# Patient Record
Sex: MALE | Race: BLACK OR AFRICAN AMERICAN | NOT HISPANIC OR LATINO | Employment: OTHER | URBAN - METROPOLITAN AREA
[De-identification: names, ages, dates, MRNs, and addresses within clinical notes are randomized per-mention and may not be internally consistent; named-entity substitution may affect disease eponyms.]

---

## 2017-01-09 RX ORDER — RISPERIDONE 1 MG/1
TABLET ORAL
Qty: 90 TABLET | Refills: 2 | Status: SHIPPED | OUTPATIENT
Start: 2017-01-09 | End: 2017-09-08

## 2017-01-25 RX ORDER — ALBUTEROL SULFATE 108 UG/1
AEROSOL, METERED RESPIRATORY (INHALATION)
Qty: 6.7 G | Refills: 0 | Status: SHIPPED | OUTPATIENT
Start: 2017-01-25 | End: 2017-03-06 | Stop reason: SDUPTHER

## 2017-01-31 RX ORDER — RISPERIDONE 1 MG/1
TABLET ORAL
Qty: 30 TABLET | Refills: 5 | Status: SHIPPED | OUTPATIENT
Start: 2017-01-31 | End: 2017-09-08 | Stop reason: SDUPTHER

## 2017-03-07 RX ORDER — ALBUTEROL SULFATE 108 UG/1
AEROSOL, METERED RESPIRATORY (INHALATION)
Qty: 6.7 G | Refills: 0 | Status: SHIPPED | OUTPATIENT
Start: 2017-03-07 | End: 2017-03-31 | Stop reason: SDUPTHER

## 2017-03-31 RX ORDER — ALBUTEROL SULFATE 108 UG/1
AEROSOL, METERED RESPIRATORY (INHALATION)
Qty: 6.7 G | Refills: 0 | Status: SHIPPED | OUTPATIENT
Start: 2017-03-31 | End: 2017-05-15 | Stop reason: SDUPTHER

## 2017-05-15 RX ORDER — ALBUTEROL SULFATE 90 UG/1
AEROSOL, METERED RESPIRATORY (INHALATION)
Qty: 6.7 G | Refills: 0 | Status: SHIPPED | OUTPATIENT
Start: 2017-05-15 | End: 2017-06-14 | Stop reason: SDUPTHER

## 2017-06-15 RX ORDER — ALBUTEROL SULFATE 90 UG/1
AEROSOL, METERED RESPIRATORY (INHALATION)
Qty: 6.7 G | Refills: 0 | Status: SHIPPED | OUTPATIENT
Start: 2017-06-15 | End: 2017-09-02 | Stop reason: SDUPTHER

## 2017-07-10 DIAGNOSIS — Z12.11 COLON CANCER SCREENING: ICD-10-CM

## 2017-08-15 RX ORDER — LISINOPRIL AND HYDROCHLOROTHIAZIDE 12.5; 2 MG/1; MG/1
2 TABLET ORAL DAILY
Qty: 60 TABLET | Refills: 2 | Status: SHIPPED | OUTPATIENT
Start: 2017-08-15 | End: 2018-03-14 | Stop reason: SDUPTHER

## 2017-09-05 RX ORDER — ALBUTEROL SULFATE 90 UG/1
AEROSOL, METERED RESPIRATORY (INHALATION)
Qty: 18 G | Refills: 0 | Status: SHIPPED | OUTPATIENT
Start: 2017-09-05 | End: 2017-10-30 | Stop reason: SDUPTHER

## 2017-09-08 DIAGNOSIS — Z12.11 COLON CANCER SCREENING: ICD-10-CM

## 2017-09-08 RX ORDER — RISPERIDONE 1 MG/1
TABLET ORAL
Qty: 90 TABLET | Refills: 0 | Status: SHIPPED | OUTPATIENT
Start: 2017-09-08 | End: 2018-03-14 | Stop reason: SDUPTHER

## 2017-10-09 RX ORDER — ALBUTEROL SULFATE 90 UG/1
AEROSOL, METERED RESPIRATORY (INHALATION)
Qty: 18 G | Refills: 0 | OUTPATIENT
Start: 2017-10-09

## 2017-10-09 RX ORDER — RISPERIDONE 1 MG/1
TABLET ORAL
Qty: 90 TABLET | Refills: 5 | Status: SHIPPED | OUTPATIENT
Start: 2017-10-09 | End: 2018-03-14

## 2017-10-30 RX ORDER — ALBUTEROL SULFATE 90 UG/1
AEROSOL, METERED RESPIRATORY (INHALATION)
Qty: 18 G | Refills: 1 | Status: SHIPPED | OUTPATIENT
Start: 2017-10-30 | End: 2018-03-20

## 2017-10-30 NOTE — TELEPHONE ENCOUNTER
----- Message from Jonathan Lu sent at 10/27/2017  4:14 PM CDT -----  Contact: Pt       _x  1st Request  _  2nd Request  _  3rd Request    Please refill the medication(s) listed below. Please call the patient when the prescription(s) is ready for  at this phone number     [phone]       Medication #1: VENTOLIN HFA 90 mcg/actuation inhaler       Preferred Pharmacy:[facility] [address] [place] [address] [place]NAL  AT SEC of [address]

## 2017-11-06 RX ORDER — FUROSEMIDE 40 MG/1
TABLET ORAL
Qty: 90 TABLET | Refills: 10 | Status: SHIPPED | OUTPATIENT
Start: 2017-11-06 | End: 2018-03-14 | Stop reason: SDUPTHER

## 2017-11-07 ENCOUNTER — PATIENT OUTREACH (OUTPATIENT)
Dept: ADMINISTRATIVE | Facility: HOSPITAL | Age: 52
End: 2017-11-07

## 2017-11-07 NOTE — PROGRESS NOTES
Pre chart for visit with Dr. Ramos on 11/10/17. Health maintenance letter mailed to pt. [facility] is committed to your overall health.  To help you get the most out of each of your visits, we will review your information to make sure you are up to date on all of your recommended tests and/or procedures.      Dr. Ramos has found that your chart shows you may be due for:    Health Maintenance Due   Topic Date Due    Colonoscopy  01/20/2015    Influenza Vaccine  08/01/2017          If you have had any of the above done at another facility, please bring the records or information with you so that your record at [facility] will be complete.  If you would like to schedule any of these, please contact me.    If you are currently taking medication, please bring it with you to your appointment for review.    Also, if you have any type of Advanced Directives, please bring them with you to your office visit so we may scan them into your chart.    Mary Rosales LPN  Clinic Care Coordinator  Internal Medicine  [facility]  [address]  [place] [address]  [phone]

## 2018-02-12 RX ORDER — FUROSEMIDE 40 MG/1
TABLET ORAL
Qty: 30 TABLET | Refills: 0 | Status: SHIPPED | OUTPATIENT
Start: 2018-02-12 | End: 2018-03-14

## 2018-03-14 ENCOUNTER — LAB VISIT (OUTPATIENT)
Dept: LAB | Facility: OTHER | Age: 53
End: 2018-03-14
Attending: FAMILY MEDICINE
Payer: MEDICARE

## 2018-03-14 ENCOUNTER — OFFICE VISIT (OUTPATIENT)
Dept: INTERNAL MEDICINE | Facility: CLINIC | Age: 53
End: 2018-03-14
Attending: FAMILY MEDICINE
Payer: MEDICARE

## 2018-03-14 VITALS
WEIGHT: 248.25 LBS | BODY MASS INDEX: 39.9 KG/M2 | HEART RATE: 101 BPM | SYSTOLIC BLOOD PRESSURE: 136 MMHG | OXYGEN SATURATION: 98 % | HEIGHT: 66 IN | DIASTOLIC BLOOD PRESSURE: 74 MMHG

## 2018-03-14 DIAGNOSIS — I10 HYPERTENSION, ESSENTIAL: ICD-10-CM

## 2018-03-14 DIAGNOSIS — H54.7 BLIND: ICD-10-CM

## 2018-03-14 DIAGNOSIS — Z00.00 PREVENTATIVE HEALTH CARE: ICD-10-CM

## 2018-03-14 DIAGNOSIS — Z00.00 PREVENTATIVE HEALTH CARE: Primary | ICD-10-CM

## 2018-03-14 DIAGNOSIS — H91.93 DEAF, BILATERAL: ICD-10-CM

## 2018-03-14 LAB
ALBUMIN SERPL BCP-MCNC: 3.2 G/DL
ALP SERPL-CCNC: 97 U/L
ALT SERPL W/O P-5'-P-CCNC: 16 U/L
ANION GAP SERPL CALC-SCNC: 12 MMOL/L
AST SERPL-CCNC: 14 U/L
BASOPHILS # BLD AUTO: 0.01 K/UL
BASOPHILS NFR BLD: 0.1 %
BILIRUB SERPL-MCNC: 0.4 MG/DL
BUN SERPL-MCNC: 25 MG/DL
CALCIUM SERPL-MCNC: 9.1 MG/DL
CHLORIDE SERPL-SCNC: 96 MMOL/L
CHOLEST SERPL-MCNC: 168 MG/DL
CHOLEST/HDLC SERPL: 3.4 {RATIO}
CO2 SERPL-SCNC: 31 MMOL/L
CREAT SERPL-MCNC: 2.3 MG/DL
DIFFERENTIAL METHOD: ABNORMAL
EOSINOPHIL # BLD AUTO: 0.3 K/UL
EOSINOPHIL NFR BLD: 2.2 %
ERYTHROCYTE [DISTWIDTH] IN BLOOD BY AUTOMATED COUNT: 19.1 %
EST. GFR  (AFRICAN AMERICAN): 36 ML/MIN/1.73 M^2
EST. GFR  (NON AFRICAN AMERICAN): 31 ML/MIN/1.73 M^2
ESTIMATED AVG GLUCOSE: 131 MG/DL
GLUCOSE SERPL-MCNC: 124 MG/DL
HBA1C MFR BLD HPLC: 6.2 %
HCT VFR BLD AUTO: 35.4 %
HDLC SERPL-MCNC: 50 MG/DL
HDLC SERPL: 29.8 %
HGB BLD-MCNC: 10.7 G/DL
LDLC SERPL CALC-MCNC: 90.8 MG/DL
LYMPHOCYTES # BLD AUTO: 1.1 K/UL
LYMPHOCYTES NFR BLD: 8 %
MCH RBC QN AUTO: 24.5 PG
MCHC RBC AUTO-ENTMCNC: 30.2 G/DL
MCV RBC AUTO: 81 FL
MONOCYTES # BLD AUTO: 0.5 K/UL
MONOCYTES NFR BLD: 3.5 %
NEUTROPHILS # BLD AUTO: 12 K/UL
NEUTROPHILS NFR BLD: 86 %
NONHDLC SERPL-MCNC: 118 MG/DL
PLATELET # BLD AUTO: 447 K/UL
PMV BLD AUTO: 9.5 FL
POTASSIUM SERPL-SCNC: 3.5 MMOL/L
PROT SERPL-MCNC: 9.2 G/DL
RBC # BLD AUTO: 4.36 M/UL
SODIUM SERPL-SCNC: 139 MMOL/L
TRIGL SERPL-MCNC: 136 MG/DL
TSH SERPL DL<=0.005 MIU/L-ACNC: 1.93 UIU/ML
WBC # BLD AUTO: 13.92 K/UL

## 2018-03-14 PROCEDURE — 99396 PREV VISIT EST AGE 40-64: CPT | Mod: S$GLB,,, | Performed by: FAMILY MEDICINE

## 2018-03-14 PROCEDURE — 85025 COMPLETE CBC W/AUTO DIFF WBC: CPT

## 2018-03-14 PROCEDURE — 80061 LIPID PANEL: CPT

## 2018-03-14 PROCEDURE — 80053 COMPREHEN METABOLIC PANEL: CPT

## 2018-03-14 PROCEDURE — 36415 COLL VENOUS BLD VENIPUNCTURE: CPT

## 2018-03-14 PROCEDURE — 83036 HEMOGLOBIN GLYCOSYLATED A1C: CPT

## 2018-03-14 PROCEDURE — 99499 UNLISTED E&M SERVICE: CPT | Mod: S$GLB,,, | Performed by: FAMILY MEDICINE

## 2018-03-14 PROCEDURE — 99999 PR PBB SHADOW E&M-EST. PATIENT-LVL III: CPT | Mod: PBBFAC,,, | Performed by: FAMILY MEDICINE

## 2018-03-14 PROCEDURE — 84443 ASSAY THYROID STIM HORMONE: CPT

## 2018-03-14 RX ORDER — LISINOPRIL AND HYDROCHLOROTHIAZIDE 12.5; 2 MG/1; MG/1
2 TABLET ORAL DAILY
Qty: 60 TABLET | Refills: 2 | Status: SHIPPED | OUTPATIENT
Start: 2018-03-14 | End: 2018-07-19 | Stop reason: SDUPTHER

## 2018-03-14 RX ORDER — ALBUTEROL SULFATE 0.63 MG/3ML
0.63 SOLUTION RESPIRATORY (INHALATION) EVERY 6 HOURS PRN
Qty: 1 BOX | Refills: 2 | Status: SHIPPED | OUTPATIENT
Start: 2018-03-14 | End: 2021-01-14

## 2018-03-14 RX ORDER — RISPERIDONE 1 MG/1
TABLET ORAL
Qty: 90 TABLET | Refills: 0 | Status: SHIPPED | OUTPATIENT
Start: 2018-03-14 | End: 2019-05-24 | Stop reason: SDUPTHER

## 2018-03-14 RX ORDER — FUROSEMIDE 40 MG/1
TABLET ORAL
Qty: 90 TABLET | Refills: 10 | Status: SHIPPED | OUTPATIENT
Start: 2018-03-14 | End: 2019-03-25 | Stop reason: SDUPTHER

## 2018-03-14 NOTE — PROGRESS NOTES
"CHIEF COMPLAINT:  Annual    HISTORY OF PRESENT ILLNESS: The patient is a fairly healthy 53 year-old male.  He is blind and deaf.  She was seen about 18 months ago.  He is seen with his family today.  The patient has been doing well overall.  He is quite active normally.      Since the last time I've seen him he has been doing very well.  His episode of bilateral lower extremity edema most consistent with congestive heart failure has responded to diuresis.  No edema is present currently.    REVIEW OF SYSTEMS:  GENERAL: No fever, chills, fatigability or weight loss.  SKIN: No rashes, itching or changes in color or texture of skin.  HEAD: No headaches or recent head trauma.  EYES: He is blind bilaterally.  EARS: Denies ear pain, discharge or vertigo.  He is deaf bilaterally  NOSE: No loss of smell, no epistaxis or postnasal drip.  MOUTH & THROAT: No hoarseness or change in voice. No excessive gum bleeding.  NODES: Denies swollen glands.  CHEST: Denies LI, cyanosis, cough and sputum production.  CARDIOVASCULAR: Denies chest pain, PND, orthopnea or reduced exercise tolerance.  ABDOMEN: Appetite fine. No weight loss. Denies diarrhea, abdominal pain, hematemesis.  URINARY: No flank pain, dysuria or hematuria.  PERIPHERAL VASCULAR: No claudication or cyanosis.  MUSCULOSKELETAL: No joint stiffness. Denies back pain.  NEUROLOGIC: No history of seizures, paralysis, alteration of gait or coordination.    SOCIAL HISTORY: The patient does not smoke.  The patient consumes alcohol socially.  The patient is disabled and lives with his family.    PHYSICAL EXAMINATION:     Blood pressure 136/74, pulse 101, height 5' 6" (1.676 m), weight 112.6 kg (248 lb 3.8 oz), SpO2 98 %.    APPEARANCE: Well nourished, well developed, in no acute distress.    HEAD: Normocephalic, atraumatic.  EYES: Changes consistent with bilateral corneal transplants and a pacer is noted  EARS: TM's intact. Light reflex normal. No retraction or perforation.  "   NOSE: Mucosa pink. Airway clear.  MOUTH & THROAT: No tonsillar enlargement. No pharyngeal erythema or exudate. No stridor.  NECK: Supple.   NODES: No cervical, axillary or inguinal lymph node enlargement.  CHEST: Lungs clear to auscultation.  No retractions are noted.  No rales or rhonchi are present.  CARDIOVASCULAR: Normal S1, S2. No rubs, murmurs or gallops.  ABDOMEN: Bowel sounds normal. Not distended. Soft. No tenderness or masses.  No ascites is noted.  MUSCULOSKELETAL:  There is no clubbing, cyanosis of the extremities x4.  There is 3+ bilateral lower extremity pitting edema to the knees bilaterally.  There is full range of motion of the lumbar spine.  There is full range of motion of the extremities x4.  There is no deformity noted.    NEUROLOGIC:       Normal gait.      Motor and sensory exams grossly normal.      DTR's normal.  PSYCHIATRIC: Patient is alert.  Thought processes are all normal.  There is no homicidality.  There is no suicidality.  There is no evidence of psychosis.    LABORATORY/RADIOLOGY:   Chart reviewed.  We will update blood work today.    ASSESSMENT:  Well compensated CHF  HTN, condition is well controlled on current medication regimen  Extensive psychiatric history, schizophrenia  Blindness  Deaf  Asthma, condition is well controlled on current medication regimen    PLAN:  Lisinopril/HCTZ 40/25 and Lasix 40 mg daily    Return to clinic in one year sooner if problems develop.  He has previously had an elevated creatinine but not sufficient for CK D3.  We will review that w/ a CBC CMP etc.

## 2018-03-15 ENCOUNTER — TELEPHONE (OUTPATIENT)
Dept: INTERNAL MEDICINE | Facility: CLINIC | Age: 53
End: 2018-03-15

## 2018-03-15 DIAGNOSIS — N18.30 CKD (CHRONIC KIDNEY DISEASE), STAGE III: Primary | ICD-10-CM

## 2018-03-15 NOTE — TELEPHONE ENCOUNTER
Left message for pt to call the office in regards to relating results and advice.    Referral pending

## 2018-03-15 NOTE — TELEPHONE ENCOUNTER
----- Message from Jonathan Lu sent at 3/15/2018  2:27 PM CDT -----  Contact: Pt  _x  1st Request  _  2nd Request  _  3rd Request        Who: Pt sister     Why: Returning a call back regarding test results.   Please return the call at earliest convenience. Thanks!    What Number to Call Back:[phone] (H)      When to Expect a call back: (Within 24 hours)

## 2018-03-15 NOTE — TELEPHONE ENCOUNTER
Pt sister informed of results and advice.  States verbal understanding.  Pt scheduled accordingly. There are no further questions or concerns.

## 2018-03-15 NOTE — TELEPHONE ENCOUNTER
----- Message from Zachary Ramos MD sent at 3/15/2018 11:44 AM CDT -----  Blood work looks good overall.  Kidney function has declined a little bit from last blood work.  He would benefit from seeing nephrology.  Please pend a referral for chronic kidney disease stage III.  No changes in medications.  Followup as scheduled with us in about a year.

## 2018-03-20 DIAGNOSIS — J45.909 ASTHMA, UNSPECIFIED ASTHMA SEVERITY, UNSPECIFIED WHETHER COMPLICATED, UNSPECIFIED WHETHER PERSISTENT: Primary | ICD-10-CM

## 2018-03-20 RX ORDER — ALBUTEROL SULFATE 90 UG/1
AEROSOL, METERED RESPIRATORY (INHALATION)
Qty: 18 G | Refills: 1 | Status: SHIPPED | OUTPATIENT
Start: 2018-03-20 | End: 2019-03-25 | Stop reason: SDUPTHER

## 2018-03-20 NOTE — TELEPHONE ENCOUNTER
Pt sister states pt received albuterol nebulizer. Pt generally uses inhaler. Sis would like PCP to authorize inhaler. Please advise/authorize?

## 2018-07-10 DIAGNOSIS — R89.9 ABNORMAL LABORATORY TEST: Primary | ICD-10-CM

## 2018-07-19 DIAGNOSIS — I10 ESSENTIAL HYPERTENSION: Primary | ICD-10-CM

## 2018-07-19 NOTE — TELEPHONE ENCOUNTER
----- Message from Lea Rosa LPN sent at 7/19/2018  2:33 PM CDT -----  Good afternoon. Brady Ledemsa from [facility] just sent an email requesting that the above patient get a refill on his Lisinopril/HCTZ . His last visit was on 3/14/118.    Lea MEYER LPN  Clinical Care Coordinator  Internal Medicine  [facility]

## 2018-07-20 RX ORDER — LISINOPRIL AND HYDROCHLOROTHIAZIDE 12.5; 2 MG/1; MG/1
2 TABLET ORAL DAILY
Qty: 60 TABLET | Refills: 2 | Status: SHIPPED | OUTPATIENT
Start: 2018-07-20 | End: 2018-11-19 | Stop reason: SDUPTHER

## 2018-08-24 ENCOUNTER — TELEPHONE (OUTPATIENT)
Dept: NEPHROLOGY | Facility: CLINIC | Age: 53
End: 2018-08-24

## 2018-08-28 ENCOUNTER — TELEPHONE (OUTPATIENT)
Dept: NEPHROLOGY | Facility: CLINIC | Age: 53
End: 2018-08-28

## 2018-09-04 RX ORDER — LISINOPRIL AND HYDROCHLOROTHIAZIDE 12.5; 2 MG/1; MG/1
TABLET ORAL
Qty: 180 TABLET | Refills: 2 | Status: SHIPPED | OUTPATIENT
Start: 2018-09-04 | End: 2020-08-10 | Stop reason: SDUPTHER

## 2018-09-21 DIAGNOSIS — Z12.11 COLON CANCER SCREENING: ICD-10-CM

## 2018-11-19 DIAGNOSIS — I10 ESSENTIAL HYPERTENSION: ICD-10-CM

## 2018-11-19 RX ORDER — LISINOPRIL AND HYDROCHLOROTHIAZIDE 12.5; 2 MG/1; MG/1
TABLET ORAL
Qty: 60 TABLET | Refills: 0 | Status: SHIPPED | OUTPATIENT
Start: 2018-11-19 | End: 2019-03-27 | Stop reason: SDUPTHER

## 2018-11-19 RX ORDER — LISINOPRIL AND HYDROCHLOROTHIAZIDE 12.5; 2 MG/1; MG/1
TABLET ORAL
Qty: 180 TABLET | Refills: 2 | Status: SHIPPED | OUTPATIENT
Start: 2018-11-19 | End: 2019-12-24

## 2018-12-11 PROBLEM — Z91.89 CANDIDATE FOR STATIN THERAPY DUE TO RISK OF FUTURE CARDIOVASCULAR EVENT: Status: ACTIVE | Noted: 2018-12-11

## 2018-12-11 PROBLEM — M79.606 LEG PAIN: Status: ACTIVE | Noted: 2018-12-11

## 2018-12-11 PROBLEM — N17.9 AKI (ACUTE KIDNEY INJURY): Status: ACTIVE | Noted: 2018-12-11

## 2019-03-13 DIAGNOSIS — I10 ESSENTIAL HYPERTENSION: ICD-10-CM

## 2019-03-13 RX ORDER — LISINOPRIL AND HYDROCHLOROTHIAZIDE 12.5; 2 MG/1; MG/1
2 TABLET ORAL DAILY
Qty: 180 TABLET | Refills: 2 | OUTPATIENT
Start: 2019-03-13

## 2019-03-13 RX ORDER — LISINOPRIL AND HYDROCHLOROTHIAZIDE 12.5; 2 MG/1; MG/1
2 TABLET ORAL DAILY
Qty: 180 TABLET | Refills: 2 | Status: CANCELLED | OUTPATIENT
Start: 2019-03-13

## 2019-03-25 DIAGNOSIS — J45.909 ASTHMA, UNSPECIFIED ASTHMA SEVERITY, UNSPECIFIED WHETHER COMPLICATED, UNSPECIFIED WHETHER PERSISTENT: ICD-10-CM

## 2019-03-25 RX ORDER — ALBUTEROL SULFATE 90 UG/1
AEROSOL, METERED RESPIRATORY (INHALATION)
Qty: 18 G | Refills: 0 | Status: SHIPPED | OUTPATIENT
Start: 2019-03-25 | End: 2019-12-24

## 2019-03-26 RX ORDER — FUROSEMIDE 40 MG/1
TABLET ORAL
Qty: 90 TABLET | Refills: 0 | Status: SHIPPED | OUTPATIENT
Start: 2019-03-26 | End: 2019-06-30 | Stop reason: SDUPTHER

## 2019-03-27 DIAGNOSIS — I10 ESSENTIAL HYPERTENSION: ICD-10-CM

## 2019-03-27 RX ORDER — LISINOPRIL AND HYDROCHLOROTHIAZIDE 12.5; 2 MG/1; MG/1
2 TABLET ORAL DAILY
Qty: 60 TABLET | Refills: 0 | Status: SHIPPED | OUTPATIENT
Start: 2019-03-27 | End: 2019-11-20 | Stop reason: SDUPTHER

## 2019-03-27 NOTE — TELEPHONE ENCOUNTER
----- Message from Mony Lara sent at 3/27/2019  4:43 PM CDT -----  Contact: CALVIN TORRES JR. [[id number]]  Can the clinic reply in [facility]:n    Please refill the medication(s) listed below.     Please call the patient when the prescription(s) is ready for  at the phone number   [phone]    Medication #1:lisinopril-hydrochlorothiazide (PRINZIDE,ZESTORETIC) 20-12.5 mg per tablet      Preferred Pharmacy:[facility] [address] [place] [address] AT [facility] OF [address] [phone]

## 2019-04-10 ENCOUNTER — PES CALL (OUTPATIENT)
Dept: ADMINISTRATIVE | Facility: CLINIC | Age: 54
End: 2019-04-10

## 2019-05-06 ENCOUNTER — TELEPHONE (OUTPATIENT)
Dept: INTERNAL MEDICINE | Facility: CLINIC | Age: 54
End: 2019-05-06

## 2019-05-06 NOTE — TELEPHONE ENCOUNTER
----- Message from Jun Sweeney sent at 5/6/2019 10:28 AM CDT -----  Contact: Wendy (sister)  Name of Who is Calling: Wendy (sister)      What is the request in detail: Would like to speak with staff in regards to needing a letter for [facility], fax [phone] Attn: Vanessa Valencia; with patient's intellectual disabilities, diagnosis, include limitation ie shopping, ADL's, make appt. Please call to discuss.       Can the clinic reply by [facility]: no      What Number to Call Back if not in [facility]: [phone]

## 2019-05-09 ENCOUNTER — LAB VISIT (OUTPATIENT)
Dept: LAB | Facility: OTHER | Age: 54
End: 2019-05-09
Attending: FAMILY MEDICINE
Payer: MEDICARE

## 2019-05-09 ENCOUNTER — OFFICE VISIT (OUTPATIENT)
Dept: INTERNAL MEDICINE | Facility: CLINIC | Age: 54
End: 2019-05-09
Attending: FAMILY MEDICINE
Payer: MEDICARE

## 2019-05-09 VITALS
WEIGHT: 202.38 LBS | BODY MASS INDEX: 33.72 KG/M2 | SYSTOLIC BLOOD PRESSURE: 132 MMHG | HEART RATE: 114 BPM | OXYGEN SATURATION: 95 % | HEIGHT: 65 IN | DIASTOLIC BLOOD PRESSURE: 88 MMHG

## 2019-05-09 DIAGNOSIS — H54.7 BLIND: ICD-10-CM

## 2019-05-09 DIAGNOSIS — H91.93 DEAF, BILATERAL: ICD-10-CM

## 2019-05-09 DIAGNOSIS — I10 ESSENTIAL HYPERTENSION: ICD-10-CM

## 2019-05-09 DIAGNOSIS — N18.30 CKD (CHRONIC KIDNEY DISEASE), STAGE III: ICD-10-CM

## 2019-05-09 DIAGNOSIS — Z00.00 PREVENTATIVE HEALTH CARE: Primary | ICD-10-CM

## 2019-05-09 DIAGNOSIS — R79.9 ABNORMAL FINDING OF BLOOD CHEMISTRY: ICD-10-CM

## 2019-05-09 LAB
ALBUMIN SERPL BCP-MCNC: 3.6 G/DL (ref 3.5–5.2)
ALP SERPL-CCNC: 102 U/L (ref 55–135)
ALT SERPL W/O P-5'-P-CCNC: 15 U/L (ref 10–44)
ANION GAP SERPL CALC-SCNC: 11 MMOL/L (ref 8–16)
AST SERPL-CCNC: 11 U/L (ref 10–40)
BASOPHILS # BLD AUTO: 0.02 K/UL (ref 0–0.2)
BASOPHILS NFR BLD: 0.2 % (ref 0–1.9)
BILIRUB SERPL-MCNC: 0.3 MG/DL (ref 0.1–1)
BUN SERPL-MCNC: 43 MG/DL (ref 6–20)
CALCIUM SERPL-MCNC: 10.1 MG/DL (ref 8.7–10.5)
CHLORIDE SERPL-SCNC: 103 MMOL/L (ref 95–110)
CHOLEST SERPL-MCNC: 137 MG/DL (ref 120–199)
CHOLEST/HDLC SERPL: 2.7 {RATIO} (ref 2–5)
CO2 SERPL-SCNC: 30 MMOL/L (ref 23–29)
CREAT SERPL-MCNC: 2.3 MG/DL (ref 0.5–1.4)
DIFFERENTIAL METHOD: ABNORMAL
EOSINOPHIL # BLD AUTO: 0.3 K/UL (ref 0–0.5)
EOSINOPHIL NFR BLD: 2.8 % (ref 0–8)
ERYTHROCYTE [DISTWIDTH] IN BLOOD BY AUTOMATED COUNT: 17.8 % (ref 11.5–14.5)
EST. GFR  (AFRICAN AMERICAN): 36 ML/MIN/1.73 M^2
EST. GFR  (NON AFRICAN AMERICAN): 31 ML/MIN/1.73 M^2
ESTIMATED AVG GLUCOSE: 117 MG/DL (ref 68–131)
GLUCOSE SERPL-MCNC: 81 MG/DL (ref 70–110)
HBA1C MFR BLD HPLC: 5.7 % (ref 4–5.6)
HCT VFR BLD AUTO: 33.8 % (ref 40–54)
HDLC SERPL-MCNC: 50 MG/DL (ref 40–75)
HDLC SERPL: 36.5 % (ref 20–50)
HGB BLD-MCNC: 10.4 G/DL (ref 14–18)
LDLC SERPL CALC-MCNC: 67.8 MG/DL (ref 63–159)
LYMPHOCYTES # BLD AUTO: 1.6 K/UL (ref 1–4.8)
LYMPHOCYTES NFR BLD: 14.6 % (ref 18–48)
MCH RBC QN AUTO: 25.4 PG (ref 27–31)
MCHC RBC AUTO-ENTMCNC: 30.8 G/DL (ref 32–36)
MCV RBC AUTO: 83 FL (ref 82–98)
MONOCYTES # BLD AUTO: 0.6 K/UL (ref 0.3–1)
MONOCYTES NFR BLD: 5.3 % (ref 4–15)
NEUTROPHILS # BLD AUTO: 8.2 K/UL (ref 1.8–7.7)
NEUTROPHILS NFR BLD: 76.9 % (ref 38–73)
NONHDLC SERPL-MCNC: 87 MG/DL
PLATELET # BLD AUTO: 489 K/UL (ref 150–350)
PMV BLD AUTO: 9.6 FL (ref 9.2–12.9)
POTASSIUM SERPL-SCNC: 4 MMOL/L (ref 3.5–5.1)
PROT SERPL-MCNC: 9.1 G/DL (ref 6–8.4)
RBC # BLD AUTO: 4.09 M/UL (ref 4.6–6.2)
SODIUM SERPL-SCNC: 144 MMOL/L (ref 136–145)
TRIGL SERPL-MCNC: 96 MG/DL (ref 30–150)
TSH SERPL DL<=0.005 MIU/L-ACNC: 1.91 UIU/ML (ref 0.4–4)
WBC # BLD AUTO: 10.64 K/UL (ref 3.9–12.7)

## 2019-05-09 PROCEDURE — 85025 COMPLETE CBC W/AUTO DIFF WBC: CPT

## 2019-05-09 PROCEDURE — 80053 COMPREHEN METABOLIC PANEL: CPT

## 2019-05-09 PROCEDURE — 3075F PR MOST RECENT SYSTOLIC BLOOD PRESS GE 130-139MM HG: ICD-10-PCS | Mod: CPTII,S$GLB,, | Performed by: FAMILY MEDICINE

## 2019-05-09 PROCEDURE — 99499 UNLISTED E&M SERVICE: CPT | Mod: S$GLB,,, | Performed by: FAMILY MEDICINE

## 2019-05-09 PROCEDURE — 99499 RISK ADDL DX/OHS AUDIT: ICD-10-PCS | Mod: S$GLB,,, | Performed by: FAMILY MEDICINE

## 2019-05-09 PROCEDURE — 99999 PR PBB SHADOW E&M-EST. PATIENT-LVL III: ICD-10-PCS | Mod: PBBFAC,,, | Performed by: FAMILY MEDICINE

## 2019-05-09 PROCEDURE — 3075F SYST BP GE 130 - 139MM HG: CPT | Mod: CPTII,S$GLB,, | Performed by: FAMILY MEDICINE

## 2019-05-09 PROCEDURE — 84443 ASSAY THYROID STIM HORMONE: CPT

## 2019-05-09 PROCEDURE — 3079F PR MOST RECENT DIASTOLIC BLOOD PRESSURE 80-89 MM HG: ICD-10-PCS | Mod: CPTII,S$GLB,, | Performed by: FAMILY MEDICINE

## 2019-05-09 PROCEDURE — 36415 COLL VENOUS BLD VENIPUNCTURE: CPT

## 2019-05-09 PROCEDURE — 99214 PR OFFICE/OUTPT VISIT, EST, LEVL IV, 30-39 MIN: ICD-10-PCS | Mod: S$GLB,,, | Performed by: FAMILY MEDICINE

## 2019-05-09 PROCEDURE — 99999 PR PBB SHADOW E&M-EST. PATIENT-LVL III: CPT | Mod: PBBFAC,,, | Performed by: FAMILY MEDICINE

## 2019-05-09 PROCEDURE — 80061 LIPID PANEL: CPT

## 2019-05-09 PROCEDURE — 99214 OFFICE O/P EST MOD 30 MIN: CPT | Mod: S$GLB,,, | Performed by: FAMILY MEDICINE

## 2019-05-09 PROCEDURE — 3079F DIAST BP 80-89 MM HG: CPT | Mod: CPTII,S$GLB,, | Performed by: FAMILY MEDICINE

## 2019-05-09 PROCEDURE — 83036 HEMOGLOBIN GLYCOSYLATED A1C: CPT

## 2019-05-09 NOTE — LETTER
May 9, 2019    Frantz Sigala Jr.  [address] [facility] [place] [address]             [facility] [address] [facility] [address]  Phone: [phone]  Fax: [phone] Dear Mr. Sigala:    This letter is to confirm that you are blind and deaf.  You also are treated for schizophrenia.  You have developmental delay and only mild to moderate cognitive function.  You need assistance with self care, ADLs and language.  You will never be able to live independently.    If you have any questions or concerns, please don't hesitate to call.    Sincerely,        Zachary Ramos MD

## 2019-05-09 NOTE — PROGRESS NOTES
"CHIEF COMPLAINT:  Annual    HISTORY OF PRESENT ILLNESS: The patient is a fairly healthy 53 year-old male.  He is blind and deaf.  She was seen about 18 months ago.  He is seen with his family today.  The patient has been doing well overall.  He is quite active normally.      Since the last time I've seen him he has been doing very well.  His episode of bilateral lower extremity edema most consistent with congestive heart failure has responded to diuresis.  No edema is present currently.    REVIEW OF SYSTEMS:  GENERAL: No fever, chills, fatigability or weight loss.  SKIN: No rashes, itching or changes in color or texture of skin.  HEAD: No headaches or recent head trauma.  EYES: He is blind bilaterally.  EARS: Denies ear pain, discharge or vertigo.  He is deaf bilaterally  NOSE: No loss of smell, no epistaxis or postnasal drip.  MOUTH & THROAT: No hoarseness or change in voice. No excessive gum bleeding.  NODES: Denies swollen glands.  CHEST: Denies LI, cyanosis, cough and sputum production.  CARDIOVASCULAR: Denies chest pain, PND, orthopnea or reduced exercise tolerance.  ABDOMEN: Appetite fine. No weight loss. Denies diarrhea, abdominal pain, hematemesis.  URINARY: No flank pain, dysuria or hematuria.  PERIPHERAL VASCULAR: No claudication or cyanosis.  MUSCULOSKELETAL: No joint stiffness. Denies back pain.  NEUROLOGIC: No history of seizures, paralysis, alteration of gait or coordination.    SOCIAL HISTORY: The patient does not smoke.  The patient consumes alcohol socially.  The patient is disabled and lives with his family.    PHYSICAL EXAMINATION:     Blood pressure 132/88, pulse (!) 114, height 5' 5" (1.651 m), weight 91.8 kg (202 lb 6.1 oz), SpO2 95 %.    APPEARANCE: Well nourished, well developed, in no acute distress.    HEAD: Normocephalic, atraumatic.  EYES: Changes consistent with bilateral corneal transplants and a pacer is noted  EARS: TM's intact. Light reflex normal. No retraction or perforation.  "   NOSE: Mucosa pink. Airway clear.  MOUTH & THROAT: No tonsillar enlargement. No pharyngeal erythema or exudate. No stridor.  NECK: Supple.   NODES: No cervical, axillary or inguinal lymph node enlargement.  CHEST: Lungs clear to auscultation.  No retractions are noted.  No rales or rhonchi are present.  CARDIOVASCULAR: Normal S1, S2. No rubs, murmurs or gallops.  ABDOMEN: Bowel sounds normal. Not distended. Soft. No tenderness or masses.  No ascites is noted.  MUSCULOSKELETAL:  There is no clubbing, cyanosis of the extremities x4.  There is 3+ bilateral lower extremity pitting edema to the knees bilaterally.  There is full range of motion of the lumbar spine.  There is full range of motion of the extremities x4.  There is no deformity noted.    NEUROLOGIC:       Normal gait.      Motor and sensory exams grossly normal.  PSYCHIATRIC: Patient is alert.  There is no homicidality.  There is no suicidality.  There is no evidence of psychosis.    LABORATORY/RADIOLOGY:   Chart reviewed.  We will update blood work today.    ASSESSMENT:  Annual  He has stable CK D3.   Well compensated CHF  HTN, condition is well controlled on current medication regimen  Extensive psychiatric history, schizophrenia  Blindness  Deaf  Asthma, condition is well controlled on current medication regimen    PLAN:  We will follow-up blood work which we expect to be normal.    Lisinopril/HCTZ 40/25 and Lasix 40 mg daily  Return to clinic in one year sooner if problems develop.

## 2019-05-10 ENCOUNTER — TELEPHONE (OUTPATIENT)
Dept: INTERNAL MEDICINE | Facility: CLINIC | Age: 54
End: 2019-05-10

## 2019-05-10 NOTE — TELEPHONE ENCOUNTER
Spoke with pts sister, she had a verbal understanding.  ----- Message from Zachary Ramos MD sent at 5/10/2019  8:01 AM CDT -----  Blood work is better than ever.  Kidney function is stable.  No changes in medications.  Followup as scheduled.

## 2019-05-24 RX ORDER — RISPERIDONE 1 MG/1
TABLET ORAL
Qty: 90 TABLET | Refills: 0 | Status: SHIPPED | OUTPATIENT
Start: 2019-05-24 | End: 2019-10-14 | Stop reason: SDUPTHER

## 2019-05-31 DIAGNOSIS — N18.9 CHRONIC KIDNEY DISEASE, UNSPECIFIED CKD STAGE: ICD-10-CM

## 2019-07-01 RX ORDER — FUROSEMIDE 40 MG/1
TABLET ORAL
Qty: 90 TABLET | Refills: 3 | Status: SHIPPED | OUTPATIENT
Start: 2019-07-01 | End: 2020-07-08 | Stop reason: SDUPTHER

## 2019-07-03 ENCOUNTER — TELEPHONE (OUTPATIENT)
Dept: INTERNAL MEDICINE | Facility: CLINIC | Age: 54
End: 2019-07-03

## 2019-07-03 NOTE — TELEPHONE ENCOUNTER
----- Message from Jun Sweeney sent at 7/3/2019  1:57 PM CDT -----  Contact: Wendy (sister)      Can the clinic reply in [facility]: no      Please refill the medication(s) listed below. Please call the patient when the prescription(s) is ready for  at this phone number   [phone]       Medication #1 Flomax    Medication #2 Fluid pill      Preferred Pharmacy:  [facility] [address] [place] [address] [address]  AT [address]

## 2019-09-13 DIAGNOSIS — N18.9 CHRONIC KIDNEY DISEASE, UNSPECIFIED CKD STAGE: ICD-10-CM

## 2019-09-27 DIAGNOSIS — Z12.11 COLON CANCER SCREENING: ICD-10-CM

## 2019-10-14 RX ORDER — RISPERIDONE 1 MG/1
TABLET ORAL
Qty: 90 TABLET | Refills: 0 | Status: SHIPPED | OUTPATIENT
Start: 2019-10-14 | End: 2020-01-13

## 2019-11-20 DIAGNOSIS — I10 ESSENTIAL HYPERTENSION: ICD-10-CM

## 2019-11-20 RX ORDER — LISINOPRIL AND HYDROCHLOROTHIAZIDE 12.5; 2 MG/1; MG/1
TABLET ORAL
Qty: 60 TABLET | Refills: 0 | Status: SHIPPED | OUTPATIENT
Start: 2019-11-20 | End: 2019-12-24

## 2019-11-20 NOTE — TELEPHONE ENCOUNTER
----- Message from Shawna Carolina sent at 11/20/2019  9:15 AM CST -----  Contact: Wendy (sister)  Can the clinic reply in [facility]: no    Please refill the medication(s) listed below. The patient can be reached at this phone number once it is called into the pharmacy [phone]    Medication #1: lisinopril-hydrochlorothiazide (PRINZIDE,ZESTORETIC) 20-12.5 mg per tablet    Preferred Pharmacy: [id number] - [place] [id number] [facility] AT [address]

## 2019-12-10 ENCOUNTER — PATIENT OUTREACH (OUTPATIENT)
Dept: ADMINISTRATIVE | Facility: HOSPITAL | Age: 54
End: 2019-12-10

## 2019-12-10 NOTE — PROGRESS NOTES
[employer] was given to patient on 12/10/2019 11:55 AM       Lea MEYER LPN  Clinical Care Coordinator  Internal Medicine  [facility]  [phone]

## 2019-12-18 ENCOUNTER — PATIENT OUTREACH (OUTPATIENT)
Dept: ADMINISTRATIVE | Facility: OTHER | Age: 54
End: 2019-12-18

## 2019-12-18 NOTE — PROGRESS NOTES
Spoke with patient's sister/caregiver. Patient is blind and deaf. Sister stated that she wanted to hold off until she spoke with patient's mother about scheduling a colonoscopy.

## 2019-12-23 DIAGNOSIS — J45.909 ASTHMA, UNSPECIFIED ASTHMA SEVERITY, UNSPECIFIED WHETHER COMPLICATED, UNSPECIFIED WHETHER PERSISTENT: ICD-10-CM

## 2019-12-23 DIAGNOSIS — I10 ESSENTIAL HYPERTENSION: ICD-10-CM

## 2019-12-24 DIAGNOSIS — I10 ESSENTIAL HYPERTENSION: ICD-10-CM

## 2019-12-24 RX ORDER — LISINOPRIL AND HYDROCHLOROTHIAZIDE 12.5; 2 MG/1; MG/1
TABLET ORAL
Qty: 180 TABLET | Refills: 2 | Status: SHIPPED | OUTPATIENT
Start: 2019-12-24 | End: 2020-08-10 | Stop reason: SDUPTHER

## 2019-12-24 RX ORDER — ALBUTEROL SULFATE 90 UG/1
AEROSOL, METERED RESPIRATORY (INHALATION)
Qty: 18 G | Refills: 0 | Status: SHIPPED | OUTPATIENT
Start: 2019-12-24 | End: 2021-01-14 | Stop reason: SDUPTHER

## 2019-12-24 RX ORDER — LISINOPRIL AND HYDROCHLOROTHIAZIDE 12.5; 2 MG/1; MG/1
TABLET ORAL
Qty: 60 TABLET | Refills: 0 | Status: SHIPPED | OUTPATIENT
Start: 2019-12-24 | End: 2020-08-10 | Stop reason: SDUPTHER

## 2019-12-26 ENCOUNTER — TELEPHONE (OUTPATIENT)
Dept: INTERNAL MEDICINE | Facility: CLINIC | Age: 54
End: 2019-12-26

## 2019-12-26 RX ORDER — LISINOPRIL AND HYDROCHLOROTHIAZIDE 12.5; 2 MG/1; MG/1
TABLET ORAL
Qty: 60 TABLET | Refills: 2 | Status: SHIPPED | OUTPATIENT
Start: 2019-12-26 | End: 2020-08-10 | Stop reason: SDUPTHER

## 2019-12-27 NOTE — TELEPHONE ENCOUNTER
Staff spoke with patient's daughter at [phone]. She stated she wasn't around the patient or his wife at the moment, but will discuss this matter with her mother. She then provided staff with the phone anupama serna to contact the wife at [phone], no answer and mailbox was full.

## 2020-01-13 RX ORDER — RISPERIDONE 1 MG/1
TABLET ORAL
Qty: 90 TABLET | Refills: 0 | Status: SHIPPED | OUTPATIENT
Start: 2020-01-13 | End: 2020-06-11 | Stop reason: SDUPTHER

## 2020-06-11 RX ORDER — RISPERIDONE 1 MG/1
1 TABLET ORAL DAILY
Qty: 90 TABLET | Refills: 0 | Status: SHIPPED | OUTPATIENT
Start: 2020-06-11 | End: 2020-08-14 | Stop reason: SDUPTHER

## 2020-06-23 ENCOUNTER — TELEPHONE (OUTPATIENT)
Dept: PRIMARY CARE CLINIC | Facility: CLINIC | Age: 55
End: 2020-06-23

## 2020-07-08 RX ORDER — FUROSEMIDE 40 MG/1
40 TABLET ORAL DAILY
Qty: 90 TABLET | Refills: 3 | Status: SHIPPED | OUTPATIENT
Start: 2020-07-08 | End: 2021-07-27 | Stop reason: SDUPTHER

## 2020-07-08 NOTE — TELEPHONE ENCOUNTER
----- Message from Jun Sweeney sent at 7/8/2020  8:09 AM CDT -----  Regarding: refill/Orders  Contact: Wendy (sister)      Can the clinic reply in [facility]: no    1) Would like to have annual blood work orders place for 8/10 around 9:30.     2) Please refill the medication(s) listed below. Please call the patient when the prescription(s) is ready for  at this phone number   [phone]      Medication #1 all medication (patient is out of Lasix)    Preferred Pharmacy: [facility] #[id number] - [place], [place] - [address] [place] [address] AT [address]

## 2020-07-27 ENCOUNTER — PATIENT OUTREACH (OUTPATIENT)
Dept: ADMINISTRATIVE | Facility: HOSPITAL | Age: 55
End: 2020-07-27

## 2020-08-07 ENCOUNTER — TELEPHONE (OUTPATIENT)
Dept: INTERNAL MEDICINE | Facility: CLINIC | Age: 55
End: 2020-08-07

## 2020-08-07 NOTE — TELEPHONE ENCOUNTER
Patient sister called stating that patient won't be able to come to previous time scheduled do to an emergency meeting she have. Scheduled patient for a later time.

## 2020-08-07 NOTE — TELEPHONE ENCOUNTER
----- Message from Williams Nixon sent at 8/7/2020  4:06 PM CDT -----      Name of Who is Calling: CALVIN TORRES JR. [[id number]] Sister      What is the request in detail: Sister called said an emergency meeting came up same time as appt and would like to know if pt could be seen later in the day or the following day.Please contact to further discuss and advise.          Can the clinic reply by [facility]: N      What Number [facility] Back [facility]: [phone]

## 2020-08-10 ENCOUNTER — OFFICE VISIT (OUTPATIENT)
Dept: INTERNAL MEDICINE | Facility: CLINIC | Age: 55
End: 2020-08-10
Attending: FAMILY MEDICINE
Payer: MEDICARE

## 2020-08-10 VITALS
HEART RATE: 91 BPM | SYSTOLIC BLOOD PRESSURE: 160 MMHG | OXYGEN SATURATION: 99 % | HEIGHT: 65 IN | BODY MASS INDEX: 33.66 KG/M2 | WEIGHT: 202 LBS | DIASTOLIC BLOOD PRESSURE: 88 MMHG

## 2020-08-10 DIAGNOSIS — H54.3 BLINDNESS OF BOTH EYES: ICD-10-CM

## 2020-08-10 DIAGNOSIS — I10 ESSENTIAL HYPERTENSION: Primary | ICD-10-CM

## 2020-08-10 DIAGNOSIS — N18.30 CKD (CHRONIC KIDNEY DISEASE), STAGE III: ICD-10-CM

## 2020-08-10 DIAGNOSIS — H91.93 DEAF, BILATERAL: ICD-10-CM

## 2020-08-10 PROCEDURE — 3077F SYST BP >= 140 MM HG: CPT | Mod: CPTII,S$GLB,, | Performed by: FAMILY MEDICINE

## 2020-08-10 PROCEDURE — 3008F PR BODY MASS INDEX (BMI) DOCUMENTED: ICD-10-PCS | Mod: CPTII,S$GLB,, | Performed by: FAMILY MEDICINE

## 2020-08-10 PROCEDURE — 99999 PR PBB SHADOW E&M-EST. PATIENT-LVL III: CPT | Mod: PBBFAC,,, | Performed by: FAMILY MEDICINE

## 2020-08-10 PROCEDURE — 3077F PR MOST RECENT SYSTOLIC BLOOD PRESSURE >= 140 MM HG: ICD-10-PCS | Mod: CPTII,S$GLB,, | Performed by: FAMILY MEDICINE

## 2020-08-10 PROCEDURE — 99499 UNLISTED E&M SERVICE: CPT | Mod: S$GLB,,, | Performed by: FAMILY MEDICINE

## 2020-08-10 PROCEDURE — 3008F BODY MASS INDEX DOCD: CPT | Mod: CPTII,S$GLB,, | Performed by: FAMILY MEDICINE

## 2020-08-10 PROCEDURE — 99214 PR OFFICE/OUTPT VISIT, EST, LEVL IV, 30-39 MIN: ICD-10-PCS | Mod: S$GLB,,, | Performed by: FAMILY MEDICINE

## 2020-08-10 PROCEDURE — 99214 OFFICE O/P EST MOD 30 MIN: CPT | Mod: S$GLB,,, | Performed by: FAMILY MEDICINE

## 2020-08-10 PROCEDURE — 3079F PR MOST RECENT DIASTOLIC BLOOD PRESSURE 80-89 MM HG: ICD-10-PCS | Mod: CPTII,S$GLB,, | Performed by: FAMILY MEDICINE

## 2020-08-10 PROCEDURE — 99999 PR PBB SHADOW E&M-EST. PATIENT-LVL III: ICD-10-PCS | Mod: PBBFAC,,, | Performed by: FAMILY MEDICINE

## 2020-08-10 PROCEDURE — 3079F DIAST BP 80-89 MM HG: CPT | Mod: CPTII,S$GLB,, | Performed by: FAMILY MEDICINE

## 2020-08-10 PROCEDURE — 99499 RISK ADDL DX/OHS AUDIT: ICD-10-PCS | Mod: S$GLB,,, | Performed by: FAMILY MEDICINE

## 2020-08-10 RX ORDER — LISINOPRIL AND HYDROCHLOROTHIAZIDE 12.5; 2 MG/1; MG/1
2 TABLET ORAL DAILY
Qty: 180 TABLET | Refills: 2 | Status: SHIPPED | OUTPATIENT
Start: 2020-08-10 | End: 2021-07-26 | Stop reason: SDUPTHER

## 2020-08-14 NOTE — TELEPHONE ENCOUNTER
----- Message from Coretta Beltran sent at 8/14/2020  3:39 PM CDT -----  Regarding: Refill Request  Please refill the medication(s) listed below :Wendy (Sister) states patient is completely out of his medication and would like prescription today for he can have some over the weekend    Medication # 1 :risperiDONE (RISPERDAL) 1 MG tablet    Medication # 2 :furosemide (LASIX) 40 MG tablet    Please call the patient when the prescription is sent to pharmacy :[phone]    Can the clinic reply in [facility] :No    Preferred Pharmacy : [facility] #[id number] [address] AT [address] [phone] (Phone)  [phone] (Fax)

## 2020-08-14 NOTE — TELEPHONE ENCOUNTER
History of Present Illness





- History of Present Illness


History of Present Illness: 





Surgery: Dr. Valerio, covering for Dr. Prescott 





CC: worsening right heal pain, chronic wound 





HPI: Patient is a 58 y/o male who presents from [facility] with worsening 

severe right heal and foot pain. Patient states the pain has been present since 

last admission but over the past week has increased in intensity. He reports 

extension of the necrosis on the heal and states that the past couple of days 

he has noticed a bright green discharge on the dressing. He denies fever or 

chills. He reports being non-weight bearing to the heal and only ambulates when 

necessary with a walker. He states that the wound vac was discontinued and they 

have been doing betadine and gauze dressing daily to the wounds. He reports 

completing full 2 weeks of IV abx at [facility]. He states since last 

hospitalization he has lost 40lbs due to decreased appetite. Patient denies any 

other complaints at this time.





PMHx: HTN, DM, PVD, bilateral chronic foot wounds, CKD, CAD w/ AICD, COPD 


PSH: left TMA, TMA revision and debridement x2, right heal debridement w/ skin 

graft placement, angios w/ stent placement, renal transplant 


Social: + tobacco use, denies ETOH or drug use 


Fam hx: noncontributory  





Present on Admission





- Present on Admission


Any Indicators Present on Admission: No





Review of Systems





- Review of Systems


All systems: reviewed and no additional remarkable complaints except


Review of Systems: 





unless stated in HPI





- Constitutional


Constitutional: Anorexia.  absent: Chills, Fever





- EENT


Eyes: absent: Blurred Vision, Change in Vision


Ears: absent: Disequilibrium, Dizziness


Nose/Mouth/Throat: absent: Nasal Congestion, Dysphagia





- Cardiovascular


Cardiovascular: absent: Chest Pain, Edema, Syncope





- Respiratory


Respiratory: absent: Cough, Wheezing, Chest Congestion





- Gastrointestinal


Gastrointestinal: absent: Abdominal Pain, Bloating, Constipation





- Genitourinary


Genitourinary: absent: Hematuria, Pyuria





- Musculoskeletal


Musculoskeletal: absent: Back Pain, Muscle Cramps





- Integumentary


Integumentary: Wounds





- Neurological


Neurological: absent: Disequilibrium, Dizziness





- Psychiatric


Psychiatric: absent: Behavioral Changes, Depression





- Endocrine


Endocrine: absent: Polydipsia, Polyphagia





- Hematologic/Lymphatic


Hematologic: absent: Easy Bleeding, Easy Bruising





Past Patient History





- Infectious Disease


Hx of Infectious Diseases: None





- Tetanus Immunizations


Tetanus Immunization: Unknown





- Past Social History


Smoking Status: Former Smoker





- CARDIAC


Hx Cardiac Disorders: Yes (cad)


Hx Congestive Heart Failure: Yes


Hx Hypercholesterolemia: Yes


Hx Hypertension: Yes





- PULMONARY


Hx Chronic Obstructive Pulmonary Disease (COPD): Yes





- NEUROLOGICAL


Hx Neurological Disorder: No





- HEENT


Hx HEENT Problems: Yes


Hx Macular Degeneration: Yes


Other/Comment: laser sx for macular degeneration 5 yrs ago both eyes





- RENAL


Hx Renal Failure: Yes





- ENDOCRINE/METABOLIC


Hx Diabetes Mellitus Type 2: Yes





- HEMATOLOGICAL/ONCOLOGICAL


Hx Blood Transfusions: No


Hx Blood Transfusion Reaction: No





- INTEGUMENTARY


Hx Dermatological Problems: Yes


Other/Comment: LEFT BIG TOE POST AMPUTATION-GANGRENE TO AREA AMPUTATED.7-24-17 

second sx all toes amputated left ft,





- MUSCULOSKELETAL/RHEUMATOLOGICAL


Hx Falls: No





- GASTROINTESTINAL


Hx Gastrointestinal Disorders: Yes (CONSTIPATION,INCISIONAL HERNIA)


Hx Gastroesophageal Reflux: Yes





- GENITOURINARY/GYNECOLOGICAL


Hx Genitourinary Disorders: Yes (KIDNEY TRANSPLANT)


Other/Comment: pt voids well





- PSYCHIATRIC


Hx Emotional Abuse: No


Hx Physical Abuse: No


Hx Substance Use: No





- SURGICAL HISTORY


Hx Surgeries: Yes





- ANESTHESIA


Hx Anesthesia Reactions: No


Hx Malignant Hyperthermia: No





Meds


Allergies/Adverse Reactions: 


 Allergies











Allergy/AdvReac Type Severity Reaction Status Date / Time


 


No Known Allergies Allergy   Verified 02/08/18 12:52














Physical Exam





- Constitutional


Appears: Non-toxic, No Acute Distress, Chronically Ill





- Head Exam


Head Exam: ATRAUMATIC, NORMOCEPHALIC





- Eye Exam


Eye Exam: EOMI, Normal appearance





- ENT Exam


ENT Exam: Mucous Membranes Moist





- Respiratory Exam


Respiratory Exam: NORMAL BREATHING PATTERN.  absent: Respiratory Distress





- Cardiovascular Exam


Cardiovascular Exam: REGULAR RHYTHM.  absent: Tachycardia





- GI/Abdominal Exam


GI & Abdominal Exam: Soft.  absent: Distended, Tenderness





- Extremities Exam


Extremities exam: Negative for: calf tenderness, pedal edema


Additional comments: 





Left prior TMA revision, inferior lateral portion of wound appears dusky, other 

skin edges appear healthy. So green tinged purulent drainage on dressing. No 

active drainage from wound. Base appears healthy. 





Right heal with black necrotic, dry gangrene. Green exudate from right heal. 





Results





- Vital Signs


Recent Vital Signs: 





 Last Vital Signs











Temp  97.9 F   04/04/18 14:22


 


Pulse  99 H  04/04/18 14:22


 


Resp  18   04/04/18 14:22


 


BP  171/96 H  04/04/18 14:22


 


Pulse Ox  100   04/04/18 14:22














- Labs


Result Diagrams: 


 04/04/18 15:00





 04/04/18 15:00


Labs: 





 Laboratory Results - last 24 hr











  04/04/18 04/04/18 04/04/18





  15:00 15:00 15:00


 


WBC  14.3 H D  


 


RBC  4.10  


 


Hgb  11.4 L  


 


Hct  34.1 L  


 


MCV  83.2  D  


 


MCH  27.8  


 


MCHC  33.4  


 


RDW  15.6 H  


 


Plt Count  319  


 


MPV  9.0  


 


Gran %  77.5 H  


 


Lymph % (Auto)  10.5 L  


 


Mono % (Auto)  9.5 H  


 


Eos % (Auto)  2.4  


 


Baso % (Auto)  0.1  


 


Gran #  11.04 H  


 


Lymph # (Auto)  1.5  


 


Mono # (Auto)  1.4 H  


 


Eos # (Auto)  0.3  


 


Baso # (Auto)  0.02  


 


PT   13.6 H 


 


INR   1.19 H 


 


APTT   29.9 


 


Sodium    130 L


 


Potassium    4.0


 


Chloride    92 L


 


Carbon Dioxide    28


 


Anion Gap    14


 


BUN    14


 


Creatinine    0.9


 


Est GFR ( Amer)    > 60


 


Est GFR (Non-Af Amer)    > 60


 


Random Glucose    140 H


 


Calcium    10.1


 


Total Bilirubin    0.6


 


AST    20


 


ALT    30


 


Alkaline Phosphatase    161 H D


 


Total Protein    7.4


 


Albumin    3.7


 


Globulin    3.7


 


Albumin/Globulin Ratio    1.0 L














Assessment & Plan





- Assessment and Plan (Free Text)


Assessment: 





58 y/o male w/ dry gangrene to right heal


Plan: 





-green discharge: concern for pseudomonas infection: will need ID consult and 

will initiate abx for coverage, culture taken, f/u results


-no crepitus on exam but will obtain right foot Xray to rule out gas gangrene 


-non weight bearing to RLE 


-ok for diet, will make NPO PMN for possible right BKA on 4/5 


-medical/cardiology/renal consult for clearance prior to OR 


-further recs per Dr. Valerio covering for Dr. Kenyon Goel PGY3





- Date & Time


Date: 04/04/18


Time: 16:22 lov 8/10/2020. Rx refill pend and routed. Pt daughter stated pt is out of risperidone.

## 2020-08-15 RX ORDER — RISPERIDONE 1 MG/1
1 TABLET ORAL DAILY
Qty: 90 TABLET | Refills: 0 | Status: SHIPPED | OUTPATIENT
Start: 2020-08-15 | End: 2020-12-18 | Stop reason: SDUPTHER

## 2020-10-23 DIAGNOSIS — Z12.11 COLON CANCER SCREENING: ICD-10-CM

## 2020-11-03 ENCOUNTER — PATIENT OUTREACH (OUTPATIENT)
Dept: FAMILY MEDICINE | Facility: CLINIC | Age: 55
End: 2020-11-03

## 2020-11-12 ENCOUNTER — PATIENT OUTREACH (OUTPATIENT)
Dept: FAMILY MEDICINE | Facility: CLINIC | Age: 55
End: 2020-11-12

## 2020-11-25 ENCOUNTER — PATIENT OUTREACH (OUTPATIENT)
Dept: ADMINISTRATIVE | Facility: OTHER | Age: 55
End: 2020-11-25

## 2020-11-26 NOTE — PROGRESS NOTES
Health Maintenance Due   Topic Date Due    Colorectal Cancer Screening  1965    HIV Screening  01/20/1980    Shingles Vaccine (1 of 2) 01/20/2015    Influenza Vaccine (1) 08/01/2020     Updates were requested from care everywhere.  Chart was reviewed for overdue Proactive [facility] Encounters (EDUARDO) topics (CRS, Breast Cancer Screening, Eye exam)  Health Maintenance has been updated.  LINKS immunization registry triggered.  Immunizations were reconciled.

## 2020-12-01 ENCOUNTER — TELEPHONE (OUTPATIENT)
Dept: OPTOMETRY | Facility: CLINIC | Age: 55
End: 2020-12-01

## 2020-12-01 NOTE — TELEPHONE ENCOUNTER
Spoke with Wendy pts sister. They would like an appointment to be seen . Will send message to [facility] for further assistance with [facility] per Dr. Marks

## 2020-12-18 RX ORDER — RISPERIDONE 1 MG/1
1 TABLET ORAL DAILY
Qty: 90 TABLET | Refills: 0 | Status: SHIPPED | OUTPATIENT
Start: 2020-12-18 | End: 2021-03-19

## 2020-12-18 NOTE — TELEPHONE ENCOUNTER
----- Message from Coretta Beltran sent at 12/18/2020  1:21 PM CST -----  Regarding: Refill Request  Please refill the medication(s) listed below :Patient is completely out of medication.    Medication # 1 :risperiDONE (RISPERDAL) 1 MG tablet    Please call the patient when the prescription is sent to pharmacy :[phone]    Can the clinic reply in [facility] :No     Preferred Pharmacy : [id number] [address] AT [address] [phone] (Phone)  [phone] (Fax)

## 2021-01-04 ENCOUNTER — TELEPHONE (OUTPATIENT)
Dept: INTERNAL MEDICINE | Facility: CLINIC | Age: 56
End: 2021-01-04

## 2021-01-12 ENCOUNTER — PATIENT OUTREACH (OUTPATIENT)
Dept: ADMINISTRATIVE | Facility: OTHER | Age: 56
End: 2021-01-12

## 2021-01-13 ENCOUNTER — TELEPHONE (OUTPATIENT)
Dept: OPTOMETRY | Facility: CLINIC | Age: 56
End: 2021-01-13

## 2021-01-14 DIAGNOSIS — J45.909 ASTHMA, UNSPECIFIED ASTHMA SEVERITY, UNSPECIFIED WHETHER COMPLICATED, UNSPECIFIED WHETHER PERSISTENT: ICD-10-CM

## 2021-01-14 RX ORDER — ALBUTEROL SULFATE 90 UG/1
2 AEROSOL, METERED RESPIRATORY (INHALATION) EVERY 6 HOURS PRN
Qty: 18 G | Refills: 2 | Status: SHIPPED | OUTPATIENT
Start: 2021-01-14 | End: 2021-11-23 | Stop reason: SDUPTHER

## 2021-01-28 NOTE — PROGRESS NOTES
"CHIEF COMPLAINT:  Medication management    HISTORY OF PRESENT ILLNESS: The patient is a fairly healthy 55 year-old male.  He is blind and deaf.  She was seen about 15 months ago.  He is seen with his family today.  The patient has been doing well overall.  He is quite active normally.      Since the last time I've seen him he has been doing very well.  His episode of bilateral lower extremity edema most consistent with congestive heart failure has responded to diuresis.  No edema is present currently.    REVIEW OF SYSTEMS:  GENERAL: No fever, chills, fatigability or weight loss.  SKIN: No rashes, itching or changes in color or texture of skin.  HEAD: No headaches or recent head trauma.  EYES: He is blind bilaterally.  EARS: Denies ear pain, discharge or vertigo.  He is deaf bilaterally  NOSE: No loss of smell, no epistaxis or postnasal drip.  MOUTH & THROAT: No hoarseness or change in voice. No excessive gum bleeding.  NODES: Denies swollen glands.  CHEST: Denies LI, cyanosis, cough and sputum production.  CARDIOVASCULAR: Denies chest pain, PND, orthopnea or reduced exercise tolerance.  ABDOMEN: Appetite fine. No weight loss. Denies diarrhea, abdominal pain, hematemesis.  URINARY: No flank pain, dysuria or hematuria.  PERIPHERAL VASCULAR: No claudication or cyanosis.  MUSCULOSKELETAL: No joint stiffness. Denies back pain.  NEUROLOGIC: No history of seizures, paralysis, alteration of gait or coordination.    SOCIAL HISTORY: The patient does not smoke.  The patient consumes alcohol socially.  The patient is disabled and lives with his family.    PHYSICAL EXAMINATION:     Blood pressure (!) 160/88, pulse 91, height 5' 5" (1.651 m), weight 91.6 kg (202 lb), SpO2 99 %.    APPEARANCE: Well nourished, well developed, in no acute distress.    HEAD: Normocephalic, atraumatic.  EYES: Changes consistent with bilateral corneal transplants and a pacer is noted  EARS: TM's intact. Light reflex normal. No retraction or " perforation.    NOSE: Mucosa pink. Airway clear.  MOUTH & THROAT: No tonsillar enlargement. No pharyngeal erythema or exudate. No stridor.  NECK: Supple.   NODES: No cervical, axillary or inguinal lymph node enlargement.  CHEST: Lungs clear to auscultation.  No retractions are noted.  No rales or rhonchi are present.  CARDIOVASCULAR: Normal S1, S2. No rubs, murmurs or gallops.  ABDOMEN: Bowel sounds normal. Not distended. Soft. No tenderness or masses.  No ascites is noted.  MUSCULOSKELETAL:  There is no clubbing, cyanosis of the extremities x4.  There is 3+ bilateral lower extremity pitting edema to the knees bilaterally.  There is full range of motion of the lumbar spine.  There is full range of motion of the extremities x4.  There is no deformity noted.    NEUROLOGIC:       Normal gait.      Motor and sensory exams grossly normal.  PSYCHIATRIC: Patient is alert.  There is no homicidality.  There is no suicidality.  There is no evidence of psychosis.    LABORATORY/RADIOLOGY:   Chart reviewed.  We will update blood work today.    ASSESSMENT:  He has stable CK D3.   Well compensated CHF  HTN, condition is well controlled on current medication regimen  Extensive psychiatric history, schizophrenia  Blindness  Deaf  Asthma, condition is well controlled on current medication regimen    PLAN:  Lisinopril/HCTZ 40/25 and Lasix 40 mg daily  Return to clinic in one year sooner if problems develop.            4

## 2021-03-10 ENCOUNTER — TELEPHONE (OUTPATIENT)
Dept: INTERNAL MEDICINE | Facility: CLINIC | Age: 56
End: 2021-03-10

## 2021-06-16 ENCOUNTER — TELEPHONE (OUTPATIENT)
Dept: OPHTHALMOLOGY | Facility: CLINIC | Age: 56
End: 2021-06-16

## 2021-06-18 ENCOUNTER — TELEPHONE (OUTPATIENT)
Dept: INTERNAL MEDICINE | Facility: CLINIC | Age: 56
End: 2021-06-18

## 2021-06-21 ENCOUNTER — TELEPHONE (OUTPATIENT)
Dept: OPHTHALMOLOGY | Facility: CLINIC | Age: 56
End: 2021-06-21

## 2021-07-01 ENCOUNTER — TELEPHONE (OUTPATIENT)
Dept: INTERNAL MEDICINE | Facility: CLINIC | Age: 56
End: 2021-07-01

## 2021-07-21 RX ORDER — RISPERIDONE 1 MG/1
TABLET ORAL
Qty: 90 TABLET | Refills: 0 | Status: SHIPPED | OUTPATIENT
Start: 2021-07-21 | End: 2021-10-29 | Stop reason: SDUPTHER

## 2021-07-26 DIAGNOSIS — I10 ESSENTIAL HYPERTENSION: Primary | ICD-10-CM

## 2021-07-26 RX ORDER — LISINOPRIL AND HYDROCHLOROTHIAZIDE 12.5; 2 MG/1; MG/1
2 TABLET ORAL DAILY
Qty: 180 TABLET | Refills: 0 | Status: SHIPPED | OUTPATIENT
Start: 2021-07-26 | End: 2021-10-29 | Stop reason: SDUPTHER

## 2021-07-28 RX ORDER — FUROSEMIDE 40 MG/1
40 TABLET ORAL DAILY
Qty: 90 TABLET | Refills: 3 | Status: SHIPPED | OUTPATIENT
Start: 2021-07-28 | End: 2021-11-23 | Stop reason: SDUPTHER

## 2021-08-17 ENCOUNTER — TELEPHONE (OUTPATIENT)
Dept: OPHTHALMOLOGY | Facility: CLINIC | Age: 56
End: 2021-08-17

## 2021-10-15 ENCOUNTER — OFFICE VISIT (OUTPATIENT)
Dept: OPHTHALMOLOGY | Facility: CLINIC | Age: 56
End: 2021-10-15
Payer: MEDICAID

## 2021-10-15 DIAGNOSIS — H54.3 LOW VISION, BOTH EYES: ICD-10-CM

## 2021-10-15 DIAGNOSIS — H44.522 PHTHISIS BULBI OF LEFT EYE: ICD-10-CM

## 2021-10-15 DIAGNOSIS — T86.8413 FAILURE OF CORNEA TRANSPLANT OF BOTH EYES: Primary | ICD-10-CM

## 2021-10-15 DIAGNOSIS — H91.93 BILATERAL DEAFNESS: ICD-10-CM

## 2021-10-15 PROCEDURE — 99212 OFFICE O/P EST SF 10 MIN: CPT | Mod: PBBFAC | Performed by: OPHTHALMOLOGY

## 2021-10-15 PROCEDURE — 99999 PR PBB SHADOW E&M-EST. PATIENT-LVL II: ICD-10-PCS | Mod: PBBFAC,,, | Performed by: OPHTHALMOLOGY

## 2021-10-15 PROCEDURE — 99999 PR PBB SHADOW E&M-EST. PATIENT-LVL II: CPT | Mod: PBBFAC,,, | Performed by: OPHTHALMOLOGY

## 2021-10-15 PROCEDURE — 99204 PR OFFICE/OUTPT VISIT, NEW, LEVL IV, 45-59 MIN: ICD-10-PCS | Mod: S$PBB,,, | Performed by: OPHTHALMOLOGY

## 2021-10-15 PROCEDURE — 99204 OFFICE O/P NEW MOD 45 MIN: CPT | Mod: S$PBB,,, | Performed by: OPHTHALMOLOGY

## 2021-10-25 ENCOUNTER — TELEPHONE (OUTPATIENT)
Dept: OPHTHALMOLOGY | Facility: CLINIC | Age: 56
End: 2021-10-25
Payer: MEDICAID

## 2021-10-29 DIAGNOSIS — F20.5 RESIDUAL SCHIZOPHRENIA: Primary | ICD-10-CM

## 2021-10-29 DIAGNOSIS — I10 ESSENTIAL HYPERTENSION: ICD-10-CM

## 2021-10-29 RX ORDER — LISINOPRIL AND HYDROCHLOROTHIAZIDE 12.5; 2 MG/1; MG/1
TABLET ORAL
Qty: 180 TABLET | Refills: 0 | Status: SHIPPED | OUTPATIENT
Start: 2021-10-29 | End: 2021-11-23 | Stop reason: SDUPTHER

## 2021-10-29 RX ORDER — LISINOPRIL AND HYDROCHLOROTHIAZIDE 12.5; 2 MG/1; MG/1
2 TABLET ORAL DAILY
Qty: 60 TABLET | Refills: 0 | Status: SHIPPED | OUTPATIENT
Start: 2021-10-29 | End: 2021-10-29

## 2021-10-29 RX ORDER — RISPERIDONE 1 MG/1
TABLET ORAL
Qty: 30 TABLET | Refills: 0 | Status: SHIPPED | OUTPATIENT
Start: 2021-10-29 | End: 2021-11-23 | Stop reason: SDUPTHER

## 2021-11-05 ENCOUNTER — PES CALL (OUTPATIENT)
Dept: ADMINISTRATIVE | Facility: CLINIC | Age: 56
End: 2021-11-05
Payer: MEDICAID

## 2021-11-08 ENCOUNTER — TELEPHONE (OUTPATIENT)
Dept: INTERNAL MEDICINE | Facility: CLINIC | Age: 56
End: 2021-11-08
Payer: MEDICAID

## 2021-11-23 ENCOUNTER — LAB VISIT (OUTPATIENT)
Dept: LAB | Facility: OTHER | Age: 56
End: 2021-11-23
Attending: FAMILY MEDICINE
Payer: MEDICAID

## 2021-11-23 ENCOUNTER — OFFICE VISIT (OUTPATIENT)
Dept: INTERNAL MEDICINE | Facility: CLINIC | Age: 56
End: 2021-11-23
Attending: FAMILY MEDICINE
Payer: MEDICAID

## 2021-11-23 VITALS
OXYGEN SATURATION: 96 % | DIASTOLIC BLOOD PRESSURE: 74 MMHG | BODY MASS INDEX: 36.65 KG/M2 | SYSTOLIC BLOOD PRESSURE: 136 MMHG | WEIGHT: 220 LBS | HEART RATE: 105 BPM | HEIGHT: 65 IN

## 2021-11-23 DIAGNOSIS — I10 ESSENTIAL HYPERTENSION: Primary | ICD-10-CM

## 2021-11-23 DIAGNOSIS — F20.5 RESIDUAL SCHIZOPHRENIA: ICD-10-CM

## 2021-11-23 DIAGNOSIS — I10 ESSENTIAL HYPERTENSION: ICD-10-CM

## 2021-11-23 DIAGNOSIS — Z12.11 SCREENING FOR COLON CANCER: ICD-10-CM

## 2021-11-23 DIAGNOSIS — N18.31 STAGE 3A CHRONIC KIDNEY DISEASE: ICD-10-CM

## 2021-11-23 DIAGNOSIS — J45.909 ASTHMA, UNSPECIFIED ASTHMA SEVERITY, UNSPECIFIED WHETHER COMPLICATED, UNSPECIFIED WHETHER PERSISTENT: ICD-10-CM

## 2021-11-23 LAB
ALBUMIN SERPL BCP-MCNC: 3.2 G/DL (ref 3.5–5.2)
ALP SERPL-CCNC: 89 U/L (ref 55–135)
ALT SERPL W/O P-5'-P-CCNC: 13 U/L (ref 10–44)
ANION GAP SERPL CALC-SCNC: 16 MMOL/L (ref 8–16)
AST SERPL-CCNC: 10 U/L (ref 10–40)
BILIRUB SERPL-MCNC: 0.2 MG/DL (ref 0.1–1)
BUN SERPL-MCNC: 45 MG/DL (ref 6–20)
CALCIUM SERPL-MCNC: 9.6 MG/DL (ref 8.7–10.5)
CHLORIDE SERPL-SCNC: 95 MMOL/L (ref 95–110)
CHOLEST SERPL-MCNC: 142 MG/DL (ref 120–199)
CHOLEST/HDLC SERPL: 3.3 {RATIO} (ref 2–5)
CO2 SERPL-SCNC: 28 MMOL/L (ref 23–29)
CREAT SERPL-MCNC: 2.3 MG/DL (ref 0.5–1.4)
EST. GFR  (AFRICAN AMERICAN): 35 ML/MIN/1.73 M^2
EST. GFR  (NON AFRICAN AMERICAN): 31 ML/MIN/1.73 M^2
ESTIMATED AVG GLUCOSE: 117 MG/DL (ref 68–131)
GLUCOSE SERPL-MCNC: 109 MG/DL (ref 70–110)
HBA1C MFR BLD: 5.7 % (ref 4–5.6)
HDLC SERPL-MCNC: 43 MG/DL (ref 40–75)
HDLC SERPL: 30.3 % (ref 20–50)
LDLC SERPL CALC-MCNC: 72.2 MG/DL (ref 63–159)
NONHDLC SERPL-MCNC: 99 MG/DL
POTASSIUM SERPL-SCNC: 4.4 MMOL/L (ref 3.5–5.1)
PROT SERPL-MCNC: 8.5 G/DL (ref 6–8.4)
SODIUM SERPL-SCNC: 139 MMOL/L (ref 136–145)
TRIGL SERPL-MCNC: 134 MG/DL (ref 30–150)
TSH SERPL DL<=0.005 MIU/L-ACNC: 1.96 UIU/ML (ref 0.4–4)

## 2021-11-23 PROCEDURE — 99999 PR PBB SHADOW E&M-EST. PATIENT-LVL III: CPT | Mod: PBBFAC,,, | Performed by: FAMILY MEDICINE

## 2021-11-23 PROCEDURE — 99214 PR OFFICE/OUTPT VISIT, EST, LEVL IV, 30-39 MIN: ICD-10-PCS | Mod: S$PBB,,, | Performed by: FAMILY MEDICINE

## 2021-11-23 PROCEDURE — 99214 OFFICE O/P EST MOD 30 MIN: CPT | Mod: S$PBB,,, | Performed by: FAMILY MEDICINE

## 2021-11-23 PROCEDURE — 84443 ASSAY THYROID STIM HORMONE: CPT | Performed by: FAMILY MEDICINE

## 2021-11-23 PROCEDURE — 83036 HEMOGLOBIN GLYCOSYLATED A1C: CPT | Performed by: FAMILY MEDICINE

## 2021-11-23 PROCEDURE — 99999 PR PBB SHADOW E&M-EST. PATIENT-LVL III: ICD-10-PCS | Mod: PBBFAC,,, | Performed by: FAMILY MEDICINE

## 2021-11-23 PROCEDURE — 36415 COLL VENOUS BLD VENIPUNCTURE: CPT | Performed by: FAMILY MEDICINE

## 2021-11-23 PROCEDURE — 80061 LIPID PANEL: CPT | Performed by: FAMILY MEDICINE

## 2021-11-23 PROCEDURE — 80053 COMPREHEN METABOLIC PANEL: CPT | Performed by: FAMILY MEDICINE

## 2021-11-23 PROCEDURE — 99213 OFFICE O/P EST LOW 20 MIN: CPT | Mod: PBBFAC | Performed by: FAMILY MEDICINE

## 2021-11-23 RX ORDER — RISPERIDONE 1 MG/1
TABLET ORAL
Qty: 30 TABLET | Refills: 5 | Status: SHIPPED | OUTPATIENT
Start: 2021-11-23 | End: 2022-06-20

## 2021-11-23 RX ORDER — ALBUTEROL SULFATE 90 UG/1
2 AEROSOL, METERED RESPIRATORY (INHALATION) EVERY 6 HOURS PRN
Qty: 18 G | Refills: 2 | Status: SHIPPED | OUTPATIENT
Start: 2021-11-23 | End: 2022-02-04 | Stop reason: SDUPTHER

## 2021-11-23 RX ORDER — LISINOPRIL AND HYDROCHLOROTHIAZIDE 12.5; 2 MG/1; MG/1
2 TABLET ORAL DAILY
Qty: 180 TABLET | Refills: 3 | Status: SHIPPED | OUTPATIENT
Start: 2021-11-23 | End: 2022-04-14

## 2021-11-23 RX ORDER — FUROSEMIDE 40 MG/1
40 TABLET ORAL DAILY
Qty: 90 TABLET | Refills: 3 | Status: SHIPPED | OUTPATIENT
Start: 2021-11-23 | End: 2022-12-14 | Stop reason: SDUPTHER

## 2021-11-23 RX ORDER — MELOXICAM 15 MG/1
15 TABLET ORAL DAILY
Qty: 30 TABLET | Refills: 5 | Status: SHIPPED | OUTPATIENT
Start: 2021-11-23

## 2021-12-03 ENCOUNTER — TELEPHONE (OUTPATIENT)
Dept: INTERNAL MEDICINE | Facility: CLINIC | Age: 56
End: 2021-12-03
Payer: MEDICAID

## 2021-12-15 ENCOUNTER — PES CALL (OUTPATIENT)
Dept: ADMINISTRATIVE | Facility: CLINIC | Age: 56
End: 2021-12-15
Payer: MEDICAID

## 2022-02-04 DIAGNOSIS — J45.909 ASTHMA, UNSPECIFIED ASTHMA SEVERITY, UNSPECIFIED WHETHER COMPLICATED, UNSPECIFIED WHETHER PERSISTENT: ICD-10-CM

## 2022-02-04 RX ORDER — ALBUTEROL SULFATE 90 UG/1
2 AEROSOL, METERED RESPIRATORY (INHALATION) EVERY 6 HOURS PRN
Qty: 18 G | Refills: 0 | Status: SHIPPED | OUTPATIENT
Start: 2022-02-04 | End: 2023-10-11 | Stop reason: SDUPTHER

## 2022-02-04 NOTE — TELEPHONE ENCOUNTER
No new care gaps identified.  Powered by [employer]. Reference number: [id number].   2/04/2022 10:43:00 AM CST

## 2022-02-23 DIAGNOSIS — N18.9 CHRONIC KIDNEY DISEASE, UNSPECIFIED CKD STAGE: ICD-10-CM

## 2022-06-14 ENCOUNTER — OFFICE VISIT (OUTPATIENT)
Dept: URGENT CARE | Facility: CLINIC | Age: 57
End: 2022-06-14
Payer: MEDICAID

## 2022-06-14 VITALS
SYSTOLIC BLOOD PRESSURE: 137 MMHG | RESPIRATION RATE: 16 BRPM | OXYGEN SATURATION: 95 % | WEIGHT: 220 LBS | DIASTOLIC BLOOD PRESSURE: 84 MMHG | HEIGHT: 65 IN | HEART RATE: 90 BPM | BODY MASS INDEX: 36.65 KG/M2 | TEMPERATURE: 98 F

## 2022-06-14 DIAGNOSIS — U07.1 COVID-19 VIRUS DETECTED: ICD-10-CM

## 2022-06-14 DIAGNOSIS — Z11.59 SCREENING FOR VIRAL DISEASE: ICD-10-CM

## 2022-06-14 DIAGNOSIS — U07.1 COVID-19: Primary | ICD-10-CM

## 2022-06-14 LAB
CTP QC/QA: YES
SARS-COV-2 RDRP RESP QL NAA+PROBE: POSITIVE

## 2022-06-14 PROCEDURE — 99203 OFFICE O/P NEW LOW 30 MIN: CPT | Mod: S$GLB,,,

## 2022-06-14 PROCEDURE — 3075F PR MOST RECENT SYSTOLIC BLOOD PRESS GE 130-139MM HG: ICD-10-PCS | Mod: CPTII,S$GLB,,

## 2022-06-14 PROCEDURE — 3079F PR MOST RECENT DIASTOLIC BLOOD PRESSURE 80-89 MM HG: ICD-10-PCS | Mod: CPTII,S$GLB,,

## 2022-06-14 PROCEDURE — 1160F PR REVIEW ALL MEDS BY PRESCRIBER/CLIN PHARMACIST DOCUMENTED: ICD-10-PCS | Mod: CPTII,S$GLB,,

## 2022-06-14 PROCEDURE — 3079F DIAST BP 80-89 MM HG: CPT | Mod: CPTII,S$GLB,,

## 2022-06-14 PROCEDURE — 4010F ACE/ARB THERAPY RXD/TAKEN: CPT | Mod: CPTII,S$GLB,,

## 2022-06-14 PROCEDURE — 1159F MED LIST DOCD IN RCRD: CPT | Mod: CPTII,S$GLB,,

## 2022-06-14 PROCEDURE — 1159F PR MEDICATION LIST DOCUMENTED IN MEDICAL RECORD: ICD-10-PCS | Mod: CPTII,S$GLB,,

## 2022-06-14 PROCEDURE — 4010F PR ACE/ARB THEARPY RXD/TAKEN: ICD-10-PCS | Mod: CPTII,S$GLB,,

## 2022-06-14 PROCEDURE — U0002: ICD-10-PCS | Mod: QW,S$GLB,,

## 2022-06-14 PROCEDURE — 3075F SYST BP GE 130 - 139MM HG: CPT | Mod: CPTII,S$GLB,,

## 2022-06-14 PROCEDURE — 1160F RVW MEDS BY RX/DR IN RCRD: CPT | Mod: CPTII,S$GLB,,

## 2022-06-14 PROCEDURE — U0002 COVID-19 LAB TEST NON-CDC: HCPCS | Mod: QW,S$GLB,,

## 2022-06-14 PROCEDURE — 99203 PR OFFICE/OUTPT VISIT, NEW, LEVL III, 30-44 MIN: ICD-10-PCS | Mod: S$GLB,,,

## 2022-06-14 PROCEDURE — 3008F PR BODY MASS INDEX (BMI) DOCUMENTED: ICD-10-PCS | Mod: CPTII,S$GLB,,

## 2022-06-14 PROCEDURE — 3008F BODY MASS INDEX DOCD: CPT | Mod: CPTII,S$GLB,,

## 2022-06-14 RX ORDER — BENZONATATE 100 MG/1
100 CAPSULE ORAL 3 TIMES DAILY PRN
Qty: 30 CAPSULE | Refills: 0 | Status: SHIPPED | OUTPATIENT
Start: 2022-06-14 | End: 2022-06-24

## 2022-06-14 RX ORDER — FLUTICASONE PROPIONATE 50 MCG
1 SPRAY, SUSPENSION (ML) NASAL DAILY
Qty: 9.9 ML | Refills: 0 | Status: SHIPPED | OUTPATIENT
Start: 2022-06-14

## 2022-06-14 RX ORDER — CETIRIZINE HYDROCHLORIDE 10 MG/1
10 TABLET ORAL DAILY
Qty: 30 TABLET | Refills: 0 | Status: SHIPPED | OUTPATIENT
Start: 2022-06-14 | End: 2022-07-14

## 2022-06-14 NOTE — PROGRESS NOTES
"Subjective:       Patient ID: Frantz Sigala Jr. is a 57 y.o. male.    Vitals:  height is 5' 5" (1.651 m) and weight is 99.8 kg (220 lb). His temperature is 97.9 °F (36.6 °C). His blood pressure is 137/84 and his pulse is 90. His respiration is 16 and oxygen saturation is 95%.     Chief Complaint: Cough (/)    57-year-old male with past medical history of deafness and blindness presents with sister and mother with complaints of a cough and sneezing that started 2 days ago.  They have not tried anything for symptoms.  Cough is improved today.    Cough  This is a new problem. The current episode started in the past 7 days. The problem has been unchanged. The problem occurs constantly. The cough is non-productive. Pertinent negatives include no chest pain, chills, ear congestion, ear pain, fever, headaches, heartburn, hemoptysis, myalgias, nasal congestion, postnasal drip, rash, rhinorrhea, sore throat, shortness of breath, sweats, weight loss or wheezing. Nothing aggravates the symptoms. He has tried nothing for the symptoms. The treatment provided no relief.       Constitution: Negative for chills and fever.   HENT: Negative for ear pain, postnasal drip and sore throat.    Cardiovascular: Negative for chest pain.   Respiratory: Positive for cough. Negative for bloody sputum, shortness of breath and wheezing.    Gastrointestinal: Negative for heartburn.   Musculoskeletal: Negative for muscle ache.   Skin: Negative for rash.   Allergic/Immunologic: Positive for sneezing.   Neurological: Negative for headaches.       Objective:      Physical Exam   Constitutional: He is oriented to person, place, and time. He appears well-developed. He is cooperative.  Non-toxic appearance. He does not appear ill. No distress.   HENT:   Head: Normocephalic and atraumatic.   Ears:   Right Ear: Hearing, tympanic membrane, external ear and ear canal normal.   Left Ear: Hearing, tympanic membrane, external ear and ear canal normal. "   Nose: Nose normal. No mucosal edema, rhinorrhea or nasal deformity. No epistaxis. Right sinus exhibits no maxillary sinus tenderness and no frontal sinus tenderness. Left sinus exhibits no maxillary sinus tenderness and no frontal sinus tenderness.   Mouth/Throat: Uvula is midline, oropharynx is clear and moist and mucous membranes are normal. No trismus in the jaw. Normal dentition. No uvula swelling. No posterior oropharyngeal erythema.   Eyes: Conjunctivae and lids are normal. Right eye exhibits no discharge. Left eye exhibits no discharge. No scleral icterus.   Neck: Trachea normal and phonation normal. Neck supple.   Cardiovascular: Normal rate, regular rhythm, normal heart sounds and normal pulses.   Pulmonary/Chest: Effort normal and breath sounds normal. No respiratory distress. He has no wheezes. He has no rhonchi. He has no rales.   Abdominal: Normal appearance and bowel sounds are normal. He exhibits no distension and no mass. Soft. There is no abdominal tenderness.   Musculoskeletal: Normal range of motion.         General: No deformity. Normal range of motion.   Neurological: He is alert and oriented to person, place, and time. He exhibits normal muscle tone. Coordination normal.   Skin: Skin is warm, dry, intact, not diaphoretic and not pale.   Psychiatric: His speech is normal and behavior is normal. Judgment and thought content normal.   Nursing note and vitals reviewed.        Results for orders placed or performed in visit on 06/14/22   POCT COVID-19 Rapid Screening   Result Value Ref Range    POC Rapid COVID Positive (A) Negative     Acceptable Yes        Assessment:       1. COVID-19    2. Screening for viral disease          Plan:       Discussed results with patient and proper quarantine based on CDC guidelines.   Discussed use of OTC medications for symptom control as this is a viral disease.   All ER precautions covered including but not limited to shortness of breath,  intractable fever, or chest pain.  Discussed RTC if symptoms worsen, change, or persist.   Patient verbalized understanding and agreed with the plan.       COVID risk of complications score is a 3. Prescribed Paxlovid.  Discussed side effects and rebound COVID symptoms with Paxlovid medication.     COVID-19  -     benzonatate (TESSALON) 100 MG capsule; Take 1 capsule (100 mg total) by mouth 3 (three) times daily as needed for Cough.  Dispense: 30 capsule; Refill: 0  -     cetirizine (ZYRTEC) 10 MG tablet; Take 1 tablet (10 mg total) by mouth once daily.  Dispense: 30 tablet; Refill: 0  -     fluticasone propionate (FLONASE) 50 mcg/actuation nasal spray; 1 spray (50 mcg total) by Each Nostril route once daily.  Dispense: 9.9 mL; Refill: 0  -     nirmatrelvir-ritonavir 150 mg x 2- 100 mg copackaged tablets (EUA); Take 3 tablets by mouth 2 (two) times daily for 5 days. Each dose contains 2 nirmatrelvir (pink tablets) and 1 ritonavir (white tablet). Take all 3 tablets together  Dispense: 30 tablet; Refill: 0    Screening for viral disease  -     POCT COVID-19 Rapid Screening

## 2022-06-15 NOTE — PATIENT INSTRUCTIONS
You have tested positive for COVID-19 today.      ISOLATION  If you tested positive and do not have symptoms, you must isolate for 5 days starting on the day of the positive test. I    If you tested positive and have symptoms, you must isolate for 5 days starting on the day of the first symptoms,  not the day of the positive test.     This is the most important part, both the CDC and the [facility] emphasize that you do not test out of isolation.     After 5 days, if your symptoms have improved and you have not had fever on day 5, you can return to the community on day 6- NO TESTING REQUIRED!      In fact, we do not retest if you were positive in the last 90 days.    After your 5 days of isolation are completed, the CDC recommends strict mask use for the first 5 days that you come out of isolation.    PLEASE READ YOUR DISCHARGE INSTRUCTIONS ENTIRELY AS IT CONTAINS IMPORTANT INFORMATION.      Please drink plenty of fluids.    Please get plenty of rest.    Please return here or go to the Emergency Department for any concerns or worsening of condition.    Please take an over the counter antihistamine medication (allegra/Claritin/Zyrtec) of your choice as directed for allergy symptoms and/or runny nose and postnasal drip.    Try an over the counter decongestant for sinus pressure/ear pressure, congestion symptoms like Mucinex D or Sudafed or Phenylephrine. You buy this behind the pharmacy counter    If you do have Hypertension or palpitations, it is safe to take Coricidin HBP for relief of sinus symptoms.    Tylenol or ibuprofen can also be used as directed for pain and fever unless you have an allergy to them or medical condition such as stomach ulcers, kidney or liver disease or blood thinners etc for which you should not be taking these type of medications.     Sore throat recommendations: Warm fluids, warm salt water gargles, throat lozenges, tea, honey, soup, rest, hydration.    Use over the counter flonase or nasocort:  one spray each nostril twice daily OR two sprays each nostril once daily until nares dry out, unless you have Glaucoma.   Can also supplement with nasal saline rinse.    Sinus rinses DO NOT USE TAP WATER, if you must, water must be a rolling boil for 1 minute, let it cool, then use.  May use distilled water, or over the counter nasal saline rinses.  Vics vapor rub in shower to help open nasal passages.  May use nasal gel to keep passages moisturized.  May use Nasal saline sprays during the day for added relief of congestion.   For those who go to the gym, please do not use the sauna or steam room now to clear sinuses.      Cough     Rest and fluids are important  Can use honey with anibal to soothe your throat    Robitussin or Delsyum for cough suppressant for dry cough.    Mucinex DM or products containing Guaifenesin or Dextromethorphan for expectorant (wet cough).    Take prescription cough meds (pills) as prescribed; take prescription cough syrup at night as needed for cough.  Do not take both the prescribed cough pills and syrup at the same time or within 6 hours of each other.  Do not take the cough syrup with any other sedative medication as it can can cause drowsiness. Do not operate any heavy machinery, drink or drive while taking the cough syrup.     Please follow up with your primary care doctor or specialist in the next 48-72hrs as needed and if no improvement    If you  smoke, please stop smoking.      Please return or see your primary care doctor if you develop new or worsening symptoms.     Please arrange follow up with your primary medical clinic as soon as possible. You must understand that you've received an Urgent Care treatment only and that you may be released before all of your medical problems are known or treated. You, the patient, will arrange for follow up as instructed. If your symptoms worsen or fail to improve you should go to the Emergency Room.      You have been prescribed Paxlovid,  which is an antiviral medication.     Oral antiviral treatment may help your body fight COVID-19 by stopping the SARS-CoV-2 virus (the virus that causes COVID-19) from multiplying in your body, lowering the amount of the virus within your body, or helping your immune system. By getting treatment, you could have less serious symptoms and may lower the chances of your illness getting worse and needing care in the hospital.    To be eligible for Paxlovid, you must be at least 12 years of age and weigh at least 88 pounds.    What is PAXLOVID?    PAXLOVID is an investigational medicine used to treat mild-to-moderate COVID-19 in  adults and children [12 years of age and older weighing at least 88 pounds (40 kg)] with  positive results of direct SARS-CoV-2 viral testing, and who are at high risk for  progression to severe COVID-19, including hospitalization or death. PAXLOVID is  investigational because it is still being studied. There is limited information about the  safety and effectiveness of using PAXLOVID to treat people with mild-to-moderate  COVID-19.  The FDA has authorized the emergency use of PAXLOVID for the treatment of mild-tomoderate COVID-19 in adults and children [12 years of age and older weighing at least  88 pounds (40 kg)] with a positive test for the virus that causes COVID-19, and who are  at high risk for progression to severe COVID-19, including hospitalization or death,  under an EUA.    Tell your healthcare provider if you:  ? Have any allergies  ? Have liver or kidney disease  ? Are pregnant or plan to become pregnant  ? Are breastfeeding a child  ? Have any serious illnesses  Tell your healthcare provider about all the medicines you take, including  prescription and over-the-counter medicines, vitamins, and herbal supplements.  Some medicines may interact with PAXLOVID and may cause serious side effects.  Keep a list of your medicines to show your healthcare provider and pharmacist when  you  get a new medicine.  You can ask your healthcare provider or pharmacist for a list of medicines that interact  with PAXLOVID. Do not start taking a new medicine without telling your  healthcare provider. Your healthcare provider can tell you if it is safe to take  PAXLOVID with other medicines    How do I take PAXLOVID?  ? PAXLOVID consists of 2 medicines: nirmatrelvir and ritonavir.  Take 2 pink tablets of nirmatrelvir with 1 white tablet of ritonavir by mouth  2 times each day (in the morning and in the evening) for 5 days. For each  dose, take all 3 tablets at the same time.  If you have kidney disease, talk to your healthcare provider. You  may need a different dose.  Swallow the tablets whole. Do not chew, break, or crush the tablets.  Take PAXLOVID with or without food.  Do not stop taking PAXLOVID without talking to your healthcare provider, even if  you feel better.  If you miss a dose of PAXLOVID within 8 hours of the time it is usually taken,  take it as soon as you remember. If you miss a dose by more than 8 hours, skip  the missed dose and take the next dose at your regular time. Do not take  2 doses of PAXLOVID at the same time.  If you take too much PAXLOVID, call your healthcare provider or go to the  nearest hospital emergency room right away.  If you are taking a ritonavir- or cobicistat-containing medicine to treat hepatitis C  or Human Immunodeficiency Virus (HIV), you should continue to take your  medicine as prescribed by your healthcare provider    Possible side effects of PAXLOVID are:  ? Allergic Reactions. Allergic reactions can happen in people taking  PAXLOVID, even after only 1 dose. Stop taking PAXLOVID and call your  healthcare provider right away if you get any of the following symptoms of an  allergic reaction:  hives  trouble swallowing or breathing  swelling of the mouth, lips, or face  throat tightness  hoarseness  skin rash  ? Liver Problems. Tell your healthcare provider right  away if you have any of  these signs and symptoms of liver problems: loss of appetite, yellowing of your  skin and the whites of eyes (jaundice), dark-colored urine, pale colored stools  and itchy skin, stomach area (abdominal) pain.  ? Resistance to HIV Medicines: If you have untreated HIV infection, PAXLOVID  may lead to some HIV medicines not working as well in the future.  ? Other possible side effects include:  altered sense of taste  diarrhea  high blood pressure  muscle aches  These are not all the possible side effects of PAXLOVID. Not many people have taken  PAXLOVID. Serious and unexpected side effects may happen. PAXLOVID is still being  studied, so it is possible that all of the risks are not known at this time.

## 2022-06-16 ENCOUNTER — TELEPHONE (OUTPATIENT)
Dept: URGENT CARE | Facility: CLINIC | Age: 57
End: 2022-06-16
Payer: MEDICAID

## 2022-10-20 ENCOUNTER — PATIENT OUTREACH (OUTPATIENT)
Dept: INTERNAL MEDICINE | Facility: CLINIC | Age: 57
End: 2022-10-20
Payer: MEDICARE

## 2022-10-20 DIAGNOSIS — Z12.11 ENCOUNTER FOR COLORECTAL CANCER SCREENING: Primary | ICD-10-CM

## 2022-10-20 DIAGNOSIS — Z12.12 ENCOUNTER FOR COLORECTAL CANCER SCREENING: Primary | ICD-10-CM

## 2022-11-01 ENCOUNTER — TELEPHONE (OUTPATIENT)
Dept: INTERNAL MEDICINE | Facility: CLINIC | Age: 57
End: 2022-11-01
Payer: MEDICARE

## 2022-11-01 NOTE — TELEPHONE ENCOUNTER
----- Message from Sabina Brock sent at 11/1/2022 10:48 AM CDT -----  Regarding: Patient call back  Who called:Wendy (sister)    What is the request in detail: Patient is requesting a call back. She states her brother's caretakers found a rash on both sides of his legs and would like to know if he can be seen today or tomorrow. Soonest appt in clinic is 11/7. She would like to further discuss.   Please advise.    Can the clinic reply by [facility]? No    Best call back number: [phone]    Additional Information: N/A

## 2022-11-01 NOTE — TELEPHONE ENCOUNTER
Spoke to patient daughter and offered an same day appointment for today 11/1/2022 for rash on patients bilateral legs. Patient daughter declined. Patient daughter was scheduled on 11/4/2022 with Laura Villa.

## 2022-12-02 ENCOUNTER — TELEPHONE (OUTPATIENT)
Dept: INTERNAL MEDICINE | Facility: CLINIC | Age: 57
End: 2022-12-02
Payer: MEDICARE

## 2022-12-02 NOTE — TELEPHONE ENCOUNTER
----- Message from Evelyn Woody sent at 12/2/2022  8:30 AM CST -----  Regarding: Advice  Name of Who is Calling: Wendy - Sister           What is the request in detail:  She states her brother is having trouble sleeping and has been up all night.  She is requesting a call back in regards to any advice in regards   Please advise         Can the clinic reply by [facility]: No           What [facility] to [facility] Back [facility]:[phone]

## 2022-12-06 ENCOUNTER — LAB VISIT (OUTPATIENT)
Dept: LAB | Facility: OTHER | Age: 57
End: 2022-12-06
Attending: FAMILY MEDICINE
Payer: MEDICARE

## 2022-12-06 ENCOUNTER — OFFICE VISIT (OUTPATIENT)
Dept: INTERNAL MEDICINE | Facility: CLINIC | Age: 57
End: 2022-12-06
Attending: FAMILY MEDICINE
Payer: MEDICARE

## 2022-12-06 VITALS
SYSTOLIC BLOOD PRESSURE: 128 MMHG | WEIGHT: 220 LBS | HEART RATE: 120 BPM | OXYGEN SATURATION: 98 % | HEIGHT: 65 IN | DIASTOLIC BLOOD PRESSURE: 80 MMHG | BODY MASS INDEX: 36.65 KG/M2

## 2022-12-06 DIAGNOSIS — Z00.00 ANNUAL PHYSICAL EXAM: ICD-10-CM

## 2022-12-06 DIAGNOSIS — I10 ESSENTIAL HYPERTENSION: Primary | ICD-10-CM

## 2022-12-06 DIAGNOSIS — I10 ESSENTIAL HYPERTENSION: ICD-10-CM

## 2022-12-06 DIAGNOSIS — F20.5 RESIDUAL SCHIZOPHRENIA: ICD-10-CM

## 2022-12-06 LAB
ALBUMIN SERPL BCP-MCNC: 3.5 G/DL (ref 3.5–5.2)
ALP SERPL-CCNC: 95 U/L (ref 55–135)
ALT SERPL W/O P-5'-P-CCNC: 17 U/L (ref 10–44)
ANION GAP SERPL CALC-SCNC: 11 MMOL/L (ref 8–16)
AST SERPL-CCNC: 15 U/L (ref 10–40)
BILIRUB SERPL-MCNC: 0.2 MG/DL (ref 0.1–1)
BUN SERPL-MCNC: 46 MG/DL (ref 6–20)
CALCIUM SERPL-MCNC: 9.9 MG/DL (ref 8.7–10.5)
CHLORIDE SERPL-SCNC: 95 MMOL/L (ref 95–110)
CO2 SERPL-SCNC: 30 MMOL/L (ref 23–29)
CREAT SERPL-MCNC: 2.5 MG/DL (ref 0.5–1.4)
EST. GFR  (NO RACE VARIABLE): 29 ML/MIN/1.73 M^2
ESTIMATED AVG GLUCOSE: 105 MG/DL (ref 68–131)
GLUCOSE SERPL-MCNC: 99 MG/DL (ref 70–110)
HBA1C MFR BLD: 5.3 % (ref 4–5.6)
POTASSIUM SERPL-SCNC: 3.5 MMOL/L (ref 3.5–5.1)
PROT SERPL-MCNC: 8.9 G/DL (ref 6–8.4)
SODIUM SERPL-SCNC: 136 MMOL/L (ref 136–145)
TSH SERPL DL<=0.005 MIU/L-ACNC: 1.92 UIU/ML (ref 0.4–4)

## 2022-12-06 PROCEDURE — 80053 COMPREHEN METABOLIC PANEL: CPT | Performed by: FAMILY MEDICINE

## 2022-12-06 PROCEDURE — 99214 PR OFFICE/OUTPT VISIT, EST, LEVL IV, 30-39 MIN: ICD-10-PCS | Mod: S$PBB,,, | Performed by: FAMILY MEDICINE

## 2022-12-06 PROCEDURE — 99214 OFFICE O/P EST MOD 30 MIN: CPT | Mod: S$PBB,,, | Performed by: FAMILY MEDICINE

## 2022-12-06 PROCEDURE — 84443 ASSAY THYROID STIM HORMONE: CPT | Performed by: FAMILY MEDICINE

## 2022-12-06 PROCEDURE — 99999 PR PBB SHADOW E&M-EST. PATIENT-LVL III: ICD-10-PCS | Mod: PBBFAC,,, | Performed by: FAMILY MEDICINE

## 2022-12-06 PROCEDURE — 83036 HEMOGLOBIN GLYCOSYLATED A1C: CPT | Performed by: FAMILY MEDICINE

## 2022-12-06 PROCEDURE — 99213 OFFICE O/P EST LOW 20 MIN: CPT | Mod: PBBFAC | Performed by: FAMILY MEDICINE

## 2022-12-06 PROCEDURE — 99999 PR PBB SHADOW E&M-EST. PATIENT-LVL III: CPT | Mod: PBBFAC,,, | Performed by: FAMILY MEDICINE

## 2022-12-06 PROCEDURE — 36415 COLL VENOUS BLD VENIPUNCTURE: CPT | Performed by: FAMILY MEDICINE

## 2022-12-06 NOTE — PROGRESS NOTES
"CHIEF COMPLAINT:  Medication management    HISTORY OF PRESENT ILLNESS: The patient is a fairly healthy 57 year-old male.  He is blind and deaf.  She was seen about 12 months ago.  He is seen with his family today.  The patient has been doing well overall.  He is quite active normally.      Since the last time I've seen him he has been doing very well.  His episode of bilateral lower extremity edema most consistent with congestive heart failure has responded to diuresis.  No edema is present currently.    REVIEW OF SYSTEMS:  GENERAL: No fever, chills, fatigability or weight loss.  SKIN: No rashes, itching or changes in color or texture of skin.  HEAD: No headaches or recent head trauma.  EYES: He is blind bilaterally.  EARS: Denies ear pain, discharge or vertigo.  He is deaf bilaterally  NOSE: No loss of smell, no epistaxis or postnasal drip.  MOUTH & THROAT: No hoarseness or change in voice. No excessive gum bleeding.  NODES: Denies swollen glands.  CHEST: Denies LI, cyanosis, cough and sputum production.  CARDIOVASCULAR: Denies chest pain, PND, orthopnea or reduced exercise tolerance.  ABDOMEN: Appetite fine. No weight loss. Denies diarrhea, abdominal pain, hematemesis.  URINARY: No flank pain, dysuria or hematuria.  PERIPHERAL VASCULAR: No claudication or cyanosis.  MUSCULOSKELETAL: No joint stiffness. Denies back pain.  NEUROLOGIC: No history of seizures, paralysis, alteration of gait or coordination.    SOCIAL HISTORY: The patient does not smoke.  The patient consumes alcohol socially.  The patient is disabled and lives with his family.    PHYSICAL EXAMINATION:     Blood pressure 128/80, pulse (!) 120, height 5' 5" (1.651 m), weight 99.8 kg (220 lb 0.3 oz), SpO2 98 %.    APPEARANCE: Well nourished, in no acute distress.    HEAD:  atraumatic.  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 Airway clear.  MOUTH & THROAT: No tonsillar enlargement. No pharyngeal " erythema or exudate. No stridor.  NECK: Supple.   NODES: No cervical, axillary or inguinal lymph node enlargement.  CHEST: Lungs clear to auscultation.  No retractions are noted.  No rales or rhonchi are present.  CARDIOVASCULAR: Normal S1, S2. No rubs, murmurs or gallops.  ABDOMEN: Bowel sounds normal. Not distended. Soft. No tenderness or masses.  No ascites is noted.  MUSCULOSKELETAL:  There is no clubbing, cyanosis of the extremities x4.  There is 3+ bilateral lower extremity pitting edema to the knees bilaterally.  There is full range of motion of the lumbar spine.  There is full range of motion of the extremities x4.  There is no deformity noted.    NEUROLOGIC:       Normal gait.      Motor and sensory exams grossly normal.  PSYCHIATRIC: Patient is alert.  There is no homicidality.  There is no suicidality.  There is no evidence of psychosis.    LABORATORY/RADIOLOGY:   Chart reviewed.  We will update blood work today.    ASSESSMENT:  He has stable CK D3.   Well compensated CHF  HTN, condition is well controlled on current medication regimen  Extensive psychiatric history, schizophrenia  Blindness  Deaf  Asthma, condition is well controlled on current medication regimen    PLAN:  We will follow-up blood work which we expect to be normal.    Lisinopril/HCTZ 40/25 and Lasix 40 mg daily  Return to clinic in one year sooner if problems develop.

## 2022-12-08 ENCOUNTER — TELEPHONE (OUTPATIENT)
Dept: INTERNAL MEDICINE | Facility: CLINIC | Age: 57
End: 2022-12-08
Payer: MEDICARE

## 2022-12-08 NOTE — TELEPHONE ENCOUNTER
----- Message from Zachary Ramos MD sent at 12/8/2022  1:17 PM CST -----  Blood work looks good as we expected.  No changes in medications.  Followup as scheduled.

## 2022-12-08 NOTE — TELEPHONE ENCOUNTER
Called pt to let him know   Blood work looks good as we expected.  No changes in medications.  Followup as scheduled.     Spoke to sister and let her know  She verbalized understanding and had no further concerns or questions.

## 2022-12-14 DIAGNOSIS — F20.5 RESIDUAL SCHIZOPHRENIA: ICD-10-CM

## 2022-12-14 DIAGNOSIS — I10 ESSENTIAL HYPERTENSION: ICD-10-CM

## 2022-12-14 RX ORDER — RISPERIDONE 1 MG/1
1 TABLET ORAL DAILY
Qty: 30 TABLET | Refills: 3 | Status: SHIPPED | OUTPATIENT
Start: 2022-12-14 | End: 2023-06-15 | Stop reason: SDUPTHER

## 2022-12-14 RX ORDER — FUROSEMIDE 40 MG/1
40 TABLET ORAL DAILY
Qty: 90 TABLET | Refills: 3 | Status: SHIPPED | OUTPATIENT
Start: 2022-12-14 | End: 2023-11-02 | Stop reason: SDUPTHER

## 2022-12-14 NOTE — TELEPHONE ENCOUNTER
Requested medication has been pended and routed to Dr. Ramos for approval. LOV 12/6/22 allergies have been verified. Thanks.

## 2022-12-14 NOTE — TELEPHONE ENCOUNTER
----- Message from Williams Nixon sent at 12/14/2022  2:05 PM CST -----      Can the clinic reply in [facility]:N        Please refill the medication(s) listed below. Please call the patient when the prescription(s) is ready for  at this phone number         Medication #1 furosemide (LASIX) 40 MG tablet    Medication #2 risperiDONE (RISPERDAL) 1 MG tablet      Preferred Pharmacy: [facility] #[id number] - [address] AT [address]

## 2022-12-19 DIAGNOSIS — I10 ESSENTIAL HYPERTENSION: ICD-10-CM

## 2022-12-19 RX ORDER — LISINOPRIL AND HYDROCHLOROTHIAZIDE 12.5; 2 MG/1; MG/1
2 TABLET ORAL DAILY
Qty: 180 TABLET | Refills: 0 | Status: SHIPPED | OUTPATIENT
Start: 2022-12-19 | End: 2023-07-21 | Stop reason: SDUPTHER

## 2022-12-19 NOTE — TELEPHONE ENCOUNTER
Faxed Prescription Refill Request received from  [facility] for Lisinopril-HCTZ 20/12.5 mg                           LOV    12/06/2022                            Thank You

## 2022-12-19 NOTE — TELEPHONE ENCOUNTER
No new care gaps identified.  Health [facility] Embedded Care Gaps. Reference number: [id number]. 12/19/2022   9:46:26 AM CST

## 2023-02-27 ENCOUNTER — TELEPHONE (OUTPATIENT)
Dept: INTERNAL MEDICINE | Facility: CLINIC | Age: 58
End: 2023-02-27
Payer: MEDICARE

## 2023-02-27 NOTE — TELEPHONE ENCOUNTER
Returned call to Mr. Sigala.[facility]  states he is loosing weight and his prostate could be swollen. Given 03/01/2023 0930 with Dr. Ramos

## 2023-02-27 NOTE — TELEPHONE ENCOUNTER
----- Message from Ree Dexter sent at 2/27/2023 10:59 AM CST -----  Regarding: appointment  Name of Who is Calling: Wendy, sister           What is the request in detail: Wendy is requesting a call back for patient to be seen soon. He is loosing weight and prostate area is swollen.            Can the clinic reply by [facility]: No           What Number to Call Back if not in [facility]: [phone]

## 2023-03-01 ENCOUNTER — OFFICE VISIT (OUTPATIENT)
Dept: INTERNAL MEDICINE | Facility: CLINIC | Age: 58
End: 2023-03-01
Attending: FAMILY MEDICINE
Payer: MEDICARE

## 2023-03-01 VITALS
OXYGEN SATURATION: 98 % | WEIGHT: 180.13 LBS | DIASTOLIC BLOOD PRESSURE: 78 MMHG | HEIGHT: 65 IN | BODY MASS INDEX: 30.01 KG/M2 | SYSTOLIC BLOOD PRESSURE: 156 MMHG | HEART RATE: 109 BPM

## 2023-03-01 DIAGNOSIS — F20.5 RESIDUAL SCHIZOPHRENIA: ICD-10-CM

## 2023-03-01 DIAGNOSIS — I50.9 HEART FAILURE, UNSPECIFIED HF CHRONICITY, UNSPECIFIED HEART FAILURE TYPE: ICD-10-CM

## 2023-03-01 DIAGNOSIS — I10 ESSENTIAL HYPERTENSION: ICD-10-CM

## 2023-03-01 DIAGNOSIS — N18.31 STAGE 3A CHRONIC KIDNEY DISEASE: ICD-10-CM

## 2023-03-01 DIAGNOSIS — N18.4 CHRONIC KIDNEY DISEASE (CKD), STAGE IV (SEVERE): ICD-10-CM

## 2023-03-01 DIAGNOSIS — R63.4 WEIGHT LOSS, UNINTENTIONAL: Primary | ICD-10-CM

## 2023-03-01 PROCEDURE — 99214 PR OFFICE/OUTPT VISIT, EST, LEVL IV, 30-39 MIN: ICD-10-PCS | Mod: S$PBB,,, | Performed by: FAMILY MEDICINE

## 2023-03-01 PROCEDURE — 99999 PR PBB SHADOW E&M-EST. PATIENT-LVL IV: CPT | Mod: PBBFAC,,, | Performed by: FAMILY MEDICINE

## 2023-03-01 PROCEDURE — 99999 PR PBB SHADOW E&M-EST. PATIENT-LVL IV: ICD-10-PCS | Mod: PBBFAC,,, | Performed by: FAMILY MEDICINE

## 2023-03-01 PROCEDURE — 99214 OFFICE O/P EST MOD 30 MIN: CPT | Mod: PBBFAC | Performed by: FAMILY MEDICINE

## 2023-03-01 PROCEDURE — 99214 OFFICE O/P EST MOD 30 MIN: CPT | Mod: S$PBB,,, | Performed by: FAMILY MEDICINE

## 2023-03-01 NOTE — PROGRESS NOTES
"CHIEF COMPLAINT:  wt loss and HTN    HISTORY OF PRESENT ILLNESS: The patient is a fairly healthy 58 year-old male.  He is blind and deaf.  She was seen about 2 months ago.  He is seen with his family today.  The patient has been doing well overall.  He is quite active normally.      C/O wt loss but unclear as it is difficult to weigh pt and he has been fluid overloaded in past visits but euvolemic today.    REVIEW OF SYSTEMS:  GENERAL: No fever, chills, fatigability.  SKIN: No rashes, itching or changes in color or texture of skin.  HEAD: No headaches or recent head trauma.  EYES: He is blind bilaterally.  EARS: Denies ear pain, discharge or vertigo.  He is deaf bilaterally  NOSE: No loss of smell, no epistaxis or postnasal drip.  MOUTH & THROAT: No hoarseness or change in voice. No excessive gum bleeding.  NODES: Denies swollen glands.  CHEST: Denies LI, cyanosis, cough and sputum production.  CARDIOVASCULAR: Denies chest pain, PND, orthopnea or reduced exercise tolerance.  ABDOMEN: Appetite fine. Denies diarrhea, abdominal pain, hematemesis.  URINARY: No flank pain, dysuria or hematuria.  PERIPHERAL VASCULAR: No claudication or cyanosis.  MUSCULOSKELETAL: No joint stiffness. Denies back pain.  NEUROLOGIC: No history of seizures, paralysis, alteration of gait or coordination.    SOCIAL HISTORY: The patient does not smoke.  The patient consumes alcohol socially.  The patient is disabled and lives with his family.    PHYSICAL EXAMINATION:   Blood pressure (!) 156/78, pulse 109, height 5' 5" (1.651 m), weight 81.7 kg (180 lb 1.9 oz), SpO2 98 %.    APPEARANCE: Well nourished, in no acute distress.    HEAD:  atraumatic.  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 Airway clear.  MOUTH & THROAT: No tonsillar enlargement. No pharyngeal erythema or exudate. No stridor.  NECK: Supple.   NODES: No cervical, axillary or inguinal lymph node enlargement.  CHEST: Lungs " clear to auscultation.  No retractions are noted.  No rales or rhonchi are present.  CARDIOVASCULAR: Normal S1, S2. No rubs, murmurs or gallops.  ABDOMEN: Bowel sounds normal. Not distended. Soft. No tenderness or masses.  No ascites is noted.  MUSCULOSKELETAL:  There is no clubbing, cyanosis of the extremities x4.  There is 3+ bilateral lower extremity pitting edema to the knees bilaterally.  There is full range of motion of the lumbar spine.  There is full range of motion of the extremities x4.  There is no deformity noted.    NEUROLOGIC:       Normal gait.      Motor and sensory exams grossly normal.  PSYCHIATRIC: Patient is alert.  There is no homicidality.  There is no suicidality.  There is no evidence of psychosis.    LABORATORY/RADIOLOGY:   Chart reviewed.  We reviewed blood work today.    ASSESSMENT:  Possible weight loss  He has stable CK D3.   Well compensated CHF  HTN, condition is not well controlled on current medication regimen  Extensive psychiatric history, schizophrenia  Blindness  Deaf  Asthma, condition is well controlled on current medication regimen    PLAN:  Recent blood work was normal.  Needs GI for weight loss  Lisinopril/HCTZ 40/25 and Lasix 40 mg daily  Nurse remote visit for BP in 2 weeks  Return to clinic in one year sooner if problems develop.

## 2023-06-15 DIAGNOSIS — F20.5 RESIDUAL SCHIZOPHRENIA: ICD-10-CM

## 2023-06-15 RX ORDER — RISPERIDONE 1 MG/1
1 TABLET ORAL DAILY
Qty: 30 TABLET | Refills: 3 | Status: SHIPPED | OUTPATIENT
Start: 2023-06-15 | End: 2023-11-02 | Stop reason: SDUPTHER

## 2023-06-15 NOTE — TELEPHONE ENCOUNTER
No care due was identified.  [facility] Embedded Care Due Messages. Reference number: [id number].   6/15/2023 10:18:29 AM CDT

## 2023-06-15 NOTE — TELEPHONE ENCOUNTER
Refill Encounter    PCP Visits: Recent Visits  Date Type Provider Dept   03/01/23 Office Visit Zachary Ramos MD [facility]   12/06/22 Office Visit Zachary Ramos MD [facility]   Showing recent visits within past 360 days and meeting all other requirements  Future Appointments  No visits were found meeting these conditions.  Showing future appointments within next 720 days and meeting all other requirements     Last 3 Blood Pressure:   BP Readings from Last 3 Encounters:   03/01/23 (!) 156/78   12/06/22 128/80   06/14/22 137/84     Preferred Pharmacy:   [facility][id number] [address] [facility]  [address] [facility] [address]  Phone: [phone] Fax: [phone]    Requested RX:  Requested Prescriptions      No prescriptions requested or ordered in this encounter      RX Route: Normal

## 2023-07-17 ENCOUNTER — PATIENT OUTREACH (OUTPATIENT)
Dept: ADMINISTRATIVE | Facility: HOSPITAL | Age: 58
End: 2023-07-17
Payer: MEDICARE

## 2023-07-21 DIAGNOSIS — I10 ESSENTIAL HYPERTENSION: ICD-10-CM

## 2023-07-21 NOTE — TELEPHONE ENCOUNTER
Refill Encounter    PCP Visits: Recent Visits  Date Type Provider Dept   03/01/23 Office Visit Zachary Ramos MD [facility]   12/06/22 Office Visit Zachary Ramos MD [facility]   Showing recent visits within past 360 days and meeting all other requirements  Future Appointments  No visits were found meeting these conditions.  Showing future appointments within next 720 days and meeting all other requirements     Last 3 Blood Pressure:   BP Readings from Last 3 Encounters:   03/01/23 (!) 156/78   12/06/22 128/80   06/14/22 137/84     Preferred Pharmacy:   [facility] #[id number] [address] [facility]  [address] [facility] [address]  Phone: [phone] Fax: [phone]    Requested RX:  Requested Prescriptions      No prescriptions requested or ordered in this encounter      RX Route: Normal

## 2023-07-21 NOTE — TELEPHONE ENCOUNTER
Refill Routing Note   Medication(s) are not appropriate for processing by [facility] for the following reason(s):      Required vitals abnormal    [facility] action(s):  Defer Care Due:  None identified     Medication Therapy Plan: BP elevated      Appointments  past 12m or future 3m with PCP    Date Provider   Last Visit   3/1/2023 Zachary Ramos MD   Next Visit   Visit date not found Zachary Ramos MD   ED visits in past 90 days: 0        Note composed:3:41 PM 07/21/2023

## 2023-07-24 RX ORDER — LISINOPRIL AND HYDROCHLOROTHIAZIDE 12.5; 2 MG/1; MG/1
2 TABLET ORAL DAILY
Qty: 180 TABLET | Refills: 0 | Status: SHIPPED | OUTPATIENT
Start: 2023-07-24 | End: 2023-10-13 | Stop reason: SDUPTHER

## 2023-10-11 DIAGNOSIS — J45.909 ASTHMA, UNSPECIFIED ASTHMA SEVERITY, UNSPECIFIED WHETHER COMPLICATED, UNSPECIFIED WHETHER PERSISTENT: ICD-10-CM

## 2023-10-11 RX ORDER — ALBUTEROL SULFATE 90 UG/1
2 AEROSOL, METERED RESPIRATORY (INHALATION) EVERY 6 HOURS PRN
Qty: 54 G | Refills: 0 | Status: SHIPPED | OUTPATIENT
Start: 2023-10-11 | End: 2024-01-09 | Stop reason: SDUPTHER

## 2023-10-11 NOTE — TELEPHONE ENCOUNTER
Refill Routing Note   Medication(s) are not appropriate for processing by [facility] for the following reason(s):      Due for refill >6 months ago    [facility] action(s):  Defer Care Due:  Labs due     Medication Therapy Plan: Per Epic data, No dispenses within 180 days of the adherence period (since 9/1/2022)        Appointments  past 12m or future 3m with PCP    Date Provider   Last Visit   3/1/2023 Zachary Ramos MD   Next Visit   Visit date not found Zachary Ramos MD   ED visits in past 90 days: 0        Note composed:11:57 AM 10/11/2023

## 2023-10-11 NOTE — TELEPHONE ENCOUNTER
----- Message from Alonzo Bermudez sent at 10/11/2023  9:01 AM CDT -----  Who Called:        Refill or New Rx: refill         RX Name and Strength:  albuterol (PROVENTIL/VENTOLIN HFA) 90 mcg/actuation inhaler          Is this a 30 day or 90 day RX        Preferred Pharmacy with phone number:   [facility] #[id number] [facility] [address] [facility] AT SEC OF [address]        Local or Mail Order: local           Would the patient rather a call back or a response via [facility]? Call back         Best Call Back Number:        Additional Information:

## 2023-10-11 NOTE — TELEPHONE ENCOUNTER
Care Due:                  Date            Visit Type   Department     Provider  --------------------------------------------------------------------------------                                EP -                              PRIMARY      [facility] INTERNAL  Zachary Solis  Last Visit: 03-      [facility] ([facility])   [facility]  Next Visit: None Scheduled  None         None Found                                                            Last  Test          Frequency    Reason                     Performed    Due Date  --------------------------------------------------------------------------------    CBC.........  12 months..  meloxicam................  Not Found    Overdue    CMP.........  12 months..  furosemide,                12- 12-                             lisinopriL-hydrochlorothi                             azide, meloxicam.........    [employer] Embedded Care Due Messages. Reference number: [id number].   10/11/2023 9:21:55 AM CDT

## 2023-10-13 DIAGNOSIS — I10 ESSENTIAL HYPERTENSION: ICD-10-CM

## 2023-10-13 RX ORDER — LISINOPRIL AND HYDROCHLOROTHIAZIDE 12.5; 2 MG/1; MG/1
2 TABLET ORAL DAILY
Qty: 180 TABLET | Refills: 1 | Status: SHIPPED | OUTPATIENT
Start: 2023-10-13 | End: 2023-11-02 | Stop reason: SDUPTHER

## 2023-10-13 NOTE — TELEPHONE ENCOUNTER
Refill Routing Note   Medication(s) are not appropriate for processing by [facility] for the following reason(s):      Required labs abnormal  Required vitals abnormal    [facility] action(s):  Defer Care Due:  None identified            Appointments  past 12m or future 3m with PCP    Date Provider   Last Visit   3/1/2023 Zachary Ramos MD   Next Visit   Visit date not found Zachary Ramos MD   ED visits in past 90 days: 0        Note composed:10:15 AM 10/13/2023

## 2023-10-13 NOTE — TELEPHONE ENCOUNTER
Refill Encounter    PCP Visits: Recent Visits  Date Type Provider Dept   03/01/23 Office Visit Zachary Ramos MD [facility]   12/06/22 Office Visit Zachary Ramos MD [facility]   Showing recent visits within past 360 days and meeting all other requirements  Future Appointments  No visits were found meeting these conditions.  Showing future appointments within next 720 days and meeting all other requirements     Last 3 Blood Pressure:   BP Readings from Last 3 Encounters:   03/01/23 (!) 156/78   12/06/22 128/80   06/14/22 137/84     Preferred Pharmacy:   [id number] [address] [facility]  [address] [facility] [address]  Phone: [phone] Fax: [phone]    Requested RX:  Requested Prescriptions      No prescriptions requested or ordered in this encounter      RX Route: Normal

## 2023-10-13 NOTE — TELEPHONE ENCOUNTER
No care due was identified.  Health [facility] Embedded Care Due Messages. Reference number: [id number].   10/13/2023 9:43:02 AM CDT

## 2023-11-02 ENCOUNTER — OFFICE VISIT (OUTPATIENT)
Dept: INTERNAL MEDICINE | Facility: CLINIC | Age: 58
End: 2023-11-02
Attending: FAMILY MEDICINE
Payer: MEDICARE

## 2023-11-02 VITALS
BODY MASS INDEX: 28.89 KG/M2 | OXYGEN SATURATION: 100 % | DIASTOLIC BLOOD PRESSURE: 78 MMHG | SYSTOLIC BLOOD PRESSURE: 136 MMHG | WEIGHT: 173.63 LBS | HEART RATE: 88 BPM

## 2023-11-02 DIAGNOSIS — I10 ESSENTIAL HYPERTENSION: ICD-10-CM

## 2023-11-02 DIAGNOSIS — M25.50 POLYARTHRALGIA: Primary | ICD-10-CM

## 2023-11-02 DIAGNOSIS — N18.31 STAGE 3A CHRONIC KIDNEY DISEASE: ICD-10-CM

## 2023-11-02 DIAGNOSIS — F20.5 RESIDUAL SCHIZOPHRENIA: ICD-10-CM

## 2023-11-02 PROCEDURE — 99999 PR PBB SHADOW E&M-EST. PATIENT-LVL III: ICD-10-PCS | Mod: PBBFAC,,, | Performed by: FAMILY MEDICINE

## 2023-11-02 PROCEDURE — 99214 PR OFFICE/OUTPT VISIT, EST, LEVL IV, 30-39 MIN: ICD-10-PCS | Mod: S$PBB,,, | Performed by: FAMILY MEDICINE

## 2023-11-02 PROCEDURE — 99214 OFFICE O/P EST MOD 30 MIN: CPT | Mod: S$PBB,,, | Performed by: FAMILY MEDICINE

## 2023-11-02 PROCEDURE — 99213 OFFICE O/P EST LOW 20 MIN: CPT | Mod: PBBFAC | Performed by: FAMILY MEDICINE

## 2023-11-02 PROCEDURE — 99999 PR PBB SHADOW E&M-EST. PATIENT-LVL III: CPT | Mod: PBBFAC,,, | Performed by: FAMILY MEDICINE

## 2023-11-02 RX ORDER — LISINOPRIL AND HYDROCHLOROTHIAZIDE 12.5; 2 MG/1; MG/1
2 TABLET ORAL DAILY
Qty: 180 TABLET | Refills: 3 | Status: SHIPPED | OUTPATIENT
Start: 2023-11-02

## 2023-11-02 RX ORDER — FUROSEMIDE 40 MG/1
40 TABLET ORAL DAILY
Qty: 90 TABLET | Refills: 3 | Status: SHIPPED | OUTPATIENT
Start: 2023-11-02

## 2023-11-02 RX ORDER — NABUMETONE 500 MG/1
500 TABLET, FILM COATED ORAL 2 TIMES DAILY
Qty: 60 TABLET | Refills: 3 | Status: SHIPPED | OUTPATIENT
Start: 2023-11-02 | End: 2023-12-02

## 2023-11-02 RX ORDER — RISPERIDONE 1 MG/1
1 TABLET ORAL DAILY
Qty: 90 TABLET | Refills: 3 | Status: SHIPPED | OUTPATIENT
Start: 2023-11-02

## 2023-11-02 NOTE — PROGRESS NOTES
CHIEF COMPLAINT:  polyarthralgia and HTN    HISTORY OF PRESENT ILLNESS: The patient is a fairly healthy 58 year-old male.  He is blind and deaf.  He is seen with his family today.  The patient has been doing well overall except for polyarthralgia.  He is quite active normally.      Following with ophtho.    REVIEW OF SYSTEMS:  GENERAL: No fever, chills, fatigability.  SKIN: No rashes, itching or changes in color or texture of skin.  HEAD: No headaches or recent head trauma.  EYES: He is blind bilaterally.  EARS: Denies ear pain, discharge or vertigo.  He is deaf bilaterally  NOSE: No loss of smell, no epistaxis or postnasal drip.  MOUTH & THROAT: No hoarseness or change in voice. No excessive gum bleeding.  NODES: Denies swollen glands.  CHEST: Denies LI, cyanosis, cough and sputum production.  CARDIOVASCULAR: Denies chest pain, PND, orthopnea or reduced exercise tolerance.  ABDOMEN: Appetite fine. Denies diarrhea, abdominal pain, hematemesis.  URINARY: No flank pain, dysuria or hematuria.  PERIPHERAL VASCULAR: No claudication or cyanosis.  MUSCULOSKELETAL: No joint stiffness. Denies back pain.  NEUROLOGIC: No history of seizures, paralysis, alteration of gait or coordination.    SOCIAL HISTORY: The patient does not smoke.  The patient consumes alcohol socially.  The patient is disabled and lives with his family.    PHYSICAL EXAMINATION:   Blood pressure 136/78, pulse 88, weight 78.8 kg (173 lb 9.8 oz), SpO2 100 %.    APPEARANCE: Well nourished, in no acute distress.    HEAD:  atraumatic.  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 Airway clear.  MOUTH & THROAT: No tonsillar enlargement. No pharyngeal erythema or exudate. No stridor.  NECK: Supple.   NODES: No cervical, axillary or inguinal lymph node enlargement.  CHEST: Lungs clear to auscultation.  No retractions are noted.  No rales or rhonchi are present.  CARDIOVASCULAR: Normal S1, S2. No rubs, murmurs  or gallops.  ABDOMEN: Bowel sounds normal. Not distended. Soft. No tenderness or masses.  No ascites is noted.  MUSCULOSKELETAL:  There is no clubbing, cyanosis of the extremities x4.  There is 3+ bilateral lower extremity pitting edema to the knees bilaterally.  There is full range of motion of the lumbar spine.  There is full range of motion of the extremities x4.  There is no deformity noted.    NEUROLOGIC:       Normal gait.      Motor and sensory exams grossly normal.  PSYCHIATRIC: Patient is alert.  There is no homicidality.  There is no suicidality.  There is no evidence of psychosis.    LABORATORY/RADIOLOGY:   Chart reviewed.  We reviewed blood work today.    ASSESSMENT:  Polyarthralgia  He has stable CK D3.   Well compensated CHF  HTN, condition is not well controlled on current medication regimen  Extensive psychiatric history, schizophrenia  Blindness  Deaf  Asthma, condition is well controlled on current medication regimen    PLAN:  Relafen    Lisinopril/HCTZ 40/25 and Lasix 40 mg daily  Nurse remote visit for BP in 2 weeks  Return to clinic in one year sooner if problems develop.

## 2023-11-16 ENCOUNTER — OFFICE VISIT (OUTPATIENT)
Dept: OPHTHALMOLOGY | Facility: CLINIC | Age: 58
End: 2023-11-16
Payer: MEDICARE

## 2023-11-16 DIAGNOSIS — H91.93 DEAF, BILATERAL: Primary | ICD-10-CM

## 2023-11-16 DIAGNOSIS — H54.0X55 CATEGORY 5 BLINDNESS OF BOTH EYES: ICD-10-CM

## 2023-11-16 DIAGNOSIS — H44.522 PHTHISIS BULBI OF LEFT EYE: ICD-10-CM

## 2023-11-16 DIAGNOSIS — T86.8411 FAILURE OF CORNEA TRANSPLANT OF RIGHT EYE: ICD-10-CM

## 2023-11-16 PROCEDURE — 99214 OFFICE O/P EST MOD 30 MIN: CPT | Mod: S$PBB,,, | Performed by: OPHTHALMOLOGY

## 2023-11-16 PROCEDURE — 99214 PR OFFICE/OUTPT VISIT, EST, LEVL IV, 30-39 MIN: ICD-10-PCS | Mod: S$PBB,,, | Performed by: OPHTHALMOLOGY

## 2023-11-16 PROCEDURE — 99999 PR PBB SHADOW E&M-EST. PATIENT-LVL II: ICD-10-PCS | Mod: PBBFAC,,, | Performed by: OPHTHALMOLOGY

## 2023-11-16 PROCEDURE — 99212 OFFICE O/P EST SF 10 MIN: CPT | Mod: PBBFAC | Performed by: OPHTHALMOLOGY

## 2023-11-16 PROCEDURE — 99999 PR PBB SHADOW E&M-EST. PATIENT-LVL II: CPT | Mod: PBBFAC,,, | Performed by: OPHTHALMOLOGY

## 2024-01-09 DIAGNOSIS — J45.909 ASTHMA, UNSPECIFIED ASTHMA SEVERITY, UNSPECIFIED WHETHER COMPLICATED, UNSPECIFIED WHETHER PERSISTENT: ICD-10-CM

## 2024-01-18 RX ORDER — ALBUTEROL SULFATE 90 UG/1
2 AEROSOL, METERED RESPIRATORY (INHALATION) EVERY 6 HOURS PRN
Qty: 18 G | Refills: 0 | Status: SHIPPED | OUTPATIENT
Start: 2024-01-18

## 2024-01-18 NOTE — TELEPHONE ENCOUNTER
Provider Staff:  Action required for this patient    Requires labs  [CMP outdated]     Please see care gap opportunities below in Care Due Message.    Thanks!  [facility]     Appointments      Date Provider   Last Visit   11/2/2023 Zachary Ramos MD   Next Visit   Visit date not found Zachary Ramos MD     Refill Decision Note   Frantz Sigala  is requesting a refill authorization.  Brief Assessment and Rationale for Refill:  Approve     Medication Therapy Plan:  CMP outdated      Comments:     Note composed:2:16 PM 01/18/2024

## 2024-01-18 NOTE — TELEPHONE ENCOUNTER
Care Due:                  Date            Visit Type   Department     Provider  --------------------------------------------------------------------------------                                EP -                              PRIMARY      [facility] INTERNAL  Eamonjaijennyfer Will  Last Visit: 11-      [facility]   [facility]  Next Visit: None Scheduled  None         None Found                                                            Last  Test          Frequency    Reason                     Performed    Due Date  --------------------------------------------------------------------------------    CMP.........  12 months..  furosemide,                12- 12-                             lisinopriL-hydrochlorothi                             azide....................    [employer] Embedded Care Due Messages. Reference number: [id number].   1/18/2024 2:05:18 PM CST

## 2024-02-06 DIAGNOSIS — Z12.11 COLON CANCER SCREENING: ICD-10-CM

## 2024-04-24 ENCOUNTER — TELEPHONE (OUTPATIENT)
Dept: INTERNAL MEDICINE | Facility: CLINIC | Age: 59
End: 2024-04-24
Payer: MEDICARE

## 2024-04-24 NOTE — TELEPHONE ENCOUNTER
Returned call to Mr. Sigala. Given suggestion from the MD. States that he needs to see Dr. Ramos as he is having dark stool. Given May 1 at 0945          From Dr. Ramos    He would benefit from a Gastro referral

## 2024-04-24 NOTE — TELEPHONE ENCOUNTER
----- Message from Zachary Ramos MD sent at 4/23/2024  2:55 PM CDT -----  He would benefit from a Gastro referral  ----- Message -----  From: Linette Mendoza, JUAN C  Sent: 4/23/2024   1:17 PM CDT  To: Zachary Ramos MD    For Your Review abd Advice  ----- Message -----  From: Myla Hagan MA  Sent: 4/23/2024  11:04 AM CDT  To: Richard VILLASEÑOR Staff    Name of Who is Calling: Wendy sister of MELISSA, CALVIN TATI TRAN. [[id number]]    What is the request in detail: The sister of the pt is requesting a call back to see if he can get the colonoscopy done another way due to him being blind and deaf. Is there any other option to check his colon.                 Can the clinic reply by [facility]: No                What Number to Call Back if not in [facility]:[phone]

## 2024-05-01 ENCOUNTER — OFFICE VISIT (OUTPATIENT)
Dept: INTERNAL MEDICINE | Facility: CLINIC | Age: 59
End: 2024-05-01
Attending: FAMILY MEDICINE
Payer: MEDICARE

## 2024-05-01 VITALS
HEIGHT: 65 IN | BODY MASS INDEX: 28.6 KG/M2 | DIASTOLIC BLOOD PRESSURE: 72 MMHG | WEIGHT: 171.63 LBS | OXYGEN SATURATION: 94 % | SYSTOLIC BLOOD PRESSURE: 130 MMHG | HEART RATE: 104 BPM

## 2024-05-01 DIAGNOSIS — I50.9 HEART FAILURE, UNSPECIFIED HF CHRONICITY, UNSPECIFIED HEART FAILURE TYPE: ICD-10-CM

## 2024-05-01 DIAGNOSIS — N18.4 CHRONIC KIDNEY DISEASE (CKD), STAGE IV (SEVERE): ICD-10-CM

## 2024-05-01 DIAGNOSIS — R19.8 CHANGE IN BOWEL MOVEMENT: Primary | ICD-10-CM

## 2024-05-01 DIAGNOSIS — F20.5 RESIDUAL SCHIZOPHRENIA: ICD-10-CM

## 2024-05-01 PROCEDURE — 99214 OFFICE O/P EST MOD 30 MIN: CPT | Mod: S$PBB,,, | Performed by: FAMILY MEDICINE

## 2024-05-01 PROCEDURE — 99213 OFFICE O/P EST LOW 20 MIN: CPT | Mod: PBBFAC | Performed by: FAMILY MEDICINE

## 2024-05-01 PROCEDURE — 99999 PR PBB SHADOW E&M-EST. PATIENT-LVL III: CPT | Mod: PBBFAC,,, | Performed by: FAMILY MEDICINE

## 2024-05-01 NOTE — PROGRESS NOTES
"CHIEF COMPLAINT:  dark stool and HTN    HISTORY OF PRESENT ILLNESS: The patient is a fairly healthy 59 year-old male.  He is blind and deaf.  He is seen with his family today.  Has been having dark stool for 2 weeks    The patient has been doing well overall except for polyarthralgia.  He is quite active normally.      Following with ophtho.    REVIEW OF SYSTEMS:  GENERAL: No fever, chills, fatigability.  SKIN: No rashes, itching or changes in color or texture of skin.  HEAD: No headaches or recent head trauma.  EYES: He is blind bilaterally.  EARS: Denies ear pain, discharge or vertigo.  He is deaf bilaterally  NOSE: No loss of smell, no epistaxis or postnasal drip.  MOUTH & THROAT: No hoarseness or change in voice. No excessive gum bleeding.  NODES: Denies swollen glands.  CHEST: Denies LI, cyanosis, cough and sputum production.  CARDIOVASCULAR: Denies chest pain, PND, orthopnea or reduced exercise tolerance.  ABDOMEN: Appetite fine. Denies diarrhea, abdominal pain, hematemesis.  URINARY: No flank pain, dysuria or hematuria.  PERIPHERAL VASCULAR: No claudication or cyanosis.  MUSCULOSKELETAL: No joint stiffness. Denies back pain.  NEUROLOGIC: No history of seizures, paralysis, alteration of gait or coordination.    SOCIAL HISTORY: The patient does not smoke.  The patient consumes alcohol socially.  The patient is disabled and lives with his family.    PHYSICAL EXAMINATION:   Blood pressure 130/72, pulse 104, height 5' 5" (1.651 m), weight 77.9 kg (171 lb 10.1 oz), SpO2 (!) 94%.    APPEARANCE: Well nourished, in no acute distress.    HEAD:  atraumatic.  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 Airway clear.  MOUTH & THROAT: No tonsillar enlargement. No pharyngeal erythema or exudate. No stridor.  NECK: Supple.   NODES: No cervical, axillary or inguinal lymph node enlargement.  CHEST: Lungs clear to auscultation.  No retractions are noted.  No rales or " rhonchi are present.  CARDIOVASCULAR: Normal S1, S2. No rubs, murmurs or gallops.  ABDOMEN: Bowel sounds normal. Not distended. Soft. No tenderness or masses.  No ascites is noted.  MUSCULOSKELETAL:  There is no clubbing, cyanosis of the extremities x4.  There is 3+ bilateral lower extremity pitting edema to the knees bilaterally.  There is full range of motion of the lumbar spine.  There is full range of motion of the extremities x4.  There is no deformity noted.    NEUROLOGIC:       Normal gait.      Motor and sensory exams grossly normal.  PSYCHIATRIC: Patient is alert.  There is no homicidality.  There is no suicidality.  There is no evidence of psychosis.    LABORATORY/RADIOLOGY:   Chart reviewed.  We reviewed blood work today.    ASSESSMENT:  2 weeks of dark stool    Polyarthralgia  He has stable CK D3.   Well compensated CHF  HTN, condition is not well controlled on current medication regimen  Extensive psychiatric history, schizophrenia  Blindness  Deaf  Asthma, condition is well controlled on current medication regimen    PLAN:  Gastro  Lisinopril/HCTZ 40/25 and Lasix 40 mg daily  Nurse remote visit for BP in 2 weeks  Return to clinic in one year sooner if problems develop.

## 2024-05-09 ENCOUNTER — LAB VISIT (OUTPATIENT)
Dept: LAB | Facility: OTHER | Age: 59
End: 2024-05-09
Attending: INTERNAL MEDICINE
Payer: MEDICARE

## 2024-05-09 DIAGNOSIS — Z12.11 COLON CANCER SCREENING: Primary | ICD-10-CM

## 2024-05-09 DIAGNOSIS — R19.5 DARK STOOLS: ICD-10-CM

## 2024-05-09 LAB
BASOPHILS # BLD AUTO: 0.05 K/UL (ref 0–0.2)
BASOPHILS NFR BLD: 0.5 % (ref 0–1.9)
DIFFERENTIAL METHOD BLD: ABNORMAL
EOSINOPHIL # BLD AUTO: 0.3 K/UL (ref 0–0.5)
EOSINOPHIL NFR BLD: 2.7 % (ref 0–8)
ERYTHROCYTE [DISTWIDTH] IN BLOOD BY AUTOMATED COUNT: 18.2 % (ref 11.5–14.5)
HCT VFR BLD AUTO: 27.1 % (ref 40–54)
HGB BLD-MCNC: 8.3 G/DL (ref 14–18)
IMM GRANULOCYTES # BLD AUTO: 0.05 K/UL (ref 0–0.04)
IMM GRANULOCYTES NFR BLD AUTO: 0.5 % (ref 0–0.5)
LYMPHOCYTES # BLD AUTO: 1.4 K/UL (ref 1–4.8)
LYMPHOCYTES NFR BLD: 15.3 % (ref 18–48)
MCH RBC QN AUTO: 23.4 PG (ref 27–31)
MCHC RBC AUTO-ENTMCNC: 30.6 G/DL (ref 32–36)
MCV RBC AUTO: 76 FL (ref 82–98)
MONOCYTES # BLD AUTO: 0.8 K/UL (ref 0.3–1)
MONOCYTES NFR BLD: 8.3 % (ref 4–15)
NEUTROPHILS # BLD AUTO: 6.8 K/UL (ref 1.8–7.7)
NEUTROPHILS NFR BLD: 72.7 % (ref 38–73)
NRBC BLD-RTO: 0 /100 WBC
PLATELET # BLD AUTO: 414 K/UL (ref 150–450)
PMV BLD AUTO: 8.8 FL (ref 9.2–12.9)
RBC # BLD AUTO: 3.55 M/UL (ref 4.6–6.2)
WBC # BLD AUTO: 9.4 K/UL (ref 3.9–12.7)

## 2024-05-09 PROCEDURE — 85025 COMPLETE CBC W/AUTO DIFF WBC: CPT | Performed by: INTERNAL MEDICINE

## 2024-05-09 PROCEDURE — 36415 COLL VENOUS BLD VENIPUNCTURE: CPT | Performed by: INTERNAL MEDICINE

## 2024-06-19 ENCOUNTER — PATIENT OUTREACH (OUTPATIENT)
Dept: ADMINISTRATIVE | Facility: HOSPITAL | Age: 59
End: 2024-06-19
Payer: MEDICARE

## 2024-06-19 NOTE — PROGRESS NOTES
Health Maintenance Due   Topic Date Due    Annual UACr  Never done    Colorectal Cancer Screening  Never done    Shingles Vaccine (1 of 2) Never done    COVID-19 Vaccine (1 - 2023-24 season) Never done   Health Maintenance reviewed, updated and links triggered. Colorectal due LVM for Scheduling. ([place]) 6/19/24

## 2024-10-24 DIAGNOSIS — J45.909 ASTHMA, UNSPECIFIED ASTHMA SEVERITY, UNSPECIFIED WHETHER COMPLICATED, UNSPECIFIED WHETHER PERSISTENT: ICD-10-CM

## 2024-10-24 RX ORDER — ALBUTEROL SULFATE 90 UG/1
2 INHALANT RESPIRATORY (INHALATION) EVERY 6 HOURS PRN
Qty: 18 G | Refills: 0 | Status: SHIPPED | OUTPATIENT
Start: 2024-10-24

## 2024-10-24 NOTE — TELEPHONE ENCOUNTER
Care Due:                  Date            Visit Type   Department     Provider  --------------------------------------------------------------------------------                                EP -                              PRIMARY      [facility] INTERNAL  Eamonjaijennyfer Will  Last Visit: 05-      [facility] ([facility]   [facility]  Next Visit: None Scheduled  None         None Found                                                            Last  Test          Frequency    Reason                     Performed    Due Date  --------------------------------------------------------------------------------    CMP.........  12 months..  furosemide,                12- 12-                             lisinopriL-hydrochlorothi                             azide....................    [employer] Embedded Care Due Messages. Reference number: [id number].   10/24/2024 10:01:13 AM CDT

## 2024-11-05 DIAGNOSIS — F20.5 RESIDUAL SCHIZOPHRENIA: ICD-10-CM

## 2024-11-05 RX ORDER — RISPERIDONE 1 MG/1
1 TABLET ORAL DAILY
Qty: 90 TABLET | Refills: 3 | Status: SHIPPED | OUTPATIENT
Start: 2024-11-05

## 2024-11-05 NOTE — TELEPHONE ENCOUNTER
No care due was identified.  Health [facility] Embedded Care Due Messages. Reference number: [id number].   11/05/2024 7:53:25 AM CST

## 2024-11-26 DIAGNOSIS — I10 ESSENTIAL HYPERTENSION: ICD-10-CM

## 2024-11-26 RX ORDER — LISINOPRIL AND HYDROCHLOROTHIAZIDE 12.5; 2 MG/1; MG/1
2 TABLET ORAL DAILY
Qty: 180 TABLET | Refills: 0 | Status: SHIPPED | OUTPATIENT
Start: 2024-11-26

## 2024-11-26 NOTE — TELEPHONE ENCOUNTER
No care due was identified.  Health [facility] Embedded Care Due Messages. Reference number: [id number].   11/26/2024 8:10:29 AM CST

## 2024-11-26 NOTE — TELEPHONE ENCOUNTER
Refill Routing Note   Medication(s) are not appropriate for processing by [facility] for the following reason(s):        Required labs outdated    [facility] action(s):  Defer     Requires labs : Yes             Appointments  past 12m or future 3m with PCP    Date Provider   Last Visit   5/1/2024 Zachary Ramos MD   Next Visit   Visit date not found Zachary Ramos MD   ED visits in past 90 days: 0        Note composed:12:52 PM 11/26/2024

## 2024-12-26 DIAGNOSIS — I10 ESSENTIAL HYPERTENSION: ICD-10-CM

## 2024-12-26 NOTE — TELEPHONE ENCOUNTER
----- Message from Summer sent at 12/26/2024  3:03 PM CST -----  Regarding: furosemide (LASIX) 40 MG tablet  Type:  RX Refill Request     Who Called: markus/sister  Refill or New Rx:refill  RX Name and Strength:furosemide (LASIX) 40 MG tablet  How is the patient currently taking it? (ex. 1XDay):1  Is this a 30 day or 90 day RX:90  Preferred Pharmacy with phone number:[id number] [address] AT [address]   Phone: [phone]  Fax: [phone]        Local or Mail Order:local  Ordering Provider:  Would the patient rather a call back or a response via [facility]? Call back   Best Call Back Number:[phone]  Additional Information:

## 2024-12-26 NOTE — TELEPHONE ENCOUNTER
Care Due:                  Date            Visit Type   Department     Provider  --------------------------------------------------------------------------------                                EP -                              PRIMARY      [facility] INTERNAL  Zachary Solis  Last Visit: 05-      [facility] ([facility]   [facility]  Next Visit: None Scheduled  None         None Found                                                            Last  Test          Frequency    Reason                     Performed    Due Date  --------------------------------------------------------------------------------    CMP.........  12 months..  furosemide,                Not Found    Overdue                             lisinopriL-hydrochlorothi                             azide....................    [employer] Embedded Care Due Messages. Reference number: [id number].   12/26/2024 3:08:39 PM CST

## 2024-12-27 RX ORDER — FUROSEMIDE 40 MG/1
40 TABLET ORAL DAILY
Qty: 90 TABLET | Refills: 0 | Status: SHIPPED | OUTPATIENT
Start: 2024-12-27

## 2024-12-27 NOTE — TELEPHONE ENCOUNTER
Refill Routing Note   Medication(s) are not appropriate for processing by [facility] for the following reason(s):        Required labs outdated    [facility] action(s):  Defer   Requires labs : Yes             Appointments  past 12m or future 3m with PCP    Date Provider   Last Visit   5/1/2024 Zachary Ramos MD   Next Visit   Visit date not found Zachary Ramos MD   ED visits in past 90 days: 0        Note composed:7:37 PM 12/26/2024

## 2025-02-13 ENCOUNTER — PATIENT OUTREACH (OUTPATIENT)
Dept: ADMINISTRATIVE | Facility: HOSPITAL | Age: 60
End: 2025-02-13
Payer: MEDICARE

## 2025-02-13 NOTE — PROGRESS NOTES
Health Maintenance reviewed, updated and links triggered. Colorectal due LVM for Scheduling. ([facility]) 2/13/25   Annual appt on 5/5/25 @ 0840am and a request to postpone Colorectal screening for now will call when ready patient is Deaf and Blind and it would be very hard at this time.     Care Coordination Encounter Details:       MyChart Portal Status:         [x]  Reviewed MyChart Portal Status offered / enrolled if applicable        Additional Notes:     MyChart Outcomes: Pt is enrolled & active          Updates Requested / Reviewed:        Care Everywhere         Health Maintenance Screening(s) Due:      Health Maintenance Topics Overdue:      VBHM Score: 1     Colon Cancer Screening    Influenza Vaccine  Pneumonia Vaccine  Shingles/Zoster Vaccine  RSV Vaccine                  Health Maintenance Topic(s) Outreach Outcomes & Actions Taken:    Colorectal Cancer Screening - Outreach Outcomes & Actions Taken  : Colonoscopy Case Request / Referral / Home Test Order Placed  postpone Colorectal screening for now will call when ready patient is Deaf and Blind and it would be very hard at this time    Primary Care Appt - Outreach Outcomes & Actions Taken  : Primary Care Appt Scheduled  Annual appt on 5/5/25 @ 0840am                  Additional Notes:  Health Maintenance reviewed, updated and links triggered. Colorectal due LVM for Scheduling. ([facility]) 2/13/25 Annual appt on 5/5/25 @ 0840am and a request to postpone Colorectal screening for now will call when ready patient is Deaf and Blind and it would be very hard at this time.            Chronic Disease Management:     Diabetes Measures        Lab Results   Component Value Date    HGBA1C 5.3 12/06/2022           [x]  Reviewed chart for active Diabetes diagnosis     [x]  Scheduled necessary follow up appointments if needed         Additional Notes:             Hypertension Measures        BP Readings from Last 1 Encounters:   05/01/24 130/72           [x]  Reviewed chart  for active Hypertension diagnosis     [x]  Reviewed & documented Home BP Cuff     [x]  Documented a Remote BP if needed & applicable     [x]  Scheduled necessary follow up appointments with Primary Care if needed         Additional Notes:  5/5/2025 appt            Provider Team Continuity:     Last PCP Visit Date: 5/1/2024          [x]  Reviewed Primary Care Provider Visits, Annual Wellness Visit, and Future          Appointments to ensure appointments have been scheduled and/or           completed        Additional Notes:             Social Determinants of Health          [x]  Reviewed, completed, and/or updated the following sections:                  Food Insecurity, Transportation Needs, Financial Resource Strain,                 Tobacco Use        Additional Notes:             Care Management, Digital Medicine, and/or Education Referrals    OPCM Risk Score: 34.6                 Additional Notes:

## 2025-02-21 DIAGNOSIS — Z00.00 ENCOUNTER FOR MEDICARE ANNUAL WELLNESS EXAM: ICD-10-CM

## 2025-03-05 DIAGNOSIS — N18.4 CHRONIC KIDNEY DISEASE (CKD), STAGE IV (SEVERE): ICD-10-CM

## 2025-03-28 DIAGNOSIS — I10 ESSENTIAL HYPERTENSION: ICD-10-CM

## 2025-03-28 RX ORDER — LISINOPRIL AND HYDROCHLOROTHIAZIDE 12.5; 2 MG/1; MG/1
2 TABLET ORAL DAILY
Qty: 180 TABLET | Refills: 0 | Status: SHIPPED | OUTPATIENT
Start: 2025-03-28

## 2025-03-28 NOTE — TELEPHONE ENCOUNTER
Care Due:                  Date            Visit Type   Department     Provider  --------------------------------------------------------------------------------                                EP -                              [facility] INTERNAL  Zachary Solis  Last Visit: 05-      CARE ([facility])   [facility]                              [facility]  Next Visit: 05-      [facility] ([facility]                                                            Last  Test          Frequency    Reason                     Performed    Due Date  --------------------------------------------------------------------------------    CMP.........  12 months..  furosemide,                Not Found    Overdue                             lisinopriL-hydrochlorothi                             azide....................    [employer] Embedded Care Due Messages. Reference number: [id number].   3/28/2025 4:18:21 PM CDT

## 2025-03-28 NOTE — TELEPHONE ENCOUNTER
Refill Encounter    PCP Visits: Recent Visits  Date Type Provider Dept   05/01/24 Office Visit Zachary Ramos MD [facility] Internal Medicine   Showing recent visits within past 360 days and meeting all other requirements  Future Appointments  Date Type Provider Dept   05/05/25 Appointment Zachary Ramos MD [facility] Internal Medicine   Showing future appointments within next 720 days and meeting all other requirements      Last 3 Blood Pressure:   BP Readings from Last 3 Encounters:   05/01/24 130/72   11/02/23 136/78   03/01/23 (!) 156/78     Preferred Pharmacy:   [id number] [address] AT [facility] & [address]  [address] [facility] [address]  Phone: [phone] Fax: [phone]    Requested RX:  Requested Prescriptions     Pending Prescriptions Disp Refills    lisinopriL-hydrochlorothiazide (PRINZIDE,ZESTORETIC) 20-12.5 mg per tablet 180 tablet 0     Sig: Take 2 tablets by mouth once daily.      RX Route: Normal

## 2025-04-01 DIAGNOSIS — I10 ESSENTIAL HYPERTENSION: ICD-10-CM

## 2025-04-01 RX ORDER — FUROSEMIDE 40 MG/1
40 TABLET ORAL DAILY
Qty: 90 TABLET | Refills: 0 | Status: SHIPPED | OUTPATIENT
Start: 2025-04-01

## 2025-04-01 NOTE — TELEPHONE ENCOUNTER
Refill Encounter    PCP Visits: Recent Visits  Date Type Provider Dept   05/01/24 Office Visit Zachary Ramos MD [facility] Internal Medicine   Showing recent visits within past 360 days and meeting all other requirements  Future Appointments  Date Type Provider Dept   05/05/25 Appointment Zachary Ramos MD [facility] Internal Medicine   Showing future appointments within next 720 days and meeting all other requirements      Last 3 Blood Pressure:   BP Readings from Last 3 Encounters:   05/01/24 130/72   11/02/23 136/78   03/01/23 (!) 156/78     Preferred Pharmacy:   [id number] [address] AT [facility] [address] & [address]  [address] [facility] [address]  Phone: [phone] Fax: [phone]    Requested RX:  Requested Prescriptions     Pending Prescriptions Disp Refills    furosemide (LASIX) 40 MG tablet 90 tablet 0     Sig: Take 1 tablet (40 mg total) by mouth once daily.      RX Route: Normal

## 2025-04-01 NOTE — TELEPHONE ENCOUNTER
No care due was identified.  [facility] Embedded Care Due Messages. Reference number: [id number].   4/01/2025 9:34:58 AM CDT

## 2025-04-08 ENCOUNTER — HOSPITAL ENCOUNTER (INPATIENT)
Facility: OTHER | Age: 60
LOS: 15 days | Discharge: HOME HEALTH | DRG: 391 | End: 2025-04-23
Attending: EMERGENCY MEDICINE | Admitting: INTERNAL MEDICINE
Payer: MEDICARE

## 2025-04-08 DIAGNOSIS — R55 SYNCOPE: ICD-10-CM

## 2025-04-08 DIAGNOSIS — F20.9 SCHIZOPHRENIA, UNSPECIFIED TYPE: Chronic | ICD-10-CM

## 2025-04-08 DIAGNOSIS — I50.9 HEART FAILURE, UNSPECIFIED HF CHRONICITY, UNSPECIFIED HEART FAILURE TYPE: Primary | ICD-10-CM

## 2025-04-08 DIAGNOSIS — J45.909 ASTHMA, UNSPECIFIED ASTHMA SEVERITY, UNSPECIFIED WHETHER COMPLICATED, UNSPECIFIED WHETHER PERSISTENT: ICD-10-CM

## 2025-04-08 DIAGNOSIS — R00.0 TACHYCARDIA: ICD-10-CM

## 2025-04-08 DIAGNOSIS — N18.4 CHRONIC KIDNEY DISEASE (CKD), STAGE IV (SEVERE): ICD-10-CM

## 2025-04-08 DIAGNOSIS — J96.01 ACUTE RESPIRATORY FAILURE WITH HYPOXIA: ICD-10-CM

## 2025-04-08 DIAGNOSIS — I16.1 HYPERTENSIVE EMERGENCY WITHOUT CONGESTIVE HEART FAILURE: ICD-10-CM

## 2025-04-08 DIAGNOSIS — K52.9 COLITIS: ICD-10-CM

## 2025-04-08 LAB
ABSOLUTE EOSINOPHIL (OHS): 0 K/UL
ABSOLUTE EOSINOPHIL (OHS): 0.09 K/UL
ABSOLUTE MONOCYTE (OHS): 0.9 K/UL (ref 0.3–1)
ABSOLUTE MONOCYTE (OHS): 0.91 K/UL (ref 0.3–1)
ABSOLUTE NEUTROPHIL COUNT (OHS): 17.06 K/UL (ref 1.8–7.7)
ABSOLUTE NEUTROPHIL COUNT (OHS): 19.23 K/UL (ref 1.8–7.7)
ALBUMIN SERPL BCP-MCNC: 2.9 G/DL (ref 3.5–5.2)
ALP SERPL-CCNC: 105 UNIT/L (ref 40–150)
ALT SERPL W/O P-5'-P-CCNC: 10 UNIT/L (ref 10–44)
ANION GAP (OHS): 14 MMOL/L (ref 8–16)
AST SERPL-CCNC: 19 UNIT/L (ref 11–45)
BACTERIA #/AREA URNS AUTO: NORMAL /HPF
BASOPHILS # BLD AUTO: 0.03 K/UL
BASOPHILS # BLD AUTO: 0.04 K/UL
BASOPHILS NFR BLD AUTO: 0.1 %
BASOPHILS NFR BLD AUTO: 0.2 %
BILIRUB SERPL-MCNC: 0.2 MG/DL (ref 0.1–1)
BILIRUB UR QL STRIP.AUTO: NEGATIVE
BNP SERPL-MCNC: 35 PG/ML (ref 0–99)
BUN SERPL-MCNC: 49 MG/DL (ref 6–20)
CALCIUM SERPL-MCNC: 9 MG/DL (ref 8.7–10.5)
CHLORIDE SERPL-SCNC: 100 MMOL/L (ref 95–110)
CLARITY UR: CLEAR
CO2 SERPL-SCNC: 22 MMOL/L (ref 23–29)
COLOR UR AUTO: COLORLESS
CREAT SERPL-MCNC: 2.2 MG/DL (ref 0.5–1.4)
CTP QC/QA: YES
CTP QC/QA: YES
ERYTHROCYTE [DISTWIDTH] IN BLOOD BY AUTOMATED COUNT: 18.3 % (ref 11.5–14.5)
ERYTHROCYTE [DISTWIDTH] IN BLOOD BY AUTOMATED COUNT: 18.6 % (ref 11.5–14.5)
GFR SERPLBLD CREATININE-BSD FMLA CKD-EPI: 33 ML/MIN/1.73/M2
GLUCOSE SERPL-MCNC: 125 MG/DL (ref 70–110)
GLUCOSE UR QL STRIP: NEGATIVE
HCT VFR BLD AUTO: 27.4 % (ref 40–54)
HCT VFR BLD AUTO: 29.4 % (ref 40–54)
HCV AB SERPL QL IA: NEGATIVE
HGB BLD-MCNC: 8.7 GM/DL (ref 14–18)
HGB BLD-MCNC: 9.4 GM/DL (ref 14–18)
HGB UR QL STRIP: NEGATIVE
HIV 1+2 AB+HIV1 P24 AG SERPL QL IA: NEGATIVE
HYALINE CASTS UR QL AUTO: 0 /LPF (ref 0–1)
IMM GRANULOCYTES # BLD AUTO: 0.11 K/UL (ref 0–0.04)
IMM GRANULOCYTES # BLD AUTO: 0.15 K/UL (ref 0–0.04)
IMM GRANULOCYTES NFR BLD AUTO: 0.6 % (ref 0–0.5)
IMM GRANULOCYTES NFR BLD AUTO: 0.7 % (ref 0–0.5)
KETONES UR QL STRIP: NEGATIVE
LACTATE SERPL-SCNC: 2.2 MMOL/L (ref 0.5–2.2)
LEUKOCYTE ESTERASE UR QL STRIP: NEGATIVE
LIPASE SERPL-CCNC: 23 U/L (ref 4–60)
LYMPHOCYTES # BLD AUTO: 0.5 K/UL (ref 1–4.8)
LYMPHOCYTES # BLD AUTO: 0.81 K/UL (ref 1–4.8)
MCH RBC QN AUTO: 25.5 PG (ref 27–31)
MCH RBC QN AUTO: 25.8 PG (ref 27–31)
MCHC RBC AUTO-ENTMCNC: 31.8 G/DL (ref 32–36)
MCHC RBC AUTO-ENTMCNC: 32 G/DL (ref 32–36)
MCV RBC AUTO: 80 FL (ref 82–98)
MCV RBC AUTO: 81 FL (ref 82–98)
MICROSCOPIC COMMENT: NORMAL
NITRITE UR QL STRIP: NEGATIVE
NUCLEATED RBC (/100WBC) (OHS): 0 /100 WBC
NUCLEATED RBC (/100WBC) (OHS): 0 /100 WBC
OHS QRS DURATION: 74 MS
OHS QTC CALCULATION: 471 MS
PH UR STRIP: 6 [PH]
PLATELET # BLD AUTO: 388 K/UL (ref 150–450)
PLATELET # BLD AUTO: 497 K/UL (ref 150–450)
PMV BLD AUTO: 8.2 FL (ref 9.2–12.9)
PMV BLD AUTO: 9.2 FL (ref 9.2–12.9)
POC MOLECULAR INFLUENZA A AGN: NEGATIVE
POC MOLECULAR INFLUENZA B AGN: NEGATIVE
POTASSIUM SERPL-SCNC: 4.4 MMOL/L (ref 3.5–5.1)
PROT SERPL-MCNC: 8.9 GM/DL (ref 6–8.4)
PROT UR QL STRIP: ABNORMAL
RBC # BLD AUTO: 3.41 M/UL (ref 4.6–6.2)
RBC # BLD AUTO: 3.65 M/UL (ref 4.6–6.2)
RBC #/AREA URNS AUTO: 3 /HPF (ref 0–4)
RELATIVE EOSINOPHIL (OHS): 0 %
RELATIVE EOSINOPHIL (OHS): 0.4 %
RELATIVE LYMPHOCYTE (OHS): 2.7 % (ref 18–48)
RELATIVE LYMPHOCYTE (OHS): 3.8 % (ref 18–48)
RELATIVE MONOCYTE (OHS): 4.3 % (ref 4–15)
RELATIVE MONOCYTE (OHS): 4.8 % (ref 4–15)
RELATIVE NEUTROPHIL (OHS): 90.7 % (ref 38–73)
RELATIVE NEUTROPHIL (OHS): 91.7 % (ref 38–73)
SARS-COV-2 RDRP RESP QL NAA+PROBE: NEGATIVE
SODIUM SERPL-SCNC: 136 MMOL/L (ref 136–145)
SP GR UR STRIP: 1.01
SQUAMOUS #/AREA URNS AUTO: 0 /HPF
TROPONIN I SERPL DL<=0.01 NG/ML-MCNC: 0.04 NG/ML
TROPONIN I SERPL DL<=0.01 NG/ML-MCNC: 0.06 NG/ML
UROBILINOGEN UR STRIP-ACNC: NEGATIVE EU/DL
WBC # BLD AUTO: 18.61 K/UL (ref 3.9–12.7)
WBC # BLD AUTO: 21.22 K/UL (ref 3.9–12.7)
WBC #/AREA URNS AUTO: <1 /HPF (ref 0–5)
YEAST UR QL AUTO: NORMAL /HPF

## 2025-04-08 PROCEDURE — 83605 ASSAY OF LACTIC ACID: CPT | Performed by: EMERGENCY MEDICINE

## 2025-04-08 PROCEDURE — 63600175 PHARM REV CODE 636 W HCPCS: Performed by: EMERGENCY MEDICINE

## 2025-04-08 PROCEDURE — 93005 ELECTROCARDIOGRAM TRACING: CPT

## 2025-04-08 PROCEDURE — 84450 TRANSFERASE (AST) (SGOT): CPT | Performed by: EMERGENCY MEDICINE

## 2025-04-08 PROCEDURE — 99900035 HC TECH TIME PER 15 MIN (STAT)

## 2025-04-08 PROCEDURE — 81001 URINALYSIS AUTO W/SCOPE: CPT | Performed by: EMERGENCY MEDICINE

## 2025-04-08 PROCEDURE — 25000003 PHARM REV CODE 250: Performed by: NURSE PRACTITIONER

## 2025-04-08 PROCEDURE — 25000242 PHARM REV CODE 250 ALT 637 W/ HCPCS: Performed by: EMERGENCY MEDICINE

## 2025-04-08 PROCEDURE — 96361 HYDRATE IV INFUSION ADD-ON: CPT

## 2025-04-08 PROCEDURE — 87502 INFLUENZA DNA AMP PROBE: CPT

## 2025-04-08 PROCEDURE — 25500020 PHARM REV CODE 255: Performed by: EMERGENCY MEDICINE

## 2025-04-08 PROCEDURE — 83690 ASSAY OF LIPASE: CPT | Performed by: EMERGENCY MEDICINE

## 2025-04-08 PROCEDURE — 21400001 HC TELEMETRY ROOM

## 2025-04-08 PROCEDURE — 11000001 HC ACUTE MED/SURG PRIVATE ROOM

## 2025-04-08 PROCEDURE — 84484 ASSAY OF TROPONIN QUANT: CPT | Performed by: EMERGENCY MEDICINE

## 2025-04-08 PROCEDURE — 83880 ASSAY OF NATRIURETIC PEPTIDE: CPT | Performed by: EMERGENCY MEDICINE

## 2025-04-08 PROCEDURE — 25000003 PHARM REV CODE 250: Performed by: EMERGENCY MEDICINE

## 2025-04-08 PROCEDURE — 94640 AIRWAY INHALATION TREATMENT: CPT

## 2025-04-08 PROCEDURE — 86803 HEPATITIS C AB TEST: CPT | Performed by: EMERGENCY MEDICINE

## 2025-04-08 PROCEDURE — 87389 HIV-1 AG W/HIV-1&-2 AB AG IA: CPT | Performed by: EMERGENCY MEDICINE

## 2025-04-08 PROCEDURE — 94761 N-INVAS EAR/PLS OXIMETRY MLT: CPT

## 2025-04-08 PROCEDURE — 85025 COMPLETE CBC W/AUTO DIFF WBC: CPT | Performed by: EMERGENCY MEDICINE

## 2025-04-08 PROCEDURE — 96360 HYDRATION IV INFUSION INIT: CPT

## 2025-04-08 PROCEDURE — 87635 SARS-COV-2 COVID-19 AMP PRB: CPT | Performed by: EMERGENCY MEDICINE

## 2025-04-08 PROCEDURE — 27000221 HC OXYGEN, UP TO 24 HOURS

## 2025-04-08 PROCEDURE — 93010 ELECTROCARDIOGRAM REPORT: CPT | Mod: ,,, | Performed by: INTERNAL MEDICINE

## 2025-04-08 PROCEDURE — 99285 EMERGENCY DEPT VISIT HI MDM: CPT | Mod: 25

## 2025-04-08 RX ORDER — RISPERIDONE 1 MG/1
1 TABLET ORAL DAILY
Status: DISCONTINUED | OUTPATIENT
Start: 2025-04-09 | End: 2025-04-10

## 2025-04-08 RX ORDER — HYDRALAZINE HYDROCHLORIDE 25 MG/1
25 TABLET, FILM COATED ORAL EVERY 8 HOURS PRN
Status: DISCONTINUED | OUTPATIENT
Start: 2025-04-08 | End: 2025-04-11

## 2025-04-08 RX ORDER — SODIUM CHLORIDE 0.9 % (FLUSH) 0.9 %
10 SYRINGE (ML) INJECTION
Status: DISCONTINUED | OUTPATIENT
Start: 2025-04-08 | End: 2025-04-23 | Stop reason: HOSPADM

## 2025-04-08 RX ORDER — ONDANSETRON HYDROCHLORIDE 2 MG/ML
8 INJECTION, SOLUTION INTRAVENOUS EVERY 6 HOURS PRN
Status: DISCONTINUED | OUTPATIENT
Start: 2025-04-08 | End: 2025-04-23 | Stop reason: HOSPADM

## 2025-04-08 RX ORDER — ACETAMINOPHEN 500 MG
1000 TABLET ORAL
Status: COMPLETED | OUTPATIENT
Start: 2025-04-08 | End: 2025-04-08

## 2025-04-08 RX ORDER — IPRATROPIUM BROMIDE AND ALBUTEROL SULFATE 2.5; .5 MG/3ML; MG/3ML
3 SOLUTION RESPIRATORY (INHALATION)
Status: COMPLETED | OUTPATIENT
Start: 2025-04-08 | End: 2025-04-08

## 2025-04-08 RX ORDER — SODIUM CHLORIDE 9 MG/ML
INJECTION, SOLUTION INTRAVENOUS CONTINUOUS
Status: DISCONTINUED | OUTPATIENT
Start: 2025-04-08 | End: 2025-04-09

## 2025-04-08 RX ORDER — CIPROFLOXACIN 2 MG/ML
400 INJECTION, SOLUTION INTRAVENOUS
Status: DISCONTINUED | OUTPATIENT
Start: 2025-04-08 | End: 2025-04-08

## 2025-04-08 RX ORDER — CIPROFLOXACIN 2 MG/ML
400 INJECTION, SOLUTION INTRAVENOUS
Status: DISCONTINUED | OUTPATIENT
Start: 2025-04-09 | End: 2025-04-10

## 2025-04-08 RX ORDER — IPRATROPIUM BROMIDE AND ALBUTEROL SULFATE 2.5; .5 MG/3ML; MG/3ML
3 SOLUTION RESPIRATORY (INHALATION) EVERY 4 HOURS PRN
Status: DISCONTINUED | OUTPATIENT
Start: 2025-04-08 | End: 2025-04-23 | Stop reason: HOSPADM

## 2025-04-08 RX ORDER — METRONIDAZOLE 500 MG/100ML
500 INJECTION, SOLUTION INTRAVENOUS
Status: DISCONTINUED | OUTPATIENT
Start: 2025-04-09 | End: 2025-04-10

## 2025-04-08 RX ORDER — TALC
6 POWDER (GRAM) TOPICAL NIGHTLY PRN
Status: DISCONTINUED | OUTPATIENT
Start: 2025-04-08 | End: 2025-04-23 | Stop reason: HOSPADM

## 2025-04-08 RX ORDER — HYDROCODONE BITARTRATE AND ACETAMINOPHEN 5; 325 MG/1; MG/1
1 TABLET ORAL EVERY 4 HOURS PRN
Refills: 0 | Status: DISCONTINUED | OUTPATIENT
Start: 2025-04-08 | End: 2025-04-16

## 2025-04-08 RX ORDER — ACETAMINOPHEN 325 MG/1
650 TABLET ORAL EVERY 4 HOURS PRN
Status: DISCONTINUED | OUTPATIENT
Start: 2025-04-08 | End: 2025-04-19

## 2025-04-08 RX ORDER — ONDANSETRON 8 MG/1
8 TABLET, ORALLY DISINTEGRATING ORAL EVERY 8 HOURS PRN
Status: DISCONTINUED | OUTPATIENT
Start: 2025-04-08 | End: 2025-04-23 | Stop reason: HOSPADM

## 2025-04-08 RX ORDER — METRONIDAZOLE 500 MG/100ML
500 INJECTION, SOLUTION INTRAVENOUS
Status: DISCONTINUED | OUTPATIENT
Start: 2025-04-08 | End: 2025-04-08

## 2025-04-08 RX ADMIN — SODIUM CHLORIDE: 9 INJECTION, SOLUTION INTRAVENOUS at 10:04

## 2025-04-08 RX ADMIN — ACETAMINOPHEN 1000 MG: 500 TABLET ORAL at 03:04

## 2025-04-08 RX ADMIN — IOHEXOL 75 ML: 350 INJECTION, SOLUTION INTRAVENOUS at 04:04

## 2025-04-08 RX ADMIN — CIPROFLOXACIN 400 MG: 2 INJECTION, SOLUTION INTRAVENOUS at 06:04

## 2025-04-08 RX ADMIN — SODIUM CHLORIDE, POTASSIUM CHLORIDE, SODIUM LACTATE AND CALCIUM CHLORIDE 1000 ML: 600; 310; 30; 20 INJECTION, SOLUTION INTRAVENOUS at 03:04

## 2025-04-08 RX ADMIN — IPRATROPIUM BROMIDE AND ALBUTEROL SULFATE 3 ML: .5; 3 SOLUTION RESPIRATORY (INHALATION) at 07:04

## 2025-04-08 NOTE — ED PROVIDER NOTES
Encounter Date: 4/8/2025       History     Chief Complaint   Patient presents with    Loss of Consciousness     Per EMS pt reports LOC with urine and feces on him. 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      HPI    60-year-old male with past medical history anxiety, asthma, schizophrenia, who is deaf and blind presents with syncopal event, vomiting, diarrhea.  He is able to communicate some with his hands as at 1 point time he was able to see, reports living with family at home.  They reports it is some other additional family members have had a little nausea and vomiting and abdominal upset however patient was able to eat a hot sausage sandwich earlier today and some other snacks without difficulty.  Reportedly was in the home and his mother heard a loud thud and patient was found on the ground without significant injury but did vomit and was concerned that he may have to go to the bathroom.  He has had multiple bouts of diarrhea at home, this resolved with the EMS arrival.  He was given IV fluids and some Zofran and still reports some abdominal pain but is feeling slightly better after further communication with his sisters at bedside.  He has had no recent illness, no cough, and no further complaints or concerns for noted.    Review of patient's allergies indicates:   Allergen Reactions    Penicillins Other (See Comments)     Past Medical History:   Diagnosis Date    Anxiety     Asthma     Schizophrenia      Past Surgical History:   Procedure Laterality Date    CORNEAL TRANSPLANT       No family history on file.  Social History[1]  Review of Systems   Constitutional: Negative.    HENT: Negative.     Eyes: Negative.    Respiratory: Negative.     Cardiovascular: Negative.    Gastrointestinal:  Positive for abdominal pain, diarrhea, nausea and vomiting.   Genitourinary: Negative.    Musculoskeletal: Negative.    Skin: Negative.    Neurological:  Positive for syncope.       Physical Exam     Initial Vitals [04/08/25 1458]    BP Pulse Resp Temp SpO2   (!) 155/68 99 20 98.1 °F (36.7 °C) (!) 90 %      MAP       --         Physical Exam    Nursing note and vitals reviewed.  Constitutional: He is not diaphoretic. He appears distressed (moderately, some tremulousness noted on the left side).   HENT:   Head: Normocephalic and atraumatic.   Nose: Nose normal.   Eyes:   Opacified right cornea, proptotic, limited extraocular movements, no discharge drainage noted.   Neck: Neck supple. No tracheal deviation present. No JVD present.   Normal range of motion.  Cardiovascular:  Regular rhythm, normal heart sounds and intact distal pulses.           Tachycardic   Pulmonary/Chest: Breath sounds normal. No respiratory distress. He has no wheezes. He has no rhonchi. He has no rales.   Abdominal: Abdomen is soft. Bowel sounds are normal. He exhibits no distension. There is abdominal tenderness (mild tenderness epigastrically). There is no rebound and no guarding.   Musculoskeletal:         General: No tenderness or edema. Normal range of motion.      Cervical back: Normal range of motion and neck supple.     Neurological: He is alert.   Unable to evaluate orientation with limited communication, able to move all extremities reflexes appear intact   Skin: Skin is warm and dry. Capillary refill takes less than 2 seconds. No rash noted. No erythema.         ED Course   Procedures  Labs Reviewed   COMPREHENSIVE METABOLIC PANEL - Abnormal       Result Value    Sodium 136      Potassium 4.4      Chloride 100      CO2 22 (*)     Glucose 125 (*)     BUN 49 (*)     Creatinine 2.2 (*)     Calcium 9.0      Protein Total 8.9 (*)     Albumin 2.9 (*)     Bilirubin Total 0.2            AST 19      ALT 10      Anion Gap 14      eGFR 33 (*)    TROPONIN I - Abnormal    Troponin-I 0.035 (*)    URINALYSIS, REFLEX TO URINE CULTURE - Abnormal    Color, UA Colorless (*)     Appearance, UA Clear      pH, UA 6.0      Spec Grav UA 1.010      Protein, UA 1+ (*)     Glucose,  UA Negative      Ketones, UA Negative      Bilirubin, UA Negative      Blood, UA Negative      Nitrites, UA Negative      Urobilinogen, UA Negative      Leukocyte Esterase, UA Negative     CBC WITH DIFFERENTIAL - Abnormal    WBC 21.22 (*)     RBC 3.65 (*)     HGB 9.4 (*)     HCT 29.4 (*)     MCV 81 (*)     MCH 25.8 (*)     MCHC 32.0      RDW 18.6 (*)     Platelet Count 497 (*)     MPV 9.2      Nucleated RBC 0      Neut % 90.7 (*)     Lymph % 3.8 (*)     Mono % 4.3      Eos % 0.4      Basophil % 0.1      Imm Grans % 0.7 (*)     Neut # 19.23 (*)     Lymph # 0.81 (*)     Mono # 0.91      Eos # 0.09      Baso # 0.03      Imm Grans # 0.15 (*)    TROPONIN I - Abnormal    Troponin-I 0.059 (*)    CBC WITH DIFFERENTIAL - Abnormal    WBC 18.61 (*)     RBC 3.41 (*)     HGB 8.7 (*)     HCT 27.4 (*)     MCV 80 (*)     MCH 25.5 (*)     MCHC 31.8 (*)     RDW 18.3 (*)     Platelet Count 388      MPV 8.2 (*)     Nucleated RBC 0      Neut % 91.7 (*)     Lymph % 2.7 (*)     Mono % 4.8      Eos % 0.0      Basophil % 0.2      Imm Grans % 0.6 (*)     Neut # 17.06 (*)     Lymph # 0.50 (*)     Mono # 0.90      Eos # 0.00      Baso # 0.04      Imm Grans # 0.11 (*)    HEPATITIS C ANTIBODY - Normal    Hep C Ab Interp Negative     HIV 1 / 2 ANTIBODY - Normal    HIV 1/2 Ag/Ab Negative     B-TYPE NATRIURETIC PEPTIDE - Normal    BNP 35     LIPASE - Normal    Lipase Level 23     LACTIC ACID, PLASMA - Normal    Lactic Acid Level 2.2      Narrative:     Falsely low lactic acid results can be found in samples containing >=13.0 mg/dL total bilirubin and/or >=3.5 mg/dL direct bilirubin.    C. DIFFICILE BY RAPID PCR   CBC W/ AUTO DIFFERENTIAL    Narrative:     The following orders were created for panel order CBC auto differential.  Procedure                               Abnormality         Status                     ---------                               -----------         ------                     CBC with Differential[[id number]]       Abnormal             Final result                 Please view results for these tests on the individual orders.   URINALYSIS MICROSCOPIC    RBC, UA 3      WBC, UA <1      Bacteria, UA None      Yeast, UA None      Squamous Epithelial Cells, UA 0      Hyaline Casts, UA 0      Microscopic Comment       CBC W/ AUTO DIFFERENTIAL    Narrative:     The following orders were created for panel order CBC auto differential.  Procedure                               Abnormality         Status                     ---------                               -----------         ------                     CBC with Differential[[id number]]       Abnormal            Final result                 Please view results for these tests on the individual orders.   POCT INFLUENZA A/B MOLECULAR    POC Molecular Influenza A Ag Negative      POC Molecular Influenza B Ag Negative       Acceptable Yes     SARS-COV-2 RDRP GENE    POC Rapid COVID Negative       Acceptable Yes          ECG Results              EKG 12-lead (Final result)        Collection Time Result Time QRS Duration OHS QTC Calculation    04/08/25 15:12:04 04/08/25 21:48:26 74 471                     Final result by Interface, [facility] (04/08/25 21:48:31)                   Narrative:    Test Reason : R55,    Vent. Rate : 101 BPM     Atrial Rate : 101 BPM     P-R Int : 164 ms          QRS Dur :  74 ms      QT Int : 364 ms       P-R-T Axes :  77  56  66 degrees    QTcB Int : 471 ms    Sinus tachycardia  Cannot rule out Anterior infarct ,age undetermined  Abnormal ECG    Confirmed by Hamzah Go ([id number]) on 4/8/2025 9:48:23 PM    Referred By: AAAREFERRAL SELF           Confirmed By: Hamzah Go                                  Imaging Results              US Carotid Bilateral (Final result)  Result time 04/08/25 22:35:39      Final result by Kuldip Griffin MD (04/08/25 22:35:39)                   Impression:      No evidence of a hemodynamically  significant carotid bifurcation stenosis.      Electronically signed by: Kuldip Griffin MD  Date:    04/08/2025  Time:    22:35               Narrative:    EXAMINATION:  US CAROTID BILATERAL    CLINICAL HISTORY:  Syncope;    TECHNIQUE:  Grayscale and color Doppler ultrasound examination of the carotid and vertebral artery systems bilaterally.  Stenosis estimates are per the NASCET measurement criteria.    COMPARISON:  None.    FINDINGS:  Right:    Internal Carotid Artery (ICA) peak systolic velocity 115 cm/sec    ICA/CCA peak systolic velocity ratio: 1.4    Plaque formation: No significant    Vertebral artery: Antegrade flow and normal waveform.    Left:    Internal Carotid Artery (ICA)  peak systolic velocity 102 cm/sec    ICA/CCA peak systolic velocity ratio: 1.3    Plaque formation: No significant    Vertebral artery: Antegrade flow and normal waveform.                                       US Retroperitoneal Complete (Final result)  Result time 04/08/25 22:40:57      Final result by Gloria Rosa MD (04/08/25 22:40:57)                   Impression:      Elevated bilateral renal indices, which may suggest medical renal disease.    Findings may suggest 9 x 9 x 9 mm bilateral subcentimeter renal cysts.  Consider interval follow-up, given their subcentimeter size.      Electronically signed by: Gloria Rosa  Date:    04/08/2025  Time:    22:40               Narrative:    EXAMINATION:  ULTRASOUND RETROPERITONEAL COMPLETE    CLINICAL HISTORY:  hypodensity in the upper pole of the right kidney;    TECHNIQUE:  Real-time ultrasound of the retroperitoneum was performed, including the urinary bladder and kidneys.    COMPARISON:  CT abdomen pelvis 04/08/2025 reportedly hypodensity in the right kidney    FINDINGS:  Right kidney measures 8.0 cm.  The resistive index is 8.0 cm.  9 x 9 x 9 mm anechoic exophytic lesion arising from the upper to mid pole kidney.  No hydronephrosis is seen.    Left kidney measures 9.2.   The resistive index is 9.2 cm.  9 x 9 x 9 mm anechoic exophytic lesion arising from the lower pole of the left kidney.  No hydronephrosis is seen.    The bladder is within normal limits.                                        CT Abdomen Pelvis With IV Contrast NO Oral Contrast (Final result)  Result time 04/08/25 17:50:11      Final result by Kuldip Griffin MD (04/08/25 17:50:11)                   Impression:      Wall thickening of the transverse colon, suggestive of nonspecific colitis.    Wall thickening of the urinary bladder.  The findings may be seen with cystitis.    Advanced calcifications of the abdominal aorta and the mesenteric arteries.  Correlation with possible vascular ultrasound of the mesenteric vessels may be obtained for further assessment.    1.2 cm hypodensity in the upper pole of the right kidney with Hounsfield of 23.  This can be assessed with renal ultrasound.    Additional findings as above.    This report was flagged in Epic as abnormal.      Electronically signed by: Kuldip Griffin MD  Date:    04/08/2025  Time:    17:50               Narrative:    EXAMINATION:  CT ABDOMEN PELVIS WITH IV CONTRAST    CLINICAL HISTORY:  Nausea/vomiting;    TECHNIQUE:  Low dose axial images, sagittal and coronal reformations were obtained from the lung bases to the pubic symphysis following the IV administration of 75 mL of Omnipaque 350 .  Oral contrast was not given.    COMPARISON:  X-ray dated 11/27/2011.    FINDINGS:  There are no pleural effusions.  There is no evidence of a pneumothorax.  No airspace opacity is present.  No pulmonary nodules identified.    The heart is unremarkable.  There is normal tapering of the abdominal aorta.  There are extensive calcifications along the course of the abdominal aorta and its branch vessels.  The signal calcifications at the ostia of the SMA.  There also calcifications identified along the course of the SMA.  No occlusion is identified.  The portal veins and  mesenteric veins are within normal limits.  IVC and the remainder of the venous structures are within normal limits    There is no evidence of lymphadenopathy in the abdomen or pelvis.    The esophagus is unremarkable.  The stomach is within normal limits.  The duodenum is unremarkable.  The small bowel loops are unremarkable.  The appendix is within normal limits.  There is wall thickening of the transverse colon..  There is colonic diverticula without evidence of acute diverticulitis.  There is no CT evidence of bowel obstruction.    The liver is unremarkable.  The gallbladder is within normal limits.  The biliary tree is unremarkable.  The spleen is within normal limits.  The pancreas is unremarkable.  The adrenal glands are within normal limits.    There is a 1.2 cm hypodensity in the upper pole of the right kidney with Hounsfield of 23.  There are additional hypodensities in both kidneys.  The ureters are unremarkable.  There is circumferential wall thickening of the urinary bladder.  There is a small diverticulum along the dome of the urinary bladder.  The prostate gland is within normal limits.    There is no evidence of free fluid in the abdomen or pelvis.  There is no evidence of free air.  There is no evidence of pneumatosis.  No portal venous air is identified.    The psoas margins are unremarkable.  There is an umbilical hernia containing omental fat.  The remainder the abdominal wall is unremarkable.    There are degenerative changes in the osseous structures. There is no evidence of a fracture.                                       CT Head Without Contrast (Final result)  Result time 04/08/25 17:44:57      Final result by Gloria Rosa MD (04/08/25 17:44:57)                   Impression:      No acute intracranial abnormality detected.    Bilateral partial opacification of the mastoid air cells.    Sphenoid sinus disease.      Electronically signed by: Gloria  Moe  Date:    04/08/2025  Time:    17:44               Narrative:    EXAMINATION:  CT OF THE HEAD WITHOUT    CLINICAL HISTORY:  Head trauma, repeat vomiting (Age 19-64y);Mental status change, unknown cause;    TECHNIQUE:  5 mm unenhanced axial images were obtained from the skull base to the vertex.    COMPARISON:  11/04/2010    FINDINGS:  The ventricles, basal cisterns, and cortical sulci are within normal limits for patient's stated age.  Moderate chronic small vessel ischemic changes are present.  There is no acute intracranial hemorrhage, territorial infarct or mass effect, or midline shift.  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  In the visualized paranasal sinuses, there is bilateral maxillary sinus mucoperiosteal thickening left greater than right.  There is partial opacification of bilateral mastoid air cells.  Dental periapical lucencies are visualized.                                       X-Ray Chest 1 View (Final result)  Result time 04/08/25 16:10:32      Final result by Librado Brar MD (04/08/25 16:10:32)                   Impression:      1. Chronic appearing interstitial findings, congestive change or edema is a consideration.  No large focal consolidation.      Electronically signed by: Librado Brar MD  Date:    04/08/2025  Time:    16:10               Narrative:    EXAMINATION:  XR CHEST 1 VIEW    CLINICAL HISTORY:  Chest Pain;    TECHNIQUE:  Single frontal view of the chest was performed.    COMPARISON:  10/12/2015    FINDINGS:  The cardiomediastinal silhouette is prominent, similar to the previous exam noting calcification of the aorta.  Patient is rotated..  There is stable prominence of the right paratracheal stripe.  There is no pleural effusion.  The trachea is midline.  The lungs are symmetrically expanded bilaterally with coarse central hilar interstitial attenuation.  No large focal consolidation seen.  There is no pneumothorax.  The osseous structures are remarkable for degenerative  change..                                       Medications   ciprofloxacin (CIPRO)400mg/200ml D5W IVPB 400 mg (0 mg Intravenous Stopped 4/9/25 0654)   metronidazole IVPB 500 mg (has no administration in time range)   0.9% NaCl infusion ( Intravenous Stopped 4/9/25 0532)   albuterol-ipratropium 2.5 mg-0.5 mg/3 mL nebulizer solution 3 mL (has no administration in time range)   risperiDONE tablet 1 mg (1 mg Oral Given 4/9/25 0839)   hydrALAZINE tablet 25 mg (25 mg Oral Given 4/9/25 0547)   sodium chloride 0.9% flush 10 mL (has no administration in time range)   acetaminophen tablet 650 mg (has no administration in time range)   HYDROcodone-acetaminophen 5-325 mg per tablet 1 tablet (has no administration in time range)   ondansetron disintegrating tablet 8 mg (has no administration in time range)   ondansetron injection 8 mg (has no administration in time range)   melatonin tablet 6 mg (has no administration in time range)   ammonium lactate 12 % lotion (has no administration in time range)   lactated ringers bolus 1,000 mL (0 mLs Intravenous Stopped 4/8/25 1943)   acetaminophen tablet 1,000 mg (1,000 mg Oral Given 4/8/25 1515)   iohexoL (OMNIPAQUE 350) injection 75 mL (75 mLs Intravenous Given 4/8/25 1635)   albuterol-ipratropium 2.5 mg-0.5 mg/3 mL nebulizer solution 3 mL (3 mLs Nebulization Given 4/8/25 1905)     Medical Decision Making  Amount and/or Complexity of Data Reviewed  Labs: ordered.  Radiology: ordered.    Risk  OTC drugs.  Prescription drug management.  Decision regarding hospitalization.      MDM:    60-year-old male with past medical history anxiety, asthma, schizophrenia, who is deaf and blind presents with syncopal event, vomiting, diarrhea. Differential Diagnosis includes:  Appendicitis, gastroenteritis, sepsis, intra-abdominal abscess, intracranial hemorrhage.  Physical exam as noted above.  ED workup notable for EKG showing normal sinus rhythm rate of 101 beats per minute, sinus tachycardia,  nonspecific ST and T-wave changes noted inferiorly,  MS, no STEMI.  Flu and COVID negative, CMP with BUN 49, creatinine 2.2, glucose 125, lipase 23, BNP 35, troponin 0.035, white blood cell count 21.22, hemoglobin 9.4, chest x-ray shows unremarkable findings.  CT head is unremarkable, CT abdomen pelvis shows wall thickening of the transverse colon suggestive of nonspecific colitis, wall thickening of the urinary bladder possibly cystitis, with the advanced calcifications in the abdominal aorta and mesenteric arteries.  Patient presentation concerning for possible colitis in the setting of syncopal event and ongoing tachycardia.  Given IV fluids in the emergency department will cover further with Cipro and Flagyl at this time given nonspecific presentation.  And extremely difficult exam and limited history.  He will be admitted to Hospital Medicine team for further evaluation and management at this time.  Communicated this to family members at bedside.  Patient appears more comfortable at this time and his tachycardia has improved.      Note was created using voice recognition software. Note may have occasional typographical or grammatical errors, garbled syntax, and other bizarre constructions that may not have been identified and edited despite good marshall initial review prior to signing.                                       Clinical Impression:  Final diagnoses:  [R55] Syncope  [J96.01] Acute respiratory failure with hypoxia  [K52.9] Colitis          ED Disposition Condition    Admit Stable                    [1]   Social History  Tobacco Use    Smoking status: Never    Smokeless tobacco: Never   Substance Use Topics    Alcohol use: No     Alcohol/week: 0.0 standard drinks of alcohol    Drug use: No        Atilio Longo MD  04/09/25 [id number]

## 2025-04-08 NOTE — ED TRIAGE NOTES
59 yo male reports to ed with reports of LOC with urine and feces noted on him. Lives with his brother and brother's wife, was found on the floor. Pt is deaf and blind so unable to have full event detailed by patient. Family states that the patient is very independent, able to feed himself, get the newspaper, use the bathroom/shower independently. Family states he may need help wiping after the bathroom but that's about it as far as ADLs. Reactive to stimuli.

## 2025-04-08 NOTE — Clinical Note
Diagnosis: Syncope [206001]   Reason for IP Medical Treatment  (Clinical interventions that can only be accomplished in the IP setting? ) :: Syncope, colitis, resp failure with hypoxia

## 2025-04-09 LAB
ABSOLUTE EOSINOPHIL (OHS): 0.09 K/UL
ABSOLUTE MONOCYTE (OHS): 0.86 K/UL (ref 0.3–1)
ABSOLUTE NEUTROPHIL COUNT (OHS): 9.9 K/UL (ref 1.8–7.7)
ALBUMIN SERPL BCP-MCNC: 2.7 G/DL (ref 3.5–5.2)
ALP SERPL-CCNC: 95 UNIT/L (ref 40–150)
ALT SERPL W/O P-5'-P-CCNC: 10 UNIT/L (ref 10–44)
ANION GAP (OHS): 11 MMOL/L (ref 8–16)
AORTIC ROOT ANNULUS: 1.98 CM
ASCENDING AORTA: 2.16 CM
AST SERPL-CCNC: 11 UNIT/L (ref 11–45)
AV INDEX (PROSTH): 0.51
AV MEAN GRADIENT: 9 MMHG
AV PEAK GRADIENT: 18 MMHG
AV VALVE AREA BY VELOCITY RATIO: 1.3 CM²
AV VALVE AREA: 1.3 CM²
AV VELOCITY RATIO: 0.52
BASOPHILS # BLD AUTO: 0.03 K/UL
BASOPHILS NFR BLD AUTO: 0.3 %
BILIRUB SERPL-MCNC: 0.3 MG/DL (ref 0.1–1)
BSA FOR ECHO PROCEDURE: 2.07 M2
BUN SERPL-MCNC: 42 MG/DL (ref 6–20)
CALCIUM SERPL-MCNC: 9 MG/DL (ref 8.7–10.5)
CHLORIDE SERPL-SCNC: 101 MMOL/L (ref 95–110)
CO2 SERPL-SCNC: 27 MMOL/L (ref 23–29)
CREAT SERPL-MCNC: 2.2 MG/DL (ref 0.5–1.4)
CV ECHO LV RWT: 0.62 CM
DOP CALC AO PEAK VEL: 2.1 M/S
DOP CALC AO VTI: 42.3 CM
DOP CALC LVOT AREA: 2.5 CM2
DOP CALC LVOT DIAMETER: 1.8 CM
DOP CALC LVOT PEAK VEL: 1.1 M/S
DOP CALC LVOT STROKE VOLUME: 54.4 CM3
DOP CALC MV VTI: 32.6 CM
DOP CALCLVOT PEAK VEL VTI: 21.4 CM
E WAVE DECELERATION TIME: 125 MSEC
E/A RATIO: 0.84
E/E' RATIO: 20 M/S
ECHO LV POSTERIOR WALL: 1.2 CM (ref 0.6–1.1)
EJECTION FRACTION: 65 %
ERYTHROCYTE [DISTWIDTH] IN BLOOD BY AUTOMATED COUNT: 18.4 % (ref 11.5–14.5)
FRACTIONAL SHORTENING: 53.8 % (ref 28–44)
GFR SERPLBLD CREATININE-BSD FMLA CKD-EPI: 33 ML/MIN/1.73/M2
GLOBAL LONGITUIDAL STRAIN: 11.7 %
GLUCOSE SERPL-MCNC: 83 MG/DL (ref 70–110)
HCT VFR BLD AUTO: 26.6 % (ref 40–54)
HGB BLD-MCNC: 8.4 GM/DL (ref 14–18)
IMM GRANULOCYTES # BLD AUTO: 0.06 K/UL (ref 0–0.04)
IMM GRANULOCYTES NFR BLD AUTO: 0.5 % (ref 0–0.5)
INTERVENTRICULAR SEPTUM: 1.2 CM (ref 0.6–1.1)
IVC DIAMETER: 1.32 CM
LA MAJOR: 4.8 CM
LA MINOR: 4.4 CM
LA WIDTH: 3.2 CM
LAAS-AP2: 16 CM2
LAAS-AP4: 17 CM2
LEFT ATRIUM AREA SYSTOLIC (APICAL 2 CHAMBER): 16.03 CM2
LEFT ATRIUM AREA SYSTOLIC (APICAL 4 CHAMBER): 17.31 CM2
LEFT ATRIUM SIZE: 3.4 CM
LEFT ATRIUM VOLUME INDEX MOD: 25 ML/M2
LEFT ATRIUM VOLUME INDEX: 21 ML/M2
LEFT ATRIUM VOLUME MOD: 50 ML
LEFT ATRIUM VOLUME: 42 CM3
LEFT INTERNAL DIMENSION IN SYSTOLE: 1.8 CM (ref 2.1–4)
LEFT VENTRICLE DIASTOLIC VOLUME INDEX: 32.84 ML/M2
LEFT VENTRICLE DIASTOLIC VOLUME: 66 ML
LEFT VENTRICLE END SYSTOLIC VOLUME APICAL 2 CHAMBER: 45.07 ML
LEFT VENTRICLE END SYSTOLIC VOLUME APICAL 4 CHAMBER: 49.85 ML
LEFT VENTRICLE MASS INDEX: 79.2 G/M2
LEFT VENTRICLE SYSTOLIC VOLUME INDEX: 4.5 ML/M2
LEFT VENTRICLE SYSTOLIC VOLUME: 9 ML
LEFT VENTRICULAR INTERNAL DIMENSION IN DIASTOLE: 3.9 CM (ref 3.5–6)
LEFT VENTRICULAR MASS: 159.3 G
LV LATERAL E/E' RATIO: 19.5 M/S
LV SEPTAL E/E' RATIO: 19.5 M/S
LVED V (TEICH): 66.06 ML
LVES V (TEICH): 9.19 ML
LVOT MG: 2.23 MMHG
LVOT MV: 0.68 CM/S
LYMPHOCYTES # BLD AUTO: 0.88 K/UL (ref 1–4.8)
MAGNESIUM SERPL-MCNC: 1.6 MG/DL (ref 1.6–2.6)
MCH RBC QN AUTO: 25.5 PG (ref 27–31)
MCHC RBC AUTO-ENTMCNC: 31.6 G/DL (ref 32–36)
MCV RBC AUTO: 81 FL (ref 82–98)
MV MEAN GRADIENT: 5 MMHG
MV PEAK A VEL: 1.85 M/S
MV PEAK E VEL: 1.56 M/S
MV PEAK GRADIENT: 13 MMHG
MV STENOSIS PRESSURE HALF TIME: 36.14 MS
MV VALVE AREA BY CONTINUITY EQUATION: 1.67 CM2
MV VALVE AREA P 1/2 METHOD: 6.09 CM2
NUCLEATED RBC (/100WBC) (OHS): 0 /100 WBC
OHS CV RV/LV RATIO: 0.62 CM
PISA MRMAX VEL: 0.08 M/S
PISA TR MAX VEL: 2.9 M/S
PLATELET # BLD AUTO: 427 K/UL (ref 150–450)
PMV BLD AUTO: 8.9 FL (ref 9.2–12.9)
POTASSIUM SERPL-SCNC: 4.2 MMOL/L (ref 3.5–5.1)
PROT SERPL-MCNC: 8 GM/DL (ref 6–8.4)
PV MV: 1.06 M/S
PV PEAK GRADIENT: 9 MMHG
PV PEAK VELOCITY: 1.52 M/S
RA MAJOR: 4.03 CM
RA PRESSURE ESTIMATED: 3 MMHG
RA WIDTH: 2.4 CM
RBC # BLD AUTO: 3.3 M/UL (ref 4.6–6.2)
RELATIVE EOSINOPHIL (OHS): 0.8 %
RELATIVE LYMPHOCYTE (OHS): 7.4 % (ref 18–48)
RELATIVE MONOCYTE (OHS): 7.3 % (ref 4–15)
RELATIVE NEUTROPHIL (OHS): 83.7 % (ref 38–73)
RIGHT VENTRICLE DIASTOLIC BASEL DIMENSION: 2.4 CM
RIGHT VENTRICULAR END-DIASTOLIC DIMENSION: 2.35 CM
RV TB RVSP: 6 MMHG
RV TISSUE DOPPLER FREE WALL SYSTOLIC VELOCITY 1 (APICAL 4 CHAMBER VIEW): 11.65 CM/S
SINUS: 2.2 CM
SODIUM SERPL-SCNC: 139 MMOL/L (ref 136–145)
STJ: 2.24 CM
TDI LATERAL: 0.08 M/S
TDI SEPTAL: 0.08 M/S
TDI: 0.08 M/S
TR MAX PG: 34 MMHG
TRICUSPID ANNULAR PLANE SYSTOLIC EXCURSION: 1.8 CM
TV REST PULMONARY ARTERY PRESSURE: 37 MMHG
WBC # BLD AUTO: 11.82 K/UL (ref 3.9–12.7)
Z-SCORE OF LEFT VENTRICULAR DIMENSION IN END DIASTOLE: -4.12
Z-SCORE OF LEFT VENTRICULAR DIMENSION IN END SYSTOLE: -5.51

## 2025-04-09 PROCEDURE — 63600175 PHARM REV CODE 636 W HCPCS: Performed by: NURSE PRACTITIONER

## 2025-04-09 PROCEDURE — 21400001 HC TELEMETRY ROOM

## 2025-04-09 PROCEDURE — 82040 ASSAY OF SERUM ALBUMIN: CPT | Performed by: NURSE PRACTITIONER

## 2025-04-09 PROCEDURE — 85025 COMPLETE CBC W/AUTO DIFF WBC: CPT | Performed by: NURSE PRACTITIONER

## 2025-04-09 PROCEDURE — 25000003 PHARM REV CODE 250: Performed by: NURSE PRACTITIONER

## 2025-04-09 PROCEDURE — 83735 ASSAY OF MAGNESIUM: CPT | Performed by: NURSE PRACTITIONER

## 2025-04-09 PROCEDURE — 36415 COLL VENOUS BLD VENIPUNCTURE: CPT | Performed by: NURSE PRACTITIONER

## 2025-04-09 RX ORDER — AMMONIUM LACTATE 12 G/100G
LOTION TOPICAL 2 TIMES DAILY PRN
Status: DISCONTINUED | OUTPATIENT
Start: 2025-04-09 | End: 2025-04-23 | Stop reason: HOSPADM

## 2025-04-09 RX ADMIN — HYDRALAZINE HYDROCHLORIDE 25 MG: 25 TABLET ORAL at 05:04

## 2025-04-09 RX ADMIN — CIPROFLOXACIN 400 MG: 2 INJECTION, SOLUTION INTRAVENOUS at 05:04

## 2025-04-09 RX ADMIN — RISPERIDONE 1 MG: 1 TABLET, FILM COATED ORAL at 08:04

## 2025-04-09 RX ADMIN — MELATONIN TAB 3 MG 6 MG: 3 TAB at 08:04

## 2025-04-09 RX ADMIN — METRONIDAZOLE 500 MG: 5 INJECTION, SOLUTION INTRAVENOUS at 10:04

## 2025-04-09 NOTE — ASSESSMENT & PLAN NOTE
-chronic   -SCr baseline appears to-2.5   -at admission 2.2  -trend labs, address/replete electrolytes as indicated  -avoid nephrotoxins and hypotension as able, renally dose medications

## 2025-04-09 NOTE — CONSULTS
Gastroenterology Consult    4/9/2025  12:25 PM    Consulting Physician:  Alfonso Reid MD    Primary Care Provider: Zachary Ramos MD    Reason for consultation: Colitis, abnormal CT scan    HPI:  Frantz Sigala Jr. is a 60 y.o. male with a PMHx of HTN, CKD, schizophrenia, blindness, and deafness who presents to the ED with complaints of a syncopal episode.      In the ED, he was note to be hypertensive and hypoxic.  Labs with a WBC 21, Hb 9.4, Cr 2.2, and normal LFTs.  Lactic acid 2.2.  CXR with chronic interstitial findings.  CT head with no acute intracranial abnormalities.  CT A/P with wall thickening of the transverse colon, wall thickening of the bladder, 1.2 cm renal hyperdensity and advanced calcifications of the abdominal aorta and mesenteric arteries.      He was admitted to hospital medicine.  GI consulted secondary to changes of colitis on CT.  In discussions with family, there has been some diarrhea at home.  No overt GI blood loss.  No diarrhea since admission.      Started on broad spectrum abx with Cipro/Flagy.    No complaints today.     Past Medical History:  Past Medical History:   Diagnosis Date    Anxiety     Asthma     Schizophrenia        Allergies:   Review of patient's allergies indicates:   Allergen Reactions    Penicillins Other (See Comments)       Current Medications:  Prescriptions Prior to Admission[1]      Social History:  Social History     Socioeconomic History    Marital status: Single   Tobacco Use    Smoking status: Never    Smokeless tobacco: Never   Substance and Sexual Activity    Alcohol use: No     Alcohol/week: 0.0 standard drinks of alcohol    Drug use: No    Sexual activity: Never   Social History Narrative    Pt is blind and deaf     Social Drivers of Health     Financial Resource Strain: Patient Unable To Answer (4/9/2025)    Overall Financial Resource Strain (CARDIA)     Difficulty of Paying Living Expenses: Patient unable to answer   Food Insecurity:  No Food Insecurity (4/9/2025)    Hunger Vital Sign     Worried About Running Out of Food in the Last Year: Never true     Ran Out of Food in the Last Year: Never true   Transportation Needs: No Transportation Needs (4/9/2025)    PRAPARE - Transportation     Lack of Transportation (Medical): No     Lack of Transportation (Non-Medical): No   Physical Activity: Inactive (4/9/2025)    Exercise Vital Sign     Days of Exercise per Week: 0 days     Minutes of Exercise per Session: 0 min   Stress: Patient Unable To Answer (4/9/2025)    [employer] - Occupational Stress Questionnaire     Feeling of Stress : Patient unable to answer   Housing Stability: Low Risk  (4/9/2025)    Housing Stability Vital Sign     Unable to Pay for Housing in the Last Year: No     Homeless in the Last Year: No       Surgical History:  Past Surgical History:   Procedure Laterality Date    CORNEAL TRANSPLANT           Family History:  No family history on file.    Review of systems:     CONSTITUTIONAL: Negative for fever, chills, weakness, weight loss, weight gain.  HEENT: Negative for blurred vision, hearing loss, nasal congestion, dry mouth, sore throat.  CARDIOVASCULAR: Negative for chest pain or palpitations.  RESPIRATORY: Negative for SOB or cough.  GASTROINTESTINAL: See HPI  GENITOURINARY: Negative for dysuria or hematuria.  MUSCULOSKELETAL: Negative for osteoarthritis or muscle pain.  SKIN: Negative for rashes/lesions.  NEUROLOGIC: Negative for headaches, numbness/tingling.  ENDOCRINE: Negative for diabetes or thyroid abnormalities.  HEMATOLOGIC: Negative for anemia or blood dyscrasias.  Aside from above positives, complete 10 point review of systems negative.    Physical Exam:  Vital Signs (Most Recent):  Temp: 98.2 °F (36.8 °C) (04/09/25 0730)  Pulse: 100 (04/09/25 1055)  Resp: 17 (04/09/25 0730)  BP: (!) 175/77 (04/09/25 0730)  SpO2: 95 % (04/09/25 0730) Vital Signs (24h Range):  Temp:  [98.1 °F (36.7 °C)-98.4  °F (36.9 °C)] 98.2 °F (36.8 °C)  Pulse:  [] 100  Resp:  [16-22] 17  SpO2:  [89 %-100 %] 95 %  BP: (151-192)/(68-86) 175/77       General: Chronically ill appearing male in no acute distress.    Eyes:  Anicteric sclera, PERRLA  ENT:  Moist mucous membranes, no drainage from ears or nose, hearing grossly intact  Lymph:  No cervical, supraclavicular or axillary lymphadenopathy  Neck:  Supple, no nodes or masses felt, no thyromegaly  Cardiovascular:  Regular rate and rhythm without murmur  Lungs:  Clear to auscultation with normal effort; no wheezes or rales noted  GI:  soft, obese, NTND  Musculoskeletal:  5/5 strength bilaterally  Extremities: No clubbing, cyanosis, or edema, 2+ dorsalis pedis bilaterally  Neurologic:  No focal deficits, alert and oriented x 3  Psych:  Appropriate mood and affect  Skin:  No rash, no pallor, no lesions       Labs:   Latest Reference Range & Units 04/08/25 15:05 04/08/25 17:44 04/09/25 05:33   WBC 3.90 - 12.70 K/uL 21.22 (H) 18.61 (H) 11.82   RBC 4.60 - 6.20 M/uL 3.65 (L) 3.41 (L) 3.30 (L)   Hemoglobin 14.0 - 18.0 gm/dL 9.4 (L) 8.7 (L) 8.4 (L)   Hematocrit 40.0 - 54.0 % 29.4 (L) 27.4 (L) 26.6 (L)   MCV 82 - 98 fL 81 (L) 80 (L) 81 (L)   MCH 27.0 - 31.0 pg 25.8 (L) 25.5 (L) 25.5 (L)   MCHC 32.0 - 36.0 g/dL 32.0 31.8 (L) 31.6 (L)   RDW 11.5 - 14.5 % 18.6 (H) 18.3 (H) 18.4 (H)   Platelet Count 150 - 450 K/uL 497 (H) 388 427   (H): Data is abnormally high  (L): Data is abnormally low   Latest Reference Range & Units 04/08/25 15:05 04/09/25 05:33   Sodium 136 - 145 mmol/L 136 139   Potassium 3.5 - 5.1 mmol/L 4.4 4.2   Chloride 95 - 110 mmol/L 100 101   CO2 23 - 29 mmol/L 22 (L) 27   Anion Gap 8 - 16 mmol/L 14 11   BUN 6 - 20 mg/dL 49 (H) 42 (H)   Creatinine 0.5 - 1.4 mg/dL 2.2 (H) 2.2 (H)   eGFR >60 mL/min/1.73/m2 33 (L) 33 (L)   Glucose 70 - 110 mg/dL 125 (H) 83   Calcium 8.7 - 10.5 mg/dL 9.0 9.0   Magnesium  1.6 - 2.6 mg/dL  1.6   ALP 40 - 150 unit/L 105 95   PROTEIN TOTAL 6.0 - 8.4  gm/dL 8.9 (H) 8.0   Albumin 3.5 - 5.2 g/dL 2.9 (L) 2.7 (L)   BILIRUBIN TOTAL 0.1 - 1.0 mg/dL 0.2 0.3   AST 11 - 45 unit/L 19 11   ALT 10 - 44 unit/L 10 10   Lipase 4 - 60 U/L 23    (L): Data is abnormally low  (H): Data is abnormally high   Latest Reference Range & Units 04/08/25 18:46   Lactic Acid Level 0.5 - 2.2 mmol/L 2.2     Imaging and Other Studies:  Personally reviewed    CT ABDOMEN PELVIS WITH IV CONTRAST    Impression:     Wall thickening of the transverse colon, suggestive of nonspecific colitis.     Wall thickening of the urinary bladder.  The findings may be seen with cystitis.     Advanced calcifications of the abdominal aorta and the mesenteric arteries.  Correlation with possible vascular ultrasound of the mesenteric vessels may be obtained for further assessment.     1.2 cm hypodensity in the upper pole of the right kidney with Hounsfield of 23.  This can be assessed with renal ultrasound.     Additional findings as above.     This report was flagged in Epic as abnormal.        Electronically signed by: Kuldip Griffin MD  Date:                                            04/08/2025  Time:                                           17:50    Assessment:  Patient is a s a 60 y.o. male with a PMHx of HTN, CKD, schizophrenia, blindness, and deafness who presents to the ED with complaints of a syncopal episode.  CT with changes of a non specific colitis.  Leukocytosis responded to broad spectrum abx.      Plan:   Would check stool studies including C diff should diarrhea return   Continue with IV antibiotics at this time   Advance to low residue renal diet    Discussed plan with Dr. Quesada.   Alfonso Reid         [1]   Medications Prior to Admission   Medication Sig Dispense Refill Last Dose/Taking    albuterol (PROVENTIL/VENTOLIN HFA) 90 mcg/actuation inhaler Inhale 2 puffs into the lungs every 6 (six) hours as needed for Wheezing. Rescue 18 g 0     cetirizine (ZYRTEC) 10 MG tablet Take 1 tablet (10 mg  total) by mouth once daily. (Patient not taking: Reported on 12/6/2022) 30 tablet 0     fluticasone propionate (FLONASE) 50 mcg/actuation nasal spray 1 spray (50 mcg total) by Each Nostril route once daily. 9.9 mL 0     furosemide (LASIX) 40 MG tablet Take 1 tablet (40 mg total) by mouth once daily. 90 tablet 0     lisinopriL-hydrochlorothiazide (PRINZIDE,ZESTORETIC) 20-12.5 mg per tablet Take 2 tablets by mouth once daily. 180 tablet 0     meloxicam (MOBIC) 15 MG tablet Take 1 tablet (15 mg total) by mouth once daily. (Patient not taking: Reported on 12/6/2022) 30 tablet 5     risperiDONE (RISPERDAL) 1 MG tablet Take 1 tablet (1 mg total) by mouth once daily. 90 tablet 3

## 2025-04-09 NOTE — ASSESSMENT & PLAN NOTE
-findings of nonspecific colitis on CT A/P with vomiting and diarrhea prior to arrival   -initiated on Cipro and Flagyl in ED >>> continue   -dose/medication adjustment as appropriate   -GI consulted  -NPO status for now  -gentle IVF hydration overnight  -PRN supportive care as indicated  -trend labs, address/replete electrolytes as indicated  -strict I&Os

## 2025-04-09 NOTE — ASSESSMENT & PLAN NOTE
Elevated troponin  -admitted due to syncope in setting of colitis with elevated troponin   -troponin appears to be chronically elevated  -syncope most likely related to vasovagal response in setting of GI illness; low suspicion for cardiac nature   -will obtain TTE and carotid ultrasound  -continue tele monitoring   -EKG prn concerns  -fall precautions

## 2025-04-09 NOTE — H&P
[facility] Surg [address] Medicine  History & Physical    Patient Name: Frantz Sigala Jr.  MRN: [id number]  Patient Class: IP- Inpatient  Admission Date: 4/8/2025  Attending Physician: LALO Quesada MD   Primary Care Provider: Zachary Ramos MD    Patient information was obtained from relative(s), EMS personnel, past medical records, and ER records.     Subjective:     Principal Problem:Syncope    Chief Complaint:   Chief Complaint   Patient presents with    Loss of Consciousness     Per EMS pt reports LOC with urine and feces on him. 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         HPI: Mr. Sigala is a 60 YOM with PMHx of HTN, asthma, CKD (baseline SCr 2-2.5), schizophrenia, blindness, and deafness.     He presents to ED via EMS with family due to syncopal event just PTA. Family provides history due patients deafness and blindness. They note that he was in his normal state of health today until right after lunch. He ate a salami sandwich with potato chips and drank orange juice; shortly thereafter he was in the bathroom when his mother heard a loud thud and patient was found by his brother on the ground unconscious.  His brother notes that he called out the patients name and slapped him on his back a few times at which time he seemed to come to and began vomiting with concurrent episodes of diarrhea.  Sister notes she has had a recent respiratory illness with abdominal cramping and some diarrhea. Family notes that patient has lived in the home he is in since a small child and that he is able to navigate the home independently including going upstairs and outside.     In the ED he is hypertensive, heart rate stable, saturations initially 80% on room air with improvement after 2 L nasal cannula application, and afebrile.  CBC with WBC 21, H&H 9.4/29.4, platelets 497.  Chemistry was , K 4.4, chloride 100, CO2 22, BUN 49, SCr 2.2, glucose 125.  LFTs unremarkable.  BNP 35.  Troponin 0.035 >>  0.059 (appear similar to priors however these were in 2015).  Lactic acid 2.2.  Flu and COVID negative.  Hepatitis-C and HIV nonreactive.  UA noninfectious.  CXR with chronic appearing interstitial findings, congestive change or edema is a consideration; no large focal consolidation. CTH with no acute intracranial abnormality detected, bilateral partial opacification of the mastoid air cells, sphenoid sinus disease.     CT A/P:  Wall thickening of the transverse colon, suggestive of nonspecific colitis.   Wall thickening of the urinary bladder.  The findings may be seen with cystitis.   Advanced calcifications of the abdominal aorta and the mesenteric arteries.  Correlation with possible vascular ultrasound of the mesenteric vessels may be obtained for further assessment. 1.2 cm hypodensity in the upper pole of the right kidney with Hounsfield of 23.  This can be assessed with renal ultrasound.     The patient was admitted to the Hospital Medicine Service for further evaluation and management.     Past Medical History:   Diagnosis Date    Anxiety     Asthma     Schizophrenia        Past Surgical History:   Procedure Laterality Date    CORNEAL TRANSPLANT         Review of patient's allergies indicates:   Allergen Reactions    Penicillins Other (See Comments)       No current facility-administered medications on file prior to encounter.     Current Outpatient Medications on File Prior to Encounter   Medication Sig    albuterol (PROVENTIL/VENTOLIN HFA) 90 mcg/actuation inhaler Inhale 2 puffs into the lungs every 6 (six) hours as needed for Wheezing. Rescue    cetirizine (ZYRTEC) 10 MG tablet Take 1 tablet (10 mg total) by mouth once daily. (Patient not taking: Reported on 12/6/2022)    fluticasone propionate (FLONASE) 50 mcg/actuation nasal spray 1 spray (50 mcg total) by Each Nostril route once daily.    furosemide (LASIX) 40 MG tablet Take 1 tablet (40 mg total) by mouth once daily.    lisinopriL-hydrochlorothiazide  (PRINZIDE,ZESTORETIC) 20-12.5 mg per tablet Take 2 tablets by mouth once daily.    meloxicam (MOBIC) 15 MG tablet Take 1 tablet (15 mg total) by mouth once daily. (Patient not taking: Reported on 12/6/2022)    risperiDONE (RISPERDAL) 1 MG tablet Take 1 tablet (1 mg total) by mouth once daily.     Family History    None       Tobacco Use    Smoking status: Never    Smokeless tobacco: Never   Substance and Sexual Activity    Alcohol use: No     Alcohol/week: 0.0 standard drinks of alcohol    Drug use: No    Sexual activity: Never     Review of Systems   Unable to perform ROS: Patient nonverbal     Objective:     Vital Signs (Most Recent):  Temp: 98.4 °F (36.9 °C) (04/08/25 2100)  Pulse: 100 (04/08/25 2233)  Resp: 18 (04/08/25 2100)  BP: (!) 151/68 (04/08/25 2233)  SpO2: 95 % (04/08/25 2100) Vital Signs (24h Range):  Temp:  [98.1 °F (36.7 °C)-98.4 °F (36.9 °C)] 98.4 °F (36.9 °C)  Pulse:  [] 100  Resp:  [18-22] 18  SpO2:  [89 %-100 %] 95 %  BP: (151-192)/(68-86) 151/68        There is no height or weight on file to calculate BMI.     Physical Exam  Vitals and nursing note reviewed.   Constitutional:       General: He is not in acute distress.     Appearance: Normal appearance. He is well-developed and normal weight. He is ill-appearing (chronically). He is not toxic-appearing.   HENT:      Head: Atraumatic.      Mouth/Throat:      Dentition: Normal dentition.   Eyes:      General: Lids are normal.   Cardiovascular:      Rate and Rhythm: Normal rate and regular rhythm.      Heart sounds: Normal heart sounds. No murmur heard.  Pulmonary:      Effort: Pulmonary effort is normal.      Breath sounds: Normal breath sounds.   Abdominal:      General: Bowel sounds are normal. There is no distension.      Palpations: Abdomen is soft.      Tenderness: There is abdominal tenderness (epigastric area).   Musculoskeletal:      Cervical back: Neck supple.      Right lower leg: No edema.      Left lower leg: No edema.   Skin:      General: Skin is warm and dry.             Significant Labs: All pertinent labs within the past 24 hours have been reviewed.  CBC:   Recent Labs   Lab 04/08/25  1505 04/08/25  1744   WBC 21.22* 18.61*   HGB 9.4* 8.7*   HCT 29.4* 27.4*   * 388     CMP:   Recent Labs   Lab 04/08/25  1505      K 4.4      CO2 22*   BUN 49*   CREATININE 2.2*   CALCIUM 9.0   ALBUMIN 2.9*   BILITOT 0.2   ALKPHOS 105   AST 19   ALT 10   ANIONGAP 14     Cardiac Markers:   Recent Labs   Lab 04/08/25  1505   BNP 35     Lactic Acid:   Recent Labs   Lab 04/08/25  1846   LACTATE 2.2     Troponin:   Recent Labs   Lab 04/08/25  1505 04/08/25  1744   TROPONINI 0.035* 0.059*     Urine Studies:   Recent Labs   Lab 04/08/25  1706   COLORU Colorless*   APPEARANCEUA Clear   PHUR 6.0   SPECGRAV 1.010   PROTEINUA 1+*   GLUCUA Negative   BILIRUBINUA Negative   OCCULTUA Negative   NITRITE Negative   UROBILINOGEN Negative   LEUKOCYTESUR Negative   RBCUA 3   WBCUA <1   BACTERIA None   HYALINECASTS 0       Significant Imaging: I have reviewed all pertinent imaging results/findings within the past 24 hours.  Assessment/Plan:     Assessment & Plan  Syncope  Elevated troponin  -admitted due to syncope in setting of colitis with elevated troponin   -troponin appears to be chronically elevated  -syncope most likely related to vasovagal response in setting of GI illness; low suspicion for cardiac nature   -will obtain TTE and carotid ultrasound  -continue tele monitoring   -EKG prn concerns  -fall precautions     Colitis  -findings of nonspecific colitis on CT A/P with vomiting and diarrhea prior to arrival   -initiated on Cipro and Flagyl in ED >>> continue   -dose/medication adjustment as appropriate   -GI consulted  -NPO status for now  -gentle IVF hydration overnight  -PRN supportive care as indicated  -trend labs, address/replete electrolytes as indicated  -strict I&Os    Essential hypertension  -chronic   -hold home Lasix, lisinopril, and  HCTZ D/T NPO status and while actively hydrating in setting of colitis   -PRN supportive care with PO hydralazine as indicated  -dose/medication adjustment as appropriate   CKD (chronic kidney disease), stage III  -chronic   -SCr baseline appears to-2.5   -at admission 2.2  -trend labs, address/replete electrolytes as indicated  -avoid nephrotoxins and hypotension as able, renally dose medications    Asthma  -chronic   -appears controlled   -DuoNebs PRN    Schizophrenia  -chronic  -continue home risperidal  -PRN supportive care as indicated     Blind  Deaf, bilateral  -chronic  -PRN supportive care as indicated  -fall precautions       VTE Risk Mitigation (From admission, onward)           Ordered     IP VTE HIGH RISK PATIENT  Once         04/08/25 1943     Place sequential compression device  Until discontinued         04/08/25 1943     Reason for No Pharmacological VTE Prophylaxis  Once        Question:  Reasons:  Answer:  Risk of Bleeding    04/08/25 1943                       Layla Smith, GABY, AG-ACNP, BC  Department of Hospital Medicine  [facility]

## 2025-04-09 NOTE — PLAN OF CARE
Medicare Message     Important Message from Medicare regarding Discharge Appeal Rights Explained to patient/caregiver; Signed/date by patient/caregiver   Date IMM was signed 4/8/2025   Time IMM was signed [id number]

## 2025-04-09 NOTE — SUBJECTIVE & OBJECTIVE
Past Medical History:   Diagnosis Date    Anxiety     Asthma     Schizophrenia        Past Surgical History:   Procedure Laterality Date    CORNEAL TRANSPLANT         Review of patient's allergies indicates:   Allergen Reactions    Penicillins Other (See Comments)       No current facility-administered medications on file prior to encounter.     Current Outpatient Medications on File Prior to Encounter   Medication Sig    albuterol (PROVENTIL/VENTOLIN HFA) 90 mcg/actuation inhaler Inhale 2 puffs into the lungs every 6 (six) hours as needed for Wheezing. Rescue    cetirizine (ZYRTEC) 10 MG tablet Take 1 tablet (10 mg total) by mouth once daily. (Patient not taking: Reported on 12/6/2022)    fluticasone propionate (FLONASE) 50 mcg/actuation nasal spray 1 spray (50 mcg total) by Each Nostril route once daily.    furosemide (LASIX) 40 MG tablet Take 1 tablet (40 mg total) by mouth once daily.    lisinopriL-hydrochlorothiazide (PRINZIDE,ZESTORETIC) 20-12.5 mg per tablet Take 2 tablets by mouth once daily.    meloxicam (MOBIC) 15 MG tablet Take 1 tablet (15 mg total) by mouth once daily. (Patient not taking: Reported on 12/6/2022)    risperiDONE (RISPERDAL) 1 MG tablet Take 1 tablet (1 mg total) by mouth once daily.     Family History    None       Tobacco Use    Smoking status: Never    Smokeless tobacco: Never   Substance and Sexual Activity    Alcohol use: No     Alcohol/week: 0.0 standard drinks of alcohol    Drug use: No    Sexual activity: Never     Review of Systems   Unable to perform ROS: Patient nonverbal     Objective:     Vital Signs (Most Recent):  Temp: 98.4 °F (36.9 °C) (04/08/25 2100)  Pulse: 100 (04/08/25 2233)  Resp: 18 (04/08/25 2100)  BP: (!) 151/68 (04/08/25 2233)  SpO2: 95 % (04/08/25 2100) Vital Signs (24h Range):  Temp:  [98.1 °F (36.7 °C)-98.4 °F (36.9 °C)] 98.4 °F (36.9 °C)  Pulse:  [] 100  Resp:  [18-22] 18  SpO2:  [89 %-100 %] 95 %  BP: (151-192)/(68-86) 151/68        There is no height  or weight on file to calculate BMI.     Physical Exam  Vitals and nursing note reviewed.   Constitutional:       General: He is not in acute distress.     Appearance: Normal appearance. He is well-developed and normal weight. He is ill-appearing (chronically). He is not toxic-appearing.   HENT:      Head: Atraumatic.      Mouth/Throat:      Dentition: Normal dentition.   Eyes:      General: Lids are normal.   Cardiovascular:      Rate and Rhythm: Normal rate and regular rhythm.      Heart sounds: Normal heart sounds. No murmur heard.  Pulmonary:      Effort: Pulmonary effort is normal.      Breath sounds: Normal breath sounds.   Abdominal:      General: Bowel sounds are normal. There is no distension.      Palpations: Abdomen is soft.      Tenderness: There is abdominal tenderness (epigastric area).   Musculoskeletal:      Cervical back: Neck supple.      Right lower leg: No edema.      Left lower leg: No edema.   Skin:     General: Skin is warm and dry.             Significant Labs: All pertinent labs within the past 24 hours have been reviewed.  CBC:   Recent Labs   Lab 04/08/25  1505 04/08/25  1744   WBC 21.22* 18.61*   HGB 9.4* 8.7*   HCT 29.4* 27.4*   * 388     CMP:   Recent Labs   Lab 04/08/25  1505      K 4.4      CO2 22*   BUN 49*   CREATININE 2.2*   CALCIUM 9.0   ALBUMIN 2.9*   BILITOT 0.2   ALKPHOS 105   AST 19   ALT 10   ANIONGAP 14     Cardiac Markers:   Recent Labs   Lab 04/08/25  1505   BNP 35     Lactic Acid:   Recent Labs   Lab 04/08/25  1846   LACTATE 2.2     Troponin:   Recent Labs   Lab 04/08/25  1505 04/08/25  1744   TROPONINI 0.035* 0.059*     Urine Studies:   Recent Labs   Lab 04/08/25  1706   COLORU Colorless*   APPEARANCEUA Clear   PHUR 6.0   SPECGRAV 1.010   PROTEINUA 1+*   GLUCUA Negative   BILIRUBINUA Negative   OCCULTUA Negative   NITRITE Negative   UROBILINOGEN Negative   LEUKOCYTESUR Negative   RBCUA 3   WBCUA <1   BACTERIA None   HYALINECASTS 0       Significant  Imaging: I have reviewed all pertinent imaging results/findings within the past 24 hours.

## 2025-04-09 NOTE — PLAN OF CARE
Plan of care reviewed with patients' sister and brother. Patient on 2L/NC oxygen. No N/V/D noted this shift. Safety maintained.     Problem: Adult Inpatient Plan of Care  Goal: Plan of Care Review  Outcome: Progressing  Goal: Absence of Hospital-Acquired Illness or Injury  Outcome: Progressing  Goal: Optimal Comfort and Wellbeing  Outcome: Progressing  Goal: Readiness for Transition of Care  Outcome: Progressing     Problem: Infection  Goal: Absence of Infection Signs and Symptoms  Outcome: Progressing     Problem: Syncope  Goal: Absence of Syncopal Symptoms  Outcome: Progressing     Problem: Fall Injury Risk  Goal: Absence of Fall and Fall-Related Injury  Outcome: Progressing     Problem: Gas Exchange Impaired  Goal: Optimal Gas Exchange  Outcome: Progressing

## 2025-04-09 NOTE — ASSESSMENT & PLAN NOTE
>>ASSESSMENT AND PLAN FOR CKD (CHRONIC KIDNEY DISEASE), STAGE III WRITTEN ON 4/8/2025 11:22 PM BY NATE PERSON NP    -chronic   -SCr baseline appears to-2.5   -at admission 2.2  -trend labs, address/replete electrolytes as indicated  -avoid nephrotoxins and hypotension as able, renally dose medications
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
## 2025-04-09 NOTE — PLAN OF CARE
Problem: Adult Inpatient Plan of Care  Goal: Plan of Care Review  Outcome: Progressing  Goal: Patient-Specific Goal (Individualized)  Outcome: Progressing  Goal: Absence of Hospital-Acquired Illness or Injury  Outcome: Progressing  Goal: Optimal Comfort and Wellbeing  Outcome: Progressing  Goal: Readiness for Transition of Care  Outcome: Progressing     Problem: Infection  Goal: Absence of Infection Signs and Symptoms  Outcome: Progressing     Problem: Acute Kidney Injury/Impairment  Goal: Fluid and Electrolyte Balance  Outcome: Progressing  Goal: Improved Oral Intake  Outcome: Progressing     Problem: Syncope  Goal: Absence of Syncopal Symptoms  Outcome: Progressing     Problem: Fall Injury Risk  Goal: Absence of Fall and Fall-Related Injury  Outcome: Progressing     Problem: Skin Injury Risk Increased  Goal: Skin Health and Integrity  Outcome: Progressing

## 2025-04-09 NOTE — ASSESSMENT & PLAN NOTE
-admitted due to syncope in setting of colitis with elevated troponin   -troponin appears to be chronically elevated  -syncope most likely related to vasovagal response in setting of GI illness; low suspicion for cardiac nature   -will obtain TTE and carotid ultrasound  -continue tele monitoring   -EKG prn concerns  -fall precautions

## 2025-04-09 NOTE — ED NOTES
Assumed care of pt. Pt currently in bed, eyes closed, respirations even/unlabored. Cardiac monitor/continuous pulse ox and nibp cuff in place. Family at bedside. Pt nonverbal/deaf and unable to communicate independently. Family at bedside requesting food for pt, provider to be contacted about diet order. NADN at this time. Call light within reach. Family at bedside updated on POC and v/u.

## 2025-04-09 NOTE — ASSESSMENT & PLAN NOTE
-chronic   -hold home Lasix, lisinopril, and HCTZ D/T NPO status and while actively hydrating in setting of colitis   -PRN supportive care with PO hydralazine as indicated  -dose/medication adjustment as appropriate

## 2025-04-09 NOTE — PLAN OF CARE
Case Management Assessment     PCP: Dr Zachary Ramos     Patient Arrived From: Home    Existing Help at Home: 24/7 family caregivers    Barriers to Discharge: None    Discharge Plan:    A. Home w/family   B. Home      Patient blind, deaf. Brother at bedside answered all questions. Patient lives with sibling and parent who provides 24/7 care. Does not use any DME at home. Requires ADL care. PCP correct on facesheet. Family to provide transportation home.     04/09/25 1041   Discharge Assessment   Assessment Type Discharge Planning Assessment   Confirmed/corrected address, phone number and insurance Yes   Confirmed Demographics Correct on Facesheet   Source of Information family   If unable to respond/provide information was family/caregiver contacted? Yes   Contact Name/Number Brother   Communicated JAQUAN with patient/caregiver Date not available/Unable to determine   People in Home sibling(s);parent(s)   Do you expect to return to your current living situation? Yes   Do you have help at home or someone to help you manage your care at home? Yes   Who are your caregiver(s) and their phone number(s)? 24/7 family caregivers   Current cognitive status: Unable to Assess  (Blind/Deaf)   Walking or Climbing Stairs Difficulty no   Dressing/Bathing Difficulty yes   Dressing/Bathing bathing difficulty, dependent   Equipment Currently Used at Home none   Readmission within 30 days? No   Do you currently have service(s) that help you manage your care at home? No   Do you take prescription medications? Yes   Do you have prescription coverage? Yes   Coverage Payor: MEDICARE - MEDICARE PART A & B -   Do you have any problems affording any of your prescribed medications? No   Is the patient taking medications as prescribed? yes   Who is going to help you get home at discharge? Family   How do you get to doctors appointments? family or friend will provide   Are you on dialysis? No   Do you take coumadin? No   Discharge Plan A  Home with family   Discharge Plan B Home   DME Needed Upon Discharge  none   Discharge Plan discussed with: Sibling   Transition of Care Barriers None   Physical Activity   On average, how many days per week do you engage in moderate to strenuous exercise (like a brisk walk)? 0 days   On average, how many minutes do you engage in exercise at this level? 0 min   Financial Resource Strain   How hard is it for you to pay for the very basics like food, housing, medical care, and heating? Pt Unable   Housing Stability   In the last 12 months, was there a time when you were not able to pay the mortgage or rent on time? N   At any time in the past 12 months, were you homeless or living in a shelter (including now)? N   Transportation Needs   In the past 12 months, has lack of transportation kept you from medical appointments or from getting medications? no   In the past 12 months, has lack of transportation kept you from meetings, work, or from getting things needed for daily living? No   Food Insecurity   Within the past 12 months, you worried that your food would run out before you got the money to buy more. Never true   Within the past 12 months, the food you bought just didn't last and you didn't have money to get more. Never true   Stress   Do you feel stress - tense, restless, nervous, or anxious, or unable to sleep at night because your mind is troubled all the time - these days? Pt Unable   Social Isolation   How often do you feel lonely or isolated from those around you?  Patient unable to answer   Alcohol Use   Q1: How often do you have a drink containing alcohol? Never   Q3: How often do you have six or more drinks on one occasion? Never   Utilities   In the past 12 months has the electric, gas, oil, or water company threatened to shut off services in your home? No   Health Literacy   How often do you need to have someone help you when you read instructions, pamphlets, or other written material from your doctor  or pharmacy? Always     [facility] ([address])  Initial Discharge Assessment       Primary Care Provider: Zachary Ramos MD    Admission Diagnosis: Colitis [K52.9]  Syncope [R55]  Acute respiratory failure with hypoxia [J96.01]    Admission Date: 4/8/2025  Expected Discharge Date:     Transition of Care Barriers: (P) None    Payor: MEDICARE / Plan: MEDICARE PART A & B / Product Type: Government /     Extended Emergency Contact Information  Primary Emergency Contact: Wendy Shipman  Address: [address] [place] [address] [facility]  Home Phone: [phone]  Relation: Sister  Secondary Emergency Contact: Eliana Sigala   [facility]  Home Phone: [phone]  Relation: Sister    Discharge Plan A: (P) Home with family  Discharge Plan B: (P) Home      [id number] [place], [place] - [address]  [address] [address]  Phone: [phone] Fax: [phone]      Initial Assessment (most recent)       Adult Discharge Assessment - 04/09/25 1041          Discharge Assessment    Assessment Type Discharge Planning Assessment (P)      Confirmed/corrected address, phone number and insurance Yes (P)      Confirmed Demographics Correct on Facesheet (P)      Source of Information family (P)      If unable to respond/provide information was family/caregiver contacted? Yes (P)      Contact Name/Number Brother (P)      Communicated JAQUAN with patient/caregiver Date not available/Unable to determine (P)      People in Home sibling(s);parent(s) (P)      Do you expect to return to your current living situation? Yes (P)      Do you have help at home or someone to help you manage your care at home? Yes (P)      Who are your caregiver(s) and their phone number(s)? 24/7 family caregivers (P)      Current cognitive status: Unable to Assess (P)    Blind/Deaf    Walking or Climbing Stairs Difficulty no (P)       Dressing/Bathing Difficulty yes (P)      Dressing/Bathing bathing difficulty, dependent (P)      Equipment Currently Used at Home none (P)      Readmission within 30 days? No (P)      Do you currently have service(s) that help you manage your care at home? No (P)      Do you take prescription medications? Yes (P)      Do you have prescription coverage? Yes (P)      Coverage Payor: MEDICARE - MEDICARE PART A & B - (P)      Do you have any problems affording any of your prescribed medications? No (P)      Is the patient taking medications as prescribed? yes (P)      Who is going to help you get home at discharge? Family (P)      How do you get to doctors appointments? family or friend will provide (P)      Are you on dialysis? No (P)      Do you take coumadin? No (P)      Discharge Plan A Home with family (P)      Discharge Plan B Home (P)      DME Needed Upon Discharge  none (P)      Discharge Plan discussed with: Sibling (P)      Transition of Care Barriers None (P)         Physical Activity    On average, how many days per week do you engage in moderate to strenuous exercise (like a brisk walk)? 0 days (P)      On average, how many minutes do you engage in exercise at this level? 0 min (P)         Financial Resource Strain    How hard is it for you to pay for the very basics like food, housing, medical care, and heating? Patient unable to answer (P)         Housing Stability    In the last 12 months, was there a time when you were not able to pay the mortgage or rent on time? No (P)      At any time in the past 12 months, were you homeless or living in a shelter (including now)? No (P)         Transportation Needs    In the past 12 months, has lack of transportation kept you from medical appointments or from getting medications? No (P)      In the past 12 months, has lack of transportation kept you from meetings, work, or from getting things needed for daily living? No (P)         Food Insecurity    Within the past  12 months, you worried that your food would run out before you got the money to buy more. Never true (P)      Within the past 12 months, the food you bought just didn't last and you didn't have money to get more. Never true (P)         Stress    Do you feel stress - tense, restless, nervous, or anxious, or unable to sleep at night because your mind is troubled all the time - these days? Patient unable to answer (P)         Social Isolation    How often do you feel lonely or isolated from those around you?  Patient unable to answer (P)         Alcohol Use    Q1: How often do you have a drink containing alcohol? Never (P)      Q3: How often do you have six or more drinks on one occasion? Never (P)         Utilities    In the past 12 months has the electric, gas, oil, or water company threatened to shut off services in your home? No (P)         Health Literacy    How often do you need to have someone help you when you read instructions, pamphlets, or other written material from your doctor or pharmacy? Always (P)

## 2025-04-10 PROBLEM — S82.402A LEFT FIBULAR FRACTURE: Status: ACTIVE | Noted: 2025-04-10

## 2025-04-10 PROBLEM — N18.30 CKD (CHRONIC KIDNEY DISEASE), STAGE III: Chronic | Status: ACTIVE | Noted: 2019-05-09

## 2025-04-10 PROBLEM — I10 ESSENTIAL HYPERTENSION: Chronic | Status: ACTIVE | Noted: 2019-05-09

## 2025-04-10 LAB
ABSOLUTE EOSINOPHIL (OHS): 0.15 K/UL
ABSOLUTE MONOCYTE (OHS): 1.15 K/UL (ref 0.3–1)
ABSOLUTE NEUTROPHIL COUNT (OHS): 11.33 K/UL (ref 1.8–7.7)
ANION GAP (OHS): 13 MMOL/L (ref 8–16)
BASOPHILS # BLD AUTO: 0.04 K/UL
BASOPHILS NFR BLD AUTO: 0.3 %
BUN SERPL-MCNC: 39 MG/DL (ref 6–20)
CALCIUM SERPL-MCNC: 9.7 MG/DL (ref 8.7–10.5)
CHLORIDE SERPL-SCNC: 99 MMOL/L (ref 95–110)
CO2 SERPL-SCNC: 24 MMOL/L (ref 23–29)
CREAT SERPL-MCNC: 2.4 MG/DL (ref 0.5–1.4)
ERYTHROCYTE [DISTWIDTH] IN BLOOD BY AUTOMATED COUNT: 18.4 % (ref 11.5–14.5)
GFR SERPLBLD CREATININE-BSD FMLA CKD-EPI: 30 ML/MIN/1.73/M2
GLUCOSE SERPL-MCNC: 94 MG/DL (ref 70–110)
HCT VFR BLD AUTO: 28.8 % (ref 40–54)
HGB BLD-MCNC: 9.1 GM/DL (ref 14–18)
IMM GRANULOCYTES # BLD AUTO: 0.06 K/UL (ref 0–0.04)
IMM GRANULOCYTES NFR BLD AUTO: 0.4 % (ref 0–0.5)
LYMPHOCYTES # BLD AUTO: 1.4 K/UL (ref 1–4.8)
MAGNESIUM SERPL-MCNC: 1.6 MG/DL (ref 1.6–2.6)
MCH RBC QN AUTO: 25.3 PG (ref 27–31)
MCHC RBC AUTO-ENTMCNC: 31.6 G/DL (ref 32–36)
MCV RBC AUTO: 80 FL (ref 82–98)
NUCLEATED RBC (/100WBC) (OHS): 0 /100 WBC
PHOSPHATE SERPL-MCNC: 2.6 MG/DL (ref 2.7–4.5)
PLATELET # BLD AUTO: 501 K/UL (ref 150–450)
PMV BLD AUTO: 8.7 FL (ref 9.2–12.9)
POTASSIUM SERPL-SCNC: 4.1 MMOL/L (ref 3.5–5.1)
RBC # BLD AUTO: 3.59 M/UL (ref 4.6–6.2)
RELATIVE EOSINOPHIL (OHS): 1.1 %
RELATIVE LYMPHOCYTE (OHS): 9.9 % (ref 18–48)
RELATIVE MONOCYTE (OHS): 8.1 % (ref 4–15)
RELATIVE NEUTROPHIL (OHS): 80.2 % (ref 38–73)
SODIUM SERPL-SCNC: 136 MMOL/L (ref 136–145)
WBC # BLD AUTO: 14.13 K/UL (ref 3.9–12.7)

## 2025-04-10 PROCEDURE — 84100 ASSAY OF PHOSPHORUS: CPT | Performed by: INTERNAL MEDICINE

## 2025-04-10 PROCEDURE — 25000003 PHARM REV CODE 250: Performed by: PHYSICIAN ASSISTANT

## 2025-04-10 PROCEDURE — 63600175 PHARM REV CODE 636 W HCPCS: Performed by: NURSE PRACTITIONER

## 2025-04-10 PROCEDURE — 85025 COMPLETE CBC W/AUTO DIFF WBC: CPT | Performed by: INTERNAL MEDICINE

## 2025-04-10 PROCEDURE — 80048 BASIC METABOLIC PNL TOTAL CA: CPT | Performed by: INTERNAL MEDICINE

## 2025-04-10 PROCEDURE — 94761 N-INVAS EAR/PLS OXIMETRY MLT: CPT

## 2025-04-10 PROCEDURE — 25000003 PHARM REV CODE 250: Performed by: NURSE PRACTITIONER

## 2025-04-10 PROCEDURE — 21400001 HC TELEMETRY ROOM

## 2025-04-10 PROCEDURE — 25000003 PHARM REV CODE 250: Performed by: INTERNAL MEDICINE

## 2025-04-10 PROCEDURE — 36415 COLL VENOUS BLD VENIPUNCTURE: CPT | Performed by: INTERNAL MEDICINE

## 2025-04-10 PROCEDURE — 83735 ASSAY OF MAGNESIUM: CPT | Performed by: INTERNAL MEDICINE

## 2025-04-10 RX ORDER — LISINOPRIL 20 MG/1
40 TABLET ORAL DAILY
Status: DISCONTINUED | OUTPATIENT
Start: 2025-04-10 | End: 2025-04-23 | Stop reason: HOSPADM

## 2025-04-10 RX ORDER — CIPROFLOXACIN 500 MG/1
500 TABLET ORAL EVERY 12 HOURS
Status: DISCONTINUED | OUTPATIENT
Start: 2025-04-10 | End: 2025-04-12

## 2025-04-10 RX ORDER — HYDROCHLOROTHIAZIDE 25 MG/1
25 TABLET ORAL DAILY
Status: DISCONTINUED | OUTPATIENT
Start: 2025-04-10 | End: 2025-04-15

## 2025-04-10 RX ORDER — RISPERIDONE 1 MG/1
1 TABLET ORAL NIGHTLY
Status: DISCONTINUED | OUTPATIENT
Start: 2025-04-10 | End: 2025-04-23 | Stop reason: HOSPADM

## 2025-04-10 RX ORDER — METRONIDAZOLE 500 MG/1
500 TABLET ORAL EVERY 8 HOURS
Status: DISCONTINUED | OUTPATIENT
Start: 2025-04-10 | End: 2025-04-12

## 2025-04-10 RX ORDER — FUROSEMIDE 20 MG/1
40 TABLET ORAL DAILY
Status: DISCONTINUED | OUTPATIENT
Start: 2025-04-10 | End: 2025-04-15

## 2025-04-10 RX ADMIN — HYDROCHLOROTHIAZIDE 25 MG: 25 TABLET ORAL at 09:04

## 2025-04-10 RX ADMIN — CIPROFLOXACIN 400 MG: 2 INJECTION, SOLUTION INTRAVENOUS at 05:04

## 2025-04-10 RX ADMIN — Medication: at 09:04

## 2025-04-10 RX ADMIN — METRONIDAZOLE 500 MG: 5 INJECTION, SOLUTION INTRAVENOUS at 05:04

## 2025-04-10 RX ADMIN — RISPERIDONE 1 MG: 1 TABLET, FILM COATED ORAL at 09:04

## 2025-04-10 RX ADMIN — ACETAMINOPHEN 650 MG: 325 TABLET, FILM COATED ORAL at 02:04

## 2025-04-10 RX ADMIN — METRONIDAZOLE 500 MG: 500 TABLET ORAL at 09:04

## 2025-04-10 RX ADMIN — ACETAMINOPHEN 650 MG: 325 TABLET, FILM COATED ORAL at 09:04

## 2025-04-10 RX ADMIN — FUROSEMIDE 40 MG: 20 TABLET ORAL at 09:04

## 2025-04-10 RX ADMIN — METRONIDAZOLE 500 MG: 5 INJECTION, SOLUTION INTRAVENOUS at 02:04

## 2025-04-10 RX ADMIN — CIPROFLOXACIN 500 MG: 500 TABLET ORAL at 09:04

## 2025-04-10 RX ADMIN — LISINOPRIL 40 MG: 20 TABLET ORAL at 09:04

## 2025-04-10 RX ADMIN — HYDRALAZINE HYDROCHLORIDE 25 MG: 25 TABLET ORAL at 12:04

## 2025-04-10 NOTE — ASSESSMENT & PLAN NOTE
- Continue ciprofloxacin, metronidazole.  - Advance diet as tolerated. Collect stool studies if has recurrence of loose stools.  - Appreciate GI assistance.

## 2025-04-10 NOTE — PROGRESS NOTES
Gastroenterology Progress Note    Active Hospital Problems    Colitis      *Syncope      CKD (chronic kidney disease), stage III      Essential hypertension      Asthma      Elevated troponin      Schizophrenia      Deaf, bilateral      Blind        Subjective:  Patient seen and examined.  The patient is better per family at the bedside.  No diarrhea.  Tolerating oral intake.      Reviews of Systems:  General:  Negative for fever or chills  Cardiovascular:  Negative for chest pain, shortness of breath, palpitations    Physical Exam    Vitals:  Vital Signs (Most Recent):  Temp: 99.4 °F (37.4 °C) (04/10/25 0733)  Pulse: 110 (04/10/25 0733)  Resp: 18 (04/10/25 0733)  BP: 139/80 (04/10/25 0733)  SpO2: 97 % (04/10/25 0733) Vital Signs (24h Range):  Temp:  [97.9 °F (36.6 °C)-99.4 °F (37.4 °C)] 99.4 °F (37.4 °C)  Pulse:  [] 110  Resp:  [17-18] 18  SpO2:  [94 %-100 %] 97 %  BP: (139-202)/(67-84) 139/80     GEN: Chronically ill appearing  HENT: Normocephalic, anicteric sclera   Cardiovascular: Regular rate and rhythm. No murmurs appreciated.   Chest: Non-labored respirations. Breath sounds equal   Abdomen: soft, NTND, no masses  Psych: Appropriate mood and affect.   Extermities: No C/C/E. 2+ dorsalis pedis pulses bilaterally  Skin: No new visible or palpable lesions.      Medications/Infusions:  Current Medications[1]    Intake and Output:    Intake/Output Summary (Last 24 hours) at 4/10/2025 1040  Last data filed at 4/10/2025 1036  Gross per 24 hour   Intake 1457.17 ml   Output 1700 ml   Net -242.83 ml       Labs:   Latest Reference Range & Units 04/10/25 08:43   WBC 3.90 - 12.70 K/uL 14.13 (H)   RBC 4.60 - 6.20 M/uL 3.59 (L)   Hemoglobin 14.0 - 18.0 gm/dL 9.1 (L)   Hematocrit 40.0 - 54.0 % 28.8 (L)   MCV 82 - 98 fL 80 (L)   MCH 27.0 - 31.0 pg 25.3 (L)   MCHC 32.0 - 36.0 g/dL 31.6 (L)   RDW 11.5 - 14.5 % 18.4 (H)   Platelet Count 150 - 450 K/uL 501 (H)   (H): Data is abnormally high  (L): Data is abnormally low    Latest Reference Range & Units 04/10/25 08:43   Sodium 136 - 145 mmol/L 136   Potassium 3.5 - 5.1 mmol/L 4.1   Chloride 95 - 110 mmol/L 99   CO2 23 - 29 mmol/L 24   Anion Gap 8 - 16 mmol/L 13   BUN 6 - 20 mg/dL 39 (H)   Creatinine 0.5 - 1.4 mg/dL 2.4 (H)   eGFR >60 mL/min/1.73/m2 30 (L)   Glucose 70 - 110 mg/dL 94   Calcium 8.7 - 10.5 mg/dL 9.7   Phosphorus Level 2.7 - 4.5 mg/dL 2.6 (L)   Magnesium  1.6 - 2.6 mg/dL 1.6   (H): Data is abnormally high  (L): Data is abnormally low    Imaging and other studies:    No new    Assessment:    Patient is a s a 60 y.o. male with a PMHx of HTN, CKD, schizophrenia, blindness, and deafness who presents to the ED with complaints of a syncopal episode.  CT with changes of a non specific colitis.  Leukocytosis responded to broad spectrum abx.      Plan:     Stool studies should diarrhea return   Transition to oral antibiotics at discharge to complete 7 days total duration therapy.   Low residue diet    Little else to add per GI.  Available as needed.           [1]   Current Facility-Administered Medications   Medication Dose Route Frequency Provider Last Rate Last Admin    acetaminophen tablet 650 mg  650 mg Oral Q4H PRN Layla Smith NP   650 mg at 04/10/25 0911    albuterol-ipratropium 2.5 mg-0.5 mg/3 mL nebulizer solution 3 mL  3 mL Nebulization Q4H PRN Layla Smith NP        ammonium lactate 12 % lotion   Topical (Top) BID PRN LALO Quesada MD   Given at 04/10/25 0927    ciprofloxacin (CIPRO)400mg/200ml D5W IVPB 400 mg  400 mg Intravenous Q12H Layla Smith NP   Stopped at 04/10/25 0620    furosemide tablet 40 mg  40 mg Oral Daily LALO Quesada MD   40 mg at 04/10/25 0911    hydrALAZINE tablet 25 mg  25 mg Oral Q8H PRN Layla Smith NP   25 mg at 04/10/25 0021    hydroCHLOROthiazide tablet 25 mg  25 mg Oral Daily LALO Quesada MD   25 mg at 04/10/25 0911    HYDROcodone-acetaminophen 5-325 mg per tablet 1 tablet  1 tablet Oral Q4H PRN  Layla Smith, NP        lisinopriL tablet 40 mg  40 mg Oral Daily LALO Quesada MD   40 mg at 04/10/25 0911    melatonin tablet 6 mg  6 mg Oral Nightly PRN Layla Smith, NP   6 mg at 04/09/25 2028    metronidazole IVPB 500 mg  500 mg Intravenous Q8H Layla Smith, NP   Stopped at 04/10/25 0615    ondansetron disintegrating tablet 8 mg  8 mg Oral Q8H PRN Layla Smith, NP        ondansetron injection 8 mg  8 mg Intravenous Q6H PRN Layla Smtih, EDU        risperiDONE tablet 1 mg  1 mg Oral QHS Miriam Clemente, ARMIN        sodium chloride 0.9% flush 10 mL  10 mL Intravenous PRN aLyla Smith, NP

## 2025-04-10 NOTE — ASSESSMENT & PLAN NOTE
- Suspect vasovagal 2/2 colitis / volume losses and BMs with the former.  - Troponin stable. No evidence of acute ischemic event.  - Pending echo. Carotid U/S unremarkable.  - Monitor on telemetry.

## 2025-04-10 NOTE — ASSESSMENT & PLAN NOTE
- Suspect vasovagal 2/2 colitis / volume losses and BMs with the former.  - Troponin stable. No evidence of acute ischemic event.  - Pending echo. Carotid U/S unremarkable.  - Monitor on telemetry.   Copied from CRM #[id number]. Topic: MW Schedule Appointment - MW Cancel/Reschedule  >> Feb 28, 2025  9:34 AM Jeimy LEON wrote:  krzysztof called to reschedule appointment for primary care.    [facility] CAN NOT cancel or reschedule per Clinician KB. Sent message to site. Selected 'Wrap Up CRM' and created new Telephone Encounter after clicking 'Convert to Clinical Call'. Sent Cancellation template and routed as routine priority per Clinician KB page to appropriate clinician pool.  -- DO NOT REPLY / DO NOT REPLY ALL --  -- This inbox is not monitored. If this was sent to the wrong provider or department, reroute message to P [facility] Reroute pool. --  -- Message is from Engagement Center Operations ([facility]O) --  Reason for Appointment Message: Caller wants sooner nurse  appointment - all locations with clinician were offered    Reason for Visit: Weight check     Is the patient currently scheduled? No    Preferred time to be seen: requesting the week of 03/24, mornings     Caller Information       Contact Date/Time Type Contact Phone/Fax    02/28/2025 09:33 AM CST Phone (Incoming) krzysztof [phone]            Alternative phone number: none     Can a detailed message be left?  Yes - Voicemail   Patient has been advised the message will be addressed within 2-3 business days

## 2025-04-10 NOTE — ASSESSMENT & PLAN NOTE
-Chronic; holding furosemide, lisinopril, HCTZ with hydration in setting of colitis.  - Resume as appropriate.  - Continue hydralazine 25mg PO q8hr PRN for SBP > 180, DBP > 100.

## 2025-04-10 NOTE — ASSESSMENT & PLAN NOTE
-Chronic; holding furosemide, lisinopril, HCTZ with hydration in setting of colitis.  - Resume as appropriate.  - Continue hydralazine 25mg PO q8hr PRN for SBP > 180, DBP > 100.   Called patient and he will come in tomorrow for labwork.

## 2025-04-10 NOTE — SUBJECTIVE & OBJECTIVE
Interval History: No acute events overnight. Symptomatically improving from GI standpoint. Discussed plan of care with sisters at bedside/via phone. No new concerns at this time.    Review of Systems   Unable to perform ROS: Patient nonverbal     Objective:     Vital Signs (Most Recent):  Temp: 98.9 °F (37.2 °C) (04/09/25 2006)  Pulse: 102 (04/09/25 2034)  Resp: 18 (04/09/25 2006)  BP: (!) 178/77 (04/09/25 2034)  SpO2: 97 % (04/09/25 2034) Vital Signs (24h Range):  Temp:  [97.9 °F (36.6 °C)-98.9 °F (37.2 °C)] 98.9 °F (37.2 °C)  Pulse:  [] 102  Resp:  [16-18] 18  SpO2:  [93 %-100 %] 97 %  BP: (151-197)/(68-85) 178/77     Weight: 91.6 kg (202 lb)  Body mass index is 32.6 kg/m².    Intake/Output Summary (Last 24 hours) at 4/9/2025 2057  Last data filed at 4/9/2025 1830  Gross per 24 hour   Intake 1384.06 ml   Output 1175 ml   Net 209.06 ml         Physical Exam  Vitals and nursing note reviewed.   Constitutional:       General: He is sleeping. He is not in acute distress.     Appearance: He is well-developed. He is ill-appearing (Chronically).   HENT:      Head: Normocephalic and atraumatic.   Eyes:      General:         Right eye: No discharge.         Left eye: No discharge.      Conjunctiva/sclera: Conjunctivae normal.   Cardiovascular:      Rate and Rhythm: Normal rate.      Pulses: Normal pulses.   Pulmonary:      Effort: Pulmonary effort is normal. No respiratory distress.   Abdominal:      Palpations: Abdomen is soft.      Tenderness: There is no abdominal tenderness.   Musculoskeletal:         General: Normal range of motion.      Right lower leg: No edema.      Left lower leg: No edema.   Skin:     General: Skin is warm and dry.   Neurological:      Mental Status: He is easily aroused. Mental status is at baseline.           Significant Labs:   CBC:  Recent Labs   Lab 04/08/25  1505 04/08/25  1744 04/09/25  0533   WBC 21.22* 18.61* 11.82   HGB 9.4* 8.7* 8.4*   HCT 29.4* 27.4* 26.6*   * 388 427    LYMPH 0.81*  3.8* 0.50*  2.7* 0.88*  7.4*   MONO 4.3  0.91 4.8  0.90 7.3  0.86   EOS 0.4  0.09 0.0  0.00 0.8  0.09   BMP:  Recent Labs   Lab 04/08/25  1505 04/09/25  0533    139   K 4.4 4.2    101   CO2 22* 27   BUN 49* 42*   CREATININE 2.2* 2.2*   CALCIUM 9.0 9.0   MG  --  1.6     Significant Imaging: I have reviewed and interpreted all pertinent imaging results/findings within the past 24 hours.

## 2025-04-10 NOTE — ASSESSMENT & PLAN NOTE
>>ASSESSMENT AND PLAN FOR CKD (CHRONIC KIDNEY DISEASE), STAGE III WRITTEN ON 4/9/2025  8:57 PM BY LALO SELLERS MD    - Chronic; stable at baseline.  - Avoid nephrotoxins, renally dose medications.

## 2025-04-10 NOTE — SIGNIFICANT EVENT
Patient /77, alert, awake and restless. Patients' sister at bedside and thinks, its because of patient not getting Risperidone every HS instead of in the morning.  On call provider notified, TELLO Ken, informed me to give PRN po hydralazine. No new orders given.

## 2025-04-10 NOTE — PROGRESS NOTES
[facility] Surg ([address] Medicine  Progress Note    Patient Name: Frantz Sigala Jr.  MRN: [id number]  Patient Class: IP- Inpatient   Admission Date: 4/8/2025  Length of Stay: 1 days  Attending Physician: LALO Quesada MD  Primary Care Provider: Zachary Ramos MD        Subjective     Principal Problem:Syncope        HPI:  Mr. Sigala is a 60 YOM with PMHx of HTN, asthma, CKD (baseline SCr 2-2.5), schizophrenia, blindness, and deafness.     He presents to ED via EMS with family due to syncopal event just PTA. Family provides history due patients deafness and blindness. They note that he was in his normal state of health today until right after lunch. He ate a salami sandwich with potato chips and drank orange juice; shortly thereafter he was in the bathroom when his mother heard a loud thud and patient was found by his brother on the ground unconscious.  His brother notes that he called out the patients name and slapped him on his back a few times at which time he seemed to come to and began vomiting with concurrent episodes of diarrhea.  Sister notes she has had a recent respiratory illness with abdominal cramping and some diarrhea. Family notes that patient has lived in the home he is in since a small child and that he is able to navigate the home independently including going upstairs and outside.     In the ED he is hypertensive, heart rate stable, saturations initially 80% on room air with improvement after 2 L nasal cannula application, and afebrile.  CBC with WBC 21, H&H 9.4/29.4, platelets 497.  Chemistry was , K 4.4, chloride 100, CO2 22, BUN 49, SCr 2.2, glucose 125.  LFTs unremarkable.  BNP 35.  Troponin 0.035 >> 0.059 (appear similar to priors however these were in 2015).  Lactic acid 2.2.  Flu and COVID negative.  Hepatitis-C and HIV nonreactive.  UA noninfectious.  CXR with chronic appearing interstitial findings, congestive change or edema is a consideration; no  large focal consolidation. CTH with no acute intracranial abnormality detected, bilateral partial opacification of the mastoid air cells, sphenoid sinus disease.     CT A/P:  Wall thickening of the transverse colon, suggestive of nonspecific colitis.   Wall thickening of the urinary bladder.  The findings may be seen with cystitis.   Advanced calcifications of the abdominal aorta and the mesenteric arteries.  Correlation with possible vascular ultrasound of the mesenteric vessels may be obtained for further assessment. 1.2 cm hypodensity in the upper pole of the right kidney with Hounsfield of 23.  This can be assessed with renal ultrasound.     The patient was admitted to the Hospital Medicine Service for further evaluation and management.     Overview/Hospital Course:  No notes on file    Interval History: No acute events overnight. Symptomatically improving from GI standpoint. Discussed plan of care with sisters at bedside/via phone. No new concerns at this time.    Review of Systems   Unable to perform ROS: Patient nonverbal     Objective:     Vital Signs (Most Recent):  Temp: 98.9 °F (37.2 °C) (04/09/25 2006)  Pulse: 102 (04/09/25 2034)  Resp: 18 (04/09/25 2006)  BP: (!) 178/77 (04/09/25 2034)  SpO2: 97 % (04/09/25 2034) Vital Signs (24h Range):  Temp:  [97.9 °F (36.6 °C)-98.9 °F (37.2 °C)] 98.9 °F (37.2 °C)  Pulse:  [] 102  Resp:  [16-18] 18  SpO2:  [93 %-100 %] 97 %  BP: (151-197)/(68-85) 178/77     Weight: 91.6 kg (202 lb)  Body mass index is 32.6 kg/m².    Intake/Output Summary (Last 24 hours) at 4/9/2025 2057  Last data filed at 4/9/2025 1830  Gross per 24 hour   Intake 1384.06 ml   Output 1175 ml   Net 209.06 ml         Physical Exam  Vitals and nursing note reviewed.   Constitutional:       General: He is sleeping. He is not in acute distress.     Appearance: He is well-developed. He is ill-appearing (Chronically).   HENT:      Head: Normocephalic and atraumatic.   Eyes:      General:         Right  eye: No discharge.         Left eye: No discharge.      Conjunctiva/sclera: Conjunctivae normal.   Cardiovascular:      Rate and Rhythm: Normal rate.      Pulses: Normal pulses.   Pulmonary:      Effort: Pulmonary effort is normal. No respiratory distress.   Abdominal:      Palpations: Abdomen is soft.      Tenderness: There is no abdominal tenderness.   Musculoskeletal:         General: Normal range of motion.      Right lower leg: No edema.      Left lower leg: No edema.   Skin:     General: Skin is warm and dry.   Neurological:      Mental Status: He is easily aroused. Mental status is at baseline.           Significant Labs:   CBC:  Recent Labs   Lab 04/08/25  1505 04/08/25  1744 04/09/25  0533   WBC 21.22* 18.61* 11.82   HGB 9.4* 8.7* 8.4*   HCT 29.4* 27.4* 26.6*   * 388 427   LYMPH 0.81*  3.8* 0.50*  2.7* 0.88*  7.4*   MONO 4.3  0.91 4.8  0.90 7.3  0.86   EOS 0.4  0.09 0.0  0.00 0.8  0.09   BMP:  Recent Labs   Lab 04/08/25  1505 04/09/25  0533    139   K 4.4 4.2    101   CO2 22* 27   BUN 49* 42*   CREATININE 2.2* 2.2*   CALCIUM 9.0 9.0   MG  --  1.6     Significant Imaging: I have reviewed and interpreted all pertinent imaging results/findings within the past 24 hours.        Assessment & Plan  Syncope  Elevated troponin  - Suspect vasovagal 2/2 colitis / volume losses and BMs with the former.  - Troponin stable. No evidence of acute ischemic event.  - Pending echo. Carotid U/S unremarkable.  - Monitor on telemetry.  Colitis  - Continue ciprofloxacin, metronidazole.  - Advance diet as tolerated. Collect stool studies if has recurrence of loose stools.  - Appreciate GI assistance.  Essential hypertension  -Chronic; holding furosemide, lisinopril, HCTZ with hydration in setting of colitis.  - Resume as appropriate.  - Continue hydralazine 25mg PO q8hr PRN for SBP > 180, DBP > 100.  CKD (chronic kidney disease), stage III  - Chronic; stable at baseline.  - Avoid nephrotoxins, renally  dose medications.  Asthma  - Chronic; stable.  - Continue albuterol-ipratropium 2.5-0.5mg inhaled q4hr PRN.  Schizophrenia  -chronic  -continue home risperidal  -PRN supportive care as indicated     Blind  Deaf, bilateral  - Chronic; fall precautions.    VTE Risk Mitigation (From admission, onward)           Ordered     IP VTE HIGH RISK PATIENT  Once         04/08/25 1943     Place sequential compression device  Until discontinued         04/08/25 1943     Reason for No Pharmacological VTE Prophylaxis  Once        Question:  Reasons:  Answer:  Risk of Bleeding    04/08/25 1943                    Discharge Planning   JAQUAN: 4/10/2025     Code Status: Full Code   Medical Readiness for Discharge Date:   Discharge Plan A: Home with family        D Sunny Quesada MD  Department of Hospital Medicine   [facility] ([address])

## 2025-04-10 NOTE — PLAN OF CARE
Problem: Adult Inpatient Plan of Care  Goal: Plan of Care Review  Outcome: Progressing  Goal: Absence of Hospital-Acquired Illness or Injury  Outcome: Progressing  Goal: Optimal Comfort and Wellbeing  Outcome: Progressing  Goal: Readiness for Transition of Care  Outcome: Progressing     Problem: Infection  Goal: Absence of Infection Signs and Symptoms  Outcome: Progressing     Problem: Acute Kidney Injury/Impairment  Goal: Fluid and Electrolyte Balance  Outcome: Progressing  Goal: Improved Oral Intake  Outcome: Progressing  Goal: Effective Renal Function  Outcome: Progressing     Problem: Syncope  Goal: Absence of Syncopal Symptoms  Outcome: Progressing     Problem: Fall Injury Risk  Goal: Absence of Fall and Fall-Related Injury  Outcome: Progressing     Problem: Gas Exchange Impaired  Goal: Optimal Gas Exchange  Outcome: Progressing     Problem: Skin Injury Risk Increased  Goal: Skin Health and Integrity  Outcome: Progressing     Problem: Hypertension Acute  Goal: Blood Pressure Within Desired Range  Outcome: Progressing

## 2025-04-11 PROBLEM — R33.9 URINARY RETENTION: Status: ACTIVE | Noted: 2025-04-11

## 2025-04-11 LAB
ABSOLUTE EOSINOPHIL (OHS): 0.04 K/UL
ABSOLUTE MONOCYTE (OHS): 1.05 K/UL (ref 0.3–1)
ABSOLUTE NEUTROPHIL COUNT (OHS): 10.67 K/UL (ref 1.8–7.7)
ANION GAP (OHS): 11 MMOL/L (ref 8–16)
BASOPHILS # BLD AUTO: 0.04 K/UL
BASOPHILS NFR BLD AUTO: 0.3 %
BUN SERPL-MCNC: 45 MG/DL (ref 6–20)
CALCIUM SERPL-MCNC: 9.8 MG/DL (ref 8.7–10.5)
CHLORIDE SERPL-SCNC: 99 MMOL/L (ref 95–110)
CO2 SERPL-SCNC: 24 MMOL/L (ref 23–29)
CREAT SERPL-MCNC: 2.5 MG/DL (ref 0.5–1.4)
ERYTHROCYTE [DISTWIDTH] IN BLOOD BY AUTOMATED COUNT: 18.2 % (ref 11.5–14.5)
GFR SERPLBLD CREATININE-BSD FMLA CKD-EPI: 29 ML/MIN/1.73/M2
GLUCOSE SERPL-MCNC: 88 MG/DL (ref 70–110)
HCT VFR BLD AUTO: 27.4 % (ref 40–54)
HGB BLD-MCNC: 8.9 GM/DL (ref 14–18)
IMM GRANULOCYTES # BLD AUTO: 0.04 K/UL (ref 0–0.04)
IMM GRANULOCYTES NFR BLD AUTO: 0.3 % (ref 0–0.5)
LYMPHOCYTES # BLD AUTO: 0.66 K/UL (ref 1–4.8)
MAGNESIUM SERPL-MCNC: 1.5 MG/DL (ref 1.6–2.6)
MCH RBC QN AUTO: 25.6 PG (ref 27–31)
MCHC RBC AUTO-ENTMCNC: 32.5 G/DL (ref 32–36)
MCV RBC AUTO: 79 FL (ref 82–98)
NUCLEATED RBC (/100WBC) (OHS): 0 /100 WBC
PHOSPHATE SERPL-MCNC: 1.9 MG/DL (ref 2.7–4.5)
PLATELET # BLD AUTO: 435 K/UL (ref 150–450)
PMV BLD AUTO: 9.2 FL (ref 9.2–12.9)
POTASSIUM SERPL-SCNC: 3.9 MMOL/L (ref 3.5–5.1)
RBC # BLD AUTO: 3.48 M/UL (ref 4.6–6.2)
RELATIVE EOSINOPHIL (OHS): 0.3 %
RELATIVE LYMPHOCYTE (OHS): 5.3 % (ref 18–48)
RELATIVE MONOCYTE (OHS): 8.4 % (ref 4–15)
RELATIVE NEUTROPHIL (OHS): 85.4 % (ref 38–73)
SODIUM SERPL-SCNC: 134 MMOL/L (ref 136–145)
WBC # BLD AUTO: 12.5 K/UL (ref 3.9–12.7)

## 2025-04-11 PROCEDURE — 25000003 PHARM REV CODE 250: Performed by: PHYSICIAN ASSISTANT

## 2025-04-11 PROCEDURE — 25000242 PHARM REV CODE 250 ALT 637 W/ HCPCS: Performed by: NURSE PRACTITIONER

## 2025-04-11 PROCEDURE — 94640 AIRWAY INHALATION TREATMENT: CPT

## 2025-04-11 PROCEDURE — 63600175 PHARM REV CODE 636 W HCPCS: Performed by: INTERNAL MEDICINE

## 2025-04-11 PROCEDURE — 36415 COLL VENOUS BLD VENIPUNCTURE: CPT | Performed by: INTERNAL MEDICINE

## 2025-04-11 PROCEDURE — 25000003 PHARM REV CODE 250: Performed by: INTERNAL MEDICINE

## 2025-04-11 PROCEDURE — 85025 COMPLETE CBC W/AUTO DIFF WBC: CPT | Performed by: INTERNAL MEDICINE

## 2025-04-11 PROCEDURE — 51798 US URINE CAPACITY MEASURE: CPT

## 2025-04-11 PROCEDURE — 51702 INSERT TEMP BLADDER CATH: CPT

## 2025-04-11 PROCEDURE — 84100 ASSAY OF PHOSPHORUS: CPT | Performed by: INTERNAL MEDICINE

## 2025-04-11 PROCEDURE — 25000003 PHARM REV CODE 250: Performed by: NURSE PRACTITIONER

## 2025-04-11 PROCEDURE — 94761 N-INVAS EAR/PLS OXIMETRY MLT: CPT

## 2025-04-11 PROCEDURE — 83735 ASSAY OF MAGNESIUM: CPT | Performed by: INTERNAL MEDICINE

## 2025-04-11 PROCEDURE — 21400001 HC TELEMETRY ROOM

## 2025-04-11 PROCEDURE — 80048 BASIC METABOLIC PNL TOTAL CA: CPT | Performed by: INTERNAL MEDICINE

## 2025-04-11 RX ORDER — HYDROCODONE BITARTRATE AND ACETAMINOPHEN 5; 325 MG/1; MG/1
1 TABLET ORAL EVERY 6 HOURS PRN
Qty: 15 TABLET | Refills: 0 | Status: ON HOLD | OUTPATIENT
Start: 2025-04-11 | End: 2025-05-01

## 2025-04-11 RX ORDER — MAGNESIUM SULFATE HEPTAHYDRATE 40 MG/ML
2 INJECTION, SOLUTION INTRAVENOUS ONCE
Status: COMPLETED | OUTPATIENT
Start: 2025-04-11 | End: 2025-04-11

## 2025-04-11 RX ORDER — TAMSULOSIN HYDROCHLORIDE 0.4 MG/1
0.4 CAPSULE ORAL DAILY
Qty: 30 CAPSULE | Refills: 0 | Status: SHIPPED | OUTPATIENT
Start: 2025-04-12

## 2025-04-11 RX ORDER — SODIUM CHLORIDE, SODIUM LACTATE, POTASSIUM CHLORIDE, CALCIUM CHLORIDE 600; 310; 30; 20 MG/100ML; MG/100ML; MG/100ML; MG/100ML
INJECTION, SOLUTION INTRAVENOUS CONTINUOUS
Status: DISCONTINUED | OUTPATIENT
Start: 2025-04-11 | End: 2025-04-14

## 2025-04-11 RX ORDER — CIPROFLOXACIN 500 MG/1
500 TABLET ORAL EVERY 12 HOURS
Qty: 3 TABLET | Refills: 0 | Status: ON HOLD | OUTPATIENT
Start: 2025-04-11 | End: 2025-05-01 | Stop reason: ALTCHOICE

## 2025-04-11 RX ORDER — METRONIDAZOLE 500 MG/1
500 TABLET ORAL EVERY 8 HOURS
Qty: 4 TABLET | Refills: 0 | Status: SHIPPED | OUTPATIENT
Start: 2025-04-11 | End: 2025-04-23 | Stop reason: HOSPADM

## 2025-04-11 RX ORDER — TAMSULOSIN HYDROCHLORIDE 0.4 MG/1
0.4 CAPSULE ORAL DAILY
Status: DISCONTINUED | OUTPATIENT
Start: 2025-04-11 | End: 2025-04-23 | Stop reason: HOSPADM

## 2025-04-11 RX ORDER — SODIUM,POTASSIUM PHOSPHATES 280-250MG
2 POWDER IN PACKET (EA) ORAL
Status: COMPLETED | OUTPATIENT
Start: 2025-04-11 | End: 2025-04-11

## 2025-04-11 RX ORDER — ONDANSETRON 4 MG/1
4 TABLET, ORALLY DISINTEGRATING ORAL EVERY 6 HOURS PRN
Qty: 30 TABLET | Refills: 0 | Status: SHIPPED | OUTPATIENT
Start: 2025-04-11

## 2025-04-11 RX ORDER — LABETALOL HYDROCHLORIDE 5 MG/ML
10 INJECTION, SOLUTION INTRAVENOUS EVERY 4 HOURS PRN
Status: DISCONTINUED | OUTPATIENT
Start: 2025-04-11 | End: 2025-04-23 | Stop reason: HOSPADM

## 2025-04-11 RX ADMIN — HYDRALAZINE HYDROCHLORIDE 25 MG: 25 TABLET ORAL at 05:04

## 2025-04-11 RX ADMIN — FUROSEMIDE 40 MG: 20 TABLET ORAL at 09:04

## 2025-04-11 RX ADMIN — HYDROCODONE BITARTRATE AND ACETAMINOPHEN 1 TABLET: 5; 325 TABLET ORAL at 11:04

## 2025-04-11 RX ADMIN — SODIUM CHLORIDE, POTASSIUM CHLORIDE, SODIUM LACTATE AND CALCIUM CHLORIDE: 600; 310; 30; 20 INJECTION, SOLUTION INTRAVENOUS at 07:04

## 2025-04-11 RX ADMIN — CIPROFLOXACIN 500 MG: 500 TABLET ORAL at 09:04

## 2025-04-11 RX ADMIN — METRONIDAZOLE 500 MG: 500 TABLET ORAL at 10:04

## 2025-04-11 RX ADMIN — MELATONIN TAB 3 MG 6 MG: 3 TAB at 08:04

## 2025-04-11 RX ADMIN — ACETAMINOPHEN 650 MG: 325 TABLET, FILM COATED ORAL at 06:04

## 2025-04-11 RX ADMIN — IPRATROPIUM BROMIDE AND ALBUTEROL SULFATE 3 ML: 2.5; .5 SOLUTION RESPIRATORY (INHALATION) at 10:04

## 2025-04-11 RX ADMIN — ACETAMINOPHEN 650 MG: 325 TABLET, FILM COATED ORAL at 05:04

## 2025-04-11 RX ADMIN — LISINOPRIL 40 MG: 20 TABLET ORAL at 09:04

## 2025-04-11 RX ADMIN — LABETALOL HYDROCHLORIDE 10 MG: 5 INJECTION INTRAVENOUS at 09:04

## 2025-04-11 RX ADMIN — POTASSIUM & SODIUM PHOSPHATES POWDER PACK 280-160-250 MG 2 PACKET: 280-160-250 PACK at 11:04

## 2025-04-11 RX ADMIN — HYDROCHLOROTHIAZIDE 25 MG: 25 TABLET ORAL at 09:04

## 2025-04-11 RX ADMIN — METRONIDAZOLE 500 MG: 500 TABLET ORAL at 05:04

## 2025-04-11 RX ADMIN — TAMSULOSIN HYDROCHLORIDE 0.4 MG: 0.4 CAPSULE ORAL at 01:04

## 2025-04-11 RX ADMIN — LABETALOL HYDROCHLORIDE 10 MG: 5 INJECTION INTRAVENOUS at 10:04

## 2025-04-11 RX ADMIN — METRONIDAZOLE 500 MG: 500 TABLET ORAL at 01:04

## 2025-04-11 RX ADMIN — RISPERIDONE 1 MG: 1 TABLET, FILM COATED ORAL at 08:04

## 2025-04-11 RX ADMIN — POTASSIUM & SODIUM PHOSPHATES POWDER PACK 280-160-250 MG 2 PACKET: 280-160-250 PACK at 09:04

## 2025-04-11 RX ADMIN — MAGNESIUM SULFATE IN WATER 2 G: 40 INJECTION, SOLUTION INTRAVENOUS at 07:04

## 2025-04-11 RX ADMIN — CIPROFLOXACIN 500 MG: 500 TABLET ORAL at 08:04

## 2025-04-11 NOTE — ASSESSMENT & PLAN NOTE
- XR demonstrating L fibular fracture; follow-up XR of tib/fib to evaluate for further areas of potential fracture.  - Anticipate can weight bear as tolerated; pain controlled with current medications.  - Orthopedics consult; anticipate outpatient follow-up.

## 2025-04-11 NOTE — ASSESSMENT & PLAN NOTE
>>ASSESSMENT AND PLAN FOR CKD (CHRONIC KIDNEY DISEASE), STAGE III WRITTEN ON 4/10/2025  9:47 PM BY LALO SELLERS MD    - Chronic; stable at baseline.  - Avoid nephrotoxins, renally dose medications.

## 2025-04-11 NOTE — ASSESSMENT & PLAN NOTE
- Continue ciprofloxacin, metronidazole. Convert to PO.  - Tolerating diet; no further GI complaints.

## 2025-04-11 NOTE — PROGRESS NOTES
[facility] Surg ([address] Medicine  Progress Note    Patient Name: Frantz Sigala Jr.  MRN: [id number]  Patient Class: IP- Inpatient   Admission Date: 4/8/2025  Length of Stay: 2 days  Attending Physician: LALO Quesada MD  Primary Care Provider: Zachary Ramos MD        Subjective     Principal Problem:Syncope        HPI:  Mr. Sigala is a 60 YOM with PMHx of HTN, asthma, CKD (baseline SCr 2-2.5), schizophrenia, blindness, and deafness.     He presents to ED via EMS with family due to syncopal event just PTA. Family provides history due patients deafness and blindness. They note that he was in his normal state of health today until right after lunch. He ate a salami sandwich with potato chips and drank orange juice; shortly thereafter he was in the bathroom when his mother heard a loud thud and patient was found by his brother on the ground unconscious.  His brother notes that he called out the patients name and slapped him on his back a few times at which time he seemed to come to and began vomiting with concurrent episodes of diarrhea.  Sister notes she has had a recent respiratory illness with abdominal cramping and some diarrhea. Family notes that patient has lived in the home he is in since a small child and that he is able to navigate the home independently including going upstairs and outside.     In the ED he is hypertensive, heart rate stable, saturations initially 80% on room air with improvement after 2 L nasal cannula application, and afebrile.  CBC with WBC 21, H&H 9.4/29.4, platelets 497.  Chemistry was , K 4.4, chloride 100, CO2 22, BUN 49, SCr 2.2, glucose 125.  LFTs unremarkable.  BNP 35.  Troponin 0.035 >> 0.059 (appear similar to priors however these were in 2015).  Lactic acid 2.2.  Flu and COVID negative.  Hepatitis-C and HIV nonreactive.  UA noninfectious.  CXR with chronic appearing interstitial findings, congestive change or edema is a consideration; no  large focal consolidation. CTH with no acute intracranial abnormality detected, bilateral partial opacification of the mastoid air cells, sphenoid sinus disease.     CT A/P:  Wall thickening of the transverse colon, suggestive of nonspecific colitis.   Wall thickening of the urinary bladder.  The findings may be seen with cystitis.   Advanced calcifications of the abdominal aorta and the mesenteric arteries.  Correlation with possible vascular ultrasound of the mesenteric vessels may be obtained for further assessment. 1.2 cm hypodensity in the upper pole of the right kidney with Hounsfield of 23.  This can be assessed with renal ultrasound.     The patient was admitted to the Hospital Medicine Service for further evaluation and management.     Overview/Hospital Course:  No notes on file    Interval History: No acute events overnight; BP elevated overnight but since improved. Discussed with brother at bedside; patient reported pain from L knee, with XR demonstrating fibular fracture. No other concerns at this time.    Review of Systems   Unable to perform ROS: Patient nonverbal     Objective:     Vital Signs (Most Recent):  Temp: 99 °F (37.2 °C) (04/10/25 1920)  Pulse: (!) 116 (04/10/25 1920)  Resp: 15 (04/10/25 1920)  BP: (!) 195/74 (04/10/25 1920)  SpO2: (!) 94 % (04/10/25 1920) Vital Signs (24h Range):  Temp:  [98 °F (36.7 °C)-99.4 °F (37.4 °C)] 99 °F (37.2 °C)  Pulse:  [102-124] 116  Resp:  [15-18] 15  SpO2:  [90 %-98 %] 94 %  BP: (131-202)/(67-80) 195/74     Weight: 91.6 kg (202 lb)  Body mass index is 32.6 kg/m².    Intake/Output Summary (Last 24 hours) at 4/10/2025 2118  Last data filed at 4/10/2025 1408  Gross per 24 hour   Intake 1479.8 ml   Output 700 ml   Net 779.8 ml         Physical Exam  Vitals and nursing note reviewed.   Constitutional:       General: He is sleeping. He is not in acute distress.     Appearance: He is well-developed. He is ill-appearing (Chronically).   HENT:      Head: Normocephalic  and atraumatic.   Eyes:      General:         Right eye: No discharge.         Left eye: No discharge.      Conjunctiva/sclera: Conjunctivae normal.   Cardiovascular:      Rate and Rhythm: Normal rate.      Pulses: Normal pulses.   Pulmonary:      Effort: Pulmonary effort is normal. No respiratory distress.   Abdominal:      Palpations: Abdomen is soft.      Tenderness: There is no abdominal tenderness.   Musculoskeletal:         General: Normal range of motion.      Right lower leg: No edema.      Left lower leg: No edema.      Comments: Mild tenderness to L distal knee with ecchymosis.   Skin:     General: Skin is warm and dry.   Neurological:      Mental Status: He is easily aroused. Mental status is at baseline.           Significant Labs:   CBC:  Recent Labs   Lab 04/08/25  1744 04/09/25  0533 04/10/25  0843   WBC 18.61* 11.82 14.13*   HGB 8.7* 8.4* 9.1*   HCT 27.4* 26.6* 28.8*    427 501*   LYMPH 0.50*  2.7* 0.88*  7.4* 1.40  9.9*   MONO 4.8  0.90 7.3  0.86 8.1  1.15*   EOS 0.0  0.00 0.8  0.09 1.1  0.15   BMP:  Recent Labs   Lab 04/08/25  1505 04/09/25  0533 04/10/25  0843    139 136   K 4.4 4.2 4.1    101 99   CO2 22* 27 24   BUN 49* 42* 39*   CREATININE 2.2* 2.2* 2.4*   CALCIUM 9.0 9.0 9.7   MG  --  1.6 1.6   PHOS  --   --  2.6*     Significant Imaging: I have reviewed and interpreted all pertinent imaging results/findings within the past 24 hours.      Assessment & Plan  Syncope  - Suspect vasovagal 2/2 colitis / volume losses and BMs with the former.  - Troponin stable. No evidence of acute ischemic event.  - Echo with normal EF, grade I diastolic dysfunction likely related to HTN. Carotid U/S unremarkable.  - Monitor on telemetry.  Colitis  - Continue ciprofloxacin, metronidazole. Convert to PO.  - Tolerating diet; no further GI complaints.  Essential hypertension  -Chronic; trending upward.  - Resume furosemide, HCTZ, lisinopril.  - Continue hydralazine 25mg PO q8hr PRN for  SBP > 180, DBP > 100.  CKD (chronic kidney disease), stage III  - Chronic; stable at baseline.  - Avoid nephrotoxins, renally dose medications.  Asthma  - Chronic; stable.  - Continue albuterol-ipratropium 2.5-0.5mg inhaled q4hr PRN.  Schizophrenia  -chronic  -continue home risperidal  -PRN supportive care as indicated   Blind  Deaf, bilateral  - Chronic; fall precautions.  Left fibular fracture  - XR demonstrating L fibular fracture; follow-up XR of tib/fib to evaluate for further areas of potential fracture.  - Anticipate can weight bear as tolerated; pain controlled with current medications.  - Orthopedics consult; anticipate outpatient follow-up.  VTE Risk Mitigation (From admission, onward)           Ordered     IP VTE HIGH RISK PATIENT  Once         04/08/25 1943     Place sequential compression device  Until discontinued         04/08/25 1943     Reason for No Pharmacological VTE Prophylaxis  Once        Question:  Reasons:  Answer:  Risk of Bleeding    04/08/25 1943                    Discharge Planning   JAQUAN: 4/10/2025     Code Status: Full Code   Medical Readiness for Discharge Date:   Discharge Plan A: Home with family                        D Sunny Quesada MD  Department of Hospital Medicine   [facility] - Med Surg ([address])

## 2025-04-11 NOTE — ASSESSMENT & PLAN NOTE
- Suspect vasovagal 2/2 colitis / volume losses and BMs with the former.  - Troponin stable. No evidence of acute ischemic event.  - Echo with normal EF, grade I diastolic dysfunction likely related to HTN. Carotid U/S unremarkable.  - Monitor on telemetry.

## 2025-04-11 NOTE — ASSESSMENT & PLAN NOTE
-Chronic; trending upward.  - Resume furosemide, HCTZ, lisinopril.  - Continue hydralazine 25mg PO q8hr PRN for SBP > 180, DBP > 100.

## 2025-04-11 NOTE — SUBJECTIVE & OBJECTIVE
Interval History: No acute events overnight; BP elevated overnight but since improved. Discussed with brother at bedside; patient reported pain from L knee, with XR demonstrating fibular fracture. No other concerns at this time.    Review of Systems   Unable to perform ROS: Patient nonverbal     Objective:     Vital Signs (Most Recent):  Temp: 99 °F (37.2 °C) (04/10/25 1920)  Pulse: (!) 116 (04/10/25 1920)  Resp: 15 (04/10/25 1920)  BP: (!) 195/74 (04/10/25 1920)  SpO2: (!) 94 % (04/10/25 1920) Vital Signs (24h Range):  Temp:  [98 °F (36.7 °C)-99.4 °F (37.4 °C)] 99 °F (37.2 °C)  Pulse:  [102-124] 116  Resp:  [15-18] 15  SpO2:  [90 %-98 %] 94 %  BP: (131-202)/(67-80) 195/74     Weight: 91.6 kg (202 lb)  Body mass index is 32.6 kg/m².    Intake/Output Summary (Last 24 hours) at 4/10/2025 2118  Last data filed at 4/10/2025 1408  Gross per 24 hour   Intake 1479.8 ml   Output 700 ml   Net 779.8 ml         Physical Exam  Vitals and nursing note reviewed.   Constitutional:       General: He is sleeping. He is not in acute distress.     Appearance: He is well-developed. He is ill-appearing (Chronically).   HENT:      Head: Normocephalic and atraumatic.   Eyes:      General:         Right eye: No discharge.         Left eye: No discharge.      Conjunctiva/sclera: Conjunctivae normal.   Cardiovascular:      Rate and Rhythm: Normal rate.      Pulses: Normal pulses.   Pulmonary:      Effort: Pulmonary effort is normal. No respiratory distress.   Abdominal:      Palpations: Abdomen is soft.      Tenderness: There is no abdominal tenderness.   Musculoskeletal:         General: Normal range of motion.      Right lower leg: No edema.      Left lower leg: No edema.      Comments: Mild tenderness to L distal knee with ecchymosis.   Skin:     General: Skin is warm and dry.   Neurological:      Mental Status: He is easily aroused. Mental status is at baseline.           Significant Labs:   CBC:  Recent Labs   Lab 04/08/25  1744  04/09/25  0533 04/10/25  0843   WBC 18.61* 11.82 14.13*   HGB 8.7* 8.4* 9.1*   HCT 27.4* 26.6* 28.8*    427 501*   LYMPH 0.50*  2.7* 0.88*  7.4* 1.40  9.9*   MONO 4.8  0.90 7.3  0.86 8.1  1.15*   EOS 0.0  0.00 0.8  0.09 1.1  0.15   BMP:  Recent Labs   Lab 04/08/25  1505 04/09/25  0533 04/10/25  0843    139 136   K 4.4 4.2 4.1    101 99   CO2 22* 27 24   BUN 49* 42* 39*   CREATININE 2.2* 2.2* 2.4*   CALCIUM 9.0 9.0 9.7   MG  --  1.6 1.6   PHOS  --   --  2.6*     Significant Imaging: I have reviewed and interpreted all pertinent imaging results/findings within the past 24 hours.

## 2025-04-12 PROBLEM — R50.9 FEVER: Status: ACTIVE | Noted: 2025-04-12

## 2025-04-12 LAB
ABSOLUTE EOSINOPHIL (OHS): 0.03 K/UL
ABSOLUTE MONOCYTE (OHS): 1.47 K/UL (ref 0.3–1)
ABSOLUTE NEUTROPHIL COUNT (OHS): 14.08 K/UL (ref 1.8–7.7)
ANION GAP (OHS): 14 MMOL/L (ref 8–16)
BACTERIA #/AREA URNS AUTO: ABNORMAL /HPF
BASOPHILS # BLD AUTO: 0.03 K/UL
BASOPHILS NFR BLD AUTO: 0.2 %
BILIRUB UR QL STRIP.AUTO: NEGATIVE
BUN SERPL-MCNC: 39 MG/DL (ref 6–20)
CALCIUM SERPL-MCNC: 9.2 MG/DL (ref 8.7–10.5)
CHLORIDE SERPL-SCNC: 93 MMOL/L (ref 95–110)
CLARITY UR: CLEAR
CO2 SERPL-SCNC: 24 MMOL/L (ref 23–29)
COLOR UR AUTO: YELLOW
CREAT SERPL-MCNC: 2.5 MG/DL (ref 0.5–1.4)
ERYTHROCYTE [DISTWIDTH] IN BLOOD BY AUTOMATED COUNT: 18.1 % (ref 11.5–14.5)
GFR SERPLBLD CREATININE-BSD FMLA CKD-EPI: 29 ML/MIN/1.73/M2
GLUCOSE SERPL-MCNC: 94 MG/DL (ref 70–110)
GLUCOSE UR QL STRIP: NEGATIVE
HCT VFR BLD AUTO: 28.4 % (ref 40–54)
HGB BLD-MCNC: 9.1 GM/DL (ref 14–18)
HGB UR QL STRIP: ABNORMAL
HYALINE CASTS UR QL AUTO: 0 /LPF (ref 0–1)
IMM GRANULOCYTES # BLD AUTO: 0.09 K/UL (ref 0–0.04)
IMM GRANULOCYTES NFR BLD AUTO: 0.6 % (ref 0–0.5)
KETONES UR QL STRIP: NEGATIVE
LEUKOCYTE ESTERASE UR QL STRIP: NEGATIVE
LYMPHOCYTES # BLD AUTO: 0.46 K/UL (ref 1–4.8)
MAGNESIUM SERPL-MCNC: 1.7 MG/DL (ref 1.6–2.6)
MCH RBC QN AUTO: 25.3 PG (ref 27–31)
MCHC RBC AUTO-ENTMCNC: 32 G/DL (ref 32–36)
MCV RBC AUTO: 79 FL (ref 82–98)
MICROSCOPIC COMMENT: ABNORMAL
NITRITE UR QL STRIP: NEGATIVE
NUCLEATED RBC (/100WBC) (OHS): 0 /100 WBC
PH UR STRIP: 7 [PH]
PHOSPHATE SERPL-MCNC: 3.6 MG/DL (ref 2.7–4.5)
PLATELET # BLD AUTO: 423 K/UL (ref 150–450)
PMV BLD AUTO: 9.3 FL (ref 9.2–12.9)
POTASSIUM SERPL-SCNC: 4.1 MMOL/L (ref 3.5–5.1)
PROT UR QL STRIP: ABNORMAL
RBC # BLD AUTO: 3.6 M/UL (ref 4.6–6.2)
RBC #/AREA URNS AUTO: >100 /HPF (ref 0–4)
RELATIVE EOSINOPHIL (OHS): 0.2 %
RELATIVE LYMPHOCYTE (OHS): 2.8 % (ref 18–48)
RELATIVE MONOCYTE (OHS): 9.1 % (ref 4–15)
RELATIVE NEUTROPHIL (OHS): 87.1 % (ref 38–73)
SODIUM SERPL-SCNC: 131 MMOL/L (ref 136–145)
SP GR UR STRIP: 1.01
UROBILINOGEN UR STRIP-ACNC: NEGATIVE EU/DL
WBC # BLD AUTO: 16.16 K/UL (ref 3.9–12.7)
WBC #/AREA URNS AUTO: 5 /HPF (ref 0–5)

## 2025-04-12 PROCEDURE — 25000242 PHARM REV CODE 250 ALT 637 W/ HCPCS: Performed by: NURSE PRACTITIONER

## 2025-04-12 PROCEDURE — 84100 ASSAY OF PHOSPHORUS: CPT | Performed by: INTERNAL MEDICINE

## 2025-04-12 PROCEDURE — 36415 COLL VENOUS BLD VENIPUNCTURE: CPT | Performed by: INTERNAL MEDICINE

## 2025-04-12 PROCEDURE — 94761 N-INVAS EAR/PLS OXIMETRY MLT: CPT

## 2025-04-12 PROCEDURE — 85025 COMPLETE CBC W/AUTO DIFF WBC: CPT | Performed by: INTERNAL MEDICINE

## 2025-04-12 PROCEDURE — 63600175 PHARM REV CODE 636 W HCPCS: Performed by: INTERNAL MEDICINE

## 2025-04-12 PROCEDURE — 25000003 PHARM REV CODE 250: Performed by: INTERNAL MEDICINE

## 2025-04-12 PROCEDURE — 94640 AIRWAY INHALATION TREATMENT: CPT

## 2025-04-12 PROCEDURE — 80048 BASIC METABOLIC PNL TOTAL CA: CPT | Performed by: INTERNAL MEDICINE

## 2025-04-12 PROCEDURE — 81003 URINALYSIS AUTO W/O SCOPE: CPT | Performed by: INTERNAL MEDICINE

## 2025-04-12 PROCEDURE — 25000003 PHARM REV CODE 250: Performed by: NURSE PRACTITIONER

## 2025-04-12 PROCEDURE — 99221 1ST HOSP IP/OBS SF/LOW 40: CPT | Mod: ,,, | Performed by: UROLOGY

## 2025-04-12 PROCEDURE — 21400001 HC TELEMETRY ROOM

## 2025-04-12 PROCEDURE — 87040 BLOOD CULTURE FOR BACTERIA: CPT | Performed by: INTERNAL MEDICINE

## 2025-04-12 PROCEDURE — 83735 ASSAY OF MAGNESIUM: CPT | Performed by: INTERNAL MEDICINE

## 2025-04-12 PROCEDURE — 25000003 PHARM REV CODE 250: Performed by: PHYSICIAN ASSISTANT

## 2025-04-12 RX ORDER — CEFEPIME HYDROCHLORIDE 2 G/1
2 INJECTION, POWDER, FOR SOLUTION INTRAVENOUS
Status: DISCONTINUED | OUTPATIENT
Start: 2025-04-12 | End: 2025-04-16

## 2025-04-12 RX ADMIN — HYDROCHLOROTHIAZIDE 25 MG: 25 TABLET ORAL at 08:04

## 2025-04-12 RX ADMIN — ACETAMINOPHEN 650 MG: 325 TABLET, FILM COATED ORAL at 05:04

## 2025-04-12 RX ADMIN — LABETALOL HYDROCHLORIDE 10 MG: 5 INJECTION INTRAVENOUS at 11:04

## 2025-04-12 RX ADMIN — CEFEPIME 2 G: 2 INJECTION, POWDER, FOR SOLUTION INTRAVENOUS at 08:04

## 2025-04-12 RX ADMIN — LISINOPRIL 40 MG: 20 TABLET ORAL at 08:04

## 2025-04-12 RX ADMIN — METRONIDAZOLE 500 MG: 500 TABLET ORAL at 05:04

## 2025-04-12 RX ADMIN — TAMSULOSIN HYDROCHLORIDE 0.4 MG: 0.4 CAPSULE ORAL at 08:04

## 2025-04-12 RX ADMIN — ACETAMINOPHEN 650 MG: 325 TABLET, FILM COATED ORAL at 10:04

## 2025-04-12 RX ADMIN — FUROSEMIDE 40 MG: 20 TABLET ORAL at 08:04

## 2025-04-12 RX ADMIN — SODIUM CHLORIDE, POTASSIUM CHLORIDE, SODIUM LACTATE AND CALCIUM CHLORIDE: 600; 310; 30; 20 INJECTION, SOLUTION INTRAVENOUS at 07:04

## 2025-04-12 RX ADMIN — RISPERIDONE 1 MG: 1 TABLET, FILM COATED ORAL at 08:04

## 2025-04-12 RX ADMIN — HYDROCODONE BITARTRATE AND ACETAMINOPHEN 1 TABLET: 5; 325 TABLET ORAL at 05:04

## 2025-04-12 RX ADMIN — CEFEPIME 2 G: 2 INJECTION, POWDER, FOR SOLUTION INTRAVENOUS at 05:04

## 2025-04-12 RX ADMIN — SODIUM CHLORIDE, POTASSIUM CHLORIDE, SODIUM LACTATE AND CALCIUM CHLORIDE: 600; 310; 30; 20 INJECTION, SOLUTION INTRAVENOUS at 05:04

## 2025-04-12 RX ADMIN — IPRATROPIUM BROMIDE AND ALBUTEROL SULFATE 3 ML: 2.5; .5 SOLUTION RESPIRATORY (INHALATION) at 06:04

## 2025-04-12 RX ADMIN — LABETALOL HYDROCHLORIDE 10 MG: 5 INJECTION INTRAVENOUS at 05:04

## 2025-04-12 RX ADMIN — MELATONIN TAB 3 MG 6 MG: 3 TAB at 08:04

## 2025-04-12 RX ADMIN — ACETAMINOPHEN 650 MG: 325 TABLET, FILM COATED ORAL at 04:04

## 2025-04-12 NOTE — ASSESSMENT & PLAN NOTE
>>ASSESSMENT AND PLAN FOR CKD (CHRONIC KIDNEY DISEASE), STAGE III WRITTEN ON 4/12/2025  9:17 PM BY LALO SELLERS MD    - Chronic; stable at baseline.  - Avoid nephrotoxins, renally dose medications.  - With elevated HR, resumed gentle IVFs.

## 2025-04-12 NOTE — CONSULTS
[facility]urg ([address])  Urology  Consult Note    Patient Name: Frantz Sigala Jr.  MRN: [id number]  Admission Date: 4/8/2025  Hospital Length of Stay: 4   Code Status: Full Code   Attending Provider: LALO Quesada MD   Consulting Provider: Adonay Tineo MD  Primary Care Physician: Zachary Ramos MD  Principal Problem:Syncope    Inpatient consult to Urology  Consult performed by: Adonay Tineo MD  Consult ordered by: LALO Quesada MD  Reason for consult: retention          Subjective:     HPI:  60 male  Blind and deaf  Admitted with fracture    On flomax at home  Unable to void yesterday  Clark placed and 500cc drained intially    Past Medical History:   Diagnosis Date    Anxiety     Asthma     Schizophrenia        Past Surgical History:   Procedure Laterality Date    CORNEAL TRANSPLANT         Review of patient's allergies indicates:   Allergen Reactions    Penicillins Other (See Comments)       Family History    None         Tobacco Use    Smoking status: Never    Smokeless tobacco: Never   Substance and Sexual Activity    Alcohol use: No     Alcohol/week: 0.0 standard drinks of alcohol    Drug use: No    Sexual activity: Never       Review of Systems    Objective:     Temp:  [97.8 °F (36.6 °C)-100.7 °F (38.2 °C)] 98.5 °F (36.9 °C)  Pulse:  [] 106  Resp:  [17-20] 18  SpO2:  [92 %-99 %] 94 %  BP: (132-194)/() 132/61  Weight: 91.6 kg (202 lb)  Body mass index is 32.6 kg/m².    Date 04/12/25 0700 - 04/13/25 0659   Shift 4116-3056 3335-5330 7803-6643 24 Hour Total   INTAKE   P.O. 480   480   Shift Total(mL/kg) 480(5.2)   480(5.2)   OUTPUT   Shift Total(mL/kg)       Weight (kg) 91.6 91.6 91.6 91.6     Bladder Scan Volume (mL): 560 mL (notified Dr Quesada) (04/11/25 1841)    Drains       Drain  Duration                  Urethral Catheter 04/11/25 0800 16 Fr. 1 day                     Physical Exam     sleeping  resp normal rate' breathing not labored  cv normal rate  Ab  nondistended  Ext no edema  Mackay draining clear yellow urine      Significant Labs:    BMP:  Recent Labs   Lab 04/10/25  0843 04/11/25  0437 04/12/25  0556    134* 131*   K 4.1 3.9 4.1   CL 99 99 93*   CO2 24 24 24   BUN 39* 45* 39*   CREATININE 2.4* 2.5* 2.5*   GLUCOSE 94 88 94   CALCIUM 9.7 9.8 9.2       CBC:  Recent Labs   Lab 04/10/25  0843 04/11/25  0437 04/12/25  0556   WBC 14.13* 12.50 16.16*   HGB 9.1* 8.9* 9.1*   HCT 28.8* 27.4* 28.4*   * 435 423       Urine Studies:   Recent Labs   Lab 04/08/25  1706 04/12/25  0744   COLORU Colorless* Yellow   APPEARANCEUA Clear Clear   PHUR 6.0 7.0   SPECGRAV 1.010 1.010   PROTEINUA 1+* 2+*   GLUCUA Negative Negative   BILIRUBINUA Negative Negative   OCCULTUA Negative 3+*   NITRITE Negative Negative   UROBILINOGEN Negative Negative   LEUKOCYTESUR Negative Negative   RBCUA 3 >100*   WBCUA <1 5   BACTERIA None Rare   HYALINECASTS 0 0       Significant Imaging:  CT: I have reviewed all results within the past 24 hours and my personal findings are:  prostate unremarkable, bladder wall thickening; renal cysts; no hydro                    Assessment and Plan:     Urinary retention  Cont flomax  Cont mackay  Active trial of voiding Monday AM    If still unable to void, considering decreasing psych meds if possible as these may contribute to urinary retention    Discussed with family and RN  Answered all questions        VTE Risk Mitigation (From admission, onward)           Ordered     IP VTE HIGH RISK PATIENT  Once         04/08/25 1943     Place sequential compression device  Until discontinued         04/08/25 1943     Reason for No Pharmacological VTE Prophylaxis  Once        Question:  Reasons:  Answer:  Risk of Bleeding    04/08/25 1943                    Thank you for your consult. I will sign off. Please contact us if you have any additional questions.    Adonay Tineo MD  Urology  [facility] - Med Surg ([address])

## 2025-04-12 NOTE — ASSESSMENT & PLAN NOTE
- Was on ciprofloxacin, metronidazole PO to complete course when he developed new fever 04/11-04/12.  - Convert to cefepime. Evaluating for alternative source. UA with >100RBCs, 5 WBCs, rare bacteria; likely related to mackay placement. Check CXR, blood cultures.

## 2025-04-12 NOTE — ASSESSMENT & PLAN NOTE
- New onset without clear etiology. Medication related?  - Family reports on tamsulosin 0.4mg PO daily at home; resumed 4/11.  - Despite straight cath still significant volumes on bladder scan. Will place mackay, consult Urology.

## 2025-04-12 NOTE — ASSESSMENT & PLAN NOTE
- New onset without clear etiology. Medication related?  - Family reports on tamsulosin 0.4mg PO daily at home; resume.  - Despite straight cath still significant volumes on bladder scan. Will place mackay, consult Urology.

## 2025-04-12 NOTE — SUBJECTIVE & OBJECTIVE
Interval History: No acute events overnight; this morning elevated HR / BP despite medications. Discussed with brother at bedside. Await ortho recs. Developed some urinary retention over the course of the day in addition.    Review of Systems   Unable to perform ROS: Patient nonverbal     Objective:     Vital Signs (Most Recent):  Temp: 97.8 °F (36.6 °C) (04/11/25 1631)  Pulse: (!) 112 (04/11/25 1631)  Resp: 17 (04/11/25 1631)  BP: (!) 162/74 (04/11/25 1631)  SpO2: 95 % (04/11/25 1631) Vital Signs (24h Range):  Temp:  [97.3 °F (36.3 °C)-100 °F (37.8 °C)] 97.8 °F (36.6 °C)  Pulse:  [111-136] 112  Resp:  [17-20] 17  SpO2:  [95 %-100 %] 95 %  BP: (143-206)/() 162/74     Weight: 91.6 kg (202 lb)  Body mass index is 32.6 kg/m².    Intake/Output Summary (Last 24 hours) at 4/11/2025 1933  Last data filed at 4/11/2025 1319  Gross per 24 hour   Intake 420 ml   Output 1000 ml   Net -580 ml         Physical Exam  Vitals and nursing note reviewed.   Constitutional:       General: He is sleeping. He is not in acute distress.     Appearance: He is well-developed. He is ill-appearing (Chronically).   HENT:      Head: Normocephalic and atraumatic.   Eyes:      General:         Right eye: No discharge.         Left eye: No discharge.      Conjunctiva/sclera: Conjunctivae normal.   Cardiovascular:      Rate and Rhythm: Tachycardia present.      Pulses: Normal pulses.   Pulmonary:      Effort: Pulmonary effort is normal. No respiratory distress.      Breath sounds: Wheezing (faint, bilaterally) present.   Abdominal:      Palpations: Abdomen is soft.      Tenderness: There is no abdominal tenderness.   Musculoskeletal:         General: Normal range of motion.      Right lower leg: No edema.      Left lower leg: No edema.      Comments: Mild tenderness to L distal knee with ecchymosis.   Skin:     General: Skin is warm and dry.   Neurological:      Mental Status: He is easily aroused. Mental status is at baseline.            Significant Labs:   CBC:  Recent Labs   Lab 04/09/25  0533 04/10/25  0843 04/11/25  0437   WBC 11.82 14.13* 12.50   HGB 8.4* 9.1* 8.9*   HCT 26.6* 28.8* 27.4*    501* 435   LYMPH 0.88*  7.4* 1.40  9.9* 0.66*  5.3*   MONO 7.3  0.86 8.1  1.15* 8.4  1.05*   EOS 0.8  0.09 1.1  0.15 0.3  0.04   BMP:  Recent Labs   Lab 04/09/25  0533 04/10/25  0843 04/11/25  0437    136 134*   K 4.2 4.1 3.9    99 99   CO2 27 24 24   BUN 42* 39* 45*   CREATININE 2.2* 2.4* 2.5*   CALCIUM 9.0 9.7 9.8   MG 1.6 1.6 1.5*   PHOS  --  2.6* 1.9*     Significant Imaging: I have reviewed and interpreted all pertinent imaging results/findings within the past 24 hours.

## 2025-04-12 NOTE — ASSESSMENT & PLAN NOTE
- Chronic; stable at baseline.  - Avoid nephrotoxins, renally dose medications.  - With elevated HR, resumed gentle IVFs.

## 2025-04-12 NOTE — ASSESSMENT & PLAN NOTE
- XR demonstrating L fibular fracture; follow-up XR of tib/fib to evaluate for further areas of potential fracture.  - Anticipate can weight bear as tolerated; pain controlled with current medications.  - Orthopedics consult pending; anticipate outpatient follow-up.

## 2025-04-12 NOTE — SUBJECTIVE & OBJECTIVE
Past Medical History:   Diagnosis Date    Anxiety     Asthma     Schizophrenia        Past Surgical History:   Procedure Laterality Date    CORNEAL TRANSPLANT         Review of patient's allergies indicates:   Allergen Reactions    Penicillins Other (See Comments)       Family History    None         Tobacco Use    Smoking status: Never    Smokeless tobacco: Never   Substance and Sexual Activity    Alcohol use: No     Alcohol/week: 0.0 standard drinks of alcohol    Drug use: No    Sexual activity: Never       Review of Systems    Objective:     Temp:  [97.8 °F (36.6 °C)-100.7 °F (38.2 °C)] 98.5 °F (36.9 °C)  Pulse:  [] 106  Resp:  [17-20] 18  SpO2:  [92 %-99 %] 94 %  BP: (132-194)/() 132/61  Weight: 91.6 kg (202 lb)  Body mass index is 32.6 kg/m².    Date 04/12/25 0700 - 04/13/25 0659   Shift 6312-3899 3483-6391 4807-7190 24 Hour Total   INTAKE   P.O. 480   480   Shift Total(mL/kg) 480(5.2)   480(5.2)   OUTPUT   Shift Total(mL/kg)       Weight (kg) 91.6 91.6 91.6 91.6     Bladder Scan Volume (mL): 560 mL (notified Dr Quesada) (04/11/25 1841)    Drains       Drain  Duration                  Urethral Catheter 04/11/25 0800 16 Fr. 1 day                     Physical Exam     sleeping  resp normal rate' breathing not labored  cv normal rate  Ab nondistended  Ext no edema  Clark draining clear yellow urine      Significant Labs:    BMP:  Recent Labs   Lab 04/10/25  0843 04/11/25  0437 04/12/25  0556    134* 131*   K 4.1 3.9 4.1   CL 99 99 93*   CO2 24 24 24   BUN 39* 45* 39*   CREATININE 2.4* 2.5* 2.5*   GLUCOSE 94 88 94   CALCIUM 9.7 9.8 9.2       CBC:  Recent Labs   Lab 04/10/25  0843 04/11/25  0437 04/12/25  0556   WBC 14.13* 12.50 16.16*   HGB 9.1* 8.9* 9.1*   HCT 28.8* 27.4* 28.4*   * 435 423       Urine Studies:   Recent Labs   Lab 04/08/25  1706 04/12/25  0744   COLORU Colorless* Yellow   APPEARANCEUA Clear Clear   PHUR 6.0 7.0   SPECGRAV 1.010 1.010   PROTEINUA 1+* 2+*   GLUCUA Negative  Negative   BILIRUBINUA Negative Negative   OCCULTUA Negative 3+*   NITRITE Negative Negative   UROBILINOGEN Negative Negative   LEUKOCYTESUR Negative Negative   RBCUA 3 >100*   WBCUA <1 5   BACTERIA None Rare   HYALINECASTS 0 0       Significant Imaging:  CT: I have reviewed all results within the past 24 hours and my personal findings are:  prostate unremarkable, bladder wall thickening; renal cysts; no hydro

## 2025-04-12 NOTE — ASSESSMENT & PLAN NOTE
- Chronic; stable at baseline.  - Avoid nephrotoxins, renally dose medications.  - With elevated HR, resume gentle IVFs.

## 2025-04-12 NOTE — PROGRESS NOTES
[facility] ([address] Medicine  Progress Note    Patient Name: Frantz Sigala Jr.  MRN: [id number]  Patient Class: IP- Inpatient   Admission Date: 4/8/2025  Length of Stay: 4 days  Attending Physician: LALO Quesada MD  Primary Care Provider: Zachary Ramos MD        Subjective     Principal Problem:Syncope        HPI:  Mr. Sigala is a 60 YOM with PMHx of HTN, asthma, CKD (baseline SCr 2-2.5), schizophrenia, blindness, and deafness.     He presents to ED via EMS with family due to syncopal event just PTA. Family provides history due patients deafness and blindness. They note that he was in his normal state of health today until right after lunch. He ate a salami sandwich with potato chips and drank orange juice; shortly thereafter he was in the bathroom when his mother heard a loud thud and patient was found by his brother on the ground unconscious.  His brother notes that he called out the patients name and slapped him on his back a few times at which time he seemed to come to and began vomiting with concurrent episodes of diarrhea.  Sister notes she has had a recent respiratory illness with abdominal cramping and some diarrhea. Family notes that patient has lived in the home he is in since a small child and that he is able to navigate the home independently including going upstairs and outside.     In the ED he is hypertensive, heart rate stable, saturations initially 80% on room air with improvement after 2 L nasal cannula application, and afebrile.  CBC with WBC 21, H&H 9.4/29.4, platelets 497.  Chemistry was , K 4.4, chloride 100, CO2 22, BUN 49, SCr 2.2, glucose 125.  LFTs unremarkable.  BNP 35.  Troponin 0.035 >> 0.059 (appear similar to priors however these were in 2015).  Lactic acid 2.2.  Flu and COVID negative.  Hepatitis-C and HIV nonreactive.  UA noninfectious.  CXR with chronic appearing interstitial findings, congestive change or edema is a consideration; no  large focal consolidation. CTH with no acute intracranial abnormality detected, bilateral partial opacification of the mastoid air cells, sphenoid sinus disease.     CT A/P:  Wall thickening of the transverse colon, suggestive of nonspecific colitis.   Wall thickening of the urinary bladder.  The findings may be seen with cystitis.   Advanced calcifications of the abdominal aorta and the mesenteric arteries.  Correlation with possible vascular ultrasound of the mesenteric vessels may be obtained for further assessment. 1.2 cm hypodensity in the upper pole of the right kidney with Hounsfield of 23.  This can be assessed with renal ultrasound.     The patient was admitted to the Hospital Medicine Service for further evaluation and management.     Overview/Hospital Course:  No notes on file    Interval History: New fever overnight without clear etiology. Discussed with family; reporting some new R knee discomfort.    Review of Systems   Unable to perform ROS: Patient nonverbal     Objective:     Vital Signs (Most Recent):  Temp: 99.1 °F (37.3 °C) (04/12/25 1901)  Pulse: (!) 131 (04/12/25 1914)  Resp: 18 (04/12/25 1901)  BP: 131/62 (04/12/25 1901)  SpO2: 97 % (04/12/25 1901) Vital Signs (24h Range):  Temp:  [98 °F (36.7 °C)-100.7 °F (38.2 °C)] 99.1 °F (37.3 °C)  Pulse:  [] 131  Resp:  [17-20] 18  SpO2:  [92 %-99 %] 97 %  BP: (131-194)/() 131/62     Weight: 91.6 kg (202 lb)  Body mass index is 32.6 kg/m².    Intake/Output Summary (Last 24 hours) at 4/12/2025 2102  Last data filed at 4/12/2025 1700  Gross per 24 hour   Intake 1620 ml   Output 2425 ml   Net -805 ml         Physical Exam  Vitals and nursing note reviewed.   Constitutional:       General: He is sleeping. He is not in acute distress.     Appearance: He is well-developed. He is ill-appearing (Chronically).   HENT:      Head: Normocephalic and atraumatic.   Eyes:      General:         Right eye: No discharge.         Left eye: No discharge.       Conjunctiva/sclera: Conjunctivae normal.   Cardiovascular:      Rate and Rhythm: Tachycardia present.      Pulses: Normal pulses.   Pulmonary:      Effort: Pulmonary effort is normal. No respiratory distress.   Abdominal:      Palpations: Abdomen is soft.      Tenderness: There is no abdominal tenderness.   Genitourinary:     Comments: Mackay in place with clear urine.  Musculoskeletal:         General: Tenderness (Reports tenderness to R knee.) present. Normal range of motion.      Right lower leg: No edema.      Left lower leg: No edema.   Skin:     General: Skin is warm and dry.   Neurological:      Mental Status: He is easily aroused. Mental status is at baseline.           Significant Labs:   CBC:  Recent Labs   Lab 04/10/25  0843 04/11/25  0437 04/12/25  0556   WBC 14.13* 12.50 16.16*   HGB 9.1* 8.9* 9.1*   HCT 28.8* 27.4* 28.4*   * 435 423   LYMPH 1.40  9.9* 0.66*  5.3* 0.46*  2.8*   MONO 8.1  1.15* 8.4  1.05* 9.1  1.47*   EOS 1.1  0.15 0.3  0.04 0.2  0.03   BMP:  Recent Labs   Lab 04/10/25  0843 04/11/25  0437 04/12/25  0556    134* 131*   K 4.1 3.9 4.1   CL 99 99 93*   CO2 24 24 24   BUN 39* 45* 39*   CREATININE 2.4* 2.5* 2.5*   CALCIUM 9.7 9.8 9.2   MG 1.6 1.5* 1.7   PHOS 2.6* 1.9* 3.6     Significant Imaging: I have reviewed and interpreted all pertinent imaging results/findings within the past 24 hours.        Assessment & Plan  Colitis  Fever  - Was on ciprofloxacin, metronidazole PO to complete course when he developed new fever 04/11-04/12.  - Convert to cefepime. Evaluating for alternative source. UA with >100RBCs, 5 WBCs, rare bacteria; likely related to mackay placement. Check CXR, blood cultures.  Syncope  - Appears was vasovagal 2/2 colitis / volume losses and BMs with the former.  - Troponin stable. No evidence of acute ischemic event. No recurrence of syncope.  - Echo with normal EF, grade I diastolic dysfunction likely related to HTN. Carotid U/S unremarkable.  - Monitor on  telemetry.  Essential hypertension  - Chronic.  - Continue furosemide, HCTZ, lisinopril.  - Continue labetalol 10mg IV q4hr PRN.  CKD (chronic kidney disease), stage III  - Chronic; stable at baseline.  - Avoid nephrotoxins, renally dose medications.  - With elevated HR, resumed gentle IVFs.  Asthma  - Chronic; stable.  - Continue albuterol-ipratropium 2.5-0.5mg inhaled q4hr PRN.  Schizophrenia  -chronic  -continue home risperidal  -PRN supportive care as indicated   Blind  Deaf, bilateral  - Chronic; fall precautions.  Left fibular fracture  - XR demonstrating L fibular fracture; follow-up XR of tib/fib to evaluate for further areas of potential fracture.  - Anticipate can weight bear as tolerated; pain controlled with current medications.  - Orthopedics consult pending; anticipate outpatient follow-up.  Urinary retention  - New onset without clear etiology. Medication related?  - Family reports on tamsulosin 0.4mg PO daily at home; resumed 4/11.  - Despite straight cath still significant volumes on bladder scan. Will place mackay, consult Urology.      VTE Risk Mitigation (From admission, onward)           Ordered     IP VTE HIGH RISK PATIENT  Once         04/08/25 1943     Place sequential compression device  Until discontinued         04/08/25 1943     Reason for No Pharmacological VTE Prophylaxis  Once        Question:  Reasons:  Answer:  Risk of Bleeding    04/08/25 1943                    Discharge Planning   JAQUAN: 4/11/2025     Code Status: Full Code   Medical Readiness for Discharge Date: 4/11/2025  Discharge Plan A: Home with family                        D Sunny Quesada MD  Department of Hospital Medicine   [facility] ([address])

## 2025-04-12 NOTE — ASSESSMENT & PLAN NOTE
- Chronic; trending upward.  - Continue furosemide, HCTZ, lisinopril.  - Convert from hydralazine to labetalol 10mg IV q4hr PRN. Suspect BP/HR elevation likely reflects

## 2025-04-12 NOTE — ASSESSMENT & PLAN NOTE
- Continue ciprofloxacin, metronidazole. Convert to PO to complete 5 day course.  - Tolerating diet; no further GI complaints.

## 2025-04-12 NOTE — PROGRESS NOTES
[facility]urg ([address] Medicine  Progress Note    Patient Name: Frantz Sigala Jr.  MRN: [id number]  Patient Class: IP- Inpatient   Admission Date: 4/8/2025  Length of Stay: 3 days  Attending Physician: LALO Quesada MD  Primary Care Provider: Zachary Ramos MD        Subjective     Principal Problem:Syncope        HPI:  Mr. Sigala is a 60 YOM with PMHx of HTN, asthma, CKD (baseline SCr 2-2.5), schizophrenia, blindness, and deafness.     He presents to ED via EMS with family due to syncopal event just PTA. Family provides history due patients deafness and blindness. They note that he was in his normal state of health today until right after lunch. He ate a salami sandwich with potato chips and drank orange juice; shortly thereafter he was in the bathroom when his mother heard a loud thud and patient was found by his brother on the ground unconscious.  His brother notes that he called out the patients name and slapped him on his back a few times at which time he seemed to come to and began vomiting with concurrent episodes of diarrhea.  Sister notes she has had a recent respiratory illness with abdominal cramping and some diarrhea. Family notes that patient has lived in the home he is in since a small child and that he is able to navigate the home independently including going upstairs and outside.     In the ED he is hypertensive, heart rate stable, saturations initially 80% on room air with improvement after 2 L nasal cannula application, and afebrile.  CBC with WBC 21, H&H 9.4/29.4, platelets 497.  Chemistry was , K 4.4, chloride 100, CO2 22, BUN 49, SCr 2.2, glucose 125.  LFTs unremarkable.  BNP 35.  Troponin 0.035 >> 0.059 (appear similar to priors however these were in 2015).  Lactic acid 2.2.  Flu and COVID negative.  Hepatitis-C and HIV nonreactive.  UA noninfectious.  CXR with chronic appearing interstitial findings, congestive change or edema is a consideration; no  large focal consolidation. CTH with no acute intracranial abnormality detected, bilateral partial opacification of the mastoid air cells, sphenoid sinus disease.     CT A/P:  Wall thickening of the transverse colon, suggestive of nonspecific colitis.   Wall thickening of the urinary bladder.  The findings may be seen with cystitis.   Advanced calcifications of the abdominal aorta and the mesenteric arteries.  Correlation with possible vascular ultrasound of the mesenteric vessels may be obtained for further assessment. 1.2 cm hypodensity in the upper pole of the right kidney with Hounsfield of 23.  This can be assessed with renal ultrasound.     The patient was admitted to the Hospital Medicine Service for further evaluation and management.     Overview/Hospital Course:  No notes on file    Interval History: No acute events overnight; this morning elevated HR / BP despite medications. Discussed with brother at bedside. Await ortho recs. Developed some urinary retention over the course of the day in addition.    Review of Systems   Unable to perform ROS: Patient nonverbal     Objective:     Vital Signs (Most Recent):  Temp: 97.8 °F (36.6 °C) (04/11/25 1631)  Pulse: (!) 112 (04/11/25 1631)  Resp: 17 (04/11/25 1631)  BP: (!) 162/74 (04/11/25 1631)  SpO2: 95 % (04/11/25 1631) Vital Signs (24h Range):  Temp:  [97.3 °F (36.3 °C)-100 °F (37.8 °C)] 97.8 °F (36.6 °C)  Pulse:  [111-136] 112  Resp:  [17-20] 17  SpO2:  [95 %-100 %] 95 %  BP: (143-206)/() 162/74     Weight: 91.6 kg (202 lb)  Body mass index is 32.6 kg/m².    Intake/Output Summary (Last 24 hours) at 4/11/2025 1933  Last data filed at 4/11/2025 1319  Gross per 24 hour   Intake 420 ml   Output 1000 ml   Net -580 ml         Physical Exam  Vitals and nursing note reviewed.   Constitutional:       General: He is sleeping. He is not in acute distress.     Appearance: He is well-developed. He is ill-appearing (Chronically).   HENT:      Head: Normocephalic and  atraumatic.   Eyes:      General:         Right eye: No discharge.         Left eye: No discharge.      Conjunctiva/sclera: Conjunctivae normal.   Cardiovascular:      Rate and Rhythm: Tachycardia present.      Pulses: Normal pulses.   Pulmonary:      Effort: Pulmonary effort is normal. No respiratory distress.      Breath sounds: Wheezing (faint, bilaterally) present.   Abdominal:      Palpations: Abdomen is soft.      Tenderness: There is no abdominal tenderness.   Musculoskeletal:         General: Normal range of motion.      Right lower leg: No edema.      Left lower leg: No edema.      Comments: Mild tenderness to L distal knee with ecchymosis.   Skin:     General: Skin is warm and dry.   Neurological:      Mental Status: He is easily aroused. Mental status is at baseline.           Significant Labs:   CBC:  Recent Labs   Lab 04/09/25  0533 04/10/25  0843 04/11/25  0437   WBC 11.82 14.13* 12.50   HGB 8.4* 9.1* 8.9*   HCT 26.6* 28.8* 27.4*    501* 435   LYMPH 0.88*  7.4* 1.40  9.9* 0.66*  5.3*   MONO 7.3  0.86 8.1  1.15* 8.4  1.05*   EOS 0.8  0.09 1.1  0.15 0.3  0.04   BMP:  Recent Labs   Lab 04/09/25  0533 04/10/25  0843 04/11/25  0437    136 134*   K 4.2 4.1 3.9    99 99   CO2 27 24 24   BUN 42* 39* 45*   CREATININE 2.2* 2.4* 2.5*   CALCIUM 9.0 9.7 9.8   MG 1.6 1.6 1.5*   PHOS  --  2.6* 1.9*     Significant Imaging: I have reviewed and interpreted all pertinent imaging results/findings within the past 24 hours.      Assessment & Plan  Syncope  - Appears was vasovagal 2/2 colitis / volume losses and BMs with the former.  - Troponin stable. No evidence of acute ischemic event. No recurrence of syncope.  - Echo with normal EF, grade I diastolic dysfunction likely related to HTN. Carotid U/S unremarkable.  - Monitor on telemetry.  Colitis  - Continue ciprofloxacin, metronidazole. Convert to PO to complete 5 day course.  - Tolerating diet; no further GI complaints.  Essential  hypertension  - Chronic; trending upward.  - Continue furosemide, HCTZ, lisinopril.  - Convert from hydralazine to labetalol 10mg IV q4hr PRN. Suspect BP/HR elevation likely reflects   CKD (chronic kidney disease), stage III  - Chronic; stable at baseline.  - Avoid nephrotoxins, renally dose medications.  - With elevated HR, resume gentle IVFs.  Asthma  - Chronic; stable.  - Continue albuterol-ipratropium 2.5-0.5mg inhaled q4hr PRN.  Schizophrenia  -chronic  -continue home risperidal  -PRN supportive care as indicated   Blind  Deaf, bilateral  - Chronic; fall precautions.  Left fibular fracture  - XR demonstrating L fibular fracture; follow-up XR of tib/fib to evaluate for further areas of potential fracture.  - Anticipate can weight bear as tolerated; pain controlled with current medications.  - Orthopedics consult pending; anticipate outpatient follow-up.  Urinary retention  - New onset without clear etiology. Medication related?  - Family reports on tamsulosin 0.4mg PO daily at home; resume.  - Despite straight cath still significant volumes on bladder scan. Will place mackay, consult Urology.    VTE Risk Mitigation (From admission, onward)           Ordered     IP VTE HIGH RISK PATIENT  Once         04/08/25 1943     Place sequential compression device  Until discontinued         04/08/25 1943     Reason for No Pharmacological VTE Prophylaxis  Once        Question:  Reasons:  Answer:  Risk of Bleeding    04/08/25 1943                    Discharge Planning   JAQUAN: 4/11/2025     Code Status: Full Code   Medical Readiness for Discharge Date: 4/11/2025  Discharge Plan A: Home with family                        D Sunny Quesada MD  Department of Hospital Medicine   [facility] ([address])

## 2025-04-12 NOTE — ASSESSMENT & PLAN NOTE
>>ASSESSMENT AND PLAN FOR CKD (CHRONIC KIDNEY DISEASE), STAGE III WRITTEN ON 4/11/2025  7:39 PM BY LALO SELLERS MD    - Chronic; stable at baseline.  - Avoid nephrotoxins, renally dose medications.  - With elevated HR, resume gentle IVFs.

## 2025-04-12 NOTE — HPI
60 male  Blind and deaf  Admitted with fracture    On flomax at home  Unable to void yesterday  Clark placed and 500cc drained intially

## 2025-04-12 NOTE — ASSESSMENT & PLAN NOTE
- Appears was vasovagal 2/2 colitis / volume losses and BMs with the former.  - Troponin stable. No evidence of acute ischemic event. No recurrence of syncope.  - Echo with normal EF, grade I diastolic dysfunction likely related to HTN. Carotid U/S unremarkable.  - Monitor on telemetry.

## 2025-04-13 LAB
ABSOLUTE EOSINOPHIL (OHS): 0 K/UL
ABSOLUTE MONOCYTE (OHS): 1.19 K/UL (ref 0.3–1)
ABSOLUTE NEUTROPHIL COUNT (OHS): 15.02 K/UL (ref 1.8–7.7)
ANION GAP (OHS): 13 MMOL/L (ref 8–16)
BASOPHILS # BLD AUTO: 0.03 K/UL
BASOPHILS NFR BLD AUTO: 0.2 %
BUN SERPL-MCNC: 41 MG/DL (ref 6–20)
CALCIUM SERPL-MCNC: 8.8 MG/DL (ref 8.7–10.5)
CHLORIDE SERPL-SCNC: 95 MMOL/L (ref 95–110)
CO2 SERPL-SCNC: 22 MMOL/L (ref 23–29)
CREAT SERPL-MCNC: 2.6 MG/DL (ref 0.5–1.4)
ERYTHROCYTE [DISTWIDTH] IN BLOOD BY AUTOMATED COUNT: 17.9 % (ref 11.5–14.5)
GFR SERPLBLD CREATININE-BSD FMLA CKD-EPI: 27 ML/MIN/1.73/M2
GLUCOSE SERPL-MCNC: 147 MG/DL (ref 70–110)
HCT VFR BLD AUTO: 26.2 % (ref 40–54)
HGB BLD-MCNC: 8.6 GM/DL (ref 14–18)
IMM GRANULOCYTES # BLD AUTO: 0.1 K/UL (ref 0–0.04)
IMM GRANULOCYTES NFR BLD AUTO: 0.6 % (ref 0–0.5)
LYMPHOCYTES # BLD AUTO: 0.32 K/UL (ref 1–4.8)
MAGNESIUM SERPL-MCNC: 1.5 MG/DL (ref 1.6–2.6)
MCH RBC QN AUTO: 25.4 PG (ref 27–31)
MCHC RBC AUTO-ENTMCNC: 32.8 G/DL (ref 32–36)
MCV RBC AUTO: 77 FL (ref 82–98)
NUCLEATED RBC (/100WBC) (OHS): 0 /100 WBC
PHOSPHATE SERPL-MCNC: 2.9 MG/DL (ref 2.7–4.5)
PLATELET # BLD AUTO: 388 K/UL (ref 150–450)
PMV BLD AUTO: 9.6 FL (ref 9.2–12.9)
POTASSIUM SERPL-SCNC: 4.5 MMOL/L (ref 3.5–5.1)
RBC # BLD AUTO: 3.39 M/UL (ref 4.6–6.2)
RELATIVE EOSINOPHIL (OHS): 0 %
RELATIVE LYMPHOCYTE (OHS): 1.9 % (ref 18–48)
RELATIVE MONOCYTE (OHS): 7.1 % (ref 4–15)
RELATIVE NEUTROPHIL (OHS): 90.2 % (ref 38–73)
SODIUM SERPL-SCNC: 130 MMOL/L (ref 136–145)
WBC # BLD AUTO: 16.66 K/UL (ref 3.9–12.7)

## 2025-04-13 PROCEDURE — 85025 COMPLETE CBC W/AUTO DIFF WBC: CPT | Performed by: INTERNAL MEDICINE

## 2025-04-13 PROCEDURE — 83735 ASSAY OF MAGNESIUM: CPT | Performed by: INTERNAL MEDICINE

## 2025-04-13 PROCEDURE — 21400001 HC TELEMETRY ROOM

## 2025-04-13 PROCEDURE — 25000003 PHARM REV CODE 250: Performed by: PHYSICIAN ASSISTANT

## 2025-04-13 PROCEDURE — 80048 BASIC METABOLIC PNL TOTAL CA: CPT | Performed by: INTERNAL MEDICINE

## 2025-04-13 PROCEDURE — 63600175 PHARM REV CODE 636 W HCPCS: Performed by: INTERNAL MEDICINE

## 2025-04-13 PROCEDURE — 94761 N-INVAS EAR/PLS OXIMETRY MLT: CPT

## 2025-04-13 PROCEDURE — 25000003 PHARM REV CODE 250: Performed by: NURSE PRACTITIONER

## 2025-04-13 PROCEDURE — 25000003 PHARM REV CODE 250: Performed by: INTERNAL MEDICINE

## 2025-04-13 PROCEDURE — 84100 ASSAY OF PHOSPHORUS: CPT | Performed by: INTERNAL MEDICINE

## 2025-04-13 PROCEDURE — 36415 COLL VENOUS BLD VENIPUNCTURE: CPT | Performed by: INTERNAL MEDICINE

## 2025-04-13 PROCEDURE — 93005 ELECTROCARDIOGRAM TRACING: CPT

## 2025-04-13 PROCEDURE — 93010 ELECTROCARDIOGRAM REPORT: CPT | Mod: ,,, | Performed by: INTERNAL MEDICINE

## 2025-04-13 RX ORDER — MAGNESIUM SULFATE HEPTAHYDRATE 40 MG/ML
2 INJECTION, SOLUTION INTRAVENOUS ONCE
Status: COMPLETED | OUTPATIENT
Start: 2025-04-13 | End: 2025-04-13

## 2025-04-13 RX ORDER — HYDROCODONE BITARTRATE AND ACETAMINOPHEN 10; 325 MG/1; MG/1
1 TABLET ORAL EVERY 4 HOURS PRN
Refills: 0 | Status: DISCONTINUED | OUTPATIENT
Start: 2025-04-13 | End: 2025-04-16

## 2025-04-13 RX ORDER — ACETAMINOPHEN 500 MG
1000 TABLET ORAL EVERY 8 HOURS
Status: DISCONTINUED | OUTPATIENT
Start: 2025-04-13 | End: 2025-04-23 | Stop reason: HOSPADM

## 2025-04-13 RX ORDER — MUPIROCIN 20 MG/G
OINTMENT TOPICAL 2 TIMES DAILY
Status: DISPENSED | OUTPATIENT
Start: 2025-04-13 | End: 2025-04-18

## 2025-04-13 RX ADMIN — TAMSULOSIN HYDROCHLORIDE 0.4 MG: 0.4 CAPSULE ORAL at 09:04

## 2025-04-13 RX ADMIN — ACETAMINOPHEN 1000 MG: 500 TABLET ORAL at 10:04

## 2025-04-13 RX ADMIN — CEFEPIME 2 G: 2 INJECTION, POWDER, FOR SOLUTION INTRAVENOUS at 06:04

## 2025-04-13 RX ADMIN — Medication: at 08:04

## 2025-04-13 RX ADMIN — FUROSEMIDE 40 MG: 20 TABLET ORAL at 09:04

## 2025-04-13 RX ADMIN — MUPIROCIN: 20 OINTMENT TOPICAL at 10:04

## 2025-04-13 RX ADMIN — SODIUM CHLORIDE, POTASSIUM CHLORIDE, SODIUM LACTATE AND CALCIUM CHLORIDE: 600; 310; 30; 20 INJECTION, SOLUTION INTRAVENOUS at 04:04

## 2025-04-13 RX ADMIN — ACETAMINOPHEN 1000 MG: 500 TABLET ORAL at 07:04

## 2025-04-13 RX ADMIN — MUPIROCIN 1 G: 20 OINTMENT TOPICAL at 08:04

## 2025-04-13 RX ADMIN — HYDROCODONE BITARTRATE AND ACETAMINOPHEN 1 TABLET: 10; 325 TABLET ORAL at 10:04

## 2025-04-13 RX ADMIN — RISPERIDONE 1 MG: 1 TABLET, FILM COATED ORAL at 08:04

## 2025-04-13 RX ADMIN — MAGNESIUM SULFATE HEPTAHYDRATE 2 G: 40 INJECTION, SOLUTION INTRAVENOUS at 07:04

## 2025-04-13 RX ADMIN — LISINOPRIL 40 MG: 20 TABLET ORAL at 09:04

## 2025-04-13 RX ADMIN — LABETALOL HYDROCHLORIDE 10 MG: 5 INJECTION INTRAVENOUS at 06:04

## 2025-04-13 RX ADMIN — HYDROCODONE BITARTRATE AND ACETAMINOPHEN 1 TABLET: 5; 325 TABLET ORAL at 04:04

## 2025-04-13 RX ADMIN — HYDROCODONE BITARTRATE AND ACETAMINOPHEN 1 TABLET: 10; 325 TABLET ORAL at 04:04

## 2025-04-13 RX ADMIN — HYDROCHLOROTHIAZIDE 25 MG: 25 TABLET ORAL at 09:04

## 2025-04-13 NOTE — NURSING
Pt's HR in the 1300's to 150,  groaning pointing r leg, per sister @ bedside, requesting pain med, given prn  pain med, Informed ARMIN Clemente of elevated HR, States, ''give the pain meds about 45 minutes more to work. Then recheck his heart rate and if still elevated we will get an EKG.'' Per secure chart. Kit IRIZARRY(CN) informed of the same. Will recheck vital signs. POC ongoing

## 2025-04-13 NOTE — ASSESSMENT & PLAN NOTE
- XR demonstrating L fibular fracture; follow-up XR of tib/fib to evaluate for further areas of potential fracture unremarkable. Reported R-sided knee discomfort in addition with XR pending final read but no fracture visible to my evaluation.  - Discussed with orthopedics, Dr. Moreira; weight bearing as tolerated, no splint/casting required, follow-up in clinic

## 2025-04-13 NOTE — SUBJECTIVE & OBJECTIVE
Interval History: New fever overnight without clear etiology. Discussed with family; reporting some new R knee discomfort.    Review of Systems   Unable to perform ROS: Patient nonverbal     Objective:     Vital Signs (Most Recent):  Temp: 99.1 °F (37.3 °C) (04/12/25 1901)  Pulse: (!) 131 (04/12/25 1914)  Resp: 18 (04/12/25 1901)  BP: 131/62 (04/12/25 1901)  SpO2: 97 % (04/12/25 1901) Vital Signs (24h Range):  Temp:  [98 °F (36.7 °C)-100.7 °F (38.2 °C)] 99.1 °F (37.3 °C)  Pulse:  [] 131  Resp:  [17-20] 18  SpO2:  [92 %-99 %] 97 %  BP: (131-194)/() 131/62     Weight: 91.6 kg (202 lb)  Body mass index is 32.6 kg/m².    Intake/Output Summary (Last 24 hours) at 4/12/2025 2102  Last data filed at 4/12/2025 1700  Gross per 24 hour   Intake 1620 ml   Output 2425 ml   Net -805 ml         Physical Exam  Vitals and nursing note reviewed.   Constitutional:       General: He is sleeping. He is not in acute distress.     Appearance: He is well-developed. He is ill-appearing (Chronically).   HENT:      Head: Normocephalic and atraumatic.   Eyes:      General:         Right eye: No discharge.         Left eye: No discharge.      Conjunctiva/sclera: Conjunctivae normal.   Cardiovascular:      Rate and Rhythm: Tachycardia present.      Pulses: Normal pulses.   Pulmonary:      Effort: Pulmonary effort is normal. No respiratory distress.   Abdominal:      Palpations: Abdomen is soft.      Tenderness: There is no abdominal tenderness.   Genitourinary:     Comments: Clark in place with clear urine.  Musculoskeletal:         General: Tenderness (Reports tenderness to R knee.) present. Normal range of motion.      Right lower leg: No edema.      Left lower leg: No edema.   Skin:     General: Skin is warm and dry.   Neurological:      Mental Status: He is easily aroused. Mental status is at baseline.           Significant Labs:   CBC:  Recent Labs   Lab 04/10/25  0843 04/11/25  0437 04/12/25  0556   WBC 14.13* 12.50 16.16*    HGB 9.1* 8.9* 9.1*   HCT 28.8* 27.4* 28.4*   * 435 423   LYMPH 1.40  9.9* 0.66*  5.3* 0.46*  2.8*   MONO 8.1  1.15* 8.4  1.05* 9.1  1.47*   EOS 1.1  0.15 0.3  0.04 0.2  0.03   BMP:  Recent Labs   Lab 04/10/25  0843 04/11/25  0437 04/12/25  0556    134* 131*   K 4.1 3.9 4.1   CL 99 99 93*   CO2 24 24 24   BUN 39* 45* 39*   CREATININE 2.4* 2.5* 2.5*   CALCIUM 9.7 9.8 9.2   MG 1.6 1.5* 1.7   PHOS 2.6* 1.9* 3.6     Significant Imaging: I have reviewed and interpreted all pertinent imaging results/findings within the past 24 hours.

## 2025-04-13 NOTE — NURSING
Labetalol 10 mg ivp adm for HR of 144 per Bryn JAVED via [facility] message. Devan IRIZARRY(AMY ) aware of the same.

## 2025-04-13 NOTE — ASSESSMENT & PLAN NOTE
- Was on ciprofloxacin, metronidazole PO to complete course when he developed new fever 04/11-04/12.  - Continue cefepime. UA with >100RBCs, 5 WBCs, rare bacteria; likely related to mackay placement. CXR appears benign; LLL zone not well evaluated due to heart silhouette but appears stable.  - Tachycardia sinus tach; no evidence of arrhythmia on telemetry. Infectious etiology should be covered - pain related? Increase pain control with hydrocodone-acetaminophen 5-325mg, add 10-325mg PO q4hr PRN.

## 2025-04-13 NOTE — ASSESSMENT & PLAN NOTE
- New onset without clear etiology. Medication related?  - Family reports on tamsulosin 0.4mg PO daily at home; resumed 4/11.  - Despite straight cath still significant volumes on bladder scan. Clark placed; appreciate urology assistance.   - Void trial tomorrow AM.

## 2025-04-13 NOTE — ASSESSMENT & PLAN NOTE
- Chronic; stable at baseline.  - Avoid nephrotoxins, renally dose medications.  - Continuing gentle IVFs.

## 2025-04-13 NOTE — ASSESSMENT & PLAN NOTE
>>ASSESSMENT AND PLAN FOR CKD (CHRONIC KIDNEY DISEASE), STAGE III WRITTEN ON 4/13/2025  9:05 PM BY LALO SELLERS MD    - Chronic; stable at baseline.  - Avoid nephrotoxins, renally dose medications.  - Continuing gentle IVFs.

## 2025-04-14 LAB
ABSOLUTE EOSINOPHIL (OHS): 0.1 K/UL
ABSOLUTE MONOCYTE (OHS): 1.27 K/UL (ref 0.3–1)
ABSOLUTE NEUTROPHIL COUNT (OHS): 16.62 K/UL (ref 1.8–7.7)
ANION GAP (OHS): 11 MMOL/L (ref 8–16)
APTT PPP: 31.9 SECONDS (ref 21–32)
BASOPHILS # BLD AUTO: 0.05 K/UL
BASOPHILS NFR BLD AUTO: 0.3 %
BUN SERPL-MCNC: 44 MG/DL (ref 6–20)
CALCIUM SERPL-MCNC: 9.2 MG/DL (ref 8.7–10.5)
CHLORIDE SERPL-SCNC: 96 MMOL/L (ref 95–110)
CO2 SERPL-SCNC: 23 MMOL/L (ref 23–29)
CREAT SERPL-MCNC: 2.6 MG/DL (ref 0.5–1.4)
D DIMER PPP IA.FEU-MCNC: 2.77 MG/L FEU
ERYTHROCYTE [DISTWIDTH] IN BLOOD BY AUTOMATED COUNT: 18.4 % (ref 11.5–14.5)
GFR SERPLBLD CREATININE-BSD FMLA CKD-EPI: 27 ML/MIN/1.73/M2
GLUCOSE SERPL-MCNC: 96 MG/DL (ref 70–110)
HCT VFR BLD AUTO: 27 % (ref 40–54)
HGB BLD-MCNC: 8.7 GM/DL (ref 14–18)
IMM GRANULOCYTES # BLD AUTO: 0.14 K/UL (ref 0–0.04)
IMM GRANULOCYTES NFR BLD AUTO: 0.7 % (ref 0–0.5)
INR PPP: 1.1 (ref 0.8–1.2)
LACTATE SERPL-SCNC: 0.9 MMOL/L (ref 0.5–2.2)
LYMPHOCYTES # BLD AUTO: 0.69 K/UL (ref 1–4.8)
MAGNESIUM SERPL-MCNC: 2.2 MG/DL (ref 1.6–2.6)
MCH RBC QN AUTO: 25.4 PG (ref 27–31)
MCHC RBC AUTO-ENTMCNC: 32.2 G/DL (ref 32–36)
MCV RBC AUTO: 79 FL (ref 82–98)
NUCLEATED RBC (/100WBC) (OHS): 0 /100 WBC
OHS QRS DURATION: 62 MS
OHS QTC CALCULATION: 429 MS
PHOSPHATE SERPL-MCNC: 4.7 MG/DL (ref 2.7–4.5)
PLATELET # BLD AUTO: 424 K/UL (ref 150–450)
PMV BLD AUTO: 9.2 FL (ref 9.2–12.9)
POTASSIUM SERPL-SCNC: 4.8 MMOL/L (ref 3.5–5.1)
PROTHROMBIN TIME: 12.3 SECONDS (ref 9–12.5)
RBC # BLD AUTO: 3.42 M/UL (ref 4.6–6.2)
RELATIVE EOSINOPHIL (OHS): 0.5 %
RELATIVE LYMPHOCYTE (OHS): 3.7 % (ref 18–48)
RELATIVE MONOCYTE (OHS): 6.7 % (ref 4–15)
RELATIVE NEUTROPHIL (OHS): 88.1 % (ref 38–73)
SODIUM SERPL-SCNC: 130 MMOL/L (ref 136–145)
WBC # BLD AUTO: 18.87 K/UL (ref 3.9–12.7)

## 2025-04-14 PROCEDURE — 25000003 PHARM REV CODE 250: Performed by: NURSE PRACTITIONER

## 2025-04-14 PROCEDURE — 83735 ASSAY OF MAGNESIUM: CPT | Performed by: INTERNAL MEDICINE

## 2025-04-14 PROCEDURE — 63600175 PHARM REV CODE 636 W HCPCS: Performed by: INTERNAL MEDICINE

## 2025-04-14 PROCEDURE — 85379 FIBRIN DEGRADATION QUANT: CPT | Performed by: INTERNAL MEDICINE

## 2025-04-14 PROCEDURE — 80048 BASIC METABOLIC PNL TOTAL CA: CPT | Performed by: INTERNAL MEDICINE

## 2025-04-14 PROCEDURE — 83605 ASSAY OF LACTIC ACID: CPT | Performed by: INTERNAL MEDICINE

## 2025-04-14 PROCEDURE — 85610 PROTHROMBIN TIME: CPT | Performed by: INTERNAL MEDICINE

## 2025-04-14 PROCEDURE — 84100 ASSAY OF PHOSPHORUS: CPT | Performed by: INTERNAL MEDICINE

## 2025-04-14 PROCEDURE — 25000003 PHARM REV CODE 250: Performed by: PHYSICIAN ASSISTANT

## 2025-04-14 PROCEDURE — 36415 COLL VENOUS BLD VENIPUNCTURE: CPT | Performed by: INTERNAL MEDICINE

## 2025-04-14 PROCEDURE — 25000003 PHARM REV CODE 250: Performed by: INTERNAL MEDICINE

## 2025-04-14 PROCEDURE — 21400001 HC TELEMETRY ROOM

## 2025-04-14 PROCEDURE — A9540 TC99M MAA: HCPCS | Performed by: INTERNAL MEDICINE

## 2025-04-14 PROCEDURE — 85025 COMPLETE CBC W/AUTO DIFF WBC: CPT | Performed by: INTERNAL MEDICINE

## 2025-04-14 PROCEDURE — 85730 THROMBOPLASTIN TIME PARTIAL: CPT | Performed by: INTERNAL MEDICINE

## 2025-04-14 RX ORDER — ENOXAPARIN SODIUM 100 MG/ML
40 INJECTION SUBCUTANEOUS EVERY 24 HOURS
Status: DISCONTINUED | OUTPATIENT
Start: 2025-04-14 | End: 2025-04-14

## 2025-04-14 RX ORDER — CARVEDILOL 6.25 MG/1
6.25 TABLET ORAL 2 TIMES DAILY WITH MEALS
Status: DISCONTINUED | OUTPATIENT
Start: 2025-04-14 | End: 2025-04-16

## 2025-04-14 RX ORDER — HEPARIN SODIUM,PORCINE/D5W 25000/250
0-40 INTRAVENOUS SOLUTION INTRAVENOUS CONTINUOUS
Status: DISPENSED | OUTPATIENT
Start: 2025-04-14 | End: 2025-04-17

## 2025-04-14 RX ADMIN — TAMSULOSIN HYDROCHLORIDE 0.4 MG: 0.4 CAPSULE ORAL at 09:04

## 2025-04-14 RX ADMIN — LISINOPRIL 40 MG: 20 TABLET ORAL at 09:04

## 2025-04-14 RX ADMIN — MUPIROCIN: 20 OINTMENT TOPICAL at 08:04

## 2025-04-14 RX ADMIN — CARVEDILOL 6.25 MG: 6.25 TABLET, FILM COATED ORAL at 09:04

## 2025-04-14 RX ADMIN — FUROSEMIDE 40 MG: 20 TABLET ORAL at 09:04

## 2025-04-14 RX ADMIN — MELATONIN TAB 3 MG 6 MG: 3 TAB at 08:04

## 2025-04-14 RX ADMIN — CEFEPIME 2 G: 2 INJECTION, POWDER, FOR SOLUTION INTRAVENOUS at 06:04

## 2025-04-14 RX ADMIN — MUPIROCIN: 20 OINTMENT TOPICAL at 09:04

## 2025-04-14 RX ADMIN — SODIUM CHLORIDE, POTASSIUM CHLORIDE, SODIUM LACTATE AND CALCIUM CHLORIDE: 600; 310; 30; 20 INJECTION, SOLUTION INTRAVENOUS at 06:04

## 2025-04-14 RX ADMIN — ACETAMINOPHEN 1000 MG: 500 TABLET ORAL at 06:04

## 2025-04-14 RX ADMIN — RISPERIDONE 1 MG: 1 TABLET, FILM COATED ORAL at 08:04

## 2025-04-14 RX ADMIN — ACETAMINOPHEN 1000 MG: 500 TABLET ORAL at 01:04

## 2025-04-14 RX ADMIN — HYDROCHLOROTHIAZIDE 25 MG: 25 TABLET ORAL at 09:04

## 2025-04-14 RX ADMIN — HYDROCODONE BITARTRATE AND ACETAMINOPHEN 1 TABLET: 10; 325 TABLET ORAL at 08:04

## 2025-04-14 RX ADMIN — KIT FOR THE PREPARATION OF TECHNETIUM TC 99M ALBUMIN AGGREGATED 5.3 MILLICURIE: 2 INJECTION, POWDER, LYOPHILIZED, FOR SUSPENSION INTRAPERITONEAL; INTRAVENOUS at 03:04

## 2025-04-14 RX ADMIN — CARVEDILOL 6.25 MG: 6.25 TABLET, FILM COATED ORAL at 06:04

## 2025-04-14 RX ADMIN — HEPARIN SODIUM 18 UNITS/KG/HR: 10000 INJECTION, SOLUTION INTRAVENOUS at 06:04

## 2025-04-14 NOTE — ASSESSMENT & PLAN NOTE
>>ASSESSMENT AND PLAN FOR CKD (CHRONIC KIDNEY DISEASE), STAGE III WRITTEN ON 4/14/2025  9:14 PM BY LALO SELLERS MD    - Chronic; stable at baseline.  - Avoid nephrotoxins, renally dose medications.

## 2025-04-14 NOTE — PROGRESS NOTES
[facility] - Med Surg ([address])  Wound Care    Patient Name:  Frantz Sigala Jr.   MRN:  [id number]  Date: 4/14/2025  Diagnosis: Colitis    History:     Past Medical History:   Diagnosis Date    Anxiety     Asthma     Schizophrenia        Social History[1]    Precautions:     Allergies as of 04/08/2025 - Reviewed 04/08/2025   Allergen Reaction Noted    Penicillins Other (See Comments) 10/12/2015       WO Assessment Details/Treatment     Wound care follow up:     Noted continued dry patch to left shin. Skin intact. Lac hydrin lotion applied. Wound care will continue to follow patient prn.     04/14/2025         [1]   Social History  Socioeconomic History    Marital status: Single   Tobacco Use    Smoking status: Never    Smokeless tobacco: Never   Substance and Sexual Activity    Alcohol use: No     Alcohol/week: 0.0 standard drinks of alcohol    Drug use: No    Sexual activity: Never   Social History Narrative    Pt is blind and deaf     Social Drivers of Health     Financial Resource Strain: Patient Unable To Answer (4/9/2025)    Overall Financial Resource Strain (CARDIA)     Difficulty of Paying Living Expenses: Patient unable to answer   Food Insecurity: No Food Insecurity (4/9/2025)    Hunger Vital Sign     Worried About Running Out of Food in the Last Year: Never true     Ran Out of Food in the Last Year: Never true   Transportation Needs: No Transportation Needs (4/9/2025)    PRAPARE - Transportation     Lack of Transportation (Medical): No     Lack of Transportation (Non-Medical): No   Physical Activity: Inactive (4/9/2025)    Exercise Vital Sign     Days of Exercise per Week: 0 days     Minutes of Exercise per Session: 0 min   Stress: Patient Unable To Answer (4/9/2025)    [employer] - Occupational Stress Questionnaire     Feeling of Stress : Patient unable to answer   Housing Stability: Low Risk  (4/9/2025)    Housing Stability Vital Sign     Unable to Pay for Housing in the  Last Year: No     Homeless in the Last Year: No
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
## 2025-04-14 NOTE — PLAN OF CARE
Problem: Adult Inpatient Plan of Care  Goal: Plan of Care Review  Outcome: Not Progressing  Goal: Patient-Specific Goal (Individualized)  Outcome: Not Progressing  Goal: Absence of Hospital-Acquired Illness or Injury  Outcome: Not Progressing  Goal: Optimal Comfort and Wellbeing  Outcome: Not Progressing  Goal: Readiness for Transition of Care  Outcome: Not Progressing     Problem: Infection  Goal: Absence of Infection Signs and Symptoms  Outcome: Not Progressing     Problem: Acute Kidney Injury/Impairment  Goal: Fluid and Electrolyte Balance  Outcome: Not Progressing  Goal: Improved Oral Intake  Outcome: Not Progressing  Goal: Effective Renal Function  Outcome: Not Progressing     Problem: Syncope  Goal: Absence of Syncopal Symptoms  Outcome: Not Progressing     Problem: Fall Injury Risk  Goal: Absence of Fall and Fall-Related Injury  Outcome: Not Progressing     Problem: Gas Exchange Impaired  Goal: Optimal Gas Exchange  Outcome: Not Progressing     Problem: Skin Injury Risk Increased  Goal: Skin Health and Integrity  Outcome: Not Progressing     Problem: Wound  Goal: Optimal Coping  Outcome: Not Progressing  Goal: Optimal Functional Ability  Outcome: Not Progressing  Goal: Absence of Infection Signs and Symptoms  Outcome: Not Progressing  Goal: Improved Oral Intake  Outcome: Not Progressing  Goal: Optimal Pain Control and Function  Outcome: Not Progressing  Goal: Skin Health and Integrity  Outcome: Not Progressing  Goal: Optimal Wound Healing  Outcome: Not Progressing     Problem: Hypertension Acute  Goal: Blood Pressure Within Desired Range  Outcome: Not Progressing

## 2025-04-14 NOTE — PLAN OF CARE
Problem: Adult Inpatient Plan of Care  Goal: Absence of Hospital-Acquired Illness or Injury  Outcome: Progressing     Problem: Infection  Goal: Absence of Infection Signs and Symptoms  Outcome: Progressing     Problem: Acute Kidney Injury/Impairment  Goal: Fluid and Electrolyte Balance  Outcome: Progressing     Problem: Fall Injury Risk  Goal: Absence of Fall and Fall-Related Injury  Outcome: Progressing

## 2025-04-14 NOTE — ASSESSMENT & PLAN NOTE
- Chronic, with persistent elevation.  - Continue furosemide 40mg PO daily, HCTZ 25mg PO daily, lisinopril 40mg PO daily. Add carvedilol 6.25mg PO BID.  - Continue labetalol 10mg IV q4hr PRN.

## 2025-04-14 NOTE — ASSESSMENT & PLAN NOTE
- Was on ciprofloxacin, metronidazole PO to complete course when he developed new fever 04/11-04/12.  - Continue cefepime. UA with >100RBCs, 5 WBCs, rare bacteria; likely related to mackay placement. CXR appears benign; LLL zone not well evaluated due to heart silhouette but appears stable.  - Tachycardia sinus tach; no evidence of arrhythmia on telemetry. Infectious etiology should be covered - pain related? Increased pain control with hydrocodone-acetaminophen 5-325mg, 10-325mg PO q4hr PRN.  - Broaden workup - has been mobilizing with family in the room, but does have potential for thrombosis with fracture. Anticipate D dimer will be elevated with fall / infection and with CKD would prefer to avoid CT angiography. Check V/Q scan, U/S BLE to further evaluate.
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
## 2025-04-14 NOTE — SUBJECTIVE & OBJECTIVE
Interval History: Fevers persist. Significant tachycardia overnight; volume status adequate, infectious etiologies should be covered. EKG with sinus tach. Discussed with sister, brother at bedside.    Review of Systems   Unable to perform ROS: Patient nonverbal     Objective:     Vital Signs (Most Recent):  Temp: 98 °F (36.7 °C) (04/13/25 1940)  Pulse: (!) 119 (04/13/25 1940)  Resp: 18 (04/13/25 1940)  BP: (!) 170/89 (04/13/25 1940)  SpO2: 97 % (04/13/25 1940) Vital Signs (24h Range):  Temp:  [97.7 °F (36.5 °C)-101 °F (38.3 °C)] 98 °F (36.7 °C)  Pulse:  [103-150] 119  Resp:  [16-22] 18  SpO2:  [95 %-98 %] 97 %  BP: (134-188)/(61-89) 170/89     Weight: 91.6 kg (202 lb)  Body mass index is 32.6 kg/m².    Intake/Output Summary (Last 24 hours) at 4/13/2025 2039  Last data filed at 4/13/2025 0529  Gross per 24 hour   Intake --   Output 900 ml   Net -900 ml         Physical Exam  Vitals and nursing note reviewed.   Constitutional:       General: He is sleeping. He is not in acute distress.     Appearance: He is well-developed. He is ill-appearing (Chronically).   HENT:      Head: Normocephalic and atraumatic.   Eyes:      General:         Right eye: No discharge.         Left eye: No discharge.      Conjunctiva/sclera: Conjunctivae normal.   Cardiovascular:      Rate and Rhythm: Tachycardia present.      Pulses: Normal pulses.   Pulmonary:      Effort: Pulmonary effort is normal. No respiratory distress.   Abdominal:      Palpations: Abdomen is soft.      Tenderness: There is no abdominal tenderness.   Genitourinary:     Comments: Clark in place with clear urine.  Musculoskeletal:         General: Tenderness (Reports tenderness to R knee.) present. Normal range of motion.      Right lower leg: No edema.      Left lower leg: No edema.   Skin:     General: Skin is warm and dry.   Neurological:      Mental Status: He is easily aroused. Mental status is at baseline.         Significant Labs:   CBC:  Recent Labs   Lab  04/11/25 0437 04/12/25  0556 04/13/25  0450   WBC 12.50 16.16* 16.66*   HGB 8.9* 9.1* 8.6*   HCT 27.4* 28.4* 26.2*    423 388   LYMPH 0.66*  5.3* 0.46*  2.8* 0.32*  1.9*   MONO 8.4  1.05* 9.1  1.47* 7.1  1.19*   EOS 0.3  0.04 0.2  0.03 0.0  0.00   BMP:  Recent Labs   Lab 04/11/25 0437 04/12/25  0556 04/13/25  0450   * 131* 130*   K 3.9 4.1 4.5   CL 99 93* 95   CO2 24 24 22*   BUN 45* 39* 41*   CREATININE 2.5* 2.5* 2.6*   CALCIUM 9.8 9.2 8.8   MG 1.5* 1.7 1.5*   PHOS 1.9* 3.6 2.9     Significant Imaging: I have reviewed and interpreted all pertinent imaging results/findings within the past 24 hours.

## 2025-04-14 NOTE — ASSESSMENT & PLAN NOTE
- Had persistently elevated volumes on bladder scan despite straight catheterization. Clark placed and resumed tamsulosin 0.4mg PO daily. Plan for void trial tomorrow AM.

## 2025-04-15 LAB
ABSOLUTE EOSINOPHIL (OHS): 0.09 K/UL
ABSOLUTE MONOCYTE (OHS): 1.15 K/UL (ref 0.3–1)
ABSOLUTE NEUTROPHIL COUNT (OHS): 14.06 K/UL (ref 1.8–7.7)
ANION GAP (OHS): 11 MMOL/L (ref 8–16)
APTT PPP: 36.7 SECONDS (ref 21–32)
APTT PPP: 37.6 SECONDS (ref 21–32)
APTT PPP: 37.9 SECONDS (ref 21–32)
APTT PPP: 40.9 SECONDS (ref 21–32)
APTT PPP: 54.4 SECONDS (ref 21–32)
BASOPHILS # BLD AUTO: 0.04 K/UL
BASOPHILS NFR BLD AUTO: 0.2 %
BUN SERPL-MCNC: 57 MG/DL (ref 6–20)
CALCIUM SERPL-MCNC: 9.1 MG/DL (ref 8.7–10.5)
CHLORIDE SERPL-SCNC: 92 MMOL/L (ref 95–110)
CO2 SERPL-SCNC: 24 MMOL/L (ref 23–29)
CREAT SERPL-MCNC: 2.6 MG/DL (ref 0.5–1.4)
ERYTHROCYTE [DISTWIDTH] IN BLOOD BY AUTOMATED COUNT: 18.4 % (ref 11.5–14.5)
GFR SERPLBLD CREATININE-BSD FMLA CKD-EPI: 27 ML/MIN/1.73/M2
GLUCOSE SERPL-MCNC: 108 MG/DL (ref 70–110)
HCT VFR BLD AUTO: 25.2 % (ref 40–54)
HGB BLD-MCNC: 8 GM/DL (ref 14–18)
IMM GRANULOCYTES # BLD AUTO: 0.12 K/UL (ref 0–0.04)
IMM GRANULOCYTES NFR BLD AUTO: 0.7 % (ref 0–0.5)
LYMPHOCYTES # BLD AUTO: 0.56 K/UL (ref 1–4.8)
MAGNESIUM SERPL-MCNC: 2.2 MG/DL (ref 1.6–2.6)
MCH RBC QN AUTO: 25.2 PG (ref 27–31)
MCHC RBC AUTO-ENTMCNC: 31.7 G/DL (ref 32–36)
MCV RBC AUTO: 80 FL (ref 82–98)
NUCLEATED RBC (/100WBC) (OHS): 0 /100 WBC
PHOSPHATE SERPL-MCNC: 3.9 MG/DL (ref 2.7–4.5)
PLATELET # BLD AUTO: 455 K/UL (ref 150–450)
PMV BLD AUTO: 9.6 FL (ref 9.2–12.9)
POTASSIUM SERPL-SCNC: 4.8 MMOL/L (ref 3.5–5.1)
PROCALCITONIN SERPL-MCNC: 3.48 NG/ML
RBC # BLD AUTO: 3.17 M/UL (ref 4.6–6.2)
RELATIVE EOSINOPHIL (OHS): 0.6 %
RELATIVE LYMPHOCYTE (OHS): 3.5 % (ref 18–48)
RELATIVE MONOCYTE (OHS): 7.2 % (ref 4–15)
RELATIVE NEUTROPHIL (OHS): 87.8 % (ref 38–73)
SODIUM SERPL-SCNC: 127 MMOL/L (ref 136–145)
WBC # BLD AUTO: 16.02 K/UL (ref 3.9–12.7)

## 2025-04-15 PROCEDURE — 36415 COLL VENOUS BLD VENIPUNCTURE: CPT | Performed by: HOSPITALIST

## 2025-04-15 PROCEDURE — 85730 THROMBOPLASTIN TIME PARTIAL: CPT | Performed by: HOSPITALIST

## 2025-04-15 PROCEDURE — 25000003 PHARM REV CODE 250: Performed by: NURSE PRACTITIONER

## 2025-04-15 PROCEDURE — 85730 THROMBOPLASTIN TIME PARTIAL: CPT | Performed by: INTERNAL MEDICINE

## 2025-04-15 PROCEDURE — 21400001 HC TELEMETRY ROOM

## 2025-04-15 PROCEDURE — 63600175 PHARM REV CODE 636 W HCPCS: Performed by: INTERNAL MEDICINE

## 2025-04-15 PROCEDURE — 25000003 PHARM REV CODE 250: Performed by: INTERNAL MEDICINE

## 2025-04-15 PROCEDURE — 85025 COMPLETE CBC W/AUTO DIFF WBC: CPT | Performed by: INTERNAL MEDICINE

## 2025-04-15 PROCEDURE — 84145 PROCALCITONIN (PCT): CPT | Performed by: INTERNAL MEDICINE

## 2025-04-15 PROCEDURE — 25000003 PHARM REV CODE 250: Performed by: PHYSICIAN ASSISTANT

## 2025-04-15 PROCEDURE — 84100 ASSAY OF PHOSPHORUS: CPT | Performed by: INTERNAL MEDICINE

## 2025-04-15 PROCEDURE — 80048 BASIC METABOLIC PNL TOTAL CA: CPT | Performed by: INTERNAL MEDICINE

## 2025-04-15 PROCEDURE — 83735 ASSAY OF MAGNESIUM: CPT | Performed by: INTERNAL MEDICINE

## 2025-04-15 PROCEDURE — 36415 COLL VENOUS BLD VENIPUNCTURE: CPT | Performed by: INTERNAL MEDICINE

## 2025-04-15 RX ADMIN — ACETAMINOPHEN 1000 MG: 500 TABLET ORAL at 06:04

## 2025-04-15 RX ADMIN — MUPIROCIN: 20 OINTMENT TOPICAL at 08:04

## 2025-04-15 RX ADMIN — CEFEPIME 2 G: 2 INJECTION, POWDER, FOR SOLUTION INTRAVENOUS at 05:04

## 2025-04-15 RX ADMIN — MUPIROCIN: 20 OINTMENT TOPICAL at 09:04

## 2025-04-15 RX ADMIN — TAMSULOSIN HYDROCHLORIDE 0.4 MG: 0.4 CAPSULE ORAL at 08:04

## 2025-04-15 RX ADMIN — HYDROCHLOROTHIAZIDE 25 MG: 25 TABLET ORAL at 08:04

## 2025-04-15 RX ADMIN — FUROSEMIDE 40 MG: 20 TABLET ORAL at 08:04

## 2025-04-15 RX ADMIN — RISPERIDONE 1 MG: 1 TABLET, FILM COATED ORAL at 08:04

## 2025-04-15 RX ADMIN — HEPARIN SODIUM 20 UNITS/KG/HR: 10000 INJECTION, SOLUTION INTRAVENOUS at 09:04

## 2025-04-15 RX ADMIN — ACETAMINOPHEN 1000 MG: 500 TABLET ORAL at 03:04

## 2025-04-15 RX ADMIN — HEPARIN SODIUM 22 UNITS/KG/HR: 10000 INJECTION, SOLUTION INTRAVENOUS at 11:04

## 2025-04-15 RX ADMIN — CEFEPIME 2 G: 2 INJECTION, POWDER, FOR SOLUTION INTRAVENOUS at 06:04

## 2025-04-15 RX ADMIN — LISINOPRIL 40 MG: 20 TABLET ORAL at 08:04

## 2025-04-15 RX ADMIN — CARVEDILOL 6.25 MG: 6.25 TABLET, FILM COATED ORAL at 05:04

## 2025-04-15 RX ADMIN — HYDROCODONE BITARTRATE AND ACETAMINOPHEN 1 TABLET: 10; 325 TABLET ORAL at 08:04

## 2025-04-15 RX ADMIN — CARVEDILOL 6.25 MG: 6.25 TABLET, FILM COATED ORAL at 08:04

## 2025-04-15 RX ADMIN — ACETAMINOPHEN 1000 MG: 500 TABLET ORAL at 10:04

## 2025-04-15 RX ADMIN — Medication: at 09:04

## 2025-04-15 RX ADMIN — MELATONIN TAB 3 MG 6 MG: 3 TAB at 08:04

## 2025-04-15 NOTE — CONSULTS
[facility] ([address])  Infectious Disease  Consult Note    Patient Name: Frantz Sigala Jr.  MRN: [id number]  Admission Date: 4/8/2025  Hospital Length of Stay: 6 days  Attending Physician: LALO Quesada MD  Primary Care Provider: Zachary Ramos MD     Isolation Status: No active isolations      Inpatient consult to Infectious Diseases  Consult performed by: Denae Koo MD  Consult ordered by: LALO Quesada MD        Attempted to see consult, but DEV for V/Q scan. Discussed with hospitalist- Not clear fevers infectious, but currently on trial of empiric antibiotics. Procalcitonin pending. F/u bcx and work up

## 2025-04-15 NOTE — CODE/ RAPID DOCUMENTATION
"RAPID RESPONSE NURSE PROACTIVE ROUNDING NOTE     Time of Visit:     Admit Date: 2025  LOS: 6  Code Status: Full Code   Date of Visit: 2025  : 1965  Age: 60 y.o.  Sex: male  Race: Black or   Bed: B363/B363 A:   MRN: [id number]  Was the patient discharged from an ICU this admission? No   Was the patient discharged from a PACU within last 24 hours?  No  Did the patient receive conscious sedation/general anesthesia in last 24 hours?  No  Was the patient in the ED within the past 24 hours?  No  Was the patient started on NIPPV within the past 24 hours?  No  Attending Physician: LALO Quesada MD  Primary Service: Networked reference to record PCT     ASSESSMENT     Notified by  Bedside RN during rounding .  Reason for alert: Heparin gtts, tachycardia    Diagnosis: Colitis    Abnormal Vital Signs: BP (!) 199/91 (BP Location: Left arm, Patient Position: Lying)   Pulse (!) 120   Temp 97.8 °F (36.6 °C) (Oral)   Resp 18   Ht 5' 6" (1.676 m)   Wt 91.6 kg (202 lb)   SpO2 (!) 93%   BMI 32.60 kg/m²      Clinical Issues: Circulatory    Patient  has a past medical history of Anxiety, Asthma, and Schizophrenia.      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 Pts sister at bedside.     Pts Heparin gtts currently on 18u, 13.5mL.     Pt is grunting, sister states that it is a sign of pain. Bedside RN just gave pt a dose of medication.      INTERVENTIONS/ RECOMMENDATIONS     Educated the pts sister on pts medication regimen. Will follow-up with pts vitals in an hour post-medication admin.     Discussed plan of care with RNCarl.    PHYSICIAN ESCALATION     Yes/No  No    Orders received and case discussed with NA.    Disposition: Remain in room 363.    FOLLOW-UP     Call back the Rapid Response Nurse, Kalin Pittman RN at [phone] for additional questions or concerns.         "

## 2025-04-15 NOTE — CLINICAL REVIEW
Sepsis Screen (most recent)       Sepsis Screen (IP) - 04/15/25 6693       Is the patient's history or complaint suggestive of a possible infection? Yes  -    Are there at least two of the following signs and symptoms present? Yes  -    Sepsis signs/symptoms - Tachycardia Tachycardia     >90  -    Sepsis signs/symptoms - WBC WBC < 4,000 or WBC > 12,000  -    Sepsis signs/symptoms - Altered Mental Status Altered Mental Status  -    Are any of the following organ dysfunction criteria present and not considered to be due to a chronic condition? Yes   ROBBY on CKD-near BL -    Organ Dysfunction Criteria Creatinine > 2.0  -    Organ Dysfunction Criteria - O2 O2 Saturation < 95% on room air  -    Initiate Sepsis Protocol No  -    Reason sepsis not considered Pt. receiving appropriate management   ID following, bc-ngtd, on abx -              User Key  (r) = Recorded By, (t) = Taken By, (c) = Cosigned By      Initials Name    Jeannine Swain RN

## 2025-04-15 NOTE — NURSING
Day shift RN Eileen VILLASEÑOR, filled patient's mackay with 200cc of NS then removed it per instructions in the nursing communication order.  The patient was able to void back out 150 ml immediately afterward.

## 2025-04-15 NOTE — NURSING
Removed mackay LDA and added condom cath LDA    Per 4/14 PM nurse (Carl), the pts mackay was removed by the day shift RN--Yusra DOAN Once removed the pt had a condom cath placed.    Removed to ensure that proper documentation on pts  takes place for his output throughout the night.

## 2025-04-15 NOTE — SUBJECTIVE & OBJECTIVE
Interval History: No further fevers since 04/13 in AM with Tmax 101, but WBCs uptrending. Tachycardia and elevated BPs persist. Discussed plan of care with family members at bedside / via phone.     Review of Systems   Unable to perform ROS: Patient nonverbal     Objective:     Vital Signs (Most Recent):  Temp: 97.8 °F (36.6 °C) (04/14/25 1956)  Pulse: (!) 119 (04/14/25 1956)  Resp: 18 (04/14/25 2012)  BP: (!) 199/91 (04/14/25 1956)  SpO2: (!) 90 % (04/14/25 1956) Vital Signs (24h Range):  Temp:  [96.5 °F (35.8 °C)-98.4 °F (36.9 °C)] 97.8 °F (36.6 °C)  Pulse:  [] 119  Resp:  [15-18] 18  SpO2:  [90 %-97 %] 90 %  BP: (125-199)/(61-91) 199/91     Weight: 91.6 kg (202 lb)  Body mass index is 32.6 kg/m².    Intake/Output Summary (Last 24 hours) at 4/14/2025 2102  Last data filed at 4/14/2025 1848  Gross per 24 hour   Intake --   Output 2300 ml   Net -2300 ml         Physical Exam  Vitals and nursing note reviewed.   Constitutional:       General: He is sleeping. He is not in acute distress.     Appearance: He is well-developed. He is ill-appearing (Chronically).   HENT:      Head: Normocephalic and atraumatic.   Eyes:      General:         Right eye: No discharge.         Left eye: No discharge.      Conjunctiva/sclera: Conjunctivae normal.   Cardiovascular:      Rate and Rhythm: Tachycardia present.      Pulses: Normal pulses.   Pulmonary:      Effort: Pulmonary effort is normal. No respiratory distress.   Abdominal:      Palpations: Abdomen is soft.      Tenderness: There is no abdominal tenderness.   Genitourinary:     Comments: Clark in place with clear urine.  Musculoskeletal:         General: Tenderness (Reports tenderness to R knee.) present. Normal range of motion.      Right lower leg: No edema.      Left lower leg: No edema.   Skin:     General: Skin is warm and dry.   Neurological:      Mental Status: He is easily aroused. Mental status is at baseline.           Significant Labs:   CBC:  Recent Labs    Lab 04/12/25  0556 04/13/25  0450 04/14/25  0506   WBC 16.16* 16.66* 18.87*   HGB 9.1* 8.6* 8.7*   HCT 28.4* 26.2* 27.0*    388 424   LYMPH 0.46*  2.8* 0.32*  1.9* 0.69*  3.7*   MONO 9.1  1.47* 7.1  1.19* 6.7  1.27*   EOS 0.2  0.03 0.0  0.00 0.5  0.10   BMP:  Recent Labs   Lab 04/12/25  0556 04/13/25  0450 04/14/25  0506   * 130* 130*   K 4.1 4.5 4.8   CL 93* 95 96   CO2 24 22* 23   BUN 39* 41* 44*   CREATININE 2.5* 2.6* 2.6*   CALCIUM 9.2 8.8 9.2   MG 1.7 1.5* 2.2   PHOS 3.6 2.9 4.7*     Significant Imaging: I have reviewed and interpreted all pertinent imaging results/findings within the past 24 hours.

## 2025-04-15 NOTE — PLAN OF CARE
Problem: Adult Inpatient Plan of Care  Goal: Absence of Hospital-Acquired Illness or Injury  Outcome: Progressing     Problem: Infection  Goal: Absence of Infection Signs and Symptoms  Outcome: Progressing     Problem: Acute Kidney Injury/Impairment  Goal: Fluid and Electrolyte Balance  Outcome: Progressing     Problem: Fall Injury Risk  Goal: Absence of Fall and Fall-Related Injury  Outcome: Progressing       Plan of care reviewed and followed. Patient progressing well. Heparin ggt infusing, family remains @ the bedside. Tolerated medications well. Absence of falls or injury noted throughout shift. Instructed to call for mobility assistance. Call bell in reach, side rails up x2, bed in lowest position. VSS and NADN. Compliant with both scheduled and prn medications. POC ongoing.

## 2025-04-15 NOTE — HOSPITAL COURSE
Admitted with syncope and fall suspected secondary to vasovagal episode due to underlying colitis. Started ciprofloxacin, metronidazole and stool studies ordered though had no further loose stool to collect. Echo showed normal EF and grade I diastolic dysfunction, carotid U/S unremarkable. Complained of increased LLE pain and XR obtained demonstrating proximal fibular fracture. Orthopedics consulted/ in discussion with Dr. Mroeira recommended weight bearing as tolerated, no splint/boot required and follow-up in clinic. Developed urinary retention and increasing tachycardia; after tamsulosin resumed and straight catheterization, had persistent retention and mackay placed. Urology consulted and recommended maintaining further prior to void trial. Had new onset fevers and converted to cefepime. Hypertension and tachycardia persisted; pain control adjusted and use liberalized, but persisted. ID consulted with uptrending WBCs and evaluation for thrombosis initiated. All antibiotics stopped and patient anticoagulated for probable PE with heparin gtt. Later transitioned to apixaban.

## 2025-04-15 NOTE — CODE/ RAPID DOCUMENTATION
"RAPID RESPONSE NURSE VIRTUAL PROACTIVE ROUNDING NOTE       Time of Visit: 1830    Admit Date: 2025  LOS: 6  Code Status: Full Code   Date of Visit: 2025  : 1965  Age: 60 y.o.  Sex: male  Race: Black or   Bed: B363/B363 A:   MRN: [id number]  Was the patient discharged from an ICU this admission? No   Was the patient discharged from a PACU within last 24 hours?  No  Did the patient receive conscious sedation/general anesthesia in last 24 hours?  No  Was the patient in the ED within the past 24 hours?  No  Was the patient started on NIPPV within the past 24 hours?  No  Attending Physician: LALO Quesada MD  Primary Service: Networked reference to record PCT     ASSESSMENT     Notified by Epic patient alert.  Reason for alert: mackay concern    Diagnosis: Colitis    Last VS: BP (!) 144/81 (BP Location: Left arm, Patient Position: Lying)   Pulse (!) 120   Temp 98.4 °F (36.9 °C) (Axillary)   Resp 18   Ht 5' 6" (1.676 m)   Wt 91.6 kg (202 lb)   SpO2 95%   BMI 32.60 kg/m²     24H Vital Sign Range:  Temp:  [96.5 °F (35.8 °C)-98.4 °F (36.9 °C)]   Pulse:  []   Resp:  [15-18]   BP: (125-172)/(61-89)   SpO2:  [95 %-97 %]     Clinical Issues:      Patient  has a past medical history of Anxiety, Asthma, and Schizophrenia.    Noted order to flush mackay  with 200cc of NS at 0800 and pull mackay after flush given    *Limited assessment completed secondary to virtual rounding.     INTERVENTIONS/ RECOMMENDATIONS     Attempted to clarify with nurse if NS flush was given. No response received. Will reach out to Charge RN for night shift to guide next care decision.     If not given, will do flush tonight and pull mackay.     Discussed plan of care with RN, Yusra DOAN.    PROVIDER ESCALATION     Yes/No  No    Orders received and case discussed with NA.    Disposition: Remain in room 363.    FOLLOW-UP     Call back the Rapid Response Nurse, Kalin Pittman RN at [phone] for additional " questions or concerns.

## 2025-04-16 PROBLEM — D72.829 LEUKOCYTOSIS: Status: ACTIVE | Noted: 2025-04-16

## 2025-04-16 LAB
ABSOLUTE EOSINOPHIL (OHS): 0.39 K/UL
ABSOLUTE MONOCYTE (OHS): 1.11 K/UL (ref 0.3–1)
ABSOLUTE NEUTROPHIL COUNT (OHS): 10.85 K/UL (ref 1.8–7.7)
ANION GAP (OHS): 10 MMOL/L (ref 8–16)
APTT PPP: 51.5 SECONDS (ref 21–32)
BASOPHILS # BLD AUTO: 0.05 K/UL
BASOPHILS NFR BLD AUTO: 0.4 %
BUN SERPL-MCNC: 66 MG/DL (ref 6–20)
CALCIUM SERPL-MCNC: 9.2 MG/DL (ref 8.7–10.5)
CHLORIDE SERPL-SCNC: 91 MMOL/L (ref 95–110)
CO2 SERPL-SCNC: 26 MMOL/L (ref 23–29)
CREAT SERPL-MCNC: 2.8 MG/DL (ref 0.5–1.4)
ERYTHROCYTE [DISTWIDTH] IN BLOOD BY AUTOMATED COUNT: 18.3 % (ref 11.5–14.5)
GFR SERPLBLD CREATININE-BSD FMLA CKD-EPI: 25 ML/MIN/1.73/M2
GLUCOSE SERPL-MCNC: 105 MG/DL (ref 70–110)
HCT VFR BLD AUTO: 24 % (ref 40–54)
HGB BLD-MCNC: 7.8 GM/DL (ref 14–18)
IMM GRANULOCYTES # BLD AUTO: 0.11 K/UL (ref 0–0.04)
IMM GRANULOCYTES NFR BLD AUTO: 0.8 % (ref 0–0.5)
LYMPHOCYTES # BLD AUTO: 0.66 K/UL (ref 1–4.8)
MAGNESIUM SERPL-MCNC: 2.2 MG/DL (ref 1.6–2.6)
MCH RBC QN AUTO: 25.2 PG (ref 27–31)
MCHC RBC AUTO-ENTMCNC: 32.5 G/DL (ref 32–36)
MCV RBC AUTO: 78 FL (ref 82–98)
NUCLEATED RBC (/100WBC) (OHS): 0 /100 WBC
PHOSPHATE SERPL-MCNC: 3.9 MG/DL (ref 2.7–4.5)
PLATELET # BLD AUTO: 476 K/UL (ref 150–450)
PMV BLD AUTO: 8.8 FL (ref 9.2–12.9)
POTASSIUM SERPL-SCNC: 4.7 MMOL/L (ref 3.5–5.1)
RBC # BLD AUTO: 3.09 M/UL (ref 4.6–6.2)
RELATIVE EOSINOPHIL (OHS): 3 %
RELATIVE LYMPHOCYTE (OHS): 5 % (ref 18–48)
RELATIVE MONOCYTE (OHS): 8.4 % (ref 4–15)
RELATIVE NEUTROPHIL (OHS): 82.4 % (ref 38–73)
SODIUM SERPL-SCNC: 127 MMOL/L (ref 136–145)
WBC # BLD AUTO: 13.17 K/UL (ref 3.9–12.7)

## 2025-04-16 PROCEDURE — 21400001 HC TELEMETRY ROOM

## 2025-04-16 PROCEDURE — 25000003 PHARM REV CODE 250: Performed by: PHYSICIAN ASSISTANT

## 2025-04-16 PROCEDURE — 99222 1ST HOSP IP/OBS MODERATE 55: CPT | Mod: ,,, | Performed by: INTERNAL MEDICINE

## 2025-04-16 PROCEDURE — G0545 PR VISIT INHERENT TO INPT OR OBS CARE, INFECTIOUS DISEASE: HCPCS | Mod: ,,, | Performed by: INTERNAL MEDICINE

## 2025-04-16 PROCEDURE — 25000003 PHARM REV CODE 250: Performed by: NURSE PRACTITIONER

## 2025-04-16 PROCEDURE — 36415 COLL VENOUS BLD VENIPUNCTURE: CPT | Performed by: HOSPITALIST

## 2025-04-16 PROCEDURE — 25000242 PHARM REV CODE 250 ALT 637 W/ HCPCS: Performed by: HOSPITALIST

## 2025-04-16 PROCEDURE — 63600175 PHARM REV CODE 636 W HCPCS: Performed by: INTERNAL MEDICINE

## 2025-04-16 PROCEDURE — 85730 THROMBOPLASTIN TIME PARTIAL: CPT | Performed by: HOSPITALIST

## 2025-04-16 PROCEDURE — 25000003 PHARM REV CODE 250: Performed by: HOSPITALIST

## 2025-04-16 PROCEDURE — 83735 ASSAY OF MAGNESIUM: CPT | Performed by: INTERNAL MEDICINE

## 2025-04-16 PROCEDURE — 84100 ASSAY OF PHOSPHORUS: CPT | Performed by: INTERNAL MEDICINE

## 2025-04-16 PROCEDURE — 85025 COMPLETE CBC W/AUTO DIFF WBC: CPT | Performed by: INTERNAL MEDICINE

## 2025-04-16 PROCEDURE — 82310 ASSAY OF CALCIUM: CPT | Performed by: INTERNAL MEDICINE

## 2025-04-16 PROCEDURE — 94761 N-INVAS EAR/PLS OXIMETRY MLT: CPT

## 2025-04-16 PROCEDURE — 94640 AIRWAY INHALATION TREATMENT: CPT

## 2025-04-16 PROCEDURE — 25000003 PHARM REV CODE 250: Performed by: INTERNAL MEDICINE

## 2025-04-16 RX ORDER — CARVEDILOL 12.5 MG/1
12.5 TABLET ORAL 2 TIMES DAILY WITH MEALS
Status: DISCONTINUED | OUTPATIENT
Start: 2025-04-17 | End: 2025-04-22

## 2025-04-16 RX ORDER — SODIUM CHLORIDE 9 MG/ML
INJECTION, SOLUTION INTRAVENOUS CONTINUOUS
Status: ACTIVE | OUTPATIENT
Start: 2025-04-16 | End: 2025-04-17

## 2025-04-16 RX ORDER — HYDROCODONE BITARTRATE AND ACETAMINOPHEN 5; 325 MG/1; MG/1
1 TABLET ORAL EVERY 4 HOURS PRN
Refills: 0 | Status: DISCONTINUED | OUTPATIENT
Start: 2025-04-16 | End: 2025-04-19

## 2025-04-16 RX ORDER — AMOXICILLIN 250 MG
1 CAPSULE ORAL DAILY PRN
Status: DISCONTINUED | OUTPATIENT
Start: 2025-04-16 | End: 2025-04-23 | Stop reason: HOSPADM

## 2025-04-16 RX ORDER — IPRATROPIUM BROMIDE AND ALBUTEROL SULFATE 2.5; .5 MG/3ML; MG/3ML
3 SOLUTION RESPIRATORY (INHALATION)
Status: DISCONTINUED | OUTPATIENT
Start: 2025-04-16 | End: 2025-04-20

## 2025-04-16 RX ORDER — POLYETHYLENE GLYCOL 3350 17 G/17G
17 POWDER, FOR SOLUTION ORAL DAILY
Status: DISCONTINUED | OUTPATIENT
Start: 2025-04-16 | End: 2025-04-19

## 2025-04-16 RX ADMIN — CEFEPIME 2 G: 2 INJECTION, POWDER, FOR SOLUTION INTRAVENOUS at 06:04

## 2025-04-16 RX ADMIN — MUPIROCIN: 20 OINTMENT TOPICAL at 08:04

## 2025-04-16 RX ADMIN — LISINOPRIL 40 MG: 20 TABLET ORAL at 10:04

## 2025-04-16 RX ADMIN — IPRATROPIUM BROMIDE AND ALBUTEROL SULFATE 3 ML: 2.5; .5 SOLUTION RESPIRATORY (INHALATION) at 05:04

## 2025-04-16 RX ADMIN — POLYETHYLENE GLYCOL 3350 17 G: 17 POWDER, FOR SOLUTION ORAL at 03:04

## 2025-04-16 RX ADMIN — SODIUM CHLORIDE: 9 INJECTION, SOLUTION INTRAVENOUS at 04:04

## 2025-04-16 RX ADMIN — HYDROCODONE BITARTRATE AND ACETAMINOPHEN 1 TABLET: 5; 325 TABLET ORAL at 03:04

## 2025-04-16 RX ADMIN — IPRATROPIUM BROMIDE AND ALBUTEROL SULFATE 3 ML: 2.5; .5 SOLUTION RESPIRATORY (INHALATION) at 08:04

## 2025-04-16 RX ADMIN — LABETALOL HYDROCHLORIDE 10 MG: 5 INJECTION INTRAVENOUS at 04:04

## 2025-04-16 RX ADMIN — MUPIROCIN: 20 OINTMENT TOPICAL at 01:04

## 2025-04-16 RX ADMIN — RISPERIDONE 1 MG: 1 TABLET, FILM COATED ORAL at 08:04

## 2025-04-16 RX ADMIN — CARVEDILOL 6.25 MG: 6.25 TABLET, FILM COATED ORAL at 10:04

## 2025-04-16 RX ADMIN — HYDROCODONE BITARTRATE AND ACETAMINOPHEN 1 TABLET: 10; 325 TABLET ORAL at 10:04

## 2025-04-16 RX ADMIN — CEFEPIME 2 G: 2 INJECTION, POWDER, FOR SOLUTION INTRAVENOUS at 05:04

## 2025-04-16 RX ADMIN — HEPARIN SODIUM 22 UNITS/KG/HR: 10000 INJECTION, SOLUTION INTRAVENOUS at 04:04

## 2025-04-16 RX ADMIN — TAMSULOSIN HYDROCHLORIDE 0.4 MG: 0.4 CAPSULE ORAL at 10:04

## 2025-04-16 RX ADMIN — ACETAMINOPHEN 1000 MG: 500 TABLET ORAL at 10:04

## 2025-04-16 RX ADMIN — ACETAMINOPHEN 1000 MG: 500 TABLET ORAL at 06:04

## 2025-04-16 RX ADMIN — CARVEDILOL 6.25 MG: 6.25 TABLET, FILM COATED ORAL at 05:04

## 2025-04-16 NOTE — PROGRESS NOTES
[facility] Surg ([address] Medicine  Progress Note    Patient Name: Frantz Sigala Jr.  MRN: [id number]  Patient Class: IP- Inpatient   Admission Date: 4/8/2025  Length of Stay: 7 days  Attending Physician: Brooklyn Wellington MD  Primary Care Provider: Zachary Ramos MD        Subjective     Principal Problem:Colitis        HPI:  Mr. Sigala is a 60 YOM with PMHx of HTN, asthma, CKD (baseline SCr 2-2.5), schizophrenia, blindness, and deafness.     He presents to ED via EMS with family due to syncopal event just PTA. Family provides history due patients deafness and blindness. They note that he was in his normal state of health today until right after lunch. He ate a salami sandwich with potato chips and drank orange juice; shortly thereafter he was in the bathroom when his mother heard a loud thud and patient was found by his brother on the ground unconscious.  His brother notes that he called out the patients name and slapped him on his back a few times at which time he seemed to come to and began vomiting with concurrent episodes of diarrhea.  Sister notes she has had a recent respiratory illness with abdominal cramping and some diarrhea. Family notes that patient has lived in the home he is in since a small child and that he is able to navigate the home independently including going upstairs and outside.     In the ED he is hypertensive, heart rate stable, saturations initially 80% on room air with improvement after 2 L nasal cannula application, and afebrile.  CBC with WBC 21, H&H 9.4/29.4, platelets 497.  Chemistry was , K 4.4, chloride 100, CO2 22, BUN 49, SCr 2.2, glucose 125.  LFTs unremarkable.  BNP 35.  Troponin 0.035 >> 0.059 (appear similar to priors however these were in 2015).  Lactic acid 2.2.  Flu and COVID negative.  Hepatitis-C and HIV nonreactive.  UA noninfectious.  CXR with chronic appearing interstitial findings, congestive change or edema is a consideration; no  large focal consolidation. CTH with no acute intracranial abnormality detected, bilateral partial opacification of the mastoid air cells, sphenoid sinus disease.     CT A/P:  Wall thickening of the transverse colon, suggestive of nonspecific colitis.   Wall thickening of the urinary bladder.  The findings may be seen with cystitis.   Advanced calcifications of the abdominal aorta and the mesenteric arteries.  Correlation with possible vascular ultrasound of the mesenteric vessels may be obtained for further assessment. 1.2 cm hypodensity in the upper pole of the right kidney with Hounsfield of 23.  This can be assessed with renal ultrasound.     The patient was admitted to the Hospital Medicine Service for further evaluation and management.     Overview/Hospital Course:  Admitted with syncope and fall suspected secondary to vasovagal episode due to underlying colitis. Started ciprofloxacin, metronidazole and stool studies ordered though had no further loose stool to collect. Echo showed normal EF and grade I diastolic dysfunction, carotid U/S unremarkable. Complained of increased LLE pain and XR obtained demonstrating proximal fibular fracture. Orthopedics consulted/ in discussion with Dr. Moreira recommended weight bearing as tolerated, no splint/boot required and follow-up in clinic. Developed urinary retention and increasing tachycardia; after tamsulosin resumed and straight catheterization, had persistent retention and mackay placed. Urology consulted and recommended maintaining further prior to void trial. Had new onset fevers and converted to cefepime. Hypertension and tachycardia persisted; pain control adjusted and use liberalized, but persisted. ID consulted with uptrending WBCs and evaluation for thrombosis initiated.    Interval History: No further fevers and appears more comfortable. Tachycardia and elevated BPs persist, but not as high. Discussed plan of care with family members at bedside / via phone.      Review of Systems   Unable to perform ROS: Patient nonverbal     Objective:     Vital Signs (Most Recent):  Temp: 98.5 °F (36.9 °C) (04/15/25 2005)  Pulse: (Abnormal) 115 (04/15/25 2005)  Resp: 18 (04/15/25 2027)  BP: (Abnormal) 177/86 (04/15/25 2005)  SpO2: (Abnormal) 92 % (04/15/25 2005) Vital Signs (24h Range):  Temp:  [98.2 °F (36.8 °C)-98.9 °F (37.2 °C)] 98.5 °F (36.9 °C)  Pulse:  [104-115] 115  Resp:  [16-20] 18  SpO2:  [91 %-98 %] 92 %  BP: (117-197)/(66-96) 177/86     Weight: 91.6 kg (202 lb)  Body mass index is 32.6 kg/m².    Intake/Output Summary (Last 24 hours) at 4/15/2025 2043  Last data filed at 4/15/2025 1730  Gross per 24 hour   Intake 418.34 ml   Output 920 ml   Net -501.66 ml         Physical Exam  Vitals and nursing note reviewed.   Constitutional:       General: He is sleeping. He is not in acute distress.     Appearance: He is well-developed. He is ill-appearing (Chronically).   HENT:      Head: Normocephalic and atraumatic.   Eyes:      General:         Right eye: No discharge.         Left eye: No discharge.      Conjunctiva/sclera: Conjunctivae normal.   Cardiovascular:      Rate and Rhythm: Tachycardia present.      Pulses: Normal pulses.   Pulmonary:      Effort: Pulmonary effort is normal. No respiratory distress.   Abdominal:      Palpations: Abdomen is soft.      Tenderness: There is no abdominal tenderness.   Genitourinary:     Comments: Clark in place with clear urine.  Musculoskeletal:         General: Tenderness (Reports tenderness to R knee.) present. Normal range of motion.      Right lower leg: No edema.      Left lower leg: No edema.   Skin:     General: Skin is warm and dry.   Neurological:      Mental Status: He is easily aroused. Mental status is at baseline.           Significant Labs:   CBC:  Recent Labs   Lab 04/13/25  0450 04/14/25  0506 04/15/25  0418   WBC 16.66* 18.87* 16.02*   HGB 8.6* 8.7* 8.0*   HCT 26.2* 27.0* 25.2*    424 455*   LYMPH 0.32*  1.9* 0.69*   3.7* 0.56*  3.5*   MONO 7.1  1.19* 6.7  1.27* 7.2  1.15*   EOS 0.0  0.00 0.5  0.10 0.6  0.09   BMP:  Recent Labs   Lab 04/13/25  0450 04/14/25  0506 04/15/25  0418   * 130* 127*   K 4.5 4.8 4.8   CL 95 96 92*   CO2 22* 23 24   BUN 41* 44* 57*   CREATININE 2.6* 2.6* 2.6*   CALCIUM 8.8 9.2 9.1   MG 1.5* 2.2 2.2   PHOS 2.9 4.7* 3.9     Significant Imaging: I have reviewed and interpreted all pertinent imaging results/findings within the past 24 hours.      Assessment & Plan  Colitis  Fever  - Was on ciprofloxacin, metronidazole PO to complete course when he developed new fever 04/11-04/12.  - Continue cefepime. UA with >100RBCs, 5 WBCs, rare bacteria; likely related to mackay placement. CXR appears benign; LLL zone not well evaluated due to heart silhouette but appears stable.  - Tachycardia sinus tach; no evidence of arrhythmia on telemetry. Infectious etiology should be covered - pain related? Increased pain control with hydrocodone-acetaminophen 5-325mg, 10-325mg PO q4hr PRN.  - Broadened workup - has been mobilizing with family in the room, but does have potential for thrombosis with fracture. D dimer elevated with fall / infection and with CKD would prefer to avoid CT angiography. Checked V/Q scan with intermediate probability, U/S BLE negative for DVT but given symptoms and intermediate probability, anticoagulated with heparin gtt on 4/14  - ID consult pending, and will continue current treatment plan for now and follow up on studies  Syncope  - Appears was vasovagal 2/2 colitis / volume losses and BMs with the former.  - Troponin stable. No evidence of acute ischemic event. No recurrence of syncope.  - Echo with normal EF, grade I diastolic dysfunction likely related to HTN. Carotid U/S unremarkable.  - Monitor on telemetry.  Essential hypertension  - Chronic, with persistent elevation.  - Continue furosemide 40mg PO daily, HCTZ 25mg PO daily, lisinopril 40mg PO daily. Add carvedilol 6.25mg PO  BID.  - Continue labetalol 10mg IV q4hr PRN.  CKD (chronic kidney disease), stage III  - Chronic; stable at baseline.  - Avoid nephrotoxins, renally dose medications.  Asthma  - Chronic; stable.  - Continue albuterol-ipratropium 2.5-0.5mg inhaled q4hr PRN.  Schizophrenia  - Chronic; stable. Continue risperidone 1mg PO qHS.  Blind  Deaf, bilateral  - Chronic; fall precautions.  Left fibular fracture  - XR demonstrating L fibular fracture; follow-up XR of tib/fib to evaluate for further areas of potential fracture unremarkable. Reported R-sided knee discomfort in addition with XR pending final read but no fracture visible to my evaluation.  - Discussed with orthopedics, Dr. Moreira; weight bearing as tolerated, no splint/casting required, follow-up in clinic  Urinary retention  - Had persistently elevated volumes on bladder scan despite straight catheterization. Clark placed and resumed tamsulosin 0.4mg PO daily. Plan for void trial today  VTE Risk Mitigation (From admission, onward)           Ordered     heparin 25,000 units in dextrose 5% (100 units/ml) IV bolus from bag HIGH INTENSITY nomogram - [facility]  As needed (PRN)        Question:  Heparin Infusion Adjustment (DO NOT MODIFY ANSWER)  Answer:  \\[facility]\Images\Pharmacy\HeparinInfusions\heparin HIGH INTENSITY nomogram for [facility] [id number].[email or web address]    04/14/25 1727     heparin 25,000 units in dextrose 5% (100 units/ml) IV bolus from bag HIGH INTENSITY nomogram - [facility]  As needed (PRN)        Question:  Heparin Infusion Adjustment (DO NOT MODIFY ANSWER)  Answer:  \[employer]\Images\Pharmacy\HeparinInfusions\heparin HIGH INTENSITY nomogram for [facility] [id number].pdf    04/14/25 1727     heparin 25,000 units in dextrose 5% 250 mL (100 units/mL) infusion HIGH INTENSITY nomogram - [facility]  Continuous        Question:  Begin at (units/kg/hr)  Answer:  18    04/14/25 1727     IP VTE HIGH RISK PATIENT  Once         04/08/25 1943     Place sequential compression device  Until  discontinued         04/08/25 1943     Reason for No Pharmacological VTE Prophylaxis  Once        Question:  Reasons:  Answer:  Risk of Bleeding    04/08/25 1943                      Brooklyn Wellington MD  Department of Hospital Medicine   [facility] ([address])

## 2025-04-16 NOTE — PLAN OF CARE
Medicare Message     Important Message from Medicare regarding Discharge Appeal Rights Explained to patient/caregiver; Signed/date by patient/caregiver Explained to patient/caregiver; Signed/date by patient/caregiver   Date IMM was signed 4/8/2025 4/16/2025   Time IMM was signed [id number]

## 2025-04-16 NOTE — CONSULTS
"[facility] ([address])  Infectious Disease  Consult Note    Patient Name: Frantz Sigala Jr.  MRN: [id number]  Admission Date: 4/8/2025  Hospital Length of Stay: 8 days  Attending Physician: rBooklyn Wellington MD  Primary Care Provider: Zachary Ramos MD     Isolation Status: No active isolations    Patient information was obtained from patient and ER records.      Consults  Assessment/Plan:     Oncology  Leukocytosis  60M with h/o HTN, asthma, CKD, schizophrenia, blindness, and deafness admitted 4/8 for syncopal event. Reportedly with diarrhea/abd symptoms prior to arrival, now constipated. ID consulted for "Presented with colitis, symptomatically improved then new fever / worsening leukocytosis"    D/dx infectious vs non-infectious. Has been on empiric abx for 9 days given elevated wbc and a fever. Work up has been negative for infection- no consolidation, UA not c/w uti, bcx have been negative. Colitis? Found to have ortho fracture and possible PE, both of which could contribute to leukcytosis.     Recommendations:   - consider stopping antibiotics  - hopefully BM will help  - appreciate HM help with non-infectious work up              Denae Koo MD  Infectious Disease  [facility] ([address])    Subjective:     Principal Problem: Colitis    HPI: 60M with h/o HTN, asthma, CKD (baseline SCr 2-2.5), schizophrenia, blindness, and deafness admitted 4/8 for syncopal event. Per h&P,     "Family provides history due patients deafness and blindness. They note that he was in his normal state of health today until right after lunch. He ate a salami sandwich with potato chips and drank orange juice; shortly thereafter he was in the bathroom when his mother heard a loud thud and patient was found by his brother on the ground unconscious.  His brother notes that he called out the patients name and slapped him on his back a few times at which time he seemed to come to and began vomiting with " "concurrent episodes of diarrhea.  Sister notes she has had a recent respiratory illness with abdominal cramping and some diarrhea."     Patient awake and alert. Sister (in law?) at bedside. Report back to baseline. Reports severe constipation     UA noninfectious.  CXR with chronic appearing interstitial findings, congestive change or edema is a consideration; no large focal consolidation. CTH with no acute intracranial abnormality detected, bilateral partial opacification of the mastoid air cells, sphenoid sinus disease.      CT A/P:  Wall thickening of the transverse colon, suggestive of nonspecific colitis.   Wall thickening of the urinary bladder.  The findings may be seen with cystitis.   Advanced calcifications of the abdominal aorta and the mesenteric arteries.  Correlation with possible vascular ultrasound of the mesenteric vessels may be obtained for further assessment. 1.2 cm hypodensity in the upper pole of the right kidney with Hounsfield of 23.  This can be assessed with renal ultrasound.        V/q- intermediate suspicion for pulmonary embolism. Anticoagulation started      Day 9 antibiotics-  currently cefepime        ID consulted for "Presented with colitis, symptomatically improved then new fever / worsening leukocytosis     Past Medical History:   Diagnosis Date    Anxiety     Asthma     Schizophrenia        Past Surgical History:   Procedure Laterality Date    CORNEAL TRANSPLANT         Review of patient's allergies indicates:   Allergen Reactions    Penicillins Other (See Comments)       No current facility-administered medications on file prior to encounter.     Current Outpatient Medications on File Prior to Encounter   Medication Sig    albuterol (PROVENTIL/VENTOLIN HFA) 90 mcg/actuation inhaler Inhale 2 puffs into the lungs every 6 (six) hours as needed for Wheezing. Rescue    fluticasone propionate (FLONASE) 50 mcg/actuation nasal spray 1 spray (50 mcg total) by Each Nostril route once " daily.    furosemide (LASIX) 40 MG tablet Take 1 tablet (40 mg total) by mouth once daily.    lisinopriL-hydrochlorothiazide (PRINZIDE,ZESTORETIC) 20-12.5 mg per tablet Take 2 tablets by mouth once daily.    risperiDONE (RISPERDAL) 1 MG tablet Take 1 tablet (1 mg total) by mouth once daily. (Patient taking differently: Take 1 mg by mouth every evening.)     Family History    None       Tobacco Use    Smoking status: Never    Smokeless tobacco: Never   Substance and Sexual Activity    Alcohol use: No     Alcohol/week: 0.0 standard drinks of alcohol    Drug use: No    Sexual activity: Never     Review of Systems   Unable to perform ROS: Patient nonverbal     Objective:     Vital Signs (Most Recent):  Temp: 98.1 °F (36.7 °C) (04/16/25 1543)  Pulse: 101 (04/16/25 1729)  Resp: 18 (04/16/25 1543)  BP: (!) 184/93 (04/16/25 1729)  SpO2: (!) 94 % (04/16/25 1543) Vital Signs (24h Range):  Temp:  [98.1 °F (36.7 °C)-98.6 °F (37 °C)] 98.1 °F (36.7 °C)  Pulse:  [] 101  Resp:  [17-18] 18  SpO2:  [92 %-98 %] 94 %  BP: (150-195)/(70-93) 184/93     Weight: 91.6 kg (202 lb)  Body mass index is 32.6 kg/m².     Physical Exam  Vitals and nursing note reviewed.   Constitutional:       General: He is not in acute distress.     Appearance: Normal appearance. He is well-developed and normal weight. He is ill-appearing (chronically). He is not toxic-appearing.   HENT:      Head: Atraumatic.      Mouth/Throat:      Dentition: Normal dentition.   Eyes:      General: Lids are normal.   Cardiovascular:      Rate and Rhythm: Normal rate and regular rhythm.      Heart sounds: Normal heart sounds. No murmur heard.  Pulmonary:      Effort: Pulmonary effort is normal.      Breath sounds: Normal breath sounds.   Abdominal:      General: Bowel sounds are normal. There is no distension.      Palpations: Abdomen is soft.      Tenderness: There is abdominal tenderness (epigastric area).   Musculoskeletal:      Cervical back: Neck supple.      Right  "lower leg: No edema.      Left lower leg: No edema.   Skin:     General: Skin is warm and dry.             Significant Labs: All pertinent labs within the past 24 hours have been reviewed.  CBC:   Recent Labs   Lab 04/15/25  0418 04/16/25  0326   WBC 16.02* 13.17*   HGB 8.0* 7.8*   HCT 25.2* 24.0*   * 476*     CMP:   Recent Labs   Lab 04/15/25  0418 04/16/25  0326   * 127*   K 4.8 4.7   CL 92* 91*   CO2 24 26   BUN 57* 66*   CREATININE 2.6* 2.8*   CALCIUM 9.1 9.2   ANIONGAP 11 10     Cardiac Markers:   No results for input(s): "CKMB", "MYOGLOBIN", "BNP", "TROPISTAT" in the last 48 hours.    Lactic Acid:   No results for input(s): "LACTATE" in the last 48 hours.    Troponin:   No results for input(s): "TROPONINI", "TROPONINIHS" in the last 48 hours.    Urine Studies:   No results for input(s): "COLORU", "APPEARANCEUA", "PHUR", "SPECGRAV", "PROTEINUA", "GLUCUA", "KETONESU", "BILIRUBINUA", "OCCULTUA", "NITRITE", "UROBILINOGEN", "LEUKOCYTESUR", "RBCUA", "WBCUA", "BACTERIA", "SQUAMEPITHEL", "HYALINECASTS" in the last 48 hours.    Invalid input(s): "WRIGHTSUR"      Significant Imaging: I have reviewed all pertinent imaging results/findings within the past 24 hours.              "

## 2025-04-16 NOTE — ASSESSMENT & PLAN NOTE
"60M with h/o HTN, asthma, CKD, schizophrenia, blindness, and deafness admitted 4/8 for syncopal event. Reportedly with diarrhea/abd symptoms prior to arrival, now constipated. ID consulted for "Presented with colitis, symptomatically improved then new fever / worsening leukocytosis"    D/dx infectious vs non-infectious. Has been on empiric abx for 9 days given elevated wbc and a fever. Work up has been negative for infection- no consolidation, UA not c/w uti, bcx have been negative. Colitis? Found to have ortho fracture and possible PE, both of which could contribute to leukcytosis.     Recommendations:   - consider stopping antibiotics  - hopefully BM will help  - appreciate HM help with non-infectious work up      "

## 2025-04-16 NOTE — ASSESSMENT & PLAN NOTE
>>ASSESSMENT AND PLAN FOR CKD (CHRONIC KIDNEY DISEASE), STAGE III WRITTEN ON 4/15/2025  8:51 PM BY WEN ALVARENGA MD    - Chronic; stable at baseline.  - Avoid nephrotoxins, renally dose medications.

## 2025-04-16 NOTE — PLAN OF CARE
Problem: Adult Inpatient Plan of Care  Goal: Plan of Care Review  Outcome: Progressing  Goal: Patient-Specific Goal (Individualized)  Outcome: Progressing  Goal: Absence of Hospital-Acquired Illness or Injury  Outcome: Progressing  Goal: Optimal Comfort and Wellbeing  Outcome: Progressing  Goal: Readiness for Transition of Care  Outcome: Progressing     Problem: Infection  Goal: Absence of Infection Signs and Symptoms  Outcome: Progressing     Problem: Acute Kidney Injury/Impairment  Goal: Fluid and Electrolyte Balance  Outcome: Progressing  Goal: Improved Oral Intake  Outcome: Progressing  Goal: Effective Renal Function  Outcome: Progressing     Problem: Syncope  Goal: Absence of Syncopal Symptoms  Outcome: Progressing     Problem: Syncope  Goal: Absence of Syncopal Symptoms  Outcome: Progressing     Problem: Fall Injury Risk  Goal: Absence of Fall and Fall-Related Injury  Outcome: Progressing     Problem: Gas Exchange Impaired  Goal: Optimal Gas Exchange  Outcome: Progressing     Problem: Skin Injury Risk Increased  Goal: Skin Health and Integrity  Outcome: Progressing     Problem: Wound  Goal: Optimal Coping  Outcome: Progressing  Goal: Optimal Functional Ability  Outcome: Progressing  Goal: Absence of Infection Signs and Symptoms  Outcome: Progressing  Goal: Improved Oral Intake  Outcome: Progressing  Goal: Optimal Pain Control and Function  Outcome: Progressing  Goal: Skin Health and Integrity  Outcome: Progressing  Goal: Optimal Wound Healing  Outcome: Progressing     Problem: Hypertension Acute  Goal: Blood Pressure Within Desired Range  Outcome: Progressing

## 2025-04-16 NOTE — ASSESSMENT & PLAN NOTE
- Was on ciprofloxacin, metronidazole PO to complete course when he developed new fever 04/11-04/12.  - Continue cefepime. UA with >100RBCs, 5 WBCs, rare bacteria; likely related to mackay placement. CXR appears benign; LLL zone not well evaluated due to heart silhouette but appears stable.  - Tachycardia sinus tach; no evidence of arrhythmia on telemetry. Infectious etiology should be covered - pain related? Increased pain control with hydrocodone-acetaminophen 5-325mg, 10-325mg PO q4hr PRN.  - Broadened workup - has been mobilizing with family in the room, but does have potential for thrombosis with fracture. D dimer elevated with fall / infection and with CKD would prefer to avoid CT angiography. Checked V/Q scan with intermediate probability, U/S BLE negative for DVT but given symptoms and intermediate probability, anticoagulated with heparin gtt on 4/14  - ID consult pending, and will continue current treatment plan for now and follow up on studies

## 2025-04-16 NOTE — SUBJECTIVE & OBJECTIVE
Interval History: No further fevers and appears more comfortable. Tachycardia and elevated BPs persist, but not as high. Discussed plan of care with family members at bedside / via phone.     Review of Systems   Unable to perform ROS: Patient nonverbal     Objective:     Vital Signs (Most Recent):  Temp: 98.5 °F (36.9 °C) (04/15/25 2005)  Pulse: (Abnormal) 115 (04/15/25 2005)  Resp: 18 (04/15/25 2027)  BP: (Abnormal) 177/86 (04/15/25 2005)  SpO2: (Abnormal) 92 % (04/15/25 2005) Vital Signs (24h Range):  Temp:  [98.2 °F (36.8 °C)-98.9 °F (37.2 °C)] 98.5 °F (36.9 °C)  Pulse:  [104-115] 115  Resp:  [16-20] 18  SpO2:  [91 %-98 %] 92 %  BP: (117-197)/(66-96) 177/86     Weight: 91.6 kg (202 lb)  Body mass index is 32.6 kg/m².    Intake/Output Summary (Last 24 hours) at 4/15/2025 2043  Last data filed at 4/15/2025 1730  Gross per 24 hour   Intake 418.34 ml   Output 920 ml   Net -501.66 ml         Physical Exam  Vitals and nursing note reviewed.   Constitutional:       General: He is sleeping. He is not in acute distress.     Appearance: He is well-developed. He is ill-appearing (Chronically).   HENT:      Head: Normocephalic and atraumatic.   Eyes:      General:         Right eye: No discharge.         Left eye: No discharge.      Conjunctiva/sclera: Conjunctivae normal.   Cardiovascular:      Rate and Rhythm: Tachycardia present.      Pulses: Normal pulses.   Pulmonary:      Effort: Pulmonary effort is normal. No respiratory distress.   Abdominal:      Palpations: Abdomen is soft.      Tenderness: There is no abdominal tenderness.   Genitourinary:     Comments: Clark in place with clear urine.  Musculoskeletal:         General: Tenderness (Reports tenderness to R knee.) present. Normal range of motion.      Right lower leg: No edema.      Left lower leg: No edema.   Skin:     General: Skin is warm and dry.   Neurological:      Mental Status: He is easily aroused. Mental status is at baseline.           Significant Labs:    CBC:  Recent Labs   Lab 04/13/25  0450 04/14/25  0506 04/15/25  0418   WBC 16.66* 18.87* 16.02*   HGB 8.6* 8.7* 8.0*   HCT 26.2* 27.0* 25.2*    424 455*   LYMPH 0.32*  1.9* 0.69*  3.7* 0.56*  3.5*   MONO 7.1  1.19* 6.7  1.27* 7.2  1.15*   EOS 0.0  0.00 0.5  0.10 0.6  0.09   BMP:  Recent Labs   Lab 04/13/25  0450 04/14/25  0506 04/15/25  0418   * 130* 127*   K 4.5 4.8 4.8   CL 95 96 92*   CO2 22* 23 24   BUN 41* 44* 57*   CREATININE 2.6* 2.6* 2.6*   CALCIUM 8.8 9.2 9.1   MG 1.5* 2.2 2.2   PHOS 2.9 4.7* 3.9     Significant Imaging: I have reviewed and interpreted all pertinent imaging results/findings within the past 24 hours.

## 2025-04-16 NOTE — PROGRESS NOTES
51.5 Ptt resulted the 2nd therapeutic level.  No change to Heparin gtt  Next level to be drawn with AM labs, order entered

## 2025-04-16 NOTE — SUBJECTIVE & OBJECTIVE
Past Medical History:   Diagnosis Date    Anxiety     Asthma     Schizophrenia        Past Surgical History:   Procedure Laterality Date    CORNEAL TRANSPLANT         Review of patient's allergies indicates:   Allergen Reactions    Penicillins Other (See Comments)       No current facility-administered medications on file prior to encounter.     Current Outpatient Medications on File Prior to Encounter   Medication Sig    albuterol (PROVENTIL/VENTOLIN HFA) 90 mcg/actuation inhaler Inhale 2 puffs into the lungs every 6 (six) hours as needed for Wheezing. Rescue    fluticasone propionate (FLONASE) 50 mcg/actuation nasal spray 1 spray (50 mcg total) by Each Nostril route once daily.    furosemide (LASIX) 40 MG tablet Take 1 tablet (40 mg total) by mouth once daily.    lisinopriL-hydrochlorothiazide (PRINZIDE,ZESTORETIC) 20-12.5 mg per tablet Take 2 tablets by mouth once daily.    risperiDONE (RISPERDAL) 1 MG tablet Take 1 tablet (1 mg total) by mouth once daily. (Patient taking differently: Take 1 mg by mouth every evening.)     Family History    None       Tobacco Use    Smoking status: Never    Smokeless tobacco: Never   Substance and Sexual Activity    Alcohol use: No     Alcohol/week: 0.0 standard drinks of alcohol    Drug use: No    Sexual activity: Never     Review of Systems   Unable to perform ROS: Patient nonverbal     Objective:     Vital Signs (Most Recent):  Temp: 98.1 °F (36.7 °C) (04/16/25 1543)  Pulse: 101 (04/16/25 1729)  Resp: 18 (04/16/25 1543)  BP: (!) 184/93 (04/16/25 1729)  SpO2: (!) 94 % (04/16/25 1543) Vital Signs (24h Range):  Temp:  [98.1 °F (36.7 °C)-98.6 °F (37 °C)] 98.1 °F (36.7 °C)  Pulse:  [] 101  Resp:  [17-18] 18  SpO2:  [92 %-98 %] 94 %  BP: (150-195)/(70-93) 184/93     Weight: 91.6 kg (202 lb)  Body mass index is 32.6 kg/m².     Physical Exam  Vitals and nursing note reviewed.   Constitutional:       General: He is not in acute distress.     Appearance: Normal appearance. He is  "well-developed and normal weight. He is ill-appearing (chronically). He is not toxic-appearing.   HENT:      Head: Atraumatic.      Mouth/Throat:      Dentition: Normal dentition.   Eyes:      General: Lids are normal.   Cardiovascular:      Rate and Rhythm: Normal rate and regular rhythm.      Heart sounds: Normal heart sounds. No murmur heard.  Pulmonary:      Effort: Pulmonary effort is normal.      Breath sounds: Normal breath sounds.   Abdominal:      General: Bowel sounds are normal. There is no distension.      Palpations: Abdomen is soft.      Tenderness: There is abdominal tenderness (epigastric area).   Musculoskeletal:      Cervical back: Neck supple.      Right lower leg: No edema.      Left lower leg: No edema.   Skin:     General: Skin is warm and dry.             Significant Labs: All pertinent labs within the past 24 hours have been reviewed.  CBC:   Recent Labs   Lab 04/15/25  0418 04/16/25  0326   WBC 16.02* 13.17*   HGB 8.0* 7.8*   HCT 25.2* 24.0*   * 476*     CMP:   Recent Labs   Lab 04/15/25  0418 04/16/25  0326   * 127*   K 4.8 4.7   CL 92* 91*   CO2 24 26   BUN 57* 66*   CREATININE 2.6* 2.8*   CALCIUM 9.1 9.2   ANIONGAP 11 10     Cardiac Markers:   No results for input(s): "CKMB", "MYOGLOBIN", "BNP", "TROPISTAT" in the last 48 hours.    Lactic Acid:   No results for input(s): "LACTATE" in the last 48 hours.    Troponin:   No results for input(s): "TROPONINI", "TROPONINIHS" in the last 48 hours.    Urine Studies:   No results for input(s): "COLORU", "APPEARANCEUA", "PHUR", "SPECGRAV", "PROTEINUA", "GLUCUA", "KETONESU", "BILIRUBINUA", "OCCULTUA", "NITRITE", "UROBILINOGEN", "LEUKOCYTESUR", "RBCUA", "WBCUA", "BACTERIA", "SQUAMEPITHEL", "HYALINECASTS" in the last 48 hours.    Invalid input(s): "WRIGHTSUR"      Significant Imaging: I have reviewed all pertinent imaging results/findings within the past 24 hours.  "

## 2025-04-16 NOTE — ASSESSMENT & PLAN NOTE
- Had persistently elevated volumes on bladder scan despite straight catheterization. Clark placed and resumed tamsulosin 0.4mg PO daily. Plan for void trial today

## 2025-04-16 NOTE — CLINICAL REVIEW
IP Sepsis Screen (most recent)       Sepsis Screen (IP) - 04/16/25 5056       Is the patient's history or complaint suggestive of a possible infection? Yes  -LW    Are there at least two of the following signs and symptoms present? Yes  -LW    Sepsis signs/symptoms - Tachycardia Tachycardia     >90  -LW    Sepsis signs/symptoms - WBC WBC < 4,000 or WBC > 12,000  -LW    Are any of the following organ dysfunction criteria present and not considered to be due to a chronic condition? Yes   elias on ckd -LW    Organ Dysfunction Criteria Creatinine > 2.0  -LW    Organ Dysfunction Criteria - O2 O2 Saturation < 95% on room air  -LW    Initiate Sepsis Protocol No  -LW    Reason sepsis not considered Pt. receiving appropriate management  -LW              User Key  (r) = Recorded By, (t) = Taken By, (c) = Cosigned By      Initials Name    Yvette Santacruz RN

## 2025-04-16 NOTE — NURSING
Berny(Lab) , states all coagulation studies sent to [facility] because machine is broken. So results will be delayed. APTT will be delayed pt on a heparin gtt.

## 2025-04-17 LAB
ABSOLUTE EOSINOPHIL (OHS): 0.37 K/UL
ABSOLUTE MONOCYTE (OHS): 1.33 K/UL (ref 0.3–1)
ABSOLUTE NEUTROPHIL COUNT (OHS): 9.57 K/UL (ref 1.8–7.7)
ANION GAP (OHS): 10 MMOL/L (ref 8–16)
ANION GAP (OHS): 8 MMOL/L (ref 8–16)
APTT PPP: 60.6 SECONDS (ref 21–32)
BASOPHILS # BLD AUTO: 0.04 K/UL
BASOPHILS NFR BLD AUTO: 0.3 %
BUN SERPL-MCNC: 70 MG/DL (ref 6–20)
BUN SERPL-MCNC: 71 MG/DL (ref 6–20)
CALCIUM SERPL-MCNC: 9 MG/DL (ref 8.7–10.5)
CALCIUM SERPL-MCNC: 9 MG/DL (ref 8.7–10.5)
CHLORIDE SERPL-SCNC: 93 MMOL/L (ref 95–110)
CHLORIDE SERPL-SCNC: 94 MMOL/L (ref 95–110)
CO2 SERPL-SCNC: 21 MMOL/L (ref 23–29)
CO2 SERPL-SCNC: 26 MMOL/L (ref 23–29)
CREAT SERPL-MCNC: 2.6 MG/DL (ref 0.5–1.4)
CREAT SERPL-MCNC: 2.7 MG/DL (ref 0.5–1.4)
ERYTHROCYTE [DISTWIDTH] IN BLOOD BY AUTOMATED COUNT: 18.7 % (ref 11.5–14.5)
GFR SERPLBLD CREATININE-BSD FMLA CKD-EPI: 26 ML/MIN/1.73/M2
GFR SERPLBLD CREATININE-BSD FMLA CKD-EPI: 27 ML/MIN/1.73/M2
GLUCOSE SERPL-MCNC: 105 MG/DL (ref 70–110)
GLUCOSE SERPL-MCNC: 90 MG/DL (ref 70–110)
HCT VFR BLD AUTO: 22.6 % (ref 40–54)
HGB BLD-MCNC: 7.2 GM/DL (ref 14–18)
IMM GRANULOCYTES # BLD AUTO: 0.29 K/UL (ref 0–0.04)
IMM GRANULOCYTES NFR BLD AUTO: 2.4 % (ref 0–0.5)
LYMPHOCYTES # BLD AUTO: 0.74 K/UL (ref 1–4.8)
MAGNESIUM SERPL-MCNC: 2.2 MG/DL (ref 1.6–2.6)
MCH RBC QN AUTO: 25.3 PG (ref 27–31)
MCHC RBC AUTO-ENTMCNC: 31.9 G/DL (ref 32–36)
MCV RBC AUTO: 79 FL (ref 82–98)
NUCLEATED RBC (/100WBC) (OHS): 0 /100 WBC
PHOSPHATE SERPL-MCNC: 2.9 MG/DL (ref 2.7–4.5)
PLATELET # BLD AUTO: 405 K/UL (ref 150–450)
PMV BLD AUTO: 10.7 FL (ref 9.2–12.9)
POTASSIUM SERPL-SCNC: 5.5 MMOL/L (ref 3.5–5.1)
POTASSIUM SERPL-SCNC: 5.6 MMOL/L (ref 3.5–5.1)
RBC # BLD AUTO: 2.85 M/UL (ref 4.6–6.2)
RELATIVE EOSINOPHIL (OHS): 3 %
RELATIVE LYMPHOCYTE (OHS): 6 % (ref 18–48)
RELATIVE MONOCYTE (OHS): 10.8 % (ref 4–15)
RELATIVE NEUTROPHIL (OHS): 77.5 % (ref 38–73)
SODIUM SERPL-SCNC: 125 MMOL/L (ref 136–145)
SODIUM SERPL-SCNC: 127 MMOL/L (ref 136–145)
WBC # BLD AUTO: 12.34 K/UL (ref 3.9–12.7)

## 2025-04-17 PROCEDURE — 36415 COLL VENOUS BLD VENIPUNCTURE: CPT | Performed by: HOSPITALIST

## 2025-04-17 PROCEDURE — 83735 ASSAY OF MAGNESIUM: CPT | Performed by: INTERNAL MEDICINE

## 2025-04-17 PROCEDURE — 85025 COMPLETE CBC W/AUTO DIFF WBC: CPT | Performed by: INTERNAL MEDICINE

## 2025-04-17 PROCEDURE — 97162 PT EVAL MOD COMPLEX 30 MIN: CPT

## 2025-04-17 PROCEDURE — 94640 AIRWAY INHALATION TREATMENT: CPT

## 2025-04-17 PROCEDURE — 25000003 PHARM REV CODE 250: Performed by: HOSPITALIST

## 2025-04-17 PROCEDURE — 80048 BASIC METABOLIC PNL TOTAL CA: CPT | Performed by: INTERNAL MEDICINE

## 2025-04-17 PROCEDURE — 25000003 PHARM REV CODE 250: Performed by: PHYSICIAN ASSISTANT

## 2025-04-17 PROCEDURE — 80048 BASIC METABOLIC PNL TOTAL CA: CPT | Performed by: HOSPITALIST

## 2025-04-17 PROCEDURE — 25000242 PHARM REV CODE 250 ALT 637 W/ HCPCS: Performed by: HOSPITALIST

## 2025-04-17 PROCEDURE — 97165 OT EVAL LOW COMPLEX 30 MIN: CPT

## 2025-04-17 PROCEDURE — 97530 THERAPEUTIC ACTIVITIES: CPT

## 2025-04-17 PROCEDURE — 25000003 PHARM REV CODE 250: Performed by: INTERNAL MEDICINE

## 2025-04-17 PROCEDURE — 84100 ASSAY OF PHOSPHORUS: CPT | Performed by: INTERNAL MEDICINE

## 2025-04-17 PROCEDURE — 94761 N-INVAS EAR/PLS OXIMETRY MLT: CPT

## 2025-04-17 PROCEDURE — 85730 THROMBOPLASTIN TIME PARTIAL: CPT | Performed by: HOSPITALIST

## 2025-04-17 PROCEDURE — 36415 COLL VENOUS BLD VENIPUNCTURE: CPT | Performed by: INTERNAL MEDICINE

## 2025-04-17 PROCEDURE — 21400001 HC TELEMETRY ROOM

## 2025-04-17 PROCEDURE — 63600175 PHARM REV CODE 636 W HCPCS: Performed by: INTERNAL MEDICINE

## 2025-04-17 RX ORDER — SODIUM CHLORIDE 9 MG/ML
INJECTION, SOLUTION INTRAVENOUS CONTINUOUS
Status: ACTIVE | OUTPATIENT
Start: 2025-04-17 | End: 2025-04-18

## 2025-04-17 RX ADMIN — RISPERIDONE 1 MG: 1 TABLET, FILM COATED ORAL at 08:04

## 2025-04-17 RX ADMIN — IPRATROPIUM BROMIDE AND ALBUTEROL SULFATE 3 ML: 2.5; .5 SOLUTION RESPIRATORY (INHALATION) at 07:04

## 2025-04-17 RX ADMIN — ACETAMINOPHEN 1000 MG: 500 TABLET ORAL at 05:04

## 2025-04-17 RX ADMIN — CARVEDILOL 12.5 MG: 12.5 TABLET, FILM COATED ORAL at 05:04

## 2025-04-17 RX ADMIN — CARVEDILOL 12.5 MG: 12.5 TABLET, FILM COATED ORAL at 08:04

## 2025-04-17 RX ADMIN — APIXABAN 10 MG: 2.5 TABLET, FILM COATED ORAL at 08:04

## 2025-04-17 RX ADMIN — LABETALOL HYDROCHLORIDE 10 MG: 5 INJECTION INTRAVENOUS at 08:04

## 2025-04-17 RX ADMIN — ACETAMINOPHEN 1000 MG: 500 TABLET ORAL at 02:04

## 2025-04-17 RX ADMIN — APIXABAN 10 MG: 2.5 TABLET, FILM COATED ORAL at 09:04

## 2025-04-17 RX ADMIN — IPRATROPIUM BROMIDE AND ALBUTEROL SULFATE 3 ML: 2.5; .5 SOLUTION RESPIRATORY (INHALATION) at 08:04

## 2025-04-17 RX ADMIN — HYDROCODONE BITARTRATE AND ACETAMINOPHEN 1 TABLET: 5; 325 TABLET ORAL at 08:04

## 2025-04-17 RX ADMIN — SODIUM CHLORIDE: 9 INJECTION, SOLUTION INTRAVENOUS at 02:04

## 2025-04-17 RX ADMIN — IPRATROPIUM BROMIDE AND ALBUTEROL SULFATE 3 ML: 2.5; .5 SOLUTION RESPIRATORY (INHALATION) at 02:04

## 2025-04-17 RX ADMIN — LISINOPRIL 40 MG: 20 TABLET ORAL at 08:04

## 2025-04-17 RX ADMIN — POLYETHYLENE GLYCOL 3350 17 G: 17 POWDER, FOR SOLUTION ORAL at 08:04

## 2025-04-17 RX ADMIN — SODIUM ZIRCONIUM CYCLOSILICATE 10 G: 10 POWDER, FOR SUSPENSION ORAL at 02:04

## 2025-04-17 RX ADMIN — SODIUM ZIRCONIUM CYCLOSILICATE 10 G: 10 POWDER, FOR SUSPENSION ORAL at 09:04

## 2025-04-17 RX ADMIN — TAMSULOSIN HYDROCHLORIDE 0.4 MG: 0.4 CAPSULE ORAL at 08:04

## 2025-04-17 RX ADMIN — MUPIROCIN: 20 OINTMENT TOPICAL at 08:04

## 2025-04-17 RX ADMIN — ACETAMINOPHEN 1000 MG: 500 TABLET ORAL at 11:04

## 2025-04-17 RX ADMIN — LABETALOL HYDROCHLORIDE 10 MG: 5 INJECTION INTRAVENOUS at 11:04

## 2025-04-17 NOTE — ASSESSMENT & PLAN NOTE
- Chronic, with persistent elevation.  - Continue lisinopril 40mg PO daily and increase carvedilol to 12.5 mg PO BID today  - Stopped furosemide 40mg PO daily, HCTZ 25mg PO daily due to low Na levels  - Continue labetalol 10mg IV q4hr PRN.

## 2025-04-17 NOTE — ASSESSMENT & PLAN NOTE
- Was on ciprofloxacin, metronidazole PO to complete course when he developed new fever 04/11-04/12.  - Continue cefepime. UA with >100RBCs, 5 WBCs, rare bacteria; likely related to mackay placement. CXR appears benign; LLL zone not well evaluated due to heart silhouette but appears stable.  - Tachycardia sinus tach; no evidence of arrhythmia on telemetry. Infectious etiology should be covered - pain related? Increased pain control with hydrocodone-acetaminophen 5-325mg, 10-325mg PO q4hr PRN.  - Broadened workup - has been mobilizing with family in the room, but does have potential for thrombosis with fracture. D dimer elevated with fall / infection and with CKD would prefer to avoid CT angiography. Checked V/Q scan with intermediate probability, U/S BLE negative for DVT but given symptoms and intermediate probability, anticoagulated with heparin gtt on 4/14  - ID consult appreciated, will stop all antibiotics today and monitor, add bowel regimen  - Will transition to apixaban from heparin tomorrow if patient continues to do well  - Trend with labs

## 2025-04-17 NOTE — PT/OT/SLP EVAL
Occupational Therapy   Evaluation    Name: Frantz Sigala Jr.  MRN: [id number]  Admitting Diagnosis: Colitis  Recent Surgery: * No surgery found *      Recommendations:     Discharge Recommendations: Moderate Intensity Therapy  Discharge Equipment Recommendations:  to be determined by next level of care  Barriers to discharge:   (current functional level)    Assessment:     Frantz Sigala Jr. is a 60 y.o. male with a medical diagnosis of Colitis.  He presents with no pain. Performance deficits affecting function: weakness, impaired endurance, impaired self care skills, impaired functional mobility, gait instability, impaired balance, decreased coordination, decreased upper extremity function, decreased lower extremity function, decreased safety awareness.  Pt working with PT upon arrival to room with son present providing insight into current functional deficits.  Pt required Min A to perform supine <> sit and sit <> supine transfer with Mod A for rolling.  PT reported pt required Max A-Mod A, RW, with assist of 2 to perform sit <> stand transfer.      PTA son reports pt was ambulating without assist and performing most ADLs independently (some assist required for bathing).  Pt is deaf and blind, and does not speak at baseline.  Pt would benefit from skilled OT services to address problems listed above and increase independence with ADLs.  Moderate intensity therapy is recommended upon d/c from acute care to further address deficits and help pt improve overall functional independence.         Rehab Prognosis: Good; patient would benefit from acute skilled OT services to address these deficits and reach maximum level of function.       Plan:     Patient to be seen 5 x/week to address the above listed problems via self-care/home management, therapeutic activities, therapeutic exercises  Plan of Care Expires: 05/17/25  Plan of Care Reviewed with: patient    Subjective     Chief Complaint: unable to  verbalize  Patient/Family Comments/goals: resume PLOF    Occupational Profile:  Living Environment: Pt lives with son in [facility] (shotgun style).  Bathroom has tub/shower.  Previous level of function:   ADLs: Mostly independent; some assist required for bathing  Mobility: independent within home (pt has home memorized)  Roles and Routines: Father  Equipment Used at Home: none  Assistance upon Discharge: family able to provide assist    Pain/Comfort:  Pain Rating 1: 0/10  Pain Rating Post-Intervention 1: 0/10    Patients cultural, spiritual, Lutheran conflicts given the current situation: no    Objective:     Communicated with: PT (Cindy) prior to session.  Patient found HOB elevated with Condom Catheter, peripheral IV upon OT entry to room.  *Pt working with PT upon arrival to room; son present during session    General Precautions: Standard, fall  Orthopedic Precautions: N/A  Braces: N/A  Respiratory Status: Room air    Occupational Performance:    Bed Mobility:    Supine <> sit: Min A  Rolling: Mod A  Sit <> supine: Min A    Functional Mobility/Transfers:  Sit <> stand: Max A-Mod A, RW, with assist of x 2 trials from EOB  Activity performed with PT and son just prior to OT arrival  Pt appeared to have muscle fatigue with some shaking noted  Functional Mobility: No steps taken this date    Activities of Daily Living:  Upper Body Dressing: maximal assistance for adjusting gown and pulling upwards while supine with HOB elevated.    Cognitive/Visual Perceptual: Pt blind, deaf, and does not speak at baseline  Cognitive/Psychosocial Skills:    -       Oriented to: Unable to determine  -       Follows Commands/attention: Difficulty following commands noted 2* blind and deaf  -       Communication: non-verbal at baseline  -       Memory: unable to assess  -       Safety awareness/insight to disability: impaired   -       Mood/Affect/Coping skills/emotional control: Cooperative, made some moaning sounds during  session    Physical Exam:  Postural examination/scapula alignment:   -       Rounded shoulders  -       Forward head  Skin integrity: Visible skin intact  Edema:  none noted  Motor Planning: some difficulty   Dominant hand: right  Upper Extremity Range of Motion: As observed through functional tasks    -       Right Upper Extremity: WFL  -       Left Upper Extremity: WFL  Upper Extremity Strength: As observed through functional tasks  -       Right Upper Extremity: WFL  -       Left Upper Extremity: WFL   Strength: WFL  Fine Motor Coordination: Intact  Gross motor coordination: Limited  Balance: Sitting- SBA; Standing- Max A    AMPAC 6 Click ADL:  AMPAC Total Score: 12    Treatment & Education:  *Pt and son educated on role of OT in acute care setting    Patient left HOB elevated with all lines intact, call button in reach, bed alarm on, and son present    GOALS:   Multidisciplinary Problems       Occupational Therapy Goals          Problem: Occupational Therapy    Goal Priority Disciplines Outcome Interventions   Occupational Therapy Goal     OT, PT/OT Progressing    Description: Goals to be met by: 5/1/2025     Patient will increase functional independence with ADLs by performing:    UE Dressing with Supervision.  LE Dressing with Minimal Assistance.  Grooming while standing at sink with Contact Guard Assistance.  Toileting from toilet with Contact Guard Assistance for hygiene and clothing management.   Toilet transfer to toilet with Contact Guard Assistance.                         DME Justifications:  No DME recommended requiring DME justifications    History:     Past Medical History:   Diagnosis Date    Anxiety     Asthma     Schizophrenia          Past Surgical History:   Procedure Laterality Date    CORNEAL TRANSPLANT         Time Tracking:     OT Date of Treatment: 04/17/25  OT Start Time: 1134  OT Stop Time: 1142  OT Total Time (min): 8 min    Billable Minutes:Evaluation 8    *completed with  PT    Shelly Lezama, LOTR  4/17/2025

## 2025-04-17 NOTE — PLAN OF CARE
D/C Recommendation- Moderate Intensity Therapy  DME Recommendation- TBD     Eval completed; pt currently performing well below his baseline. Pt PLOF is independent with ambulation in the home but does require some assistance w ADLS. Pt completely blind, deaf, and does not speak. Performed bed mobility with Min A and tactiles cues for guidance. Performed sit to stand at RW with max A x2. Tolerated sitting up at EOB x10 min with SBA for safety. Son present and helpful throughout assessment.     Problem: Physical Therapy  Goal: Physical Therapy Goal  Description: P.T goals to be met in 2 weeks as follows:  1. Mod I Bed Mobility  2. Min A T/F  3. Gait 50'  Min A with LRAD  4.         Tolerate being out of bed x2 hours.       Outcome: Progressing

## 2025-04-17 NOTE — PLAN OF CARE
Problem: Occupational Therapy  Goal: Occupational Therapy Goal  Description: Goals to be met by: 5/1/2025     Patient will increase functional independence with ADLs by performing:    UE Dressing with Supervision.  LE Dressing with Minimal Assistance.  Grooming while standing at sink with Contact Guard Assistance.  Toileting from toilet with Contact Guard Assistance for hygiene and clothing management.   Toilet transfer to toilet with Contact Guard Assistance.    Outcome: Progressing     OT evaluation complete and POC established.  Moderate intensity therapy is recommended upon d/c from acute care to further address deficits and help pt improve overall functional independence.     CORY Keane  4/17/2025

## 2025-04-17 NOTE — PLAN OF CARE
Discharge disposition pending therapy recs   04/17/25 1112   Discharge Reassessment   Assessment Type Discharge Planning Reassessment   Did the patient's condition or plan change since previous assessment? No   Discharge Plan discussed with: Caregiver   Communicated JAQUAN with patient/caregiver Date not available/Unable to determine   Discharge Plan A Home Health   Discharge Plan B Home with family   DME Needed Upon Discharge  none   Transition of Care Barriers None   Why the patient remains in the hospital Requires continued medical care   Post-Acute Status   Discharge Delays None known at this time

## 2025-04-17 NOTE — ASSESSMENT & PLAN NOTE
>>ASSESSMENT AND PLAN FOR CKD (CHRONIC KIDNEY DISEASE), STAGE III WRITTEN ON 4/16/2025  8:42 PM BY WEN ALVARENGA MD    - Chronic; stable at baseline.  - Avoid nephrotoxins, renally dose medications.

## 2025-04-17 NOTE — ASSESSMENT & PLAN NOTE
- Was on ciprofloxacin, metronidazole PO to complete course when he developed new fever 04/11-04/12.  - Continue cefepime. UA with >100RBCs, 5 WBCs, rare bacteria; likely related to mackay placement. CXR appears benign; LLL zone not well evaluated due to heart silhouette but appears stable.  - Tachycardia sinus tach; no evidence of arrhythmia on telemetry. Infectious etiology should be covered - pain related? Increased pain control with hydrocodone-acetaminophen 5-325mg, 10-325mg PO q4hr PRN.  - Broadened workup - has been mobilizing with family in the room, but does have potential for thrombosis with fracture. D dimer elevated with fall / infection and with CKD would prefer to avoid CT angiography. Checked V/Q scan with intermediate probability, U/S BLE negative for DVT but given symptoms and intermediate probability, anticoagulated with heparin gtt on 4/14  - ID consult appreciated, stopped all antibiotics on 4/16   - Remains AF and WBC still down, monitor, continue bowel regimen  - Will transition to apixaban from heparin today  - Trend with labs

## 2025-04-17 NOTE — PROGRESS NOTES
[facility] ([address] Medicine  Progress Note    Patient Name: Frantz Sigala Jr.  MRN: [id number]  Patient Class: IP- Inpatient   Admission Date: 4/8/2025  Length of Stay: 8 days  Attending Physician: Brooklyn Wellington MD  Primary Care Provider: Zachary Ramos MD        Subjective     Principal Problem:Colitis        HPI:  Mr. Sigala is a 60 YOM with PMHx of HTN, asthma, CKD (baseline SCr 2-2.5), schizophrenia, blindness, and deafness.     He presents to ED via EMS with family due to syncopal event just PTA. Family provides history due patients deafness and blindness. They note that he was in his normal state of health today until right after lunch. He ate a salami sandwich with potato chips and drank orange juice; shortly thereafter he was in the bathroom when his mother heard a loud thud and patient was found by his brother on the ground unconscious.  His brother notes that he called out the patients name and slapped him on his back a few times at which time he seemed to come to and began vomiting with concurrent episodes of diarrhea.  Sister notes she has had a recent respiratory illness with abdominal cramping and some diarrhea. Family notes that patient has lived in the home he is in since a small child and that he is able to navigate the home independently including going upstairs and outside.     In the ED he is hypertensive, heart rate stable, saturations initially 80% on room air with improvement after 2 L nasal cannula application, and afebrile.  CBC with WBC 21, H&H 9.4/29.4, platelets 497.  Chemistry was , K 4.4, chloride 100, CO2 22, BUN 49, SCr 2.2, glucose 125.  LFTs unremarkable.  BNP 35.  Troponin 0.035 >> 0.059 (appear similar to priors however these were in 2015).  Lactic acid 2.2.  Flu and COVID negative.  Hepatitis-C and HIV nonreactive.  UA noninfectious.  CXR with chronic appearing interstitial findings, congestive change or edema is a consideration; no  large focal consolidation. CTH with no acute intracranial abnormality detected, bilateral partial opacification of the mastoid air cells, sphenoid sinus disease.     CT A/P:  Wall thickening of the transverse colon, suggestive of nonspecific colitis.   Wall thickening of the urinary bladder.  The findings may be seen with cystitis.   Advanced calcifications of the abdominal aorta and the mesenteric arteries.  Correlation with possible vascular ultrasound of the mesenteric vessels may be obtained for further assessment. 1.2 cm hypodensity in the upper pole of the right kidney with Houn[place]field of 23.  This can be assessed with renal ultrasound.     The patient was admitted to the Hospital Medicine Service for further evaluation and management.     Overview/Hospital Course:  Admitted with syncope and fall suspected secondary to vasovagal episode due to underlying colitis. Started ciprofloxacin, metronidazole and stool studies ordered though had no further loose stool to collect. Echo showed normal EF and grade I diastolic dysfunction, carotid U/S unremarkable. Complained of increased LLE pain and XR obtained demonstrating proximal fibular fracture. Orthopedics consulted/ in discussion with Dr. Moreira recommended weight bearing as tolerated, no splint/boot required and follow-up in clinic. Developed urinary retention and increasing tachycardia; after tamsulosin resumed and straight catheterization, had persistent retention and mackay placed. Urology consulted and recommended maintaining further prior to void trial. Had new onset fevers and converted to cefepime. Hypertension and tachycardia persisted; pain control adjusted and use liberalized, but persisted. ID consulted with uptrending WBCs and evaluation for thrombosis initiated.    Interval History: No further fevers and appears more comfortable. HR decreased. No BM since Monday. Discussed plan of care with family members at bedside / via phone.     Review of Systems    Unable to perform ROS: Patient nonverbal     Objective:     Vital Signs (Most Recent):  Temp: 98.4 °F (36.9 °C) (04/16/25 1947)  Pulse: 100 (04/16/25 2018)  Resp: 17 (04/16/25 1947)  BP: (Abnormal) 170/77 (04/16/25 2018)  SpO2: 96 % (04/16/25 1947) Vital Signs (24h Range):  Temp:  [98.1 °F (36.7 °C)-98.6 °F (37 °C)] 98.4 °F (36.9 °C)  Pulse:  [] 100  Resp:  [16-18] 17  SpO2:  [92 %-98 %] 96 %  BP: (145-195)/(76-93) 170/77     Weight: 91.6 kg (202 lb)  Body mass index is 32.6 kg/m².    Intake/Output Summary (Last 24 hours) at 4/16/2025 2028  Last data filed at 4/16/2025 1812  Gross per 24 hour   Intake 600 ml   Output 1325 ml   Net -725 ml         Physical Exam  Vitals and nursing note reviewed.   Constitutional:       General: He is sleeping. He is not in acute distress.     Appearance: He is well-developed. He is ill-appearing (Chronically).   HENT:      Head: Normocephalic and atraumatic.      Ears:      Comments: Anacusis bilaterally  Eyes:      General:         Right eye: No discharge.         Left eye: No discharge.      Conjunctiva/sclera: Conjunctivae normal.      Comments: Corneal opacity bilaterally, blind   Cardiovascular:      Rate and Rhythm: Tachycardia present.      Pulses: Normal pulses.   Pulmonary:      Effort: Pulmonary effort is normal. No respiratory distress.   Abdominal:      Palpations: Abdomen is soft.      Tenderness: There is no abdominal tenderness.   Genitourinary:     Comments: External catheter in place with clear urine.  Musculoskeletal:         General: Tenderness (Reports tenderness to R knee.) present. Normal range of motion.      Right lower leg: No edema.      Left lower leg: No edema.   Skin:     General: Skin is warm and dry.   Neurological:      Mental Status: He is easily aroused. Mental status is at baseline.           Significant Labs:   CBC:  Recent Labs   Lab 04/14/25  0506 04/15/25  0418 04/16/25  0326   WBC 18.87* 16.02* 13.17*   HGB 8.7* 8.0* 7.8*   HCT 27.0*  25.2* 24.0*    455* 476*   LYMPH 0.69*  3.7* 0.56*  3.5* 0.66*  5.0*   MONO 6.7  1.27* 7.2  1.15* 8.4  1.11*   EOS 0.5  0.10 0.6  0.09 3.0  0.39   BMP:  Recent Labs   Lab 04/14/25  0506 04/15/25  0418 04/16/25  0326   * 127* 127*   K 4.8 4.8 4.7   CL 96 92* 91*   CO2 23 24 26   BUN 44* 57* 66*   CREATININE 2.6* 2.6* 2.8*   CALCIUM 9.2 9.1 9.2   MG 2.2 2.2 2.2   PHOS 4.7* 3.9 3.9     Significant Imaging: I have reviewed and interpreted all pertinent imaging results/findings within the past 24 hours.      Assessment & Plan  Colitis  Fever  Leukocytosis  - Was on ciprofloxacin, metronidazole PO to complete course when he developed new fever 04/11-04/12.  - Continue cefepime. UA with >100RBCs, 5 WBCs, rare bacteria; likely related to mackay placement. CXR appears benign; LLL zone not well evaluated due to heart silhouette but appears stable.  - Tachycardia sinus tach; no evidence of arrhythmia on telemetry. Infectious etiology should be covered - pain related? Increased pain control with hydrocodone-acetaminophen 5-325mg, 10-325mg PO q4hr PRN.  - Broadened workup - has been mobilizing with family in the room, but does have potential for thrombosis with fracture. D dimer elevated with fall / infection and with CKD would prefer to avoid CT angiography. Checked V/Q scan with intermediate probability, U/S BLE negative for DVT but given symptoms and intermediate probability, anticoagulated with heparin gtt on 4/14  - ID consult appreciated, will stop all antibiotics today and monitor, add bowel regimen  - Will transition to apixaban from heparin tomorrow if patient continues to do well  - Trend with labs  Syncope  - Appears was vasovagal 2/2 colitis / volume losses and BMs with the former.  - Troponin stable. No evidence of acute ischemic event. No recurrence of syncope.  - Echo with normal EF, grade I diastolic dysfunction likely related to HTN. Carotid U/S unremarkable.  - NS IV fluids for 1 liter  today, but given slowly at 50 ml/hr  - Monitor on telemetry.  Essential hypertension  - Chronic, with persistent elevation.  - Continue lisinopril 40mg PO daily and increase carvedilol to 12.5 mg PO BID today  - Stopped furosemide 40mg PO daily, HCTZ 25mg PO daily due to low Na levels  - Continue labetalol 10mg IV q4hr PRN.  CKD (chronic kidney disease), stage III  - Chronic; stable at baseline.  - Avoid nephrotoxins, renally dose medications.  Asthma  - Chronic; stable.  - Continue albuterol-ipratropium 2.5-0.5mg inhaled q4hr PRN.  Schizophrenia  - Chronic; stable. Continue risperidone 1mg PO qHS.  Blind  Deaf, bilateral  - Chronic; fall precautions.  Left fibular fracture  - XR demonstrating L fibular fracture; follow-up XR of tib/fib to evaluate for further areas of potential fracture unremarkable. Reported R-sided knee discomfort in addition with XR pending final read but no fracture visible to my evaluation.  - Discussed with orthopedics, Dr. Moreira; weight bearing as tolerated, no splint/casting required, follow-up in clinic  - PT and OT evaluation  Urinary retention  - Had persistently elevated volumes on bladder scan despite straight catheterization. Clark placed and resumed tamsulosin 0.4mg PO daily.   - Successful voiding trial completed on 4/15  VTE Risk Mitigation (From admission, onward)           Ordered     heparin 25,000 units in dextrose 5% (100 units/ml) IV bolus from bag HIGH INTENSITY nomogram - [facility]  As needed (PRN)        Question:  Heparin Infusion Adjustment (DO NOT MODIFY ANSWER)  Answer:  \[employer]\Images\Pharmacy\HeparinInfusions\heparin HIGH INTENSITY nomogram for [facility] [id number]    04/14/25 1727     heparin 25,000 units in dextrose 5% (100 units/ml) IV bolus from bag HIGH INTENSITY nomogram - [facility]  As needed (PRN)        Question:  Heparin Infusion Adjustment (DO NOT MODIFY ANSWER)  Answer:  \[employer]\Images\Pharmacy\HeparinInfusions\heparin HIGH INTENSITY nomogram for [facility]  [id number]    04/14/25 1727     heparin 25,000 units in dextrose 5% 250 mL (100 units/mL) infusion HIGH INTENSITY nomogram - [facility]  Continuous        Question:  Begin at (units/kg/hr)  Answer:  18    04/14/25 1727     IP VTE HIGH RISK PATIENT  Once         04/08/25 1943     Place sequential compression device  Until discontinued         04/08/25 1943     Reason for No Pharmacological VTE Prophylaxis  Once        Question:  Reasons:  Answer:  Risk of Bleeding    04/08/25 1943                        Brooklyn Wellington MD  Department of Hospital Medicine   [facility] ([address])

## 2025-04-17 NOTE — PT/OT/SLP EVAL
Physical Therapy Evaluation    Patient Name:  Frantz Sigala Jr.   MRN:  [id number]    Recommendations:     Discharge Recommendations: Moderate Intensity Therapy   Discharge Equipment Recommendations: to be determined by next level of care   Barriers to discharge:  mobility limitations    Assessment:     Frantz Sigala Jr. is a 60 y.o. male admitted with a medical diagnosis of Colitis.  He presents with the following impairments/functional limitations: weakness, impaired endurance, impaired functional mobility, gait instability, impaired balance, decreased coordination .  pt currently performing well below his baseline. Pt PLOF is independent with ambulation in the home but does require some assistance w ADLS. Pt completely blind, deaf, and does not speak. Performed bed mobility with Min A and tactiles cues for guidance. Performed sit to stand at RW with max A x2. Tolerated sitting up at EOB x10 min with SBA for safety. Son present and helpful throughout assessment.     Rehab Prognosis: Good; patient would benefit from acute skilled PT services to address these deficits and reach maximum level of function.    Recent Surgery: * No surgery found *      Plan:     During this hospitalization, patient to be seen 5 x/week to address the identified rehab impairments via therapeutic activities, gait training, therapeutic exercises, neuromuscular re-education and progress toward the following goals:    Plan of Care Expires:  05/01/25    Subjective     Chief Complaint: unable to verbalize. Son at bedside.  Patient/Family Comments/goals: son agreeable to PT evaluation.   Pain/Comfort:  Pain Rating 1: 0/10    Patients cultural, spiritual, Methodist conflicts given the current situation: no    Living Environment:  Lives in a [facility] with son (other son who was not present at this time)  Prior to admission, patients level of function was independent w ambulation within the home and required some assistance with ADLs.  Equipment  used at home: none.  DME owned (not currently used): none.  Upon discharge, patient will have assistance from family.    Objective:     Communicated with JUAN C Miguel prior to session.  Patient found supine with mackay catheter, peripheral IV  upon PT entry to room.    General Precautions: Standard,    Orthopedic Precautions:N/A   Braces: N/A  Respiratory Status: Room air    Exams:  RLE ROM: WFL  RLE Strength: at least 3/5 observed through mobility in the bed and at EOB  LLE ROM: WFL  LLE Strength: at least 3/5 observed through mobility in the bed and at EOB    Functional Mobility:  Bed Mobility:  Rolling Left:  moderate assistance  Rolling Right: moderate assistance  Scooting: minimum assistance  Supine to Sit: minimum assistance  Sit to Supine: minimum assistance  Transfers:  Sit to Stand:  mod-max assist x2 (son assisting) with rolling walker      AM-PAC 6 CLICK MOBILITY  Total Score:13       Treatment & Education:  Rolling to R&L while adjusting bed linens  Static sitting at EOB w ENRICO UE support and then only LUE support x8 minutes  Sit to stand at RW x2 trials with max A x2. Muscle fatigue visualized as pt shaking and then pt returned to sitting. Pt stood <30 seconds during both trials w cues for upright posture but poor correction observed.     Patient left supine with all lines intact, call button in reach, and son at bedside .    GOALS:   Multidisciplinary Problems       Physical Therapy Goals          Problem: Physical Therapy    Goal Priority Disciplines Outcome Interventions   Physical Therapy Goal     PT, PT/OT Progressing    Description: P.T goals to be met in 2 weeks as follows:  1. Mod I Bed Mobility  2. Min A T/F  3. Gait 50'  Min A with LRAD  4.         Tolerate being out of bed x2 hours.                            DME Justifications:  No DME recommended requiring DME justifications    History:     Past Medical History:   Diagnosis Date    Anxiety     Asthma     Schizophrenia        Past Surgical  History:   Procedure Laterality Date    CORNEAL TRANSPLANT         Time Tracking:     PT Received On: 04/17/25  PT Start Time: 1110     PT Stop Time: 1142  PT Total Time (min): 32 min     Billable Minutes: Evaluation 20 and Therapeutic Activity 12      04/17/2025

## 2025-04-17 NOTE — SUBJECTIVE & OBJECTIVE
Interval History: No further fevers and appears more comfortable. HR decreased. Family at the bedside said he looks more like himself. Discussed plan of care with family members at bedside.     Review of Systems   Unable to perform ROS: Patient nonverbal     Objective:     Vital Signs (Most Recent):  Temp: 99 °F (37.2 °C) (04/17/25 1552)  Pulse: 104 (04/17/25 1552)  Resp: 18 (04/17/25 1552)  BP: (Abnormal) 184/77 (04/17/25 1552)  SpO2: 99 % (04/17/25 1552) Vital Signs (24h Range):  Temp:  [97.4 °F (36.3 °C)-99 °F (37.2 °C)] 99 °F (37.2 °C)  Pulse:  [] 104  Resp:  [16-20] 18  SpO2:  [94 %-100 %] 99 %  BP: (145-193)/(76-93) 184/77     Weight: 91.6 kg (202 lb)  Body mass index is 32.6 kg/m².    Intake/Output Summary (Last 24 hours) at 4/17/2025 1712  Last data filed at 4/17/2025 1429  Gross per 24 hour   Intake 780 ml   Output 2800 ml   Net -2020 ml         Physical Exam  Vitals and nursing note reviewed.   Constitutional:       General: He is sleeping. He is not in acute distress.     Appearance: He is well-developed. He is ill-appearing (Chronically).   HENT:      Head: Normocephalic and atraumatic.      Ears:      Comments: Anacusis bilaterally  Eyes:      General:         Right eye: No discharge.         Left eye: No discharge.      Conjunctiva/sclera: Conjunctivae normal.      Comments: Corneal opacity bilaterally, blind   Cardiovascular:      Rate and Rhythm: Tachycardia present.      Pulses: Normal pulses.   Pulmonary:      Effort: Pulmonary effort is normal. No respiratory distress.   Abdominal:      Palpations: Abdomen is soft.      Tenderness: There is no abdominal tenderness.   Genitourinary:     Comments: External catheter in place with clear urine.  Musculoskeletal:         General: Tenderness (Reports tenderness to R knee.) present. Normal range of motion.      Right lower leg: No edema.      Left lower leg: No edema.   Skin:     General: Skin is warm and dry.   Neurological:      Mental Status: He  is easily aroused. Mental status is at baseline.           Significant Labs:   CBC:  Recent Labs   Lab 04/15/25  0418 04/16/25  0326 04/17/25  0542   WBC 16.02* 13.17* 12.34   HGB 8.0* 7.8* 7.2*   HCT 25.2* 24.0* 22.6*   * 476* 405   LYMPH 0.56*  3.5* 0.66*  5.0* 0.74*  6.0*   MONO 7.2  1.15* 8.4  1.11* 10.8  1.33*   EOS 0.6  0.09 3.0  0.39 3.0  0.37   BMP:  Recent Labs   Lab 04/15/25  0418 04/16/25  0326 04/17/25  0542 04/17/25  1219   * 127* 125* 127*   K 4.8 4.7 5.6* 5.5*   CL 92* 91* 94* 93*   CO2 24 26 21* 26   BUN 57* 66* 71* 70*   CREATININE 2.6* 2.8* 2.6* 2.7*   CALCIUM 9.1 9.2 9.0 9.0   MG 2.2 2.2 2.2  --    PHOS 3.9 3.9 2.9  --      Significant Imaging: I have reviewed and interpreted all pertinent imaging results/findings within the past 24 hours.

## 2025-04-17 NOTE — ASSESSMENT & PLAN NOTE
- XR demonstrating L fibular fracture; follow-up XR of tib/fib to evaluate for further areas of potential fracture unremarkable. Reported R-sided knee discomfort in addition with XR pending final read but no fracture visible to my evaluation.  - Discussed with orthopedics, Dr. Moreira; weight bearing as tolerated, no splint/casting required, follow-up in clinic  - PT and OT evaluation, recommendation for moderate intensity

## 2025-04-17 NOTE — SUBJECTIVE & OBJECTIVE
Interval History: No further fevers and appears more comfortable. HR decreased. No BM since Monday. Discussed plan of care with family members at bedside / via phone.     Review of Systems   Unable to perform ROS: Patient nonverbal     Objective:     Vital Signs (Most Recent):  Temp: 98.4 °F (36.9 °C) (04/16/25 1947)  Pulse: 100 (04/16/25 2018)  Resp: 17 (04/16/25 1947)  BP: (Abnormal) 170/77 (04/16/25 2018)  SpO2: 96 % (04/16/25 1947) Vital Signs (24h Range):  Temp:  [98.1 °F (36.7 °C)-98.6 °F (37 °C)] 98.4 °F (36.9 °C)  Pulse:  [] 100  Resp:  [16-18] 17  SpO2:  [92 %-98 %] 96 %  BP: (145-195)/(76-93) 170/77     Weight: 91.6 kg (202 lb)  Body mass index is 32.6 kg/m².    Intake/Output Summary (Last 24 hours) at 4/16/2025 2028  Last data filed at 4/16/2025 1812  Gross per 24 hour   Intake 600 ml   Output 1325 ml   Net -725 ml         Physical Exam  Vitals and nursing note reviewed.   Constitutional:       General: He is sleeping. He is not in acute distress.     Appearance: He is well-developed. He is ill-appearing (Chronically).   HENT:      Head: Normocephalic and atraumatic.      Ears:      Comments: Anacusis bilaterally  Eyes:      General:         Right eye: No discharge.         Left eye: No discharge.      Conjunctiva/sclera: Conjunctivae normal.      Comments: Corneal opacity bilaterally, blind   Cardiovascular:      Rate and Rhythm: Tachycardia present.      Pulses: Normal pulses.   Pulmonary:      Effort: Pulmonary effort is normal. No respiratory distress.   Abdominal:      Palpations: Abdomen is soft.      Tenderness: There is no abdominal tenderness.   Genitourinary:     Comments: External catheter in place with clear urine.  Musculoskeletal:         General: Tenderness (Reports tenderness to R knee.) present. Normal range of motion.      Right lower leg: No edema.      Left lower leg: No edema.   Skin:     General: Skin is warm and dry.   Neurological:      Mental Status: He is easily aroused.  Mental status is at baseline.           Significant Labs:   CBC:  Recent Labs   Lab 04/14/25  0506 04/15/25  0418 04/16/25  0326   WBC 18.87* 16.02* 13.17*   HGB 8.7* 8.0* 7.8*   HCT 27.0* 25.2* 24.0*    455* 476*   LYMPH 0.69*  3.7* 0.56*  3.5* 0.66*  5.0*   MONO 6.7  1.27* 7.2  1.15* 8.4  1.11*   EOS 0.5  0.10 0.6  0.09 3.0  0.39   BMP:  Recent Labs   Lab 04/14/25  0506 04/15/25  0418 04/16/25  0326   * 127* 127*   K 4.8 4.8 4.7   CL 96 92* 91*   CO2 23 24 26   BUN 44* 57* 66*   CREATININE 2.6* 2.6* 2.8*   CALCIUM 9.2 9.1 9.2   MG 2.2 2.2 2.2   PHOS 4.7* 3.9 3.9     Significant Imaging: I have reviewed and interpreted all pertinent imaging results/findings within the past 24 hours.

## 2025-04-17 NOTE — ASSESSMENT & PLAN NOTE
- Had persistently elevated volumes on bladder scan despite straight catheterization. Clark placed and resumed tamsulosin 0.4mg PO daily.   - Successful voiding trial completed on 4/15

## 2025-04-17 NOTE — PLAN OF CARE
Problem: Adult Inpatient Plan of Care  Goal: Plan of Care Review  Outcome: Progressing  Goal: Patient-Specific Goal (Individualized)  Outcome: Progressing  Goal: Absence of Hospital-Acquired Illness or Injury  Outcome: Progressing  Goal: Optimal Comfort and Wellbeing  Outcome: Progressing  Goal: Readiness for Transition of Care  Outcome: Progressing     Problem: Infection  Goal: Absence of Infection Signs and Symptoms  Outcome: Progressing     Problem: Acute Kidney Injury/Impairment  Goal: Fluid and Electrolyte Balance  Outcome: Progressing  Goal: Improved Oral Intake  Outcome: Progressing  Goal: Effective Renal Function  Outcome: Progressing     Problem: Syncope  Goal: Absence of Syncopal Symptoms  Outcome: Progressing     Problem: Fall Injury Risk  Goal: Absence of Fall and Fall-Related Injury  Outcome: Progressing     Problem: Gas Exchange Impaired  Goal: Optimal Gas Exchange  Outcome: Progressing     Problem: Skin Injury Risk Increased  Goal: Skin Health and Integrity  Outcome: Progressing     Problem: Wound  Goal: Optimal Coping  Outcome: Progressing  Goal: Optimal Functional Ability  Outcome: Progressing  Goal: Absence of Infection Signs and Symptoms  Outcome: Progressing    Pt doing well. Easily aroused, rest most of day. Not wanting to put much weight on leg.  Family alternates at bedside to make patient more comfortable since blind/deaf.  Safety maintained.  IV fluids continue at 50 cc/hr.

## 2025-04-17 NOTE — ASSESSMENT & PLAN NOTE
- Appears was vasovagal 2/2 colitis / volume losses and BMs with the former.  - Troponin stable. No evidence of acute ischemic event. No recurrence of syncope.  - Echo with normal EF, grade I diastolic dysfunction likely related to HTN. Carotid U/S unremarkable.  - NS IV fluids for 1 liter today, but given slowly at 50 ml/hr  - Monitor on telemetry.

## 2025-04-17 NOTE — ASSESSMENT & PLAN NOTE
- XR demonstrating L fibular fracture; follow-up XR of tib/fib to evaluate for further areas of potential fracture unremarkable. Reported R-sided knee discomfort in addition with XR pending final read but no fracture visible to my evaluation.  - Discussed with orthopedics, Dr. Moreira; weight bearing as tolerated, no splint/casting required, follow-up in clinic  - PT and OT evaluation

## 2025-04-17 NOTE — NURSING
Heparin at 22 units/ kg/ hr = 16.5 ml hr infusing.  Aptt 60.9 Therapeutic. Next leval 04/18/25 AM  no changes required.

## 2025-04-17 NOTE — PROGRESS NOTES
Addressed thru labs   [facility] Surg ([address] Medicine  Progress Note    Patient Name: Frantz Sigala Jr.  MRN: [id number]  Patient Class: IP- Inpatient   Admission Date: 4/8/2025  Length of Stay: 9 days  Attending Physician: Brooklyn Wellington MD  Primary Care Provider: Zachary Ramos MD        Subjective     Principal Problem:Colitis        HPI:  Mr. Sigala is a 60 YOM with PMHx of HTN, asthma, CKD (baseline SCr 2-2.5), schizophrenia, blindness, and deafness.     He presents to ED via EMS with family due to syncopal event just PTA. Family provides history due patients deafness and blindness. They note that he was in his normal state of health today until right after lunch. He ate a salami sandwich with potato chips and drank orange juice; shortly thereafter he was in the bathroom when his mother heard a loud thud and patient was found by his brother on the ground unconscious.  His brother notes that he called out the patients name and slapped him on his back a few times at which time he seemed to come to and began vomiting with concurrent episodes of diarrhea.  Sister notes she has had a recent respiratory illness with abdominal cramping and some diarrhea. Family notes that patient has lived in the home he is in since a small child and that he is able to navigate the home independently including going upstairs and outside.     In the ED he is hypertensive, heart rate stable, saturations initially 80% on room air with improvement after 2 L nasal cannula application, and afebrile.  CBC with WBC 21, H&H 9.4/29.4, platelets 497.  Chemistry was , K 4.4, chloride 100, CO2 22, BUN 49, SCr 2.2, glucose 125.  LFTs unremarkable.  BNP 35.  Troponin 0.035 >> 0.059 (appear similar to priors however these were in 2015).  Lactic acid 2.2.  Flu and COVID negative.  Hepatitis-C and HIV nonreactive.  UA noninfectious.  CXR with chronic appearing interstitial findings, congestive change or edema is a consideration; no  large focal consolidation. CTH with no acute intracranial abnormality detected, bilateral partial opacification of the mastoid air cells, sphenoid sinus disease.     CT A/P:  Wall thickening of the transverse colon, suggestive of nonspecific colitis.   Wall thickening of the urinary bladder.  The findings may be seen with cystitis.   Advanced calcifications of the abdominal aorta and the mesenteric arteries.  Correlation with possible vascular ultrasound of the mesenteric vessels may be obtained for further assessment. 1.2 cm hypodensity in the upper pole of the right kidney with Hounsfield of 23.  This can be assessed with renal ultrasound.     The patient was admitted to the Hospital Medicine Service for further evaluation and management.     Overview/Hospital Course:  Admitted with syncope and fall suspected secondary to vasovagal episode due to underlying colitis. Started ciprofloxacin, metronidazole and stool studies ordered though had no further loose stool to collect. Echo showed normal EF and grade I diastolic dysfunction, carotid U/S unremarkable. Complained of increased LLE pain and XR obtained demonstrating proximal fibular fracture. Orthopedics consulted/ in discussion with Dr. Moreira recommended weight bearing as tolerated, no splint/boot required and follow-up in clinic. Developed urinary retention and increasing tachycardia; after tamsulosin resumed and straight catheterization, had persistent retention and mackay placed. Urology consulted and recommended maintaining further prior to void trial. Had new onset fevers and converted to cefepime. Hypertension and tachycardia persisted; pain control adjusted and use liberalized, but persisted. ID consulted with uptrending WBCs and evaluation for thrombosis initiated.    Interval History: No further fevers and appears more comfortable. HR decreased. Family at the bedside said he looks more like himself. Discussed plan of care with family members at bedside.      Review of Systems   Unable to perform ROS: Patient nonverbal     Objective:     Vital Signs (Most Recent):  Temp: 99 °F (37.2 °C) (04/17/25 1552)  Pulse: 104 (04/17/25 1552)  Resp: 18 (04/17/25 1552)  BP: (Abnormal) 184/77 (04/17/25 1552)  SpO2: 99 % (04/17/25 1552) Vital Signs (24h Range):  Temp:  [97.4 °F (36.3 °C)-99 °F (37.2 °C)] 99 °F (37.2 °C)  Pulse:  [] 104  Resp:  [16-20] 18  SpO2:  [94 %-100 %] 99 %  BP: (145-193)/(76-93) 184/77     Weight: 91.6 kg (202 lb)  Body mass index is 32.6 kg/m².    Intake/Output Summary (Last 24 hours) at 4/17/2025 1712  Last data filed at 4/17/2025 1429  Gross per 24 hour   Intake 780 ml   Output 2800 ml   Net -2020 ml         Physical Exam  Vitals and nursing note reviewed.   Constitutional:       General: He is sleeping. He is not in acute distress.     Appearance: He is well-developed. He is ill-appearing (Chronically).   HENT:      Head: Normocephalic and atraumatic.      Ears:      Comments: Anacusis bilaterally  Eyes:      General:         Right eye: No discharge.         Left eye: No discharge.      Conjunctiva/sclera: Conjunctivae normal.      Comments: Corneal opacity bilaterally, blind   Cardiovascular:      Rate and Rhythm: Tachycardia present.      Pulses: Normal pulses.   Pulmonary:      Effort: Pulmonary effort is normal. No respiratory distress.   Abdominal:      Palpations: Abdomen is soft.      Tenderness: There is no abdominal tenderness.   Genitourinary:     Comments: External catheter in place with clear urine.  Musculoskeletal:         General: Tenderness (Reports tenderness to R knee.) present. Normal range of motion.      Right lower leg: No edema.      Left lower leg: No edema.   Skin:     General: Skin is warm and dry.   Neurological:      Mental Status: He is easily aroused. Mental status is at baseline.           Significant Labs:   CBC:  Recent Labs   Lab 04/15/25  0418 04/16/25  0326 04/17/25  0542   WBC 16.02* 13.17* 12.34   HGB 8.0* 7.8*  7.2*   HCT 25.2* 24.0* 22.6*   * 476* 405   LYMPH 0.56*  3.5* 0.66*  5.0* 0.74*  6.0*   MONO 7.2  1.15* 8.4  1.11* 10.8  1.33*   EOS 0.6  0.09 3.0  0.39 3.0  0.37   BMP:  Recent Labs   Lab 04/15/25  0418 04/16/25  0326 04/17/25  0542 04/17/25  1219   * 127* 125* 127*   K 4.8 4.7 5.6* 5.5*   CL 92* 91* 94* 93*   CO2 24 26 21* 26   BUN 57* 66* 71* 70*   CREATININE 2.6* 2.8* 2.6* 2.7*   CALCIUM 9.1 9.2 9.0 9.0   MG 2.2 2.2 2.2  --    PHOS 3.9 3.9 2.9  --      Significant Imaging: I have reviewed and interpreted all pertinent imaging results/findings within the past 24 hours.      Assessment & Plan  Colitis  Fever  Leukocytosis  - Was on ciprofloxacin, metronidazole PO to complete course when he developed new fever 04/11-04/12.  - Continue cefepime. UA with >100RBCs, 5 WBCs, rare bacteria; likely related to mackay placement. CXR appears benign; LLL zone not well evaluated due to heart silhouette but appears stable.  - Tachycardia sinus tach; no evidence of arrhythmia on telemetry. Infectious etiology should be covered - pain related? Increased pain control with hydrocodone-acetaminophen 5-325mg, 10-325mg PO q4hr PRN.  - Broadened workup - has been mobilizing with family in the room, but does have potential for thrombosis with fracture. D dimer elevated with fall / infection and with CKD would prefer to avoid CT angiography. Checked V/Q scan with intermediate probability, U/S BLE negative for DVT but given symptoms and intermediate probability, anticoagulated with heparin gtt on 4/14  - ID consult appreciated, stopped all antibiotics on 4/16   - Remains AF and WBC still down, monitor, continue bowel regimen  - Will transition to apixaban from heparin today  - Trend with labs  Syncope  - Appears was vasovagal 2/2 colitis / volume losses and BMs with the former.  - Troponin stable. No evidence of acute ischemic event. No recurrence of syncope.  - Echo with normal EF, grade I diastolic dysfunction  likely related to HTN. Carotid U/S unremarkable.  - NS IV fluids for 1 liter again x 1 today, but given slowly at 50 ml/hr  - Monitor on telemetry.  Essential hypertension  - Chronic, with persistent elevation.  - Continue lisinopril 40mg PO daily and increase carvedilol to 12.5 mg PO BID today  - Stopped furosemide 40mg PO daily, HCTZ 25mg PO daily due to low Na levels  - Continue labetalol 10mg IV q4hr PRN.  CKD (chronic kidney disease), stage III  - Chronic; stable at baseline.  - Avoid nephrotoxins, renally dose medications.  Asthma  - Chronic; stable.  - Continue albuterol-ipratropium 2.5-0.5mg inhaled q4hr PRN.  Schizophrenia  - Chronic; stable. Continue risperidone 1mg PO qHS.  Blind  Deaf, bilateral  - Chronic; fall precautions.  Left fibular fracture  - XR demonstrating L fibular fracture; follow-up XR of tib/fib to evaluate for further areas of potential fracture unremarkable. Reported R-sided knee discomfort in addition with XR pending final read but no fracture visible to my evaluation.  - Discussed with orthopedics, Dr. Moreira; weight bearing as tolerated, no splint/casting required, follow-up in clinic  - PT and OT evaluation, recommendation for moderate intensity  Urinary retention  - Had persistently elevated volumes on bladder scan despite straight catheterization. Clark placed and resumed tamsulosin 0.4mg PO daily.   - Successful voiding trial completed on 4/15    VTE Risk Mitigation (From admission, onward)           Ordered     apixaban tablet 10 mg  2 times daily         04/17/25 0858     heparin 25,000 units in dextrose 5% (100 units/ml) IV bolus from bag HIGH INTENSITY nomogram - [facility]  As needed (PRN)        Question:  Heparin Infusion Adjustment (DO NOT MODIFY ANSWER)  Answer:  [email or web address]\Images\Pharmacy\HeparinInfusions\heparin HIGH INTENSITY nomogram for [facility] [id number]    04/14/25 1727     heparin 25,000 units in dextrose 5% (100 units/ml) IV bolus from bag HIGH INTENSITY nomogram -  [facility]  As needed (PRN)        Question:  Heparin Infusion Adjustment (DO NOT MODIFY ANSWER)  Answer:  [email or web address]\Images\Pharmacy\HeparinInfusions\heparin HIGH INTENSITY nomogram for [facility] [id number]    04/14/25 1727     heparin 25,000 units in dextrose 5% 250 mL (100 units/mL) infusion HIGH INTENSITY nomogram - [facility]  Continuous        Question:  Begin at (units/kg/hr)  Answer:  18    04/14/25 1727     IP VTE HIGH RISK PATIENT  Once         04/08/25 1943     Place sequential compression device  Until discontinued         04/08/25 1943     Reason for No Pharmacological VTE Prophylaxis  Once        Question:  Reasons:  Answer:  Risk of Bleeding    04/08/25 1943                      Brooklyn Wellington MD  Department of Hospital Medicine   [facility], [place], operated by [facility] ([address])

## 2025-04-17 NOTE — ASSESSMENT & PLAN NOTE
- Appears was vasovagal 2/2 colitis / volume losses and BMs with the former.  - Troponin stable. No evidence of acute ischemic event. No recurrence of syncope.  - Echo with normal EF, grade I diastolic dysfunction likely related to HTN. Carotid U/S unremarkable.  - NS IV fluids for 1 liter again x 1 today, but given slowly at 50 ml/hr  - Monitor on telemetry.

## 2025-04-17 NOTE — PLAN OF CARE
Met with caregiver(sibling) to review discharge recommendation of SNF and is agreeable to plan    Patient/family provided list of facilities in-network with patient's payor plan. Providers that are owned, operated, or affiliated with [facility] are included on the list.     Notified that referral sent to below listed facilities from in-network list based on proximity to home/family support:   1.[facility]    Patient/family instructed to identify preference.    Preferred Facility: (if more than 1, listed in order of descending preference)  1.[facility]    If above facilities unable to accept, will send additional referrals to in-network providers.     04/17/25 1414   Post-Acute Status   Post-Acute Authorization Placement   Post-Acute Placement Status Referrals Sent   Patient choice form signed by patient/caregiver List with quality metrics by geographic area provided;List from CMS Compare;List from System Post-Acute Care   Discharge Delays (!) Post-Acute Set-up   Discharge Plan   Discharge Plan A Skilled Nursing Facility   Discharge Plan B Home Health

## 2025-04-17 NOTE — ASSESSMENT & PLAN NOTE
>>ASSESSMENT AND PLAN FOR CKD (CHRONIC KIDNEY DISEASE), STAGE III WRITTEN ON 4/17/2025  5:16 PM BY WEN ALVARENGA MD    - Chronic; stable at baseline.  - Avoid nephrotoxins, renally dose medications.

## 2025-04-18 PROBLEM — I26.99 ACUTE PULMONARY EMBOLISM: Status: ACTIVE | Noted: 2025-04-18

## 2025-04-18 LAB
ABSOLUTE EOSINOPHIL (OHS): 0.37 K/UL
ABSOLUTE MONOCYTE (OHS): 0.45 K/UL (ref 0.3–1)
ABSOLUTE NEUTROPHIL COUNT (OHS): 7.17 K/UL (ref 1.8–7.7)
ANION GAP (OHS): 11 MMOL/L (ref 8–16)
BACTERIA BLD CULT: NORMAL
BACTERIA BLD CULT: NORMAL
BASOPHILS # BLD AUTO: 0.02 K/UL
BASOPHILS NFR BLD AUTO: 0.2 %
BUN SERPL-MCNC: 74 MG/DL (ref 6–20)
CALCIUM SERPL-MCNC: 9.4 MG/DL (ref 8.7–10.5)
CHLORIDE SERPL-SCNC: 96 MMOL/L (ref 95–110)
CO2 SERPL-SCNC: 24 MMOL/L (ref 23–29)
CREAT SERPL-MCNC: 2.6 MG/DL (ref 0.5–1.4)
ERYTHROCYTE [DISTWIDTH] IN BLOOD BY AUTOMATED COUNT: 18.9 % (ref 11.5–14.5)
GFR SERPLBLD CREATININE-BSD FMLA CKD-EPI: 27 ML/MIN/1.73/M2
GLUCOSE SERPL-MCNC: 138 MG/DL (ref 70–110)
HCT VFR BLD AUTO: 27.4 % (ref 40–54)
HGB BLD-MCNC: 8.5 GM/DL (ref 14–18)
IMM GRANULOCYTES # BLD AUTO: 0.19 K/UL (ref 0–0.04)
IMM GRANULOCYTES NFR BLD AUTO: 2.2 % (ref 0–0.5)
LYMPHOCYTES # BLD AUTO: 0.62 K/UL (ref 1–4.8)
MAGNESIUM SERPL-MCNC: 2.1 MG/DL (ref 1.6–2.6)
MCH RBC QN AUTO: 24.7 PG (ref 27–31)
MCHC RBC AUTO-ENTMCNC: 31 G/DL (ref 32–36)
MCV RBC AUTO: 80 FL (ref 82–98)
NUCLEATED RBC (/100WBC) (OHS): 0 /100 WBC
PHOSPHATE SERPL-MCNC: 3.8 MG/DL (ref 2.7–4.5)
PLATELET # BLD AUTO: 441 K/UL (ref 150–450)
PMV BLD AUTO: 10.7 FL (ref 9.2–12.9)
POTASSIUM SERPL-SCNC: 5 MMOL/L (ref 3.5–5.1)
RBC # BLD AUTO: 3.44 M/UL (ref 4.6–6.2)
RELATIVE EOSINOPHIL (OHS): 4.2 %
RELATIVE LYMPHOCYTE (OHS): 7 % (ref 18–48)
RELATIVE MONOCYTE (OHS): 5.1 % (ref 4–15)
RELATIVE NEUTROPHIL (OHS): 81.3 % (ref 38–73)
SODIUM SERPL-SCNC: 131 MMOL/L (ref 136–145)
WBC # BLD AUTO: 8.82 K/UL (ref 3.9–12.7)

## 2025-04-18 PROCEDURE — 21400001 HC TELEMETRY ROOM

## 2025-04-18 PROCEDURE — 94761 N-INVAS EAR/PLS OXIMETRY MLT: CPT

## 2025-04-18 PROCEDURE — 94640 AIRWAY INHALATION TREATMENT: CPT

## 2025-04-18 PROCEDURE — 85025 COMPLETE CBC W/AUTO DIFF WBC: CPT | Performed by: INTERNAL MEDICINE

## 2025-04-18 PROCEDURE — 84100 ASSAY OF PHOSPHORUS: CPT | Performed by: INTERNAL MEDICINE

## 2025-04-18 PROCEDURE — 25000003 PHARM REV CODE 250: Performed by: HOSPITALIST

## 2025-04-18 PROCEDURE — 25000003 PHARM REV CODE 250: Performed by: PHYSICIAN ASSISTANT

## 2025-04-18 PROCEDURE — 25000003 PHARM REV CODE 250: Performed by: INTERNAL MEDICINE

## 2025-04-18 PROCEDURE — 80048 BASIC METABOLIC PNL TOTAL CA: CPT | Performed by: INTERNAL MEDICINE

## 2025-04-18 PROCEDURE — 83735 ASSAY OF MAGNESIUM: CPT | Performed by: INTERNAL MEDICINE

## 2025-04-18 PROCEDURE — 36415 COLL VENOUS BLD VENIPUNCTURE: CPT | Performed by: INTERNAL MEDICINE

## 2025-04-18 PROCEDURE — 25000242 PHARM REV CODE 250 ALT 637 W/ HCPCS: Performed by: HOSPITALIST

## 2025-04-18 PROCEDURE — 63600175 PHARM REV CODE 636 W HCPCS: Performed by: NURSE PRACTITIONER

## 2025-04-18 RX ORDER — HYDRALAZINE HYDROCHLORIDE 20 MG/ML
10 INJECTION INTRAMUSCULAR; INTRAVENOUS ONCE
Status: COMPLETED | OUTPATIENT
Start: 2025-04-18 | End: 2025-04-18

## 2025-04-18 RX ADMIN — HYDRALAZINE HYDROCHLORIDE 10 MG: 20 INJECTION INTRAMUSCULAR; INTRAVENOUS at 01:04

## 2025-04-18 RX ADMIN — IPRATROPIUM BROMIDE AND ALBUTEROL SULFATE 3 ML: 2.5; .5 SOLUTION RESPIRATORY (INHALATION) at 02:04

## 2025-04-18 RX ADMIN — POLYETHYLENE GLYCOL 3350 17 G: 17 POWDER, FOR SOLUTION ORAL at 09:04

## 2025-04-18 RX ADMIN — APIXABAN 10 MG: 2.5 TABLET, FILM COATED ORAL at 09:04

## 2025-04-18 RX ADMIN — RISPERIDONE 1 MG: 1 TABLET, FILM COATED ORAL at 09:04

## 2025-04-18 RX ADMIN — HYDROCODONE BITARTRATE AND ACETAMINOPHEN 1 TABLET: 5; 325 TABLET ORAL at 01:04

## 2025-04-18 RX ADMIN — CARVEDILOL 12.5 MG: 12.5 TABLET, FILM COATED ORAL at 05:04

## 2025-04-18 RX ADMIN — SODIUM CHLORIDE: 9 INJECTION, SOLUTION INTRAVENOUS at 05:04

## 2025-04-18 RX ADMIN — IPRATROPIUM BROMIDE AND ALBUTEROL SULFATE 3 ML: 2.5; .5 SOLUTION RESPIRATORY (INHALATION) at 08:04

## 2025-04-18 RX ADMIN — CARVEDILOL 12.5 MG: 12.5 TABLET, FILM COATED ORAL at 09:04

## 2025-04-18 RX ADMIN — TAMSULOSIN HYDROCHLORIDE 0.4 MG: 0.4 CAPSULE ORAL at 09:04

## 2025-04-18 RX ADMIN — ACETAMINOPHEN 1000 MG: 500 TABLET ORAL at 05:04

## 2025-04-18 RX ADMIN — LISINOPRIL 40 MG: 20 TABLET ORAL at 09:04

## 2025-04-18 RX ADMIN — IPRATROPIUM BROMIDE AND ALBUTEROL SULFATE 3 ML: 2.5; .5 SOLUTION RESPIRATORY (INHALATION) at 07:04

## 2025-04-18 RX ADMIN — HYDROCODONE BITARTRATE AND ACETAMINOPHEN 1 TABLET: 5; 325 TABLET ORAL at 09:04

## 2025-04-18 NOTE — PROGRESS NOTES
"[facility] ([address])  Adult Nutrition  Progress Note    SUMMARY       Recommendations  - Continue regular diet as tolerated   - Boost Plus 1x daily to support nutrient needs   - Monitor weights/labs   - RD to monitor and follow up    Goals: Pt will meet >75% EEN by RD follow up  Nutrition Goal Status: new  Communication of RD Recs:  (POC)    Nutrition Discharge Planning    Nutrition Discharge Planning: General healthy diet    Assessment and Plan  Nutrition Problem  Altered GI Function    Related to (etiology):   colitis    Signs and Symptoms (as evidenced by):   Admitted with syncope and fall suspected secondary to vasovagal episode due to underlying colitis. No BM since Monday.    Interventions  (treatment strategy):  General healthful diet  Commercial beverages  Collaboration of care with other providers    Nutrition Diagnosis Status:   New    Reason for Assessment    Reason For Assessment: length of stay  Diagnosis:  (colitis)  General Information Comments: Pt LOS (10 D) admitted for colitis. Currently on a regular diet. Family at bedside states pt loves to eat, tolerated 100% of breakfast this morning. Tolerating meals well while IP. Discussed the addition of ONS to support nutrient needs while IP. No recent weight loss noted. No diet restrictions/NKFA. PIV. Livan 14-skin intact. Pt appeared nourished at this time.    Nutrition/Diet History    Spiritual, Cultural Beliefs, Jew Practices, Values that Affect Care: no  Food Allergies: NKFA  Factors Affecting Nutritional Intake: altered gastrointestinal function    Anthropometrics    Height: 5' 6" (167.6 cm)  Height (inches): 66 in  Height Method: Stated  Weight: 91.6 kg (201 lb 15.1 oz)  Weight (lb): 201.94 lb  Weight Method: Bed Scale  Ideal Body Weight (IBW), Male: 142 lb  % Ideal Body Weight, Male (lb): 142.21 %  BMI (Calculated): 32.6  BMI Grade: 30 - 34.9- obesity - grade I     Wt Readings from Last 5 Encounters:   04/18/25 91.6 kg (201 lb " 15.1 oz)   05/01/24 77.9 kg (171 lb 10.1 oz)   11/02/23 78.8 kg (173 lb 9.8 oz)   03/01/23 81.7 kg (180 lb 1.9 oz)   12/06/22 99.8 kg (220 lb 0.3 oz)       Lab/Procedures/Meds    Pertinent Labs Reviewed: reviewed  Pertinent Labs Comments: Na 131L; glucose 138H; BUN 74H; Cr 2.6H; eGFR 27L  Pertinent Medications Reviewed: reviewed  Scheduled Meds:   acetaminophen  1,000 mg Oral Q8H    albuterol-ipratropium  3 mL Nebulization Q6H WAKE    apixaban  10 mg Oral BID    carvediloL  12.5 mg Oral BID WM    lisinopriL  40 mg Oral Daily    polyethylene glycol  17 g Oral Daily    risperiDONE  1 mg Oral QHS    tamsulosin  0.4 mg Oral Daily     Continuous Infusions:  PRN Meds:.  Current Facility-Administered Medications:     acetaminophen, 650 mg, Oral, Q4H PRN    albuterol-ipratropium, 3 mL, Nebulization, Q4H PRN    ammonium lactate, , Topical (Top), BID PRN    heparin (PORCINE), 60 Units/kg (Adjusted), Intravenous, PRN    heparin (PORCINE), 30 Units/kg (Adjusted), Intravenous, PRN    HYDROcodone-acetaminophen, 1 tablet, Oral, Q4H PRN    kit prep Tc 99m-pentetic acid (aerosol), 40 millicurie, Inhalation, ONCE PRN    labetaloL, 10 mg, Intravenous, Q4H PRN    melatonin, 6 mg, Oral, Nightly PRN    ondansetron, 8 mg, Oral, Q8H PRN    ondansetron, 8 mg, Intravenous, Q6H PRN    senna-docusate, 1 tablet, Oral, Daily PRN    sodium chloride 0.9%, 10 mL, Intravenous, PRN    Estimated/Assessed Needs    Weight Used For Calorie Calculations: 91.6 kg (201 lb 15.1 oz)  Energy Calorie Requirements (kcal): 9230-7118  Energy Need Method: Kcal/kg (20-25)  Protein Requirements: 83g (0.9 g/kg)  Weight Used For Protein Calculations: 91.6 kg (201 lb 15.1 oz)  Fluid Requirements (mL): 1 mL/kcal  Estimated Fluid Requirement Method: RDA Method  RDA Method (mL): 1832       Nutrition Prescription Ordered    Current Diet Order: Regular    Evaluation of Received Nutrient/Fluid Intake    I/O: -5.7L since admit  Energy Calories Required: meeting needs  Protein  Required: meeting needs  Fluid Required: meeting needs  Comments: LBM: 4/14  Tolerance: tolerating  % Intake of Estimated Energy Needs: 75 - 100 %  % Meal Intake: 75 - 100 %    Nutrition Risk    Level of Risk/Frequency of Follow-up: low     Monitor and Evaluation    Monitor and Evaluation: Diet order, Weight, Nutrition focused physical findings, Gastrointestinal profile     Nutrition Follow-Up    RD Follow-up?: Yes    Lisa Caldwell RDN, JUAN RAMONN

## 2025-04-18 NOTE — PLAN OF CARE
Problem: Adult Inpatient Plan of Care  Goal: Plan of Care Review  Outcome: Progressing  Goal: Patient-Specific Goal (Individualized)  Outcome: Progressing  Goal: Absence of Hospital-Acquired Illness or Injury  Outcome: Progressing  Goal: Optimal Comfort and Wellbeing  Outcome: Progressing  Goal: Readiness for Transition of Care  Outcome: Progressing     Problem: Infection  Goal: Absence of Infection Signs and Symptoms  Outcome: Progressing     Problem: Acute Kidney Injury/Impairment  Goal: Fluid and Electrolyte Balance  Outcome: Progressing  Goal: Improved Oral Intake  Outcome: Progressing  Goal: Effective Renal Function  Outcome: Progressing     Problem: Syncope  Goal: Absence of Syncopal Symptoms  Outcome: Progressing    Pt showing signs of improvement. BP improving.  Appears more comfortable not grunting like before.  Family remains around the clock at bedside. Urine flowing to gravity bag yellow clear.

## 2025-04-18 NOTE — ASSESSMENT & PLAN NOTE
- Appears was vasovagal 2/2 colitis / volume losses and BMs with the former.  - Troponin stable. No evidence of acute ischemic event. No recurrence of syncope.  - Echo with normal EF, grade I diastolic dysfunction likely related to HTN. Carotid U/S unremarkable.  - NS IV fluids for 1 liter at 50 ml/hr done x 2, now off  - Monitor on telemetry.

## 2025-04-18 NOTE — SUBJECTIVE & OBJECTIVE
Interval History: No further fevers and appears comfortable. HR normalized. Family at the bedside said he looks more like himself. Discussed plan of care with family member at bedside and via phone on speaker phone.     Review of Systems   Unable to perform ROS: Patient nonverbal     Objective:     Vital Signs (Most Recent):  Temp: 98.7 °F (37.1 °C) (04/18/25 1624)  Pulse: 97 (04/18/25 1624)  Resp: 18 (04/18/25 1624)  BP: (Abnormal) 112/52 (04/18/25 1624)  SpO2: 97 % (04/18/25 1624) Vital Signs (24h Range):  Temp:  [97.4 °F (36.3 °C)-98.7 °F (37.1 °C)] 98.7 °F (37.1 °C)  Pulse:  [] 97  Resp:  [16-20] 18  SpO2:  [95 %-100 %] 97 %  BP: (112-213)/(52-95) 112/52     Weight: 91.6 kg (201 lb 15.1 oz)  Body mass index is 32.59 kg/m².    Intake/Output Summary (Last 24 hours) at 4/18/2025 1736  Last data filed at 4/18/2025 1647  Gross per 24 hour   Intake 1522.99 ml   Output 700 ml   Net 822.99 ml         Physical Exam  Vitals and nursing note reviewed.   Constitutional:       General: He is sleeping. He is not in acute distress.     Appearance: He is well-developed. He is ill-appearing (Chronically).   HENT:      Head: Normocephalic and atraumatic.      Ears:      Comments: Anacusis bilaterally  Eyes:      General:         Right eye: No discharge.         Left eye: No discharge.      Conjunctiva/sclera: Conjunctivae normal.      Comments: Corneal opacity bilaterally, blind   Cardiovascular:      Rate and Rhythm: Normal rate and regular rhythm.      Pulses: Normal pulses.   Pulmonary:      Effort: Pulmonary effort is normal. No respiratory distress.   Abdominal:      Palpations: Abdomen is soft.      Tenderness: There is no abdominal tenderness. There is no guarding or rebound.   Genitourinary:     Comments: External catheter in place with clear urine.  Musculoskeletal:         General: Tenderness (Reports tenderness to R knee.) present. Normal range of motion.      Right lower leg: No edema.      Left lower leg: No  edema.   Skin:     General: Skin is warm and dry.   Neurological:      Mental Status: He is easily aroused. Mental status is at baseline.           Significant Labs:   CBC:  Recent Labs   Lab 04/16/25 0326 04/17/25  0542 04/18/25  0921   WBC 13.17* 12.34 8.82   HGB 7.8* 7.2* 8.5*   HCT 24.0* 22.6* 27.4*   * 405 441   LYMPH 0.66*  5.0* 0.74*  6.0* 0.62*  7.0*   MONO 8.4  1.11* 10.8  1.33* 5.1  0.45   EOS 3.0  0.39 3.0  0.37 4.2  0.37   BMP:  Recent Labs   Lab 04/16/25 0326 04/17/25  0542 04/17/25  1219 04/18/25  0921   * 125* 127* 131*   K 4.7 5.6* 5.5* 5.0   CL 91* 94* 93* 96   CO2 26 21* 26 24   BUN 66* 71* 70* 74*   CREATININE 2.8* 2.6* 2.7* 2.6*   CALCIUM 9.2 9.0 9.0 9.4   MG 2.2 2.2  --  2.1   PHOS 3.9 2.9  --  3.8     Significant Imaging: I have reviewed and interpreted all pertinent imaging results/findings within the past 24 hours.   Xolair Pregnancy And Lactation Text: This medication is Pregnancy Category B and is considered safe during pregnancy. This medication is excreted in breast milk.

## 2025-04-18 NOTE — ASSESSMENT & PLAN NOTE
>>ASSESSMENT AND PLAN FOR CKD (CHRONIC KIDNEY DISEASE), STAGE III WRITTEN ON 4/18/2025  5:48 PM BY WEN ALVARENGA MD    - Chronic; stable at baseline.  - Avoid nephrotoxins, renally dose medications.

## 2025-04-18 NOTE — ASSESSMENT & PLAN NOTE
- Was on ciprofloxacin, metronidazole PO to complete course when he developed new fever 04/11-04/12.  - Continued cefepime. UA with >100RBCs, 5 WBCs, rare bacteria; likely related to mackay placement. CXR appears benign; LLL zone not well evaluated due to heart silhouette but appears stable.  - Tachycardia sinus tach; no evidence of arrhythmia on telemetry. Infectious etiology should be covered - pain related? Increased pain control with hydrocodone-acetaminophen 5-325mg, 10-325mg PO q4hr PRN.  - Broadened workup - has been mobilizing with family in the room, but does have potential for thrombosis with fracture. D dimer elevated with fall / infection and with CKD would prefer to avoid CT angiography. Checked V/Q scan with intermediate probability, U/S BLE negative for DVT but given symptoms and intermediate probability, anticoagulated with heparin gtt on 4/14  - ID consult appreciated, stopped all antibiotics on 4/16   - Remains AF and WBC still down trending, monitor, continue bowel regimen  - Transitioned to apixaban from heparin on 4/17  - Clinically improving and working with therapy, plan for moderate intensity  - Trend with labs

## 2025-04-18 NOTE — PLAN OF CARE
Recommendations  - Continue regular diet as tolerated   - Boost Plus 1x daily to support nutrient needs   - Monitor weights/labs   - RD to monitor and follow up    Goals: Pt will meet >75% EEN by RD follow up  Nutrition Goal Status: new  Communication of RD Recs:  (POC)

## 2025-04-18 NOTE — PROGRESS NOTES
[facility] Surg ([address] Medicine  Progress Note    Patient Name: Frantz Sigala Jr.  MRN: [id number]  Patient Class: IP- Inpatient   Admission Date: 4/8/2025  Length of Stay: 10 days  Attending Physician: Brooklyn Wellington MD  Primary Care Provider: Zachary Ramos MD        Subjective     Principal Problem:Colitis        HPI:  Mr. Sigala is a 60 YOM with PMHx of HTN, asthma, CKD (baseline SCr 2-2.5), schizophrenia, blindness, and deafness.     He presents to ED via EMS with family due to syncopal event just PTA. Family provides history due patients deafness and blindness. They note that he was in his normal state of health today until right after lunch. He ate a salami sandwich with potato chips and drank orange juice; shortly thereafter he was in the bathroom when his mother heard a loud thud and patient was found by his brother on the ground unconscious.  His brother notes that he called out the patients name and slapped him on his back a few times at which time he seemed to come to and began vomiting with concurrent episodes of diarrhea.  Sister notes she has had a recent respiratory illness with abdominal cramping and some diarrhea. Family notes that patient has lived in the home he is in since a small child and that he is able to navigate the home independently including going upstairs and outside.     In the ED he is hypertensive, heart rate stable, saturations initially 80% on room air with improvement after 2 L nasal cannula application, and afebrile.  CBC with WBC 21, H&H 9.4/29.4, platelets 497.  Chemistry was , K 4.4, chloride 100, CO2 22, BUN 49, SCr 2.2, glucose 125.  LFTs unremarkable.  BNP 35.  Troponin 0.035 >> 0.059 (appear similar to priors however these were in 2015).  Lactic acid 2.2.  Flu and COVID negative.  Hepatitis-C and HIV nonreactive.  UA noninfectious.  CXR with chronic appearing interstitial findings, congestive change or edema is a consideration; no  large focal consolidation. CTH with no acute intracranial abnormality detected, bilateral partial opacification of the mastoid air cells, sphenoid sinus disease.     CT A/P:  Wall thickening of the transverse colon, suggestive of nonspecific colitis.   Wall thickening of the urinary bladder.  The findings may be seen with cystitis.   Advanced calcifications of the abdominal aorta and the mesenteric arteries.  Correlation with possible vascular ultrasound of the mesenteric vessels may be obtained for further assessment. 1.2 cm hypodensity in the upper pole of the right kidney with Houn[place]field of 23.  This can be assessed with renal ultrasound.     The patient was admitted to the Hospital Medicine Service for further evaluation and management.     Overview/Hospital Course:  Admitted with syncope and fall suspected secondary to vasovagal episode due to underlying colitis. Started ciprofloxacin, metronidazole and stool studies ordered though had no further loose stool to collect. Echo showed normal EF and grade I diastolic dysfunction, carotid U/S unremarkable. Complained of increased LLE pain and XR obtained demonstrating proximal fibular fracture. Orthopedics consulted/ in discussion with Dr. Moreira recommended weight bearing as tolerated, no splint/boot required and follow-up in clinic. Developed urinary retention and increasing tachycardia; after tamsulosin resumed and straight catheterization, had persistent retention and mackay placed. Urology consulted and recommended maintaining further prior to void trial. Had new onset fevers and converted to cefepime. Hypertension and tachycardia persisted; pain control adjusted and use liberalized, but persisted. ID consulted with uptrending WBCs and evaluation for thrombosis initiated. All antibiotics stopped and patient anticoagulated for probable PE with heparin gtt.    Interval History: No further fevers and appears comfortable. HR normalized. Family at the bedside said he  looks more like himself. Discussed plan of care with family member at bedside and via phone on speaker phone.     Review of Systems   Unable to perform ROS: Patient nonverbal     Objective:     Vital Signs (Most Recent):  Temp: 98.7 °F (37.1 °C) (04/18/25 1624)  Pulse: 97 (04/18/25 1624)  Resp: 18 (04/18/25 1624)  BP: (Abnormal) 112/52 (04/18/25 1624)  SpO2: 97 % (04/18/25 1624) Vital Signs (24h Range):  Temp:  [97.4 °F (36.3 °C)-98.7 °F (37.1 °C)] 98.7 °F (37.1 °C)  Pulse:  [] 97  Resp:  [16-20] 18  SpO2:  [95 %-100 %] 97 %  BP: (112-213)/(52-95) 112/52     Weight: 91.6 kg (201 lb 15.1 oz)  Body mass index is 32.59 kg/m².    Intake/Output Summary (Last 24 hours) at 4/18/2025 1736  Last data filed at 4/18/2025 1647  Gross per 24 hour   Intake 1522.99 ml   Output 700 ml   Net 822.99 ml         Physical Exam  Vitals and nursing note reviewed.   Constitutional:       General: He is sleeping. He is not in acute distress.     Appearance: He is well-developed. He is ill-appearing (Chronically).   HENT:      Head: Normocephalic and atraumatic.      Ears:      Comments: Anacusis bilaterally  Eyes:      General:         Right eye: No discharge.         Left eye: No discharge.      Conjunctiva/sclera: Conjunctivae normal.      Comments: Corneal opacity bilaterally, blind   Cardiovascular:      Rate and Rhythm: Normal rate and regular rhythm.      Pulses: Normal pulses.   Pulmonary:      Effort: Pulmonary effort is normal. No respiratory distress.   Abdominal:      Palpations: Abdomen is soft.      Tenderness: There is no abdominal tenderness. There is no guarding or rebound.   Genitourinary:     Comments: External catheter in place with clear urine.  Musculoskeletal:         General: Tenderness (Reports tenderness to R knee.) present. Normal range of motion.      Right lower leg: No edema.      Left lower leg: No edema.   Skin:     General: Skin is warm and dry.   Neurological:      Mental Status: He is easily aroused.  Mental status is at baseline.           Significant Labs:   CBC:  Recent Labs   Lab 04/16/25  0326 04/17/25  0542 04/18/25  0921   WBC 13.17* 12.34 8.82   HGB 7.8* 7.2* 8.5*   HCT 24.0* 22.6* 27.4*   * 405 441   LYMPH 0.66*  5.0* 0.74*  6.0* 0.62*  7.0*   MONO 8.4  1.11* 10.8  1.33* 5.1  0.45   EOS 3.0  0.39 3.0  0.37 4.2  0.37   BMP:  Recent Labs   Lab 04/16/25  0326 04/17/25  0542 04/17/25  1219 04/18/25  0921   * 125* 127* 131*   K 4.7 5.6* 5.5* 5.0   CL 91* 94* 93* 96   CO2 26 21* 26 24   BUN 66* 71* 70* 74*   CREATININE 2.8* 2.6* 2.7* 2.6*   CALCIUM 9.2 9.0 9.0 9.4   MG 2.2 2.2  --  2.1   PHOS 3.9 2.9  --  3.8     Significant Imaging: I have reviewed and interpreted all pertinent imaging results/findings within the past 24 hours.      Assessment & Plan  Colitis  Fever  Leukocytosis  Acute pulmonary embolism  - Was on ciprofloxacin, metronidazole PO to complete course when he developed new fever 04/11-04/12.  - Continued cefepime. UA with >100RBCs, 5 WBCs, rare bacteria; likely related to mackay placement. CXR appears benign; LLL zone not well evaluated due to heart silhouette but appears stable.  - Tachycardia sinus tach; no evidence of arrhythmia on telemetry. Infectious etiology should be covered - pain related? Increased pain control with hydrocodone-acetaminophen 5-325mg, 10-325mg PO q4hr PRN.  - Broadened workup - has been mobilizing with family in the room, but does have potential for thrombosis with fracture. D dimer elevated with fall / infection and with CKD would prefer to avoid CT angiography. Checked V/Q scan with intermediate probability, U/S BLE negative for DVT but given symptoms and intermediate probability, anticoagulated with heparin gtt on 4/14  - ID consult appreciated, stopped all antibiotics on 4/16   - Remains AF and WBC still down trending, monitor, continue bowel regimen  - Transitioned to apixaban from heparin on 4/17  - Clinically improving and working with  therapy, plan for moderate intensity  - Trend with labs  Syncope  - Appears was vasovagal 2/2 colitis / volume losses and BMs with the former.  - Troponin stable. No evidence of acute ischemic event. No recurrence of syncope.  - Echo with normal EF, grade I diastolic dysfunction likely related to HTN. Carotid U/S unremarkable.  - NS IV fluids for 1 liter at 50 ml/hr done x 2, now off  - Monitor on telemetry.  Essential hypertension  - Chronic, with persistent elevation.  - Continue lisinopril 40mg PO daily and increase carvedilol to 12.5 mg PO BID today  - Stopped furosemide 40mg PO daily, HCTZ 25mg PO daily due to low Na levels  - Continue labetalol 10mg IV q4hr PRN.  CKD (chronic kidney disease), stage III  - Chronic; stable at baseline.  - Avoid nephrotoxins, renally dose medications.  Asthma  - Chronic; stable.  - Continue albuterol-ipratropium 2.5-0.5mg inhaled q4hr PRN.  Schizophrenia  - Chronic; stable. Continue risperidone 1mg PO qHS.  Blind  Deaf, bilateral  - Chronic; fall precautions.  Left fibular fracture  - XR demonstrating L fibular fracture; follow-up XR of tib/fib to evaluate for further areas of potential fracture unremarkable. Reported R-sided knee discomfort in addition with XR pending final read but no fracture visible to my evaluation.  - Discussed with orthopedics, Dr. Moreira; weight bearing as tolerated, no splint/casting required, follow-up in clinic  - PT and OT evaluation, recommendation for moderate intensity  Urinary retention  - Had persistently elevated volumes on bladder scan despite straight catheterization. Clark placed and resumed tamsulosin 0.4mg PO daily.   - Successful voiding trial completed on 4/15  VTE Risk Mitigation (From admission, onward)           Ordered     apixaban tablet 10 mg  2 times daily         04/17/25 0858     heparin 25,000 units in dextrose 5% 250 mL (100 units/mL) infusion HIGH INTENSITY nomogram - [facility]  Continuous        Question:  Begin at (units/kg/hr)   Answer:  18    04/14/25 1727     IP VTE HIGH RISK PATIENT  Once         04/08/25 1943     Place sequential compression device  Until discontinued         04/08/25 1943     Reason for No Pharmacological VTE Prophylaxis  Once        Question:  Reasons:  Answer:  Risk of Bleeding    04/08/25 1943                      Brooklyn Wellington MD  Department of Hospital Medicine   [facility] ([address])

## 2025-04-19 LAB
ABSOLUTE EOSINOPHIL (OHS): 0.52 K/UL
ABSOLUTE MONOCYTE (OHS): 1.02 K/UL (ref 0.3–1)
ABSOLUTE NEUTROPHIL COUNT (OHS): 7.84 K/UL (ref 1.8–7.7)
ANION GAP (OHS): 9 MMOL/L (ref 8–16)
BASOPHILS # BLD AUTO: 0.02 K/UL
BASOPHILS NFR BLD AUTO: 0.2 %
BUN SERPL-MCNC: 74 MG/DL (ref 6–20)
CALCIUM SERPL-MCNC: 9.1 MG/DL (ref 8.7–10.5)
CHLORIDE SERPL-SCNC: 97 MMOL/L (ref 95–110)
CO2 SERPL-SCNC: 25 MMOL/L (ref 23–29)
CREAT SERPL-MCNC: 2.4 MG/DL (ref 0.5–1.4)
ERYTHROCYTE [DISTWIDTH] IN BLOOD BY AUTOMATED COUNT: 18.6 % (ref 11.5–14.5)
GFR SERPLBLD CREATININE-BSD FMLA CKD-EPI: 30 ML/MIN/1.73/M2
GLUCOSE SERPL-MCNC: 97 MG/DL (ref 70–110)
HCT VFR BLD AUTO: 24.5 % (ref 40–54)
HGB BLD-MCNC: 8 GM/DL (ref 14–18)
IMM GRANULOCYTES # BLD AUTO: 0.22 K/UL (ref 0–0.04)
IMM GRANULOCYTES NFR BLD AUTO: 2.1 % (ref 0–0.5)
LYMPHOCYTES # BLD AUTO: 0.96 K/UL (ref 1–4.8)
MAGNESIUM SERPL-MCNC: 2.2 MG/DL (ref 1.6–2.6)
MCH RBC QN AUTO: 25.2 PG (ref 27–31)
MCHC RBC AUTO-ENTMCNC: 32.7 G/DL (ref 32–36)
MCV RBC AUTO: 77 FL (ref 82–98)
NUCLEATED RBC (/100WBC) (OHS): 0 /100 WBC
PHOSPHATE SERPL-MCNC: 4 MG/DL (ref 2.7–4.5)
PLATELET # BLD AUTO: 611 K/UL (ref 150–450)
PLATELET BLD QL SMEAR: ABNORMAL
PMV BLD AUTO: 8.6 FL (ref 9.2–12.9)
POTASSIUM SERPL-SCNC: 5.3 MMOL/L (ref 3.5–5.1)
RBC # BLD AUTO: 3.18 M/UL (ref 4.6–6.2)
RELATIVE EOSINOPHIL (OHS): 4.9 %
RELATIVE LYMPHOCYTE (OHS): 9.1 % (ref 18–48)
RELATIVE MONOCYTE (OHS): 9.6 % (ref 4–15)
RELATIVE NEUTROPHIL (OHS): 74.1 % (ref 38–73)
SODIUM SERPL-SCNC: 131 MMOL/L (ref 136–145)
WBC # BLD AUTO: 10.58 K/UL (ref 3.9–12.7)

## 2025-04-19 PROCEDURE — 94640 AIRWAY INHALATION TREATMENT: CPT

## 2025-04-19 PROCEDURE — 85025 COMPLETE CBC W/AUTO DIFF WBC: CPT | Performed by: INTERNAL MEDICINE

## 2025-04-19 PROCEDURE — 25000003 PHARM REV CODE 250: Performed by: HOSPITALIST

## 2025-04-19 PROCEDURE — 97530 THERAPEUTIC ACTIVITIES: CPT

## 2025-04-19 PROCEDURE — 83735 ASSAY OF MAGNESIUM: CPT | Performed by: INTERNAL MEDICINE

## 2025-04-19 PROCEDURE — 25000242 PHARM REV CODE 250 ALT 637 W/ HCPCS: Performed by: NURSE PRACTITIONER

## 2025-04-19 PROCEDURE — 36415 COLL VENOUS BLD VENIPUNCTURE: CPT | Performed by: INTERNAL MEDICINE

## 2025-04-19 PROCEDURE — 25000003 PHARM REV CODE 250: Performed by: PHYSICIAN ASSISTANT

## 2025-04-19 PROCEDURE — 25000242 PHARM REV CODE 250 ALT 637 W/ HCPCS: Performed by: HOSPITALIST

## 2025-04-19 PROCEDURE — 21400001 HC TELEMETRY ROOM

## 2025-04-19 PROCEDURE — 25000003 PHARM REV CODE 250: Performed by: INTERNAL MEDICINE

## 2025-04-19 PROCEDURE — 80048 BASIC METABOLIC PNL TOTAL CA: CPT | Performed by: INTERNAL MEDICINE

## 2025-04-19 PROCEDURE — 84100 ASSAY OF PHOSPHORUS: CPT | Performed by: INTERNAL MEDICINE

## 2025-04-19 PROCEDURE — 11000001 HC ACUTE MED/SURG PRIVATE ROOM

## 2025-04-19 PROCEDURE — 94761 N-INVAS EAR/PLS OXIMETRY MLT: CPT

## 2025-04-19 RX ORDER — ACETAMINOPHEN 325 MG/1
650 TABLET ORAL EVERY 4 HOURS PRN
Status: DISCONTINUED | OUTPATIENT
Start: 2025-04-19 | End: 2025-04-23 | Stop reason: HOSPADM

## 2025-04-19 RX ORDER — SODIUM CHLORIDE 9 MG/ML
INJECTION, SOLUTION INTRAVENOUS CONTINUOUS
Status: ACTIVE | OUTPATIENT
Start: 2025-04-19 | End: 2025-04-19

## 2025-04-19 RX ORDER — POLYETHYLENE GLYCOL 3350 17 G/17G
17 POWDER, FOR SOLUTION ORAL 2 TIMES DAILY
Status: DISCONTINUED | OUTPATIENT
Start: 2025-04-19 | End: 2025-04-23 | Stop reason: HOSPADM

## 2025-04-19 RX ORDER — BISACODYL 5 MG
10 TABLET, DELAYED RELEASE (ENTERIC COATED) ORAL ONCE
Status: COMPLETED | OUTPATIENT
Start: 2025-04-19 | End: 2025-04-19

## 2025-04-19 RX ORDER — HYDROCODONE BITARTRATE AND ACETAMINOPHEN 5; 325 MG/1; MG/1
1 TABLET ORAL EVERY 4 HOURS PRN
Refills: 0 | Status: DISCONTINUED | OUTPATIENT
Start: 2025-04-19 | End: 2025-04-23 | Stop reason: HOSPADM

## 2025-04-19 RX ADMIN — CARVEDILOL 12.5 MG: 12.5 TABLET, FILM COATED ORAL at 06:04

## 2025-04-19 RX ADMIN — IPRATROPIUM BROMIDE AND ALBUTEROL SULFATE 3 ML: 2.5; .5 SOLUTION RESPIRATORY (INHALATION) at 07:04

## 2025-04-19 RX ADMIN — SODIUM ZIRCONIUM CYCLOSILICATE 10 G: 10 POWDER, FOR SUSPENSION ORAL at 08:04

## 2025-04-19 RX ADMIN — LISINOPRIL 40 MG: 20 TABLET ORAL at 08:04

## 2025-04-19 RX ADMIN — APIXABAN 10 MG: 2.5 TABLET, FILM COATED ORAL at 08:04

## 2025-04-19 RX ADMIN — POLYETHYLENE GLYCOL 3350 17 G: 17 POWDER, FOR SOLUTION ORAL at 08:04

## 2025-04-19 RX ADMIN — RISPERIDONE 1 MG: 1 TABLET, FILM COATED ORAL at 08:04

## 2025-04-19 RX ADMIN — IPRATROPIUM BROMIDE AND ALBUTEROL SULFATE 3 ML: 2.5; .5 SOLUTION RESPIRATORY (INHALATION) at 03:04

## 2025-04-19 RX ADMIN — ACETAMINOPHEN 1000 MG: 500 TABLET ORAL at 05:04

## 2025-04-19 RX ADMIN — IPRATROPIUM BROMIDE AND ALBUTEROL SULFATE 3 ML: 2.5; .5 SOLUTION RESPIRATORY (INHALATION) at 08:04

## 2025-04-19 RX ADMIN — IPRATROPIUM BROMIDE AND ALBUTEROL SULFATE 3 ML: 2.5; .5 SOLUTION RESPIRATORY (INHALATION) at 01:04

## 2025-04-19 RX ADMIN — ACETAMINOPHEN 1000 MG: 500 TABLET ORAL at 01:04

## 2025-04-19 RX ADMIN — SODIUM CHLORIDE: 9 INJECTION, SOLUTION INTRAVENOUS at 08:04

## 2025-04-19 RX ADMIN — TAMSULOSIN HYDROCHLORIDE 0.4 MG: 0.4 CAPSULE ORAL at 08:04

## 2025-04-19 RX ADMIN — ACETAMINOPHEN 1000 MG: 500 TABLET ORAL at 10:04

## 2025-04-19 RX ADMIN — BISACODYL 10 MG: 5 TABLET, COATED ORAL at 01:04

## 2025-04-19 RX ADMIN — CARVEDILOL 12.5 MG: 12.5 TABLET, FILM COATED ORAL at 08:04

## 2025-04-19 NOTE — ASSESSMENT & PLAN NOTE
>>ASSESSMENT AND PLAN FOR CKD (CHRONIC KIDNEY DISEASE), STAGE III WRITTEN ON 4/19/2025 11:20 AM BY WEN ALVARENGA MD    - Chronic; stable at baseline.  - Avoid nephrotoxins, renally dose medications.

## 2025-04-19 NOTE — NURSING
Update Expected DC Date: 4/21/2025  B363/B363 A  IDENTIFY  60 y.o., male  SITUATION  Chief Complaint   Patient presents with    Loss of Consciousness     Per EMS pt reports LOC with urine and feces on him. 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      Primary Diagnosis:  Colitis  Date of admission:  4/8/2025  Physician:  Brooklyn Wellington MD  Medical Hx/Background:    Colitis    Blind    Deaf, bilateral    Schizophrenia    Asthma    Essential hypertension    CKD (chronic kidney disease), stage III    Syncope    Left fibular fracture    Urinary retention    Fever    Leukocytosis    Acute pulmonary embolism     Allergies:  Penicillins   Isolation:  No active isolations   Code status:  Full Code   Consults: Orthopedic surg     Procedure(s)?   *Labs to Monitor:   *Recent Vitals: Temp: 99.4 °F (37.4 °C) Pulse: 98 Resp: 18 SpO2: 96 % BP: (!) 148/80   Weights (last 2 days)       Date/Time Weight    04/18/25 1312 91.6 kg (201 lb 15.1 oz)           Antibiotics: No    Assessment   Respiratory WDL      Cardiac WDL  Rhythm: normal sinus rhythm   GI/  I/O WDL  Diet Adult Regular  Diet Adult Regular   Last Bowel Movement: 04/14/25 (per chart, unknown by family)    Intake/Output Summary (Last 24 hours) at 4/19/2025 0613  Last data filed at 4/19/2025 0545  Gross per 24 hour   Intake 1273.29 ml   Output 700 ml   Net 573.29 ml       Neuro ex:NELSON   Skin WDL  Livan Score: 14     Quality/Safety Checklist  *Clark catheter: No  Lines/Drains/Airways       Drain  Duration             Male External Urinary Catheter 04/14/25 1915 4 days              Peripheral Intravenous Line  Duration                  Peripheral IV - Single Lumen 04/11/25 1045 22 G Anterior;Right Forearm 7 days         Peripheral IV - Single Lumen 04/14/25 2140 20 G Anterior;Left Forearm 4 days                  VTE Risk Mitigation (From admission, onward)           Ordered     apixaban tablet 10 mg  2 times daily         04/17/25 0858     heparin 25,000 units in dextrose 5% 250 mL  "(100 units/mL) infusion HIGH INTENSITY nomogram - [facility]  Continuous        Question:  Begin at (units/kg/hr)  Answer:  18    04/14/25 1727     IP VTE HIGH RISK PATIENT  Once         04/08/25 1943     Place sequential compression device  Until discontinued         04/08/25 1943     Reason for No Pharmacological VTE Prophylaxis  Once        Question:  Reasons:  Answer:  Risk of Bleeding    04/08/25 1943                   Skin Integrity:No  *Glycemic control: No results for input(s): "POCTGLUCOSE" in the last 168 hours.  Fall risk: standard interventions, bed alarm  *Mobility: GEMS ([place] Early Mobility Scale): Level 3-Standing activities with assist    Nursing Update/Plan of Care (what should happen to make progress on the next shift?):  pain management and discharge needs  "

## 2025-04-19 NOTE — SUBJECTIVE & OBJECTIVE
Interval History: No further fevers and appears comfortable. Family at the bedside said he looks more like himself and had a good night. Discussed plan of care with family member at bedside and via phone on speaker phone.     Review of Systems   Unable to perform ROS: Patient nonverbal     Objective:     Vital Signs (Most Recent):  Temp: 98 °F (36.7 °C) (04/19/25 0736)  Pulse: 102 (04/19/25 0808)  Resp: 18 (04/19/25 0808)  BP: (Abnormal) 170/68 (04/19/25 0736)  SpO2: 97 % (04/19/25 0808) Vital Signs (24h Range):  Temp:  [97.7 °F (36.5 °C)-99.4 °F (37.4 °C)] 98 °F (36.7 °C)  Pulse:  [] 102  Resp:  [16-20] 18  SpO2:  [93 %-100 %] 97 %  BP: (112-188)/() 170/68     Weight: 91.6 kg (201 lb 15.1 oz)  Body mass index is 32.59 kg/m².    Intake/Output Summary (Last 24 hours) at 4/19/2025 1115  Last data filed at 4/19/2025 1033  Gross per 24 hour   Intake 660 ml   Output 200 ml   Net 460 ml         Physical Exam  Vitals and nursing note reviewed.   Constitutional:       General: He is sleeping. He is not in acute distress.     Appearance: He is well-developed. He is ill-appearing (Chronically).   HENT:      Head: Normocephalic and atraumatic.      Ears:      Comments: Anacusis bilaterally  Eyes:      General:         Right eye: No discharge.         Left eye: No discharge.      Conjunctiva/sclera: Conjunctivae normal.      Comments: Corneal opacity bilaterally, blind   Cardiovascular:      Rate and Rhythm: Normal rate and regular rhythm.      Pulses: Normal pulses.   Pulmonary:      Effort: Pulmonary effort is normal. No respiratory distress.   Abdominal:      Palpations: Abdomen is soft.      Tenderness: There is no abdominal tenderness. There is no guarding or rebound.   Genitourinary:     Comments: External catheter in place with clear urine.  Musculoskeletal:         General: Tenderness (Reports tenderness to R knee.) present. Normal range of motion.      Right lower leg: No edema.      Left lower leg: No  edema.   Skin:     General: Skin is warm and dry.   Neurological:      Mental Status: He is easily aroused. Mental status is at baseline.           Significant Labs:   CBC:  Recent Labs   Lab 04/17/25  0542 04/18/25  0921 04/19/25  0439   WBC 12.34 8.82 10.58   HGB 7.2* 8.5* 8.0*   HCT 22.6* 27.4* 24.5*    441 611*   LYMPH 0.74*  6.0* 0.62*  7.0* 0.96*  9.1*   MONO 10.8  1.33* 5.1  0.45 9.6  1.02*   EOS 3.0  0.37 4.2  0.37 4.9  0.52*   BMP:  Recent Labs   Lab 04/17/25  0542 04/17/25  1219 04/18/25  0921 04/19/25  0439   * 127* 131* 131*   K 5.6* 5.5* 5.0 5.3*   CL 94* 93* 96 97   CO2 21* 26 24 25   BUN 71* 70* 74* 74*   CREATININE 2.6* 2.7* 2.6* 2.4*   CALCIUM 9.0 9.0 9.4 9.1   MG 2.2  --  2.1 2.2   PHOS 2.9  --  3.8 4.0     Significant Imaging: I have reviewed and interpreted all pertinent imaging results/findings within the past 24 hours.

## 2025-04-19 NOTE — PT/OT/SLP PROGRESS
Physical Therapy Treatment    Patient Name:  Frantz Sigala Jr.   MRN:  [id number]    Recommendations:     Discharge Recommendations: Moderate Intensity Therapy  Discharge Equipment Recommendations: to be determined by next level of care  Barriers to discharge:  Decreased     Assessment:     Frantz Sigala Jr. is a 60 y.o. male admitted with a medical diagnosis of Colitis.  He presents with the following impairments/functional limitations: weakness, impaired endurance, impaired self care skills, impaired functional mobility, gait instability, impaired balance, visual deficits, impaired cognition, decreased coordination, decreased lower extremity function, decreased safety awareness, impaired coordination, impaired fine motor, other (comment) (Pt is blind and deaf) Pt required Mod A to role to either side and to transfer supine to sit.  He was able to perform sit to stand transfer with Mod A of 2 x 1 and Mod A of one x 4 with a RW.  Her required Max A of 2 to transfer sit to supine and to scoot up in bed.    Rehab Prognosis: Good; patient would benefit from acute skilled PT services to address these deficits and reach maximum level of function.    Recent Surgery: * No surgery found *      Plan:     During this hospitalization, patient to be seen 5 x/week to address the identified rehab impairments via gait training, therapeutic activities, therapeutic exercises, neuromuscular re-education and progress toward the following goals:    Plan of Care Expires:  05/01/25    Subjective     Chief Complaint: Pt indicated discomfort with R knee PROM into extension - Pt is non verbal  Patient/Family Comments/goals: Pt is non verbal - his sister was agreeable to Tx.  Pain/Comfort:  Pain Rating 1: 0/10 (Pt is deaf and blind)  Pain Rating Post-Intervention 1: 0/10 (Pt is deaf and blind)      Objective:     Communicated with Nurse prior to session.  Patient found HOB elevated with Condom Catheter, bed alarm, peripheral IV upon PT  Spoke with Mariangel CARDONA at Dr. Stoddard office. Per Dr. Stoddard, patient can hold Plavix and ASA starting today. Called to daughter via  [id number]. Instructed to hold Plavix and ASA per Dr. Stoddard instruction. Patient took dose today, will hold as of 5/18/24. Verbalized understanding.     entry to room.     General Precautions: Standard, deaf, blind, fall  Orthopedic Precautions: N/A  Braces: N/A  Respiratory Status: Room air     Functional Mobility:  Bed Mobility:     Rolling Left:  moderate assistance  Rolling Right: moderate assistance  Supine to Sit: moderate assistance  Sit to Supine: maximal assistance and of 2 persons  Transfers:     Sit to Stand:  Mod A of 2 x 1 and Mod A of 1 x 4  with rolling walker      AM-PAC 6 CLICK MOBILITY  Turning over in bed (including adjusting bedclothes, sheets and blankets)?: 3  Sitting down on and standing up from a chair with arms (e.g., wheelchair, bedside commode, etc.): 2  Moving from lying on back to sitting on the side of the bed?: 2  Moving to and from a bed to a chair (including a wheelchair)?: 2  Need to walk in hospital room?: 1  Climbing 3-5 steps with a railing?: 1  Basic Mobility Total Score: 11       Treatment & Education:  Plan of care discussed with Pt's sister  Transfer training - rolling to either side. Supine<>sit and sit to stand  Pt received PROM to AISHA SHELL's - family member instructed in PROM to AISHA EVANSs avoiding pressure on R fibula.    Patient left right sidelying with all lines intact, call button in reach, bed alarm on, Nurse notified, and Sister present..    GOALS:   Multidisciplinary Problems       Physical Therapy Goals          Problem: Physical Therapy    Goal Priority Disciplines Outcome Interventions   Physical Therapy Goal     PT, PT/OT Progressing    Description: P.T goals to be met in 2 weeks as follows:  1. Mod I Bed Mobility  2. Min A T/F  3. Gait 50'  Min A with LRAD  4.         Tolerate being out of bed x2 hours.                            DME Justifications:  No DME recommended requiring DME justifications    Time Tracking:     PT Received On: 04/19/25  PT Start Time: 1338     PT Stop Time: 1410  PT Total Time (min): 32 min     Billable Minutes: Therapeutic Activity 32    Treatment Type: Treatment  PT/PTA: PT     Number of PTA  visits since last PT visit: 0     04/19/2025

## 2025-04-19 NOTE — ASSESSMENT & PLAN NOTE
- Appears was vasovagal 2/2 colitis / volume losses and BMs with the former.  - Troponin stable. No evidence of acute ischemic event. No recurrence of syncope.  - Echo with normal EF, grade I diastolic dysfunction likely related to HTN. Carotid U/S unremarkable.  - NS IV fluids for 1 liter at 50 ml/hr done x 2, will give another liter  - Monitor on telemetry.

## 2025-04-19 NOTE — ASSESSMENT & PLAN NOTE
- Was on ciprofloxacin, metronidazole PO to complete course when he developed new fever 04/11-04/12.  - Continued cefepime. UA with >100RBCs, 5 WBCs, rare bacteria; likely related to mackay placement. CXR appears benign; LLL zone not well evaluated due to heart silhouette but appears stable.  - Tachycardia sinus tach; no evidence of arrhythmia on telemetry. Infectious etiology should be covered - pain related? Increased pain control with hydrocodone-acetaminophen 5-325mg, 10-325mg PO q4hr PRN.  - Broadened workup - has been mobilizing with family in the room, but does have potential for thrombosis with fracture. D dimer elevated with fall / infection and with CKD would prefer to avoid CT angiography. Checked V/Q scan with intermediate probability, U/S BLE negative for DVT but given symptoms and intermediate probability, anticoagulated with heparin gtt on 4/14  - ID consult appreciated, stopped all antibiotics on 4/16   - Remains AF and WBC remaining normal, monitor, continue bowel regimen with increase of Miralax and give Dulcolax today  - Transitioned to apixaban from heparin on 4/17  - Clinically improving and working with therapy, plan for moderate intensity  - Trend with labs

## 2025-04-19 NOTE — PROGRESS NOTES
[facility] Surg ([address] Medicine  Progress Note    Patient Name: Frantz Sigala Jr.  MRN: [id number]  Patient Class: IP- Inpatient   Admission Date: 4/8/2025  Length of Stay: 11 days  Attending Physician: Brooklyn Wellington MD  Primary Care Provider: Zachary Ramos MD        Subjective     Principal Problem:Colitis        HPI:  Mr. Sigala is a 60 YOM with PMHx of HTN, asthma, CKD (baseline SCr 2-2.5), schizophrenia, blindness, and deafness.     He presents to ED via EMS with family due to syncopal event just PTA. Family provides history due patients deafness and blindness. They note that he was in his normal state of health today until right after lunch. He ate a salami sandwich with potato chips and drank orange juice; shortly thereafter he was in the bathroom when his mother heard a loud thud and patient was found by his brother on the ground unconscious.  His brother notes that he called out the patients name and slapped him on his back a few times at which time he seemed to come to and began vomiting with concurrent episodes of diarrhea.  Sister notes she has had a recent respiratory illness with abdominal cramping and some diarrhea. Family notes that patient has lived in the home he is in since a small child and that he is able to navigate the home independently including going upstairs and outside.     In the ED he is hypertensive, heart rate stable, saturations initially 80% on room air with improvement after 2 L nasal cannula application, and afebrile.  CBC with WBC 21, H&H 9.4/29.4, platelets 497.  Chemistry was , K 4.4, chloride 100, CO2 22, BUN 49, SCr 2.2, glucose 125.  LFTs unremarkable.  BNP 35.  Troponin 0.035 >> 0.059 (appear similar to priors however these were in 2015).  Lactic acid 2.2.  Flu and COVID negative.  Hepatitis-C and HIV nonreactive.  UA noninfectious.  CXR with chronic appearing interstitial findings, congestive change or edema is a consideration; no  large focal consolidation. CTH with no acute intracranial abnormality detected, bilateral partial opacification of the mastoid air cells, sphenoid sinus disease.     CT A/P:  Wall thickening of the transverse colon, suggestive of nonspecific colitis.   Wall thickening of the urinary bladder.  The findings may be seen with cystitis.   Advanced calcifications of the abdominal aorta and the mesenteric arteries.  Correlation with possible vascular ultrasound of the mesenteric vessels may be obtained for further assessment. 1.2 cm hypodensity in the upper pole of the right kidney with Hounsfield of 23.  This can be assessed with renal ultrasound.     The patient was admitted to the Hospital Medicine Service for further evaluation and management.     Overview/Hospital Course:  Admitted with syncope and fall suspected secondary to vasovagal episode due to underlying colitis. Started ciprofloxacin, metronidazole and stool studies ordered though had no further loose stool to collect. Echo showed normal EF and grade I diastolic dysfunction, carotid U/S unremarkable. Complained of increased LLE pain and XR obtained demonstrating proximal fibular fracture. Orthopedics consulted/ in discussion with Dr. Moreira recommended weight bearing as tolerated, no splint/boot required and follow-up in clinic. Developed urinary retention and increasing tachycardia; after tamsulosin resumed and straight catheterization, had persistent retention and mackay placed. Urology consulted and recommended maintaining further prior to void trial. Had new onset fevers and converted to cefepime. Hypertension and tachycardia persisted; pain control adjusted and use liberalized, but persisted. ID consulted with uptrending WBCs and evaluation for thrombosis initiated. All antibiotics stopped and patient anticoagulated for probable PE with heparin gtt.    Interval History: No further fevers and appears comfortable. Family at the bedside said he looks more  like himself and had a good night. Discussed plan of care with family member at bedside and via phone on speaker phone.     Review of Systems   Unable to perform ROS: Patient nonverbal     Objective:     Vital Signs (Most Recent):  Temp: 98 °F (36.7 °C) (04/19/25 0736)  Pulse: 102 (04/19/25 0808)  Resp: 18 (04/19/25 0808)  BP: (Abnormal) 170/68 (04/19/25 0736)  SpO2: 97 % (04/19/25 0808) Vital Signs (24h Range):  Temp:  [97.7 °F (36.5 °C)-99.4 °F (37.4 °C)] 98 °F (36.7 °C)  Pulse:  [] 102  Resp:  [16-20] 18  SpO2:  [93 %-100 %] 97 %  BP: (112-188)/() 170/68     Weight: 91.6 kg (201 lb 15.1 oz)  Body mass index is 32.59 kg/m².    Intake/Output Summary (Last 24 hours) at 4/19/2025 1115  Last data filed at 4/19/2025 1033  Gross per 24 hour   Intake 660 ml   Output 200 ml   Net 460 ml         Physical Exam  Vitals and nursing note reviewed.   Constitutional:       General: He is sleeping. He is not in acute distress.     Appearance: He is well-developed. He is ill-appearing (Chronically).   HENT:      Head: Normocephalic and atraumatic.      Ears:      Comments: Anacusis bilaterally  Eyes:      General:         Right eye: No discharge.         Left eye: No discharge.      Conjunctiva/sclera: Conjunctivae normal.      Comments: Corneal opacity bilaterally, blind   Cardiovascular:      Rate and Rhythm: Normal rate and regular rhythm.      Pulses: Normal pulses.   Pulmonary:      Effort: Pulmonary effort is normal. No respiratory distress.   Abdominal:      Palpations: Abdomen is soft.      Tenderness: There is no abdominal tenderness. There is no guarding or rebound.   Genitourinary:     Comments: External catheter in place with clear urine.  Musculoskeletal:         General: Tenderness (Reports tenderness to R knee.) present. Normal range of motion.      Right lower leg: No edema.      Left lower leg: No edema.   Skin:     General: Skin is warm and dry.   Neurological:      Mental Status: He is easily  aroused. Mental status is at baseline.           Significant Labs:   CBC:  Recent Labs   Lab 04/17/25  0542 04/18/25  0921 04/19/25  0439   WBC 12.34 8.82 10.58   HGB 7.2* 8.5* 8.0*   HCT 22.6* 27.4* 24.5*    441 611*   LYMPH 0.74*  6.0* 0.62*  7.0* 0.96*  9.1*   MONO 10.8  1.33* 5.1  0.45 9.6  1.02*   EOS 3.0  0.37 4.2  0.37 4.9  0.52*   BMP:  Recent Labs   Lab 04/17/25  0542 04/17/25  1219 04/18/25  0921 04/19/25  0439   * 127* 131* 131*   K 5.6* 5.5* 5.0 5.3*   CL 94* 93* 96 97   CO2 21* 26 24 25   BUN 71* 70* 74* 74*   CREATININE 2.6* 2.7* 2.6* 2.4*   CALCIUM 9.0 9.0 9.4 9.1   MG 2.2  --  2.1 2.2   PHOS 2.9  --  3.8 4.0     Significant Imaging: I have reviewed and interpreted all pertinent imaging results/findings within the past 24 hours.      Assessment & Plan  Colitis  Fever  Leukocytosis  Acute pulmonary embolism  - Was on ciprofloxacin, metronidazole PO to complete course when he developed new fever 04/11-04/12.  - Continued cefepime. UA with >100RBCs, 5 WBCs, rare bacteria; likely related to mackay placement. CXR appears benign; LLL zone not well evaluated due to heart silhouette but appears stable.  - Tachycardia sinus tach; no evidence of arrhythmia on telemetry. Infectious etiology should be covered - pain related? Increased pain control with hydrocodone-acetaminophen 5-325mg, 10-325mg PO q4hr PRN.  - Broadened workup - has been mobilizing with family in the room, but does have potential for thrombosis with fracture. D dimer elevated with fall / infection and with CKD would prefer to avoid CT angiography. Checked V/Q scan with intermediate probability, U/S BLE negative for DVT but given symptoms and intermediate probability, anticoagulated with heparin gtt on 4/14  - ID consult appreciated, stopped all antibiotics on 4/16   - Remains AF and WBC remaining normal, monitor, continue bowel regimen with increase of Miralax and give Dulcolax today  - Transitioned to apixaban from heparin  on 4/17  - Clinically improving and working with therapy, plan for moderate intensity  - Trend with labs  Syncope  - Appears was vasovagal 2/2 colitis / volume losses and BMs with the former.  - Troponin stable. No evidence of acute ischemic event. No recurrence of syncope.  - Echo with normal EF, grade I diastolic dysfunction likely related to HTN. Carotid U/S unremarkable.  - NS IV fluids for 1 liter at 50 ml/hr done x 2, will give another liter  - Monitor on telemetry.  Essential hypertension  - Chronic, with persistent elevation.  - Continue lisinopril 40mg PO daily and increase carvedilol to 12.5 mg PO BID today  - Stopped furosemide 40mg PO daily, HCTZ 25mg PO daily due to low Na levels  - Continue labetalol 10mg IV q4hr PRN.  CKD (chronic kidney disease), stage III  - Chronic; stable at baseline.  - Avoid nephrotoxins, renally dose medications.  Asthma  - Chronic; stable.  - Continue albuterol-ipratropium 2.5-0.5mg inhaled q4hr PRN.  Schizophrenia  - Chronic; stable. Continue risperidone 1mg PO qHS.  Blind  Deaf, bilateral  - Chronic; fall precautions.  Left fibular fracture  - XR demonstrating L fibular fracture; follow-up XR of tib/fib to evaluate for further areas of potential fracture unremarkable. Reported R-sided knee discomfort in addition with XR pending final read but no fracture visible to my evaluation.  - Discussed with orthopedics, Dr. Moreira; weight bearing as tolerated, no splint/casting required, follow-up in clinic  - PT and OT evaluation, recommendation for moderate intensity  Urinary retention  - Had persistently elevated volumes on bladder scan despite straight catheterization. Clark placed and resumed tamsulosin 0.4mg PO daily.   - Successful voiding trial completed on 4/15  VTE Risk Mitigation (From admission, onward)           Ordered     apixaban tablet 10 mg  2 times daily         04/17/25 0858     heparin 25,000 units in dextrose 5% 250 mL (100 units/mL) infusion HIGH INTENSITY  nomogram - [facility]  Continuous        Question:  Begin at (units/kg/hr)  Answer:  18    04/14/25 1727     IP VTE HIGH RISK PATIENT  Once         04/08/25 1943     Place sequential compression device  Until discontinued         04/08/25 1943     Reason for No Pharmacological VTE Prophylaxis  Once        Question:  Reasons:  Answer:  Risk of Bleeding    04/08/25 1943                        Brooklyn Wellington MD  Department of Hospital Medicine   [facility] ([address])

## 2025-04-19 NOTE — PLAN OF CARE
Problem: Adult Inpatient Plan of Care  Goal: Plan of Care Review  Outcome: Progressing  Goal: Patient-Specific Goal (Individualized)  Outcome: Progressing  Goal: Absence of Hospital-Acquired Illness or Injury  Outcome: Progressing  Goal: Optimal Comfort and Wellbeing  Outcome: Progressing  Goal: Readiness for Transition of Care  Outcome: Progressing     Problem: Infection  Goal: Absence of Infection Signs and Symptoms  Outcome: Progressing     Problem: Acute Kidney Injury/Impairment  Goal: Fluid and Electrolyte Balance  Outcome: Progressing  Goal: Improved Oral Intake  Outcome: Progressing  Goal: Effective Renal Function  Outcome: Progressing     Problem: Syncope  Goal: Absence of Syncopal Symptoms  Outcome: Progressing     Problem: Fall Injury Risk  Goal: Absence of Fall and Fall-Related Injury  Outcome: Progressing     Problem: Gas Exchange Impaired  Goal: Optimal Gas Exchange  Outcome: Progressing     Problem: Skin Injury Risk Increased  Goal: Skin Health and Integrity  Outcome: Progressing     Problem: Wound  Goal: Optimal Coping  Outcome: Progressing  Goal: Optimal Functional Ability  Outcome: Progressing  Goal: Absence of Infection Signs and Symptoms  Outcome: Progressing  Goal: Improved Oral Intake  Outcome: Progressing  Goal: Optimal Pain Control and Function  Outcome: Progressing  Goal: Skin Health and Integrity  Outcome: Progressing  Goal: Optimal Wound Healing  Outcome: Progressing     Problem: Hypertension Acute  Goal: Blood Pressure Within Desired Range  Outcome: Progressing

## 2025-04-20 LAB
ABSOLUTE EOSINOPHIL (OHS): 0.47 K/UL
ABSOLUTE MONOCYTE (OHS): 0.83 K/UL (ref 0.3–1)
ABSOLUTE NEUTROPHIL COUNT (OHS): 7.36 K/UL (ref 1.8–7.7)
ANION GAP (OHS): 9 MMOL/L (ref 8–16)
ANISOCYTOSIS BLD QL SMEAR: SLIGHT
BASOPHILS # BLD AUTO: 0.02 K/UL
BASOPHILS NFR BLD AUTO: 0.2 %
BUN SERPL-MCNC: 73 MG/DL (ref 6–20)
CALCIUM SERPL-MCNC: 9.2 MG/DL (ref 8.7–10.5)
CHLORIDE SERPL-SCNC: 100 MMOL/L (ref 95–110)
CO2 SERPL-SCNC: 23 MMOL/L (ref 23–29)
CREAT SERPL-MCNC: 2.4 MG/DL (ref 0.5–1.4)
ERYTHROCYTE [DISTWIDTH] IN BLOOD BY AUTOMATED COUNT: 18.6 % (ref 11.5–14.5)
GFR SERPLBLD CREATININE-BSD FMLA CKD-EPI: 30 ML/MIN/1.73/M2
GLUCOSE SERPL-MCNC: 86 MG/DL (ref 70–110)
HCT VFR BLD AUTO: 21.7 % (ref 40–54)
HGB BLD-MCNC: 7 GM/DL (ref 14–18)
HYPOCHROMIA BLD QL SMEAR: ABNORMAL
IMM GRANULOCYTES # BLD AUTO: 0.19 K/UL (ref 0–0.04)
IMM GRANULOCYTES NFR BLD AUTO: 1.9 % (ref 0–0.5)
LYMPHOCYTES # BLD AUTO: 0.94 K/UL (ref 1–4.8)
MAGNESIUM SERPL-MCNC: 2.1 MG/DL (ref 1.6–2.6)
MCH RBC QN AUTO: 25 PG (ref 27–31)
MCHC RBC AUTO-ENTMCNC: 32.3 G/DL (ref 32–36)
MCV RBC AUTO: 78 FL (ref 82–98)
NUCLEATED RBC (/100WBC) (OHS): 0 /100 WBC
PHOSPHATE SERPL-MCNC: 3.6 MG/DL (ref 2.7–4.5)
PLATELET # BLD AUTO: 611 K/UL (ref 150–450)
PLATELET BLD QL SMEAR: ABNORMAL
PMV BLD AUTO: 8.8 FL (ref 9.2–12.9)
POTASSIUM SERPL-SCNC: 5.1 MMOL/L (ref 3.5–5.1)
RBC # BLD AUTO: 2.8 M/UL (ref 4.6–6.2)
RELATIVE EOSINOPHIL (OHS): 4.8 %
RELATIVE LYMPHOCYTE (OHS): 9.6 % (ref 18–48)
RELATIVE MONOCYTE (OHS): 8.5 % (ref 4–15)
RELATIVE NEUTROPHIL (OHS): 75 % (ref 38–73)
SODIUM SERPL-SCNC: 132 MMOL/L (ref 136–145)
WBC # BLD AUTO: 9.81 K/UL (ref 3.9–12.7)

## 2025-04-20 PROCEDURE — 99900035 HC TECH TIME PER 15 MIN (STAT)

## 2025-04-20 PROCEDURE — 85025 COMPLETE CBC W/AUTO DIFF WBC: CPT | Performed by: INTERNAL MEDICINE

## 2025-04-20 PROCEDURE — 36415 COLL VENOUS BLD VENIPUNCTURE: CPT | Performed by: INTERNAL MEDICINE

## 2025-04-20 PROCEDURE — 25000003 PHARM REV CODE 250: Performed by: HOSPITALIST

## 2025-04-20 PROCEDURE — 94640 AIRWAY INHALATION TREATMENT: CPT

## 2025-04-20 PROCEDURE — 94761 N-INVAS EAR/PLS OXIMETRY MLT: CPT

## 2025-04-20 PROCEDURE — 83735 ASSAY OF MAGNESIUM: CPT | Performed by: INTERNAL MEDICINE

## 2025-04-20 PROCEDURE — 25000003 PHARM REV CODE 250: Performed by: INTERNAL MEDICINE

## 2025-04-20 PROCEDURE — 84100 ASSAY OF PHOSPHORUS: CPT | Performed by: INTERNAL MEDICINE

## 2025-04-20 PROCEDURE — 25000242 PHARM REV CODE 250 ALT 637 W/ HCPCS: Performed by: HOSPITALIST

## 2025-04-20 PROCEDURE — 11000001 HC ACUTE MED/SURG PRIVATE ROOM

## 2025-04-20 PROCEDURE — 25000003 PHARM REV CODE 250: Performed by: PHYSICIAN ASSISTANT

## 2025-04-20 PROCEDURE — 80048 BASIC METABOLIC PNL TOTAL CA: CPT | Performed by: INTERNAL MEDICINE

## 2025-04-20 RX ORDER — IPRATROPIUM BROMIDE AND ALBUTEROL SULFATE 2.5; .5 MG/3ML; MG/3ML
3 SOLUTION RESPIRATORY (INHALATION) EVERY 4 HOURS PRN
Status: DISCONTINUED | OUTPATIENT
Start: 2025-04-20 | End: 2025-04-20

## 2025-04-20 RX ADMIN — CARVEDILOL 12.5 MG: 12.5 TABLET, FILM COATED ORAL at 05:04

## 2025-04-20 RX ADMIN — ACETAMINOPHEN 1000 MG: 500 TABLET ORAL at 05:04

## 2025-04-20 RX ADMIN — IPRATROPIUM BROMIDE AND ALBUTEROL SULFATE 3 ML: 2.5; .5 SOLUTION RESPIRATORY (INHALATION) at 08:04

## 2025-04-20 RX ADMIN — APIXABAN 10 MG: 2.5 TABLET, FILM COATED ORAL at 09:04

## 2025-04-20 RX ADMIN — ACETAMINOPHEN 1000 MG: 500 TABLET ORAL at 09:04

## 2025-04-20 RX ADMIN — POLYETHYLENE GLYCOL 3350 17 G: 17 POWDER, FOR SOLUTION ORAL at 09:04

## 2025-04-20 RX ADMIN — IPRATROPIUM BROMIDE AND ALBUTEROL SULFATE 3 ML: 2.5; .5 SOLUTION RESPIRATORY (INHALATION) at 01:04

## 2025-04-20 RX ADMIN — CARVEDILOL 12.5 MG: 12.5 TABLET, FILM COATED ORAL at 10:04

## 2025-04-20 RX ADMIN — POLYETHYLENE GLYCOL 3350 17 G: 17 POWDER, FOR SOLUTION ORAL at 10:04

## 2025-04-20 RX ADMIN — LISINOPRIL 40 MG: 20 TABLET ORAL at 10:04

## 2025-04-20 RX ADMIN — TAMSULOSIN HYDROCHLORIDE 0.4 MG: 0.4 CAPSULE ORAL at 10:04

## 2025-04-20 RX ADMIN — ACETAMINOPHEN 1000 MG: 500 TABLET ORAL at 03:04

## 2025-04-20 RX ADMIN — RISPERIDONE 1 MG: 1 TABLET, FILM COATED ORAL at 09:04

## 2025-04-20 RX ADMIN — APIXABAN 10 MG: 2.5 TABLET, FILM COATED ORAL at 10:04

## 2025-04-21 PROBLEM — K52.9 COLITIS: Status: RESOLVED | Noted: 2025-04-08 | Resolved: 2025-04-21

## 2025-04-21 LAB
ABSOLUTE EOSINOPHIL (OHS): 0.4 K/UL
ABSOLUTE MONOCYTE (OHS): 0.68 K/UL (ref 0.3–1)
ABSOLUTE NEUTROPHIL COUNT (OHS): 8.53 K/UL (ref 1.8–7.7)
ANION GAP (OHS): 9 MMOL/L (ref 8–16)
BASOPHILS # BLD AUTO: 0.03 K/UL
BASOPHILS NFR BLD AUTO: 0.3 %
BUN SERPL-MCNC: 68 MG/DL (ref 6–20)
CALCIUM SERPL-MCNC: 9.2 MG/DL (ref 8.7–10.5)
CHLORIDE SERPL-SCNC: 100 MMOL/L (ref 95–110)
CO2 SERPL-SCNC: 23 MMOL/L (ref 23–29)
CREAT SERPL-MCNC: 2.4 MG/DL (ref 0.5–1.4)
ERYTHROCYTE [DISTWIDTH] IN BLOOD BY AUTOMATED COUNT: 18.7 % (ref 11.5–14.5)
GFR SERPLBLD CREATININE-BSD FMLA CKD-EPI: 30 ML/MIN/1.73/M2
GLUCOSE SERPL-MCNC: 117 MG/DL (ref 70–110)
HCT VFR BLD AUTO: 23 % (ref 40–54)
HGB BLD-MCNC: 7.2 GM/DL (ref 14–18)
IMM GRANULOCYTES # BLD AUTO: 0.16 K/UL (ref 0–0.04)
IMM GRANULOCYTES NFR BLD AUTO: 1.5 % (ref 0–0.5)
LYMPHOCYTES # BLD AUTO: 1.07 K/UL (ref 1–4.8)
MAGNESIUM SERPL-MCNC: 2.1 MG/DL (ref 1.6–2.6)
MCH RBC QN AUTO: 24.7 PG (ref 27–31)
MCHC RBC AUTO-ENTMCNC: 31.3 G/DL (ref 32–36)
MCV RBC AUTO: 79 FL (ref 82–98)
NUCLEATED RBC (/100WBC) (OHS): 0 /100 WBC
PHOSPHATE SERPL-MCNC: 4.2 MG/DL (ref 2.7–4.5)
PLATELET # BLD AUTO: 623 K/UL (ref 150–450)
PMV BLD AUTO: 8.5 FL (ref 9.2–12.9)
POTASSIUM SERPL-SCNC: 5.4 MMOL/L (ref 3.5–5.1)
RBC # BLD AUTO: 2.91 M/UL (ref 4.6–6.2)
RELATIVE EOSINOPHIL (OHS): 3.7 %
RELATIVE LYMPHOCYTE (OHS): 9.8 % (ref 18–48)
RELATIVE MONOCYTE (OHS): 6.3 % (ref 4–15)
RELATIVE NEUTROPHIL (OHS): 78.4 % (ref 38–73)
SODIUM SERPL-SCNC: 132 MMOL/L (ref 136–145)
WBC # BLD AUTO: 10.87 K/UL (ref 3.9–12.7)

## 2025-04-21 PROCEDURE — 25000003 PHARM REV CODE 250: Performed by: INTERNAL MEDICINE

## 2025-04-21 PROCEDURE — 83735 ASSAY OF MAGNESIUM: CPT | Performed by: INTERNAL MEDICINE

## 2025-04-21 PROCEDURE — 25000003 PHARM REV CODE 250: Performed by: PHYSICIAN ASSISTANT

## 2025-04-21 PROCEDURE — 11000001 HC ACUTE MED/SURG PRIVATE ROOM

## 2025-04-21 PROCEDURE — 25000242 PHARM REV CODE 250 ALT 637 W/ HCPCS: Performed by: NURSE PRACTITIONER

## 2025-04-21 PROCEDURE — 94640 AIRWAY INHALATION TREATMENT: CPT

## 2025-04-21 PROCEDURE — 85025 COMPLETE CBC W/AUTO DIFF WBC: CPT | Performed by: INTERNAL MEDICINE

## 2025-04-21 PROCEDURE — 94761 N-INVAS EAR/PLS OXIMETRY MLT: CPT

## 2025-04-21 PROCEDURE — 97535 SELF CARE MNGMENT TRAINING: CPT

## 2025-04-21 PROCEDURE — 84100 ASSAY OF PHOSPHORUS: CPT | Performed by: INTERNAL MEDICINE

## 2025-04-21 PROCEDURE — 80048 BASIC METABOLIC PNL TOTAL CA: CPT | Performed by: INTERNAL MEDICINE

## 2025-04-21 PROCEDURE — 97110 THERAPEUTIC EXERCISES: CPT | Mod: CQ

## 2025-04-21 PROCEDURE — 97530 THERAPEUTIC ACTIVITIES: CPT | Mod: CQ

## 2025-04-21 PROCEDURE — 25000003 PHARM REV CODE 250: Performed by: HOSPITALIST

## 2025-04-21 PROCEDURE — 36415 COLL VENOUS BLD VENIPUNCTURE: CPT | Performed by: INTERNAL MEDICINE

## 2025-04-21 RX ADMIN — CARVEDILOL 12.5 MG: 12.5 TABLET, FILM COATED ORAL at 09:04

## 2025-04-21 RX ADMIN — LISINOPRIL 40 MG: 20 TABLET ORAL at 09:04

## 2025-04-21 RX ADMIN — ACETAMINOPHEN 1000 MG: 500 TABLET ORAL at 05:04

## 2025-04-21 RX ADMIN — APIXABAN 10 MG: 2.5 TABLET, FILM COATED ORAL at 09:04

## 2025-04-21 RX ADMIN — POLYETHYLENE GLYCOL 3350 17 G: 17 POWDER, FOR SOLUTION ORAL at 09:04

## 2025-04-21 RX ADMIN — ACETAMINOPHEN 1000 MG: 500 TABLET ORAL at 10:04

## 2025-04-21 RX ADMIN — POLYETHYLENE GLYCOL 3350 17 G: 17 POWDER, FOR SOLUTION ORAL at 08:04

## 2025-04-21 RX ADMIN — CARVEDILOL 12.5 MG: 12.5 TABLET, FILM COATED ORAL at 04:04

## 2025-04-21 RX ADMIN — IPRATROPIUM BROMIDE AND ALBUTEROL SULFATE 3 ML: 2.5; .5 SOLUTION RESPIRATORY (INHALATION) at 08:04

## 2025-04-21 RX ADMIN — RISPERIDONE 1 MG: 1 TABLET, FILM COATED ORAL at 08:04

## 2025-04-21 RX ADMIN — APIXABAN 10 MG: 2.5 TABLET, FILM COATED ORAL at 08:04

## 2025-04-21 RX ADMIN — ACETAMINOPHEN 1000 MG: 500 TABLET ORAL at 04:04

## 2025-04-21 RX ADMIN — TAMSULOSIN HYDROCHLORIDE 0.4 MG: 0.4 CAPSULE ORAL at 09:04

## 2025-04-21 NOTE — ASSESSMENT & PLAN NOTE
>>ASSESSMENT AND PLAN FOR CKD (CHRONIC KIDNEY DISEASE), STAGE III WRITTEN ON 4/21/2025  8:08 PM BY LALO SELLERS MD    - Chronic; stable at baseline.  - Avoid nephrotoxins, renally dose medications.

## 2025-04-21 NOTE — CONSULTS
[facility] Med Surg ([address])  Wound Care    Patient Name:  rFantz Sigala Jr.   MRN:  [id number]  Date: 4/21/2025  Diagnosis: Acute pulmonary embolism    History:     Past Medical History:   Diagnosis Date    Anxiety     Asthma     Schizophrenia        Social History[1]        [facility] Assessment Details:     Consultation per primary related to HA Ruptured blister on inner thigh, posterior aspect.  Recs made and ordered entered.     04/21/25 1404        Wound 04/21/25 0701 Blister(s) Left Thigh   Date First Assessed/Time First Assessed: 04/21/25 0701   Present on Original Admission: No  Primary Wound Type: Blister(s)  Side: Left  Location: Thigh   Wound Image      Drainage Amount None   Drainage Characteristics/Odor Serosanguineous   Appearance Pink   Tissue loss description Partial thickness   Wound Edges Defined;Open   Care Cleansed with:;Wound cleanser   Dressing Applied;Paste;Silicone;Foam   Periwound Care Dry periwound area maintained;Skin barrier film applied;Other (see comments)   Dressing Change Due 04/21/25 04/21/2025         [1]   Social History  Socioeconomic History    Marital status: Single   Tobacco Use    Smoking status: Never    Smokeless tobacco: Never   Substance and Sexual Activity    Alcohol use: No     Alcohol/week: 0.0 standard drinks of alcohol    Drug use: No    Sexual activity: Never   Social History Narrative    Pt is blind and deaf     Social Drivers of Health     Financial Resource Strain: Patient Unable To Answer (4/9/2025)    Overall Financial Resource Strain (CARDIA)     Difficulty of Paying Living Expenses: Patient unable to answer   Food Insecurity: No Food Insecurity (4/9/2025)    Hunger Vital Sign     Worried About Running Out of Food in the Last Year: Never true     Ran Out of Food in the Last Year: Never true   Transportation Needs: No Transportation Needs (4/9/2025)    PRAPARE - Transportation     Lack of Transportation (Medical): No     Lack of Transportation  (Non-Medical): No   Physical Activity: Inactive (4/9/2025)    Exercise Vital Sign     Days of Exercise per Week: 0 days     Minutes of Exercise per Session: 0 min   Stress: Patient Unable To Answer (4/9/2025)    [employer] [place] - Occupational Stress Questionnaire     Feeling of Stress : Patient unable to answer   Housing Stability: Low Risk  (4/9/2025)    Housing Stability Vital Sign     Unable to Pay for Housing in the Last Year: No     Homeless in the Last Year: No

## 2025-04-21 NOTE — ASSESSMENT & PLAN NOTE
>>ASSESSMENT AND PLAN FOR CKD (CHRONIC KIDNEY DISEASE), STAGE III WRITTEN ON 4/20/2025  8:23 PM BY WEN ALVARENGA MD    - Chronic; stable at baseline.  - Avoid nephrotoxins, renally dose medications.

## 2025-04-21 NOTE — ASSESSMENT & PLAN NOTE
- Was on ciprofloxacin, metronidazole PO to complete course when he developed new fever 04/11-04/12.  - Continued cefepime. UA with >100RBCs, 5 WBCs, rare bacteria; likely related to mackay placement. CXR appears benign; LLL zone not well evaluated due to heart silhouette but appears stable.  - Tachycardia sinus tach; no evidence of arrhythmia on telemetry. Infectious etiology should be covered - pain related? Increased pain control with hydrocodone-acetaminophen 5-325mg, 10-325mg PO q4hr PRN.  - Broadened workup - has been mobilizing with family in the room, but does have potential for thrombosis with fracture. D dimer elevated with fall / infection and with CKD would prefer to avoid CT angiography. Checked V/Q scan with intermediate probability, U/S BLE negative for DVT but given symptoms and intermediate probability, anticoagulated with heparin gtt on 4/14  - ID consult appreciated, stopped all antibiotics on 4/16   - Remains AF and WBC remaining normal, monitor  - Transitioned to apixaban from heparin on 4/17  - Continue bowel regimen with Miralax and give Dulcolax today  - Clinically improving and working with therapy, plan for moderate intensity  - Trend with labs   positive S1/positive S2

## 2025-04-21 NOTE — PLAN OF CARE
Problem: Occupational Therapy  Goal: Occupational Therapy Goal  Description: Goals to be met by: 5/1/2025     Patient will increase functional independence with ADLs by performing:    UE Dressing with Supervision.  LE Dressing with Minimal Assistance.  Grooming while standing at sink with Contact Guard Assistance.  Toileting from toilet with Contact Guard Assistance for hygiene and clothing management.   Toilet transfer to toilet with Contact Guard Assistance.    Outcome: Progressing     Pt is making strong progress towards goals and would continue to benefit from skilled OT services to address deficits and increase independence with ADLs.  Moderate intensity therapy is recommended upon d/c from acute care to further address deficits and help pt improve overall functional independence.     CORY Keane  4/21/2025

## 2025-04-21 NOTE — PLAN OF CARE
[place] called into state, PASSR entered into Assessment Pro.    [id number] PASSR uploaded to .    1000 CM met at bedside with family to inform SNF choice [facility] and [facility] unable to accept patient. Provided list of additional SNF for choices. Family to discuss and provide with choice this pm.    [phone] uploaded to [employer].

## 2025-04-21 NOTE — ASSESSMENT & PLAN NOTE
- XR demonstrating L fibular fracture; follow-up XR of tib/fib to evaluate for further areas of potential fracture unremarkable. Reported R-sided knee discomfort in addition with XR pending final read but no fracture visible to my evaluation.  - Discussed with orthopedics, Dr. Moreira; weight bearing as tolerated, no splint/casting required, follow-up in clinic  - PT and OT evaluations recommending for moderate intensity

## 2025-04-21 NOTE — ASSESSMENT & PLAN NOTE
- Was on ciprofloxacin, metronidazole PO to complete course when he developed new fever 04/11-04/12.  - Continued cefepime. UA with >100RBCs, 5 WBCs, rare bacteria; likely related to mackay placement. CXR appears benign; LLL zone not well evaluated due to heart silhouette but appears stable.  - Tachycardia sinus tach; no evidence of arrhythmia on telemetry. Infectious etiology should be covered - pain related? Increased pain control with hydrocodone-acetaminophen 5-325mg, 10-325mg PO q4hr PRN.  - Broadened workup - has been mobilizing with family in the room, but does have potential for thrombosis with fracture. D dimer elevated with fall / infection and with CKD would prefer to avoid CT angiography. Checked V/Q scan with intermediate probability, U/S BLE negative for DVT but given symptoms and intermediate probability, anticoagulated with heparin gtt on 4/14  - ID consult appreciated, stopped all antibiotics on 4/16   - Remains AF and WBC remaining normal, monitor  - Transitioned to apixaban from heparin on 4/17  - Continue bowel regimen with Miralax and give Dulcolax today  - Clinically improving and working with therapy, plan for moderate intensity  - Trend with labs

## 2025-04-21 NOTE — PLAN OF CARE
Patient is blind and deaf . Patient grunts and moans, care plan reviewed with family member at bedside.          Problem: Adult Inpatient Plan of Care  Goal: Plan of Care Review  Outcome: Progressing  Goal: Patient-Specific Goal (Individualized)  Outcome: Progressing  Goal: Absence of Hospital-Acquired Illness or Injury  Outcome: Progressing  Goal: Optimal Comfort and Wellbeing  Outcome: Progressing  Goal: Readiness for Transition of Care  Outcome: Progressing     Problem: Infection  Goal: Absence of Infection Signs and Symptoms  Outcome: Progressing     Problem: Acute Kidney Injury/Impairment  Goal: Fluid and Electrolyte Balance  Outcome: Progressing  Goal: Improved Oral Intake  Outcome: Progressing  Goal: Effective Renal Function  Outcome: Progressing     Problem: Syncope  Goal: Absence of Syncopal Symptoms  Outcome: Progressing     Problem: Fall Injury Risk  Goal: Absence of Fall and Fall-Related Injury  Outcome: Progressing     Problem: Gas Exchange Impaired  Goal: Optimal Gas Exchange  Outcome: Progressing     Problem: Skin Injury Risk Increased  Goal: Skin Health and Integrity  Outcome: Progressing     Problem: Wound  Goal: Optimal Coping  Outcome: Progressing  Goal: Optimal Functional Ability  Outcome: Progressing  Goal: Absence of Infection Signs and Symptoms  Outcome: Progressing  Goal: Improved Oral Intake  Outcome: Progressing  Goal: Optimal Pain Control and Function  Outcome: Progressing  Goal: Skin Health and Integrity  Outcome: Progressing  Goal: Optimal Wound Healing  Outcome: Progressing     Problem: Hypertension Acute  Goal: Blood Pressure Within Desired Range  Outcome: Progressing

## 2025-04-21 NOTE — PROGRESS NOTES
[facility] Surg ([address] Medicine  Progress Note    Patient Name: Frantz Sigala Jr.  MRN: [id number]  Patient Class: IP- Inpatient   Admission Date: 4/8/2025  Length of Stay: 12 days  Attending Physician: Brooklyn Wellington MD  Primary Care Provider: Zachary Ramos MD        Subjective     Principal Problem:Colitis        HPI:  Mr. Sigala is a 60 YOM with PMHx of HTN, asthma, CKD (baseline SCr 2-2.5), schizophrenia, blindness, and deafness.     He presents to ED via EMS with family due to syncopal event just PTA. Family provides history due patients deafness and blindness. They note that he was in his normal state of health today until right after lunch. He ate a salami sandwich with potato chips and drank orange juice; shortly thereafter he was in the bathroom when his mother heard a loud thud and patient was found by his brother on the ground unconscious.  His brother notes that he called out the patients name and slapped him on his back a few times at which time he seemed to come to and began vomiting with concurrent episodes of diarrhea.  Sister notes she has had a recent respiratory illness with abdominal cramping and some diarrhea. Family notes that patient has lived in the home he is in since a small child and that he is able to navigate the home independently including going upstairs and outside.     In the ED he is hypertensive, heart rate stable, saturations initially 80% on room air with improvement after 2 L nasal cannula application, and afebrile.  CBC with WBC 21, H&H 9.4/29.4, platelets 497.  Chemistry was , K 4.4, chloride 100, CO2 22, BUN 49, SCr 2.2, glucose 125.  LFTs unremarkable.  BNP 35.  Troponin 0.035 >> 0.059 (appear similar to priors however these were in 2015).  Lactic acid 2.2.  Flu and COVID negative.  Hepatitis-C and HIV nonreactive.  UA noninfectious.  CXR with chronic appearing interstitial findings, congestive change or edema is a consideration; no  large focal consolidation. CTH with no acute intracranial abnormality detected, bilateral partial opacification of the mastoid air cells, sphenoid sinus disease.     CT A/P:  Wall thickening of the transverse colon, suggestive of nonspecific colitis.   Wall thickening of the urinary bladder.  The findings may be seen with cystitis.   Advanced calcifications of the abdominal aorta and the mesenteric arteries.  Correlation with possible vascular ultrasound of the mesenteric vessels may be obtained for further assessment. 1.2 cm hypodensity in the upper pole of the right kidney with Hounsfield of 23.  This can be assessed with renal ultrasound.     The patient was admitted to the Hospital Medicine Service for further evaluation and management.     Overview/Hospital Course:  Admitted with syncope and fall suspected secondary to vasovagal episode due to underlying colitis. Started ciprofloxacin, metronidazole and stool studies ordered though had no further loose stool to collect. Echo showed normal EF and grade I diastolic dysfunction, carotid U/S unremarkable. Complained of increased LLE pain and XR obtained demonstrating proximal fibular fracture. Orthopedics consulted/ in discussion with Dr. Moreira recommended weight bearing as tolerated, no splint/boot required and follow-up in clinic. Developed urinary retention and increasing tachycardia; after tamsulosin resumed and straight catheterization, had persistent retention and mackay placed. Urology consulted and recommended maintaining further prior to void trial. Had new onset fevers and converted to cefepime. Hypertension and tachycardia persisted; pain control adjusted and use liberalized, but persisted. ID consulted with uptrending WBCs and evaluation for thrombosis initiated. All antibiotics stopped and patient anticoagulated for probable PE with heparin gtt. Later transitioned to apixaban.    Interval History: No further fevers and appears comfortable. Family at  the bedside said he looks like himself and had a good night. Family report he is eating well and has no bleeding. Discussed plan of care with family member at bedside..     Review of Systems   Unable to perform ROS: Patient nonverbal     Objective:     Vital Signs (Most Recent):  Temp: 98.7 °F (37.1 °C) (04/20/25 1956)  Pulse: 91 (04/20/25 1956)  Resp: 16 (04/20/25 1956)  BP: 122/60 (04/20/25 1956)  SpO2: (Abnormal) 94 % (04/20/25 1956) Vital Signs (24h Range):  Temp:  [97.6 °F (36.4 °C)-98.8 °F (37.1 °C)] 98.7 °F (37.1 °C)  Pulse:  [] 91  Resp:  [16-18] 16  SpO2:  [94 %-97 %] 94 %  BP: (118-178)/(58-84) 122/60     Weight: 91.6 kg (201 lb 15.1 oz)  Body mass index is 32.59 kg/m².    Intake/Output Summary (Last 24 hours) at 4/20/2025 2019  Last data filed at 4/20/2025 1746  Gross per 24 hour   Intake 600 ml   Output 1470 ml   Net -870 ml         Physical Exam  Vitals and nursing note reviewed.   Constitutional:       General: He is sleeping. He is not in acute distress.     Appearance: He is well-developed. He is ill-appearing (Chronically).   HENT:      Head: Normocephalic and atraumatic.      Ears:      Comments: Anacusis bilaterally  Eyes:      General:         Right eye: No discharge.         Left eye: No discharge.      Conjunctiva/sclera: Conjunctivae normal.      Comments: Corneal opacity bilaterally, blind   Cardiovascular:      Rate and Rhythm: Normal rate and regular rhythm.      Pulses: Normal pulses.   Pulmonary:      Effort: Pulmonary effort is normal. No respiratory distress.   Abdominal:      Palpations: Abdomen is soft.      Tenderness: There is no abdominal tenderness. There is no guarding or rebound.   Genitourinary:     Comments: External catheter in place with clear urine.  Musculoskeletal:         General: Tenderness (Reports tenderness to R knee.) present. Normal range of motion.      Right lower leg: No edema.      Left lower leg: No edema.   Skin:     General: Skin is warm and dry.    Neurological:      Mental Status: He is easily aroused. Mental status is at baseline.           Significant Labs:   CBC:  Recent Labs   Lab 04/18/25  0921 04/19/25  0439 04/20/25  0448   WBC 8.82 10.58 9.81   HGB 8.5* 8.0* 7.0*   HCT 27.4* 24.5* 21.7*    611* 611*   LYMPH 0.62*  7.0* 0.96*  9.1* 0.94*  9.6*   MONO 5.1  0.45 9.6  1.02* 8.5  0.83   EOS 4.2  0.37 4.9  0.52* 4.8  0.47   BMP:  Recent Labs   Lab 04/18/25  0921 04/19/25  0439 04/20/25  0509   * 131* 132*   K 5.0 5.3* 5.1   CL 96 97 100   CO2 24 25 23   BUN 74* 74* 73*   CREATININE 2.6* 2.4* 2.4*   CALCIUM 9.4 9.1 9.2   MG 2.1 2.2 2.1   PHOS 3.8 4.0 3.6     Significant Imaging: I have reviewed and interpreted all pertinent imaging results/findings within the past 24 hours.      Assessment & Plan  Colitis  Fever  Leukocytosis  Acute pulmonary embolism  - Was on ciprofloxacin, metronidazole PO to complete course when he developed new fever 04/11-04/12.  - Continued cefepime. UA with >100RBCs, 5 WBCs, rare bacteria; likely related to mackay placement. CXR appears benign; LLL zone not well evaluated due to heart silhouette but appears stable.  - Tachycardia sinus tach; no evidence of arrhythmia on telemetry. Infectious etiology should be covered - pain related? Increased pain control with hydrocodone-acetaminophen 5-325mg, 10-325mg PO q4hr PRN.  - Broadened workup - has been mobilizing with family in the room, but does have potential for thrombosis with fracture. D dimer elevated with fall / infection and with CKD would prefer to avoid CT angiography. Checked V/Q scan with intermediate probability, U/S BLE negative for DVT but given symptoms and intermediate probability, anticoagulated with heparin gtt on 4/14  - ID consult appreciated, stopped all antibiotics on 4/16   - Remains AF and WBC remaining normal, monitor  - Transitioned to apixaban from heparin on 4/17  - Continue bowel regimen with Miralax and give Dulcolax today  -  Clinically improving and working with therapy, plan for moderate intensity  - Trend with labs  Syncope  - Appears was vasovagal 2/2 colitis / volume losses and BMs with the former.  - Troponin stable. No evidence of acute ischemic event. No recurrence of syncope.  - Echo with normal EF, grade I diastolic dysfunction likely related to HTN. Carotid U/S unremarkable.  - NS IV fluids for 1 liter at 50 ml/hr done x 3 and volume status corrected and he is eating well  - Monitor on telemetry.  Essential hypertension  - Chronic, with persistent elevation.  - Continue lisinopril 40mg PO daily and increase carvedilol to 12.5 mg PO BID today  - Stopped furosemide 40mg PO daily, HCTZ 25mg PO daily due to low Na levels  - Continue labetalol 10mg IV q4hr PRN.  CKD (chronic kidney disease), stage III  - Chronic; stable at baseline.  - Avoid nephrotoxins, renally dose medications.  Asthma  - Chronic; stable.  - Continue albuterol-ipratropium 2.5-0.5mg inhaled q4hr PRN.  Schizophrenia  - Chronic; stable. Continue risperidone 1mg PO qHS.  Blind  Deaf, bilateral  - Chronic; fall precautions.  Left fibular fracture  - XR demonstrating L fibular fracture; follow-up XR of tib/fib to evaluate for further areas of potential fracture unremarkable. Reported R-sided knee discomfort in addition with XR pending final read but no fracture visible to my evaluation.  - Discussed with orthopedics, Dr. Moreira; weight bearing as tolerated, no splint/casting required, follow-up in clinic  - PT and OT evaluation, recommendation for moderate intensity  Urinary retention  - Had persistently elevated volumes on bladder scan despite straight catheterization. Clark placed and resumed tamsulosin 0.4mg PO daily.   - Successful voiding trial completed on 4/15  VTE Risk Mitigation (From admission, onward)           Ordered     apixaban tablet 10 mg  2 times daily         04/17/25 0858     heparin 25,000 units in dextrose 5% 250 mL (100 units/mL) infusion HIGH  INTENSITY nomogram - [facility]  Continuous        Question:  Begin at (units/kg/hr)  Answer:  18    04/14/25 1727     IP VTE HIGH RISK PATIENT  Once         04/08/25 1943     Place sequential compression device  Until discontinued         04/08/25 1943     Reason for No Pharmacological VTE Prophylaxis  Once        Question:  Reasons:  Answer:  Risk of Bleeding    04/08/25 1943                          Brooklyn Wellington MD  Department of Hospital Medicine   [facility] Surg ([address])

## 2025-04-21 NOTE — PT/OT/SLP PROGRESS
Occupational Therapy   Treatment    Name: Frantz Sigala Jr.  MRN: [id number]  Admitting Diagnosis:  Acute pulmonary embolism       Recommendations:     Discharge Recommendations: Moderate Intensity Therapy  Discharge Equipment Recommendations:  to be determined by next level of care (would currently need BSC, RW, wheelchair)  Barriers to discharge:  None    Assessment:     Frantz Sigala Jr. is a 60 y.o. male with a medical diagnosis of Acute pulmonary embolism.  He presents with no pain. Performance deficits affecting function are weakness, impaired self care skills, impaired functional mobility, gait instability, impaired balance, impaired cognition, decreased coordination, decreased lower extremity function, decreased safety awareness (pt is deaf, blind, and non-verbal at baseline; pt able to communicate with him).  Pt agreeable to participating in therapy upon arrival to room.  Pt demonstrated improvement with sit <> stand transfer performing with Mod A-Min A, RW and assist of 2.  In addition, pt able to perform grooming task standing at sink with Min A-CGA, RW.  Pt ambulated ~18 ft total this date with Min A-CGA, RW with chair follow for safety.    Pt is making strong progress towards goals and would continue to benefit from skilled OT services to address problems listed above and increase independence with ADLs.  Family is hopeful for post acute placement and reports they will be able to be present and provide support as needed.  Moderate intensity therapy is recommended upon d/c from acute care to further address deficits and help pt improve overall functional independence.         Rehab Prognosis:  Good; patient would benefit from acute skilled OT services to address these deficits and reach maximum level of function.       Plan:     Patient to be seen 5 x/week to address the above listed problems via self-care/home management, therapeutic activities, therapeutic exercises  Plan of Care Expires:  05/17/25  Plan of Care Reviewed with: patient (sister)    Subjective     Sister (Wendy) present during session and reported that family is still hopeful for SNFeven though pt has been denied (prefers [facility] and [facility])    Chief Complaint: none stated  Patient/Family Comments/goals: continue getting stronger, go to SNF  Pain/Comfort:  Pain Rating 1: 0/10  Pain Rating Post-Intervention 1: 0/10    Objective:     Communicated with: PTA (Skinny) prior to session.  Patient found up in chair with peripheral IV, Condom Catheter upon OT entry to room.  *Sister present during session; PCT present during functional mobility     General Precautions: Standard, deaf, blind, fall    Orthopedic Precautions:N/A  Braces: N/A  Respiratory Status: Room air     Occupational Performance:     Bed Mobility:    Not assessed 2* pt up in chair upon arrival    Functional Mobility/Transfers:  Sit <> stand:   Mod A, RW with assist of 2 x 2 trials from chair  Min A, RW with assist of 2 x 1 trial from chair  Min A, RW with assist of 2 x 1 trial from BSC  Notes:  Tactile cues provided from sister for sequencing  Toilet: Min A, RW (with assist of 2 for safety) taking steps to BSC placed near chair  Tactile cues provided for direction and sequencing  Functional Mobility: Pt ambulated ~18 ft total this date with Min A-CGA, RW and chair follow for safety.  1st trial: 12 ft, Min A, RW progressing to CGA, with assist of 2  Seated rest break taken after task  Tactile cues provided for direction and sequencing  2nd trial: 6 ft, Min A, RW progressing to CGA with assist of 2  Pt performed grooming task in standing after 2nd trial of ambulation    Activities of Daily Living:  Grooming: Min A-CGA, RW for washing hands while standing at sink.  RW placed to side of pt  Pt leaned forward and rested forearms on sink  Chair placed behind pt for safety  Tactile cues provided for sequencing      AMPAC 6 Click ADL: 16    Treatment & Education:  *Session  focused on promoting increased endurance, posture, strength, balance, coordination, and ROM needed for ADLs and functional transfers as part of daily routine.   -pt educated on role of OT in acute care setting  -pt performed multiple sit <> stand transfers from chair; tactile cues provided for sequencing  -pt performed toilet transfer to BSC; tactile cues provided for sequencing  -pt ambulated within room to address coordination, endurance, and balance needed for functional mobility; chair follow for safety  -pt performed grooming task standing at sink  -POC reviewed with pt and family        Patient left up in chair with all lines intact, call button in reach, and sister and PCT present  *Pt safe to transfer to BSC and back to bed with 2 person assist; RN/PCT and family member    GOALS:   Multidisciplinary Problems       Occupational Therapy Goals          Problem: Occupational Therapy    Goal Priority Disciplines Outcome Interventions   Occupational Therapy Goal     OT, PT/OT Progressing    Description: Goals to be met by: 5/1/2025     Patient will increase functional independence with ADLs by performing:    UE Dressing with Supervision.  LE Dressing with Minimal Assistance.  Grooming while standing at sink with Contact Guard Assistance.  Toileting from toilet with Contact Guard Assistance for hygiene and clothing management.   Toilet transfer to toilet with Contact Guard Assistance.                         DME Justifications:   Frantz's mobility limitation cannot be sufficiently resolved by the use of a cane. His functional mobility deficit can be sufficiently resolved with the use of a Rolling Walker. Patient's mobility limitation significantly impairs their ability to participate in one of more activities of daily living.  The use of a RW will significantly improve the patient's ability to participate in MRADLS and the patient will use it on regular basis in the home.    Time Tracking:     OT Date of Treatment:  04/21/25  OT Start Time: 1111  OT Stop Time: 1142  OT Total Time (min): 31 min    Billable Minutes:Self Care/Home Management 31    OT/MATT: OT        CORY Keane  4/21/2025

## 2025-04-21 NOTE — PROGRESS NOTES
[facility] ([address] Medicine  Progress Note    Patient Name: Frantz Sigala Jr.  MRN: [id number]  Patient Class: IP- Inpatient   Admission Date: 4/8/2025  Length of Stay: 13 days  Attending Physician: LALO Quesada MD  Primary Care Provider: Zachary Ramos MD        Subjective     Principal Problem:Acute pulmonary embolism        HPI:  Mr. Sigala is a 60 YOM with PMHx of HTN, asthma, CKD (baseline SCr 2-2.5), schizophrenia, blindness, and deafness.     He presents to ED via EMS with family due to syncopal event just PTA. Family provides history due patients deafness and blindness. They note that he was in his normal state of health today until right after lunch. He ate a salami sandwich with potato chips and drank orange juice; shortly thereafter he was in the bathroom when his mother heard a loud thud and patient was found by his brother on the ground unconscious.  His brother notes that he called out the patients name and slapped him on his back a few times at which time he seemed to come to and began vomiting with concurrent episodes of diarrhea.  Sister notes she has had a recent respiratory illness with abdominal cramping and some diarrhea. Family notes that patient has lived in the home he is in since a small child and that he is able to navigate the home independently including going upstairs and outside.     In the ED he is hypertensive, heart rate stable, saturations initially 80% on room air with improvement after 2 L nasal cannula application, and afebrile.  CBC with WBC 21, H&H 9.4/29.4, platelets 497.  Chemistry was , K 4.4, chloride 100, CO2 22, BUN 49, SCr 2.2, glucose 125.  LFTs unremarkable.  BNP 35.  Troponin 0.035 >> 0.059 (appear similar to priors however these were in 2015).  Lactic acid 2.2.  Flu and COVID negative.  Hepatitis-C and HIV nonreactive.  UA noninfectious.  CXR with chronic appearing interstitial findings, congestive change or edema is a  consideration; no large focal consolidation. CTH with no acute intracranial abnormality detected, bilateral partial opacification of the mastoid air cells, sphenoid sinus disease.     CT A/P:  Wall thickening of the transverse colon, suggestive of nonspecific colitis.   Wall thickening of the urinary bladder.  The findings may be seen with cystitis.   Advanced calcifications of the abdominal aorta and the mesenteric arteries.  Correlation with possible vascular ultrasound of the mesenteric vessels may be obtained for further assessment. 1.2 cm hypodensity in the upper pole of the right kidney with Hounsfield of 23.  This can be assessed with renal ultrasound.     The patient was admitted to the Hospital Medicine Service for further evaluation and management.     Overview/Hospital Course:  Admitted with syncope and fall suspected secondary to vasovagal episode due to underlying colitis. Started ciprofloxacin, metronidazole and stool studies ordered though had no further loose stool to collect. Echo showed normal EF and grade I diastolic dysfunction, carotid U/S unremarkable. Complained of increased LLE pain and XR obtained demonstrating proximal fibular fracture. Orthopedics consulted/ in discussion with Dr. Moreira recommended weight bearing as tolerated, no splint/boot required and follow-up in clinic. Developed urinary retention and increasing tachycardia; after tamsulosin resumed and straight catheterization, had persistent retention and mackay placed. Urology consulted and recommended maintaining further prior to void trial. Had new onset fevers and converted to cefepime. Hypertension and tachycardia persisted; pain control adjusted and use liberalized, but persisted. ID consulted with uptrending WBCs and evaluation for thrombosis initiated. All antibiotics stopped and patient anticoagulated for probable PE with heparin gtt. Later transitioned to apixaban.    Interval History: No acute events overnight. Pursuing  placement; rejected from first two choice facilities. Discussed plan of care with family at bedside. No other concerns at this time.    Review of Systems   Unable to perform ROS: Patient nonverbal     Objective:     Vital Signs (Most Recent):  Temp: 97.4 °F (36.3 °C) (04/21/25 1954)  Pulse: 95 (04/21/25 1954)  Resp: 18 (04/21/25 1954)  BP: (!) 157/72 (04/21/25 1954)  SpO2: (!) 93 % (04/21/25 1954) Vital Signs (24h Range):  Temp:  [97.4 °F (36.3 °C)-98.7 °F (37.1 °C)] 97.4 °F (36.3 °C)  Pulse:  [] 95  Resp:  [16-18] 18  SpO2:  [91 %-98 %] 93 %  BP: (120-194)/(54-88) 157/72     Weight: 91.6 kg (201 lb 15.1 oz)  Body mass index is 32.59 kg/m².    Intake/Output Summary (Last 24 hours) at 4/21/2025 2006  Last data filed at 4/21/2025 1306  Gross per 24 hour   Intake 960 ml   Output 1000 ml   Net -40 ml         Physical Exam  Vitals and nursing note reviewed.   Constitutional:       General: He is sleeping. He is not in acute distress.     Appearance: He is well-developed. He is ill-appearing (Chronically).   HENT:      Head: Normocephalic and atraumatic.   Eyes:      General:         Right eye: No discharge.         Left eye: No discharge.      Conjunctiva/sclera: Conjunctivae normal.   Cardiovascular:      Rate and Rhythm: Normal rate.      Pulses: Normal pulses.   Pulmonary:      Effort: Pulmonary effort is normal. No respiratory distress.   Abdominal:      Palpations: Abdomen is soft.      Tenderness: There is no abdominal tenderness.   Musculoskeletal:         General: No tenderness. Normal range of motion.      Right lower leg: No edema.      Left lower leg: No edema.   Skin:     General: Skin is warm and dry.   Neurological:      Mental Status: He is easily aroused. Mental status is at baseline.           Significant Labs:   CBC:  Recent Labs   Lab 04/19/25  0439 04/20/25  0448 04/21/25  0453   WBC 10.58 9.81 10.87   HGB 8.0* 7.0* 7.2*   HCT 24.5* 21.7* 23.0*   * 611* 623*   LYMPH 0.96*  9.1* 0.94*   9.6* 1.07  9.8*   MONO 9.6  1.02* 8.5  0.83 6.3  0.68   EOS 4.9  0.52* 4.8  0.47 3.7  0.40   BMP:  Recent Labs   Lab 04/19/25  0439 04/20/25  0509 04/21/25  0453   * 132* 132*   K 5.3* 5.1 5.4*   CL 97 100 100   CO2 25 23 23   BUN 74* 73* 68*   CREATININE 2.4* 2.4* 2.4*   CALCIUM 9.1 9.2 9.2   MG 2.2 2.1 2.1   PHOS 4.0 3.6 4.2     Significant Imaging: I have reviewed and interpreted all pertinent imaging results/findings within the past 24 hours.        Assessment & Plan  Acute pulmonary embolism  Colitis (Resolved: 4/21/2025)  Fever  Leukocytosis  - Was on ciprofloxacin, metronidazole PO to complete course when he developed new fever 04/11-04/12.  - Continued cefepime. UA with >100RBCs, 5 WBCs, rare bacteria; likely related to mackay placement. CXR appears benign; LLL zone not well evaluated due to heart silhouette but appears stable.  - Tachycardia sinus tach; no evidence of arrhythmia on telemetry. Infectious etiology should be covered - pain related? Increased pain control with hydrocodone-acetaminophen 5-325mg, 10-325mg PO q4hr PRN.  - Broadened workup - has been mobilizing with family in the room, but does have potential for thrombosis with fracture. D dimer elevated with fall / infection and with CKD would prefer to avoid CT angiography. Checked V/Q scan with intermediate probability, U/S BLE negative for DVT but given symptoms and intermediate probability, anticoagulated with heparin gtt on 4/14  - ID consult appreciated, stopped all antibiotics on 4/16   - Remains AF and WBC remaining normal, monitor  - Transitioned to apixaban from heparin on 4/17  - Continue bowel regimen with Miralax and give Dulcolax today  - Clinically improving and working with therapy, plan for moderate intensity  - Trend with labs  Syncope  - Appears was vasovagal 2/2 colitis / volume losses and BMs with the former.  - Troponin stable. No evidence of acute ischemic event. No recurrence of syncope.  - Echo with normal EF,  grade I diastolic dysfunction likely related to HTN. Carotid U/S unremarkable.  - Monitor on telemetry.  Essential hypertension  - Chronic, with persistent elevation.  - Continue lisinopril 40mg PO daily and increase carvedilol to 12.5 mg PO BID today  - Stopped furosemide 40mg PO daily, HCTZ 25mg PO daily due to low Na levels  - Continue labetalol 10mg IV q4hr PRN.  CKD (chronic kidney disease), stage III  - Chronic; stable at baseline.  - Avoid nephrotoxins, renally dose medications.  Asthma  - Chronic; stable.  - Continue albuterol-ipratropium 2.5-0.5mg inhaled q4hr PRN.  Schizophrenia  - Chronic; stable. Continue risperidone 1mg PO qHS.  Blind  Deaf, bilateral  - Chronic; fall precautions.  Left fibular fracture  - XR demonstrating L fibular fracture; follow-up XR of tib/fib to evaluate for further areas of potential fracture unremarkable. Reported R-sided knee discomfort in addition with XR pending final read but no fracture visible to my evaluation.  - Discussed with orthopedics, Dr. Moreira; weight bearing as tolerated, no splint/casting required, follow-up in clinic  - PT and OT evaluations recommending for moderate intensity  Urinary retention  - Had persistently elevated volumes on bladder scan despite straight catheterization. Clark placed and resumed tamsulosin 0.4mg PO daily.   - Successful voiding trial completed on 4/15    VTE Risk Mitigation (From admission, onward)           Ordered     apixaban tablet 10 mg  2 times daily         04/17/25 0858     heparin 25,000 units in dextrose 5% 250 mL (100 units/mL) infusion HIGH INTENSITY nomogram - [facility]  Continuous        Question:  Begin at (units/kg/hr)  Answer:  18    04/14/25 1727     IP VTE HIGH RISK PATIENT  Once         04/08/25 1943     Place sequential compression device  Until discontinued         04/08/25 1943     Reason for No Pharmacological VTE Prophylaxis  Once        Question:  Reasons:  Answer:  Risk of Bleeding    04/08/25 1943                     Discharge Planning   JAQUAN: 4/21/2025     Code Status: Full Code   Medical Readiness for Discharge Date: 4/11/2025  Discharge Plan A: Skilled Nursing Facility   Discharge Delays: (!) Post-Acute Set-up      D Sunny Quesada MD  Department of Hospital Medicine   [facility] ([address])

## 2025-04-21 NOTE — PT/OT/SLP PROGRESS
Physical Therapy Treatment    Patient Name:  Frantz Sigala Jr.   MRN:  [id number]    Recommendations:     Discharge Recommendations: Moderate Intensity Therapy  Discharge Equipment Recommendations: to be determined by next level of care (would currently need BSC, RW, WC)  Barriers to discharge: Decreased caregiver support    Assessment:     Frantz Sigala Jr. is a 60 y.o. male admitted with a medical diagnosis of Acute pulmonary embolism.  He presents with the following impairments/functional limitations: weakness, gait instability, visual deficits, impaired balance, impaired cognition, impaired coordination, impaired endurance, impaired fine motor, impaired functional mobility, impaired self care skills, decreased coordination, decreased lower extremity function, decreased safety awareness (pt is blind and deaf).    Supine>sit with modA x 2  Sit>stand with RW and modA x 2  Static stand x 90 secs with RW and modA for paul hygiene  Bed>Chair with RW and modA x 2, stand/pivot  Pt able to transfer to chair, no c/o pain  Rec Moderate Intensity Therapy, though with cont'd progress with therapy and availability of 24/7 family assistance, pt may be able to d/c home    Rehab Prognosis: Good; patient would benefit from acute skilled PT services to address these deficits and reach maximum level of function.    Recent Surgery: * No surgery found *      Plan:     During this hospitalization, patient to be seen 5 x/week to address the identified rehab impairments via gait training, therapeutic activities, therapeutic exercises, neuromuscular re-education and progress toward the following goals:    Plan of Care Expires:  05/01/25    Subjective     Chief Complaint: pt with a little fussing during session, no real complaints (per family)  Patient/Family Comments/goals: dtr present and participating  Pain/Comfort:  Pain Rating 1: 0/10 (pt not  complaining of or endorsing pain, even with mobility)  Pain Rating Post-Intervention 1:  0/10      Objective:     Communicated with nurse Mills prior to session.  Patient found HOB elevated with peripheral IV (condom cath had come off before session) upon PT entry to room.     General Precautions: Standard, deaf, blind, fall  Orthopedic Precautions: N/A  Braces: N/A  Respiratory Status: Room air     Functional Mobility:  Bed Mobility:     Supine to Sit: moderate assistance and of 2 persons  Transfers:     Sit to Stand:  moderate assistance and of 2 persons with rolling walker  Bed to Chair: moderate assistance and of 2 persons with  rolling walker  using  Stand Pivot      AM-PAC 6 CLICK MOBILITY  Turning over in bed (including adjusting bedclothes, sheets and blankets)?: 3  Sitting down on and standing up from a chair with arms (e.g., wheelchair, bedside commode, etc.): 2  Moving from lying on back to sitting on the side of the bed?: 2  Moving to and from a bed to a chair (including a wheelchair)?: 2  Need to walk in hospital room?: 2  Climbing 3-5 steps with a railing?: 1  Basic Mobility Total Score: 12       Treatment & Education:  Reclined therex BLE: hip abd/add, SLR, heel slides x 10 (AAROM)  Reclined therex BUE: shlder flexion, elbow extension/flexion x 10 (AAROM)  Static stand x 90 secs with RW and modA  Transfers as noted    Patient left up in chair with all lines intact, call button in reach, and daughter present..    GOALS:   Multidisciplinary Problems       Physical Therapy Goals          Problem: Physical Therapy    Goal Priority Disciplines Outcome Interventions   Physical Therapy Goal     PT, PT/OT Progressing    Description: P.T goals to be met in 2 weeks as follows:  1. Mod I Bed Mobility  2. Min A T/F  3. Gait 50'  Min A with LRAD  4.         Tolerate being out of bed x2 hours.                            DME Justifications:   Frantz's mobility limitation cannot be sufficiently resolved by the use of a cane. His functional mobility deficit can be sufficiently resolved with the use  of a Rolling Walker. Patient's mobility limitation significantly impairs their ability to participate in one of more activities of daily living.  The use of a RW will significantly improve the patient's ability to participate in MRADLS and the patient will use it on regular basis in the home.    Pt requires a commode for home use because he is confined to a single room.    Time Tracking:     PT Received On: 04/21/25  PT Start Time: 0905     PT Stop Time: 0937  PT Total Time (min): 32 min     Billable Minutes: Therapeutic Activity 16 and Therapeutic Exercise 16    Treatment Type: Treatment  PT/PTA: PTA     Number of PTA visits since last PT visit: 1 04/21/2025

## 2025-04-21 NOTE — PLAN OF CARE
Medicare Message     Important Message from Medicare regarding Discharge Appeal Rights Explained to patient/caregiver; Signed/date by patient/caregiver Explained to patient/caregiver; Signed/date by patient/caregiver Explained to patient/caregiver; Signed/date by patient/caregiver   Date IMM was signed 4/8/2025 4/16/2025 4/21/2025   Time IMM was signed [phone]

## 2025-04-21 NOTE — SUBJECTIVE & OBJECTIVE
Interval History: No further fevers and appears comfortable. Family at the bedside said he looks like himself and had a good night. Family report he is eating well and has no bleeding. Discussed plan of care with family member at bedside..     Review of Systems   Unable to perform ROS: Patient nonverbal     Objective:     Vital Signs (Most Recent):  Temp: 98.7 °F (37.1 °C) (04/20/25 1956)  Pulse: 91 (04/20/25 1956)  Resp: 16 (04/20/25 1956)  BP: 122/60 (04/20/25 1956)  SpO2: (Abnormal) 94 % (04/20/25 1956) Vital Signs (24h Range):  Temp:  [97.6 °F (36.4 °C)-98.8 °F (37.1 °C)] 98.7 °F (37.1 °C)  Pulse:  [] 91  Resp:  [16-18] 16  SpO2:  [94 %-97 %] 94 %  BP: (118-178)/(58-84) 122/60     Weight: 91.6 kg (201 lb 15.1 oz)  Body mass index is 32.59 kg/m².    Intake/Output Summary (Last 24 hours) at 4/20/2025 2019  Last data filed at 4/20/2025 1746  Gross per 24 hour   Intake 600 ml   Output 1470 ml   Net -870 ml         Physical Exam  Vitals and nursing note reviewed.   Constitutional:       General: He is sleeping. He is not in acute distress.     Appearance: He is well-developed. He is ill-appearing (Chronically).   HENT:      Head: Normocephalic and atraumatic.      Ears:      Comments: Anacusis bilaterally  Eyes:      General:         Right eye: No discharge.         Left eye: No discharge.      Conjunctiva/sclera: Conjunctivae normal.      Comments: Corneal opacity bilaterally, blind   Cardiovascular:      Rate and Rhythm: Normal rate and regular rhythm.      Pulses: Normal pulses.   Pulmonary:      Effort: Pulmonary effort is normal. No respiratory distress.   Abdominal:      Palpations: Abdomen is soft.      Tenderness: There is no abdominal tenderness. There is no guarding or rebound.   Genitourinary:     Comments: External catheter in place with clear urine.  Musculoskeletal:         General: Tenderness (Reports tenderness to R knee.) present. Normal range of motion.      Right lower leg: No edema.       Left lower leg: No edema.   Skin:     General: Skin is warm and dry.   Neurological:      Mental Status: He is easily aroused. Mental status is at baseline.           Significant Labs:   CBC:  Recent Labs   Lab 04/18/25  0921 04/19/25 0439 04/20/25  0448   WBC 8.82 10.58 9.81   HGB 8.5* 8.0* 7.0*   HCT 27.4* 24.5* 21.7*    611* 611*   LYMPH 0.62*  7.0* 0.96*  9.1* 0.94*  9.6*   MONO 5.1  0.45 9.6  1.02* 8.5  0.83   EOS 4.2  0.37 4.9  0.52* 4.8  0.47   BMP:  Recent Labs   Lab 04/18/25 0921 04/19/25 0439 04/20/25  0509   * 131* 132*   K 5.0 5.3* 5.1   CL 96 97 100   CO2 24 25 23   BUN 74* 74* 73*   CREATININE 2.6* 2.4* 2.4*   CALCIUM 9.4 9.1 9.2   MG 2.1 2.2 2.1   PHOS 3.8 4.0 3.6     Significant Imaging: I have reviewed and interpreted all pertinent imaging results/findings within the past 24 hours.

## 2025-04-21 NOTE — ASSESSMENT & PLAN NOTE
- Appears was vasovagal 2/2 colitis / volume losses and BMs with the former.  - Troponin stable. No evidence of acute ischemic event. No recurrence of syncope.  - Echo with normal EF, grade I diastolic dysfunction likely related to HTN. Carotid U/S unremarkable.  - NS IV fluids for 1 liter at 50 ml/hr done x 3 and volume status corrected and he is eating well  - Monitor on telemetry.

## 2025-04-21 NOTE — NURSING
Pt has blister L butt cheek , redness w/ scant serous drainage . Cleaned w/ vashe , triad cream applied and foam dressing. Pt will be turned every 2hrs. Wound care consulted.

## 2025-04-22 LAB
ABSOLUTE EOSINOPHIL (OHS): 0.3 K/UL
ABSOLUTE MONOCYTE (OHS): 0.68 K/UL (ref 0.3–1)
ABSOLUTE NEUTROPHIL COUNT (OHS): 7.73 K/UL (ref 1.8–7.7)
ANION GAP (OHS): 9 MMOL/L (ref 8–16)
BASOPHILS # BLD AUTO: 0.02 K/UL
BASOPHILS NFR BLD AUTO: 0.2 %
BUN SERPL-MCNC: 68 MG/DL (ref 6–20)
CALCIUM SERPL-MCNC: 9 MG/DL (ref 8.7–10.5)
CHLORIDE SERPL-SCNC: 101 MMOL/L (ref 95–110)
CO2 SERPL-SCNC: 23 MMOL/L (ref 23–29)
CREAT SERPL-MCNC: 2.3 MG/DL (ref 0.5–1.4)
ERYTHROCYTE [DISTWIDTH] IN BLOOD BY AUTOMATED COUNT: 18.9 % (ref 11.5–14.5)
GFR SERPLBLD CREATININE-BSD FMLA CKD-EPI: 32 ML/MIN/1.73/M2
GLUCOSE SERPL-MCNC: 104 MG/DL (ref 70–110)
HCT VFR BLD AUTO: 22.8 % (ref 40–54)
HGB BLD-MCNC: 7 GM/DL (ref 14–18)
IMM GRANULOCYTES # BLD AUTO: 0.16 K/UL (ref 0–0.04)
IMM GRANULOCYTES NFR BLD AUTO: 1.6 % (ref 0–0.5)
LYMPHOCYTES # BLD AUTO: 1.1 K/UL (ref 1–4.8)
MAGNESIUM SERPL-MCNC: 2 MG/DL (ref 1.6–2.6)
MCH RBC QN AUTO: 24.5 PG (ref 27–31)
MCHC RBC AUTO-ENTMCNC: 30.7 G/DL (ref 32–36)
MCV RBC AUTO: 80 FL (ref 82–98)
NUCLEATED RBC (/100WBC) (OHS): 0 /100 WBC
PHOSPHATE SERPL-MCNC: 4.6 MG/DL (ref 2.7–4.5)
PLATELET # BLD AUTO: 664 K/UL (ref 150–450)
PMV BLD AUTO: 8.6 FL (ref 9.2–12.9)
POTASSIUM SERPL-SCNC: 5.7 MMOL/L (ref 3.5–5.1)
RBC # BLD AUTO: 2.86 M/UL (ref 4.6–6.2)
RELATIVE EOSINOPHIL (OHS): 3 %
RELATIVE LYMPHOCYTE (OHS): 11 % (ref 18–48)
RELATIVE MONOCYTE (OHS): 6.8 % (ref 4–15)
RELATIVE NEUTROPHIL (OHS): 77.4 % (ref 38–73)
SODIUM SERPL-SCNC: 133 MMOL/L (ref 136–145)
WBC # BLD AUTO: 9.99 K/UL (ref 3.9–12.7)

## 2025-04-22 PROCEDURE — 11000001 HC ACUTE MED/SURG PRIVATE ROOM

## 2025-04-22 PROCEDURE — 83735 ASSAY OF MAGNESIUM: CPT | Performed by: INTERNAL MEDICINE

## 2025-04-22 PROCEDURE — 80048 BASIC METABOLIC PNL TOTAL CA: CPT | Performed by: INTERNAL MEDICINE

## 2025-04-22 PROCEDURE — 25000003 PHARM REV CODE 250: Performed by: PHYSICIAN ASSISTANT

## 2025-04-22 PROCEDURE — 97116 GAIT TRAINING THERAPY: CPT | Mod: CQ

## 2025-04-22 PROCEDURE — 85025 COMPLETE CBC W/AUTO DIFF WBC: CPT | Performed by: INTERNAL MEDICINE

## 2025-04-22 PROCEDURE — 84100 ASSAY OF PHOSPHORUS: CPT | Performed by: INTERNAL MEDICINE

## 2025-04-22 PROCEDURE — 25000003 PHARM REV CODE 250: Performed by: HOSPITALIST

## 2025-04-22 PROCEDURE — 36415 COLL VENOUS BLD VENIPUNCTURE: CPT | Performed by: INTERNAL MEDICINE

## 2025-04-22 PROCEDURE — 25000003 PHARM REV CODE 250: Performed by: INTERNAL MEDICINE

## 2025-04-22 PROCEDURE — 97530 THERAPEUTIC ACTIVITIES: CPT | Mod: CQ

## 2025-04-22 RX ORDER — CARVEDILOL 12.5 MG/1
25 TABLET ORAL 2 TIMES DAILY WITH MEALS
Status: DISCONTINUED | OUTPATIENT
Start: 2025-04-22 | End: 2025-04-23 | Stop reason: HOSPADM

## 2025-04-22 RX ORDER — BISACODYL 10 MG/1
10 SUPPOSITORY RECTAL ONCE
Status: COMPLETED | OUTPATIENT
Start: 2025-04-22 | End: 2025-04-22

## 2025-04-22 RX ORDER — NIFEDIPINE 30 MG/1
30 TABLET, EXTENDED RELEASE ORAL DAILY
Status: DISCONTINUED | OUTPATIENT
Start: 2025-04-22 | End: 2025-04-23 | Stop reason: HOSPADM

## 2025-04-22 RX ADMIN — TAMSULOSIN HYDROCHLORIDE 0.4 MG: 0.4 CAPSULE ORAL at 08:04

## 2025-04-22 RX ADMIN — SODIUM ZIRCONIUM CYCLOSILICATE 10 G: 10 POWDER, FOR SUSPENSION ORAL at 02:04

## 2025-04-22 RX ADMIN — CARVEDILOL 25 MG: 12.5 TABLET, FILM COATED ORAL at 05:04

## 2025-04-22 RX ADMIN — CARVEDILOL 25 MG: 12.5 TABLET, FILM COATED ORAL at 08:04

## 2025-04-22 RX ADMIN — LISINOPRIL 40 MG: 20 TABLET ORAL at 08:04

## 2025-04-22 RX ADMIN — ACETAMINOPHEN 1000 MG: 500 TABLET ORAL at 02:04

## 2025-04-22 RX ADMIN — POLYETHYLENE GLYCOL 3350 17 G: 17 POWDER, FOR SOLUTION ORAL at 08:04

## 2025-04-22 RX ADMIN — NIFEDIPINE 30 MG: 30 TABLET, FILM COATED, EXTENDED RELEASE ORAL at 02:04

## 2025-04-22 RX ADMIN — ACETAMINOPHEN 1000 MG: 500 TABLET ORAL at 05:04

## 2025-04-22 RX ADMIN — POLYETHYLENE GLYCOL 3350 17 G: 17 POWDER, FOR SOLUTION ORAL at 09:04

## 2025-04-22 RX ADMIN — RISPERIDONE 1 MG: 1 TABLET, FILM COATED ORAL at 09:04

## 2025-04-22 RX ADMIN — SODIUM ZIRCONIUM CYCLOSILICATE 10 G: 10 POWDER, FOR SUSPENSION ORAL at 06:04

## 2025-04-22 RX ADMIN — APIXABAN 10 MG: 2.5 TABLET, FILM COATED ORAL at 09:04

## 2025-04-22 RX ADMIN — BISACODYL 10 MG: 10 SUPPOSITORY RECTAL at 05:04

## 2025-04-22 RX ADMIN — ACETAMINOPHEN 1000 MG: 500 TABLET ORAL at 09:04

## 2025-04-22 RX ADMIN — APIXABAN 10 MG: 2.5 TABLET, FILM COATED ORAL at 08:04

## 2025-04-22 NOTE — SUBJECTIVE & OBJECTIVE
Interval History: No acute events overnight. Pursuing placement; rejected from first two choice facilities. Discussed plan of care with family at bedside. No other concerns at this time.    Review of Systems   Unable to perform ROS: Patient nonverbal     Objective:     Vital Signs (Most Recent):  Temp: 97.4 °F (36.3 °C) (04/21/25 1954)  Pulse: 95 (04/21/25 1954)  Resp: 18 (04/21/25 1954)  BP: (!) 157/72 (04/21/25 1954)  SpO2: (!) 93 % (04/21/25 1954) Vital Signs (24h Range):  Temp:  [97.4 °F (36.3 °C)-98.7 °F (37.1 °C)] 97.4 °F (36.3 °C)  Pulse:  [] 95  Resp:  [16-18] 18  SpO2:  [91 %-98 %] 93 %  BP: (120-194)/(54-88) 157/72     Weight: 91.6 kg (201 lb 15.1 oz)  Body mass index is 32.59 kg/m².    Intake/Output Summary (Last 24 hours) at 4/21/2025 2006  Last data filed at 4/21/2025 1306  Gross per 24 hour   Intake 960 ml   Output 1000 ml   Net -40 ml         Physical Exam  Vitals and nursing note reviewed.   Constitutional:       General: He is sleeping. He is not in acute distress.     Appearance: He is well-developed. He is ill-appearing (Chronically).   HENT:      Head: Normocephalic and atraumatic.   Eyes:      General:         Right eye: No discharge.         Left eye: No discharge.      Conjunctiva/sclera: Conjunctivae normal.   Cardiovascular:      Rate and Rhythm: Normal rate.      Pulses: Normal pulses.   Pulmonary:      Effort: Pulmonary effort is normal. No respiratory distress.   Abdominal:      Palpations: Abdomen is soft.      Tenderness: There is no abdominal tenderness.   Musculoskeletal:         General: No tenderness. Normal range of motion.      Right lower leg: No edema.      Left lower leg: No edema.   Skin:     General: Skin is warm and dry.   Neurological:      Mental Status: He is easily aroused. Mental status is at baseline.           Significant Labs:   CBC:  Recent Labs   Lab 04/19/25  0439 04/20/25  0448 04/21/25  0453   WBC 10.58 9.81 10.87   HGB 8.0* 7.0* 7.2*   HCT 24.5* 21.7*  23.0*   * 611* 623*   LYMPH 0.96*  9.1* 0.94*  9.6* 1.07  9.8*   MONO 9.6  1.02* 8.5  0.83 6.3  0.68   EOS 4.9  0.52* 4.8  0.47 3.7  0.40   BMP:  Recent Labs   Lab 04/19/25  0439 04/20/25  0509 04/21/25  0453   * 132* 132*   K 5.3* 5.1 5.4*   CL 97 100 100   CO2 25 23 23   BUN 74* 73* 68*   CREATININE 2.4* 2.4* 2.4*   CALCIUM 9.1 9.2 9.2   MG 2.2 2.1 2.1   PHOS 4.0 3.6 4.2     Significant Imaging: I have reviewed and interpreted all pertinent imaging results/findings within the past 24 hours.

## 2025-04-22 NOTE — ASSESSMENT & PLAN NOTE
- Was on ciprofloxacin, metronidazole PO to complete course when he developed new fever 04/11-04/12.  - Continued cefepime. UA with >100RBCs, 5 WBCs, rare bacteria; likely related to mackay placement. CXR appears benign; LLL zone not well evaluated due to heart silhouette but appears stable.  - Tachycardia sinus tach; no evidence of arrhythmia on telemetry. Infectious etiology should be covered - pain related? Increased pain control with hydrocodone-acetaminophen 5-325mg, 10-325mg PO q4hr PRN.  - Broadened workup - has been mobilizing with family in the room, but does have potential for thrombosis with fracture. D dimer elevated with fall / infection and with CKD would prefer to avoid CT angiography. Checked V/Q scan with intermediate probability, U/S BLE negative for DVT but given symptoms and intermediate probability, anticoagulated with heparin gtt on 4/14  - ID consult appreciated, stopped all antibiotics on 4/16   - Remains AF and WBC remaining normal, monitor  - Transitioned to apixaban from heparin on 4/17  - Continue bowel regimen with Miralax and dulcolax. No BM earlier today and more distended, check KUB.  - Clinically improving and working with therapy, plan for moderate intensity as feasible though difficulty with placement options to date.

## 2025-04-22 NOTE — ASSESSMENT & PLAN NOTE
- Chronic, with persistent elevation.  - Continue lisinopril 40mg PO daily, carvedilol at 25mg PO BID. Add nifedipine 60mg PO daily.  - Stopped furosemide 40mg PO daily, HCTZ 25mg PO daily due to low Na levels  - Continue labetalol 10mg IV q4hr PRN.

## 2025-04-22 NOTE — PT/OT/SLP PROGRESS
Physical Therapy Treatment    Patient Name:  Frantz Sigala Jr.   MRN:  [id number]    Recommendations:     Discharge Recommendations: Moderate Intensity Therapy  Discharge Equipment Recommendations: to be determined by next level of care (currently would need RW, BSC, hospital bed, respiratory supplies)  Barriers to discharge: Decreased caregiver support    Assessment:     Frantz Sigala Jr. is a 60 y.o. male admitted with a medical diagnosis of Acute pulmonary embolism.  He presents with the following impairments/functional limitations: weakness, gait instability, impaired balance, impaired cognition, impaired functional mobility, impaired self care skills, decreased coordination, decreased lower extremity function, decreased safety awareness.    Supine>sit with Jay  Sit>stand with RW and Jay  Bed>BSC with RW and Jay + RW mgt 2/2 visual impairment, stand/pivot  Amb 15' with RW and Jay + RW mgt 2/2 visual impairment  Pt continues to progress functional mobility, demonstrating decreased levels of assist req'd for transfers; pt is somewhat deconditioned from hospital stay and bed rest and is not yet at his PLOF  Rec Moderate Intensity Therapy (currently would need RW and BSC if d/c'ing home with 24/7 family assist)    Rehab Prognosis: Good; patient would benefit from acute skilled PT services to address these deficits and reach maximum level of function.    Recent Surgery: * No surgery found *      Plan:     During this hospitalization, patient to be seen 5 x/week to address the identified rehab impairments via gait training, therapeutic activities, therapeutic exercises, neuromuscular re-education and progress toward the following goals:    Plan of Care Expires:  05/01/25    Subjective     Chief Complaint: none indicated; pt seemed agreeable to therapy  Patient/Family Comments/goals: brother states that there are 9 siblings who can all take a shift  Pain/Comfort:  Pain Rating 1: 0/10  Pain Rating  Post-Intervention 1: 0/10      Objective:     Communicated with nurse Mills prior to session.  Patient found HOB elevated with peripheral IV (condom catheter doffed at entry) upon PT entry to room.     General Precautions: Standard, deaf, blind, fall  Orthopedic Precautions: N/A  Braces: N/A  Respiratory Status: Room air     Functional Mobility:  Bed Mobility:     Supine to Sit: minimum assistance  Transfers:     Sit to Stand:  minimum assistance with rolling walker  Toilet Transfer: minimum assistance with  rolling walker  using  Step Transfer  Gait: 15' with RW and Jay + RW mgt 2/2 visual impairment      AM-PAC 6 CLICK MOBILITY  Turning over in bed (including adjusting bedclothes, sheets and blankets)?: 3  Sitting down on and standing up from a chair with arms (e.g., wheelchair, bedside commode, etc.): 3  Moving from lying on back to sitting on the side of the bed?: 3  Moving to and from a bed to a chair (including a wheelchair)?: 3  Need to walk in hospital room?: 3  Climbing 3-5 steps with a railing?: 1  Basic Mobility Total Score: 16       Treatment & Education:  Gait and transfer training as noted  Static stand x 2 mins with CGA for paul hygiene after BM    Patient left up in chair with all lines intact, call button in reach, and sister, brother, and PCT Lisa present..    GOALS:   Multidisciplinary Problems       Physical Therapy Goals          Problem: Physical Therapy    Goal Priority Disciplines Outcome Interventions   Physical Therapy Goal     PT, PT/OT Progressing    Description: P.T goals to be met in 2 weeks as follows:  1. Mod I Bed Mobility  2. Min A T/F  3. Gait 50'  Min A with LRAD  4.         Tolerate being out of bed x2 hours.                            DME Justifications:   Frantz requires a commode for home use because he is confined to a single room.   Frantz's mobility limitation cannot be sufficiently resolved by the use of a cane. His functional mobility deficit can be sufficiently  resolved with the use of a Rolling Walker. Patient's mobility limitation significantly impairs their ability to participate in one of more activities of daily living.  The use of a RW will significantly improve the patient's ability to participate in MRADLS and the patient will use it on regular basis in the home.  Frantz requires a hospital bed due to him requiring positioning of the body in ways not feasible with an ordinary bed due to limited ability and cannot independently make changes in body position without the use of the bed.The positioning of the body cannot be sufficiently resolved by the use of pillows and wedges.     Time Tracking:     PT Received On: 04/22/25  PT Start Time: 1107     PT Stop Time: 1133  PT Total Time (min): 26 min     Billable Minutes: Gait Training 13 and Therapeutic Activity 13    Treatment Type: Treatment  PT/PTA: PTA     Number of PTA visits since last PT visit: 2     04/22/2025

## 2025-04-22 NOTE — ASSESSMENT & PLAN NOTE
>>ASSESSMENT AND PLAN FOR CKD (CHRONIC KIDNEY DISEASE), STAGE III WRITTEN ON 4/22/2025  9:30 PM BY LALO SELLERS MD    - Chronic; stable at baseline.  - Avoid nephrotoxins, renally dose medications.

## 2025-04-22 NOTE — PLAN OF CARE
[facility] delivered RW and BSC to bedside. Patient family was given Education sheets and signed delivery ticket

## 2025-04-22 NOTE — PROGRESS NOTES
[facility] Surg ([address] Medicine  Progress Note    Patient Name: Frantz Sigala Jr.  MRN: [id number]  Patient Class: IP- Inpatient   Admission Date: 4/8/2025  Length of Stay: 14 days  Attending Physician: LALO Quesada MD  Primary Care Provider: Zachary Ramos MD        Subjective     Principal Problem:Acute pulmonary embolism        HPI:  Mr. Sigala is a 60 YOM with PMHx of HTN, asthma, CKD (baseline SCr 2-2.5), schizophrenia, blindness, and deafness.     He presents to ED via EMS with family due to syncopal event just PTA. Family provides history due patients deafness and blindness. They note that he was in his normal state of health today until right after lunch. He ate a salami sandwich with potato chips and drank orange juice; shortly thereafter he was in the bathroom when his mother heard a loud thud and patient was found by his brother on the ground unconscious.  His brother notes that he called out the patients name and slapped him on his back a few times at which time he seemed to come to and began vomiting with concurrent episodes of diarrhea.  Sister notes she has had a recent respiratory illness with abdominal cramping and some diarrhea. Family notes that patient has lived in the home he is in since a small child and that he is able to navigate the home independently including going upstairs and outside.     In the ED he is hypertensive, heart rate stable, saturations initially 80% on room air with improvement after 2 L nasal cannula application, and afebrile.  CBC with WBC 21, H&H 9.4/29.4, platelets 497.  Chemistry was , K 4.4, chloride 100, CO2 22, BUN 49, SCr 2.2, glucose 125.  LFTs unremarkable.  BNP 35.  Troponin 0.035 >> 0.059 (appear similar to priors however these were in 2015).  Lactic acid 2.2.  Flu and COVID negative.  Hepatitis-C and HIV nonreactive.  UA noninfectious.  CXR with chronic appearing interstitial findings, congestive change or edema is a  consideration; no large focal consolidation. CTH with no acute intracranial abnormality detected, bilateral partial opacification of the mastoid air cells, sphenoid sinus disease.     CT A/P:  Wall thickening of the transverse colon, suggestive of nonspecific colitis.   Wall thickening of the urinary bladder.  The findings may be seen with cystitis.   Advanced calcifications of the abdominal aorta and the mesenteric arteries.  Correlation with possible vascular ultrasound of the mesenteric vessels may be obtained for further assessment. 1.2 cm hypodensity in the upper pole of the right kidney with Hounsfield of 23.  This can be assessed with renal ultrasound.     The patient was admitted to the Hospital Medicine Service for further evaluation and management.     Overview/Hospital Course:  Admitted with syncope and fall suspected secondary to vasovagal episode due to underlying colitis. Started ciprofloxacin, metronidazole and stool studies ordered though had no further loose stool to collect. Echo showed normal EF and grade I diastolic dysfunction, carotid U/S unremarkable. Complained of increased LLE pain and XR obtained demonstrating proximal fibular fracture. Orthopedics consulted/ in discussion with Dr. Moreira recommended weight bearing as tolerated, no splint/boot required and follow-up in clinic. Developed urinary retention and increasing tachycardia; after tamsulosin resumed and straight catheterization, had persistent retention and mackay placed. Urology consulted and recommended maintaining further prior to void trial. Had new onset fevers and converted to cefepime. Hypertension and tachycardia persisted; pain control adjusted and use liberalized, but persisted. ID consulted with uptrending WBCs and evaluation for thrombosis initiated. All antibiotics stopped and patient anticoagulated for probable PE with heparin gtt. Later transitioned to apixaban.    Interval History: No acute events overnight. A little  more distended this morning and no BM overnight. Discussed with family at bedside.    Review of Systems   Unable to perform ROS: Patient nonverbal     Objective:     Vital Signs (Most Recent):  Temp: 97.5 °F (36.4 °C) (04/22/25 1632)  Pulse: 88 (04/22/25 1632)  Resp: 16 (04/22/25 1632)  BP: (!) 174/71 (04/22/25 1632)  SpO2: 95 % (04/22/25 1632) Vital Signs (24h Range):  Temp:  [97.5 °F (36.4 °C)-98.9 °F (37.2 °C)] 97.5 °F (36.4 °C)  Pulse:  [] 88  Resp:  [16-18] 16  SpO2:  [94 %-97 %] 95 %  BP: (124-188)/(60-89) 174/71     Weight: 91.6 kg (201 lb 15.1 oz)  Body mass index is 32.59 kg/m².    Intake/Output Summary (Last 24 hours) at 4/22/2025 2129  Last data filed at 4/22/2025 1200  Gross per 24 hour   Intake 280 ml   Output 750 ml   Net -470 ml         Physical Exam  Vitals and nursing note reviewed.   Constitutional:       General: He is sleeping. He is not in acute distress.     Appearance: He is well-developed. He is ill-appearing (Chronically).   HENT:      Head: Normocephalic and atraumatic.   Eyes:      General:         Right eye: No discharge.         Left eye: No discharge.      Conjunctiva/sclera: Conjunctivae normal.   Cardiovascular:      Rate and Rhythm: Normal rate.      Pulses: Normal pulses.   Pulmonary:      Effort: Pulmonary effort is normal. No respiratory distress.   Abdominal:      General: There is distension.      Palpations: Abdomen is soft.      Tenderness: There is no abdominal tenderness.   Musculoskeletal:         General: No tenderness. Normal range of motion.      Right lower leg: No edema.      Left lower leg: No edema.   Skin:     General: Skin is warm and dry.   Neurological:      Mental Status: He is easily aroused. Mental status is at baseline.           Significant Labs:   CBC:  Recent Labs   Lab 04/20/25  0448 04/21/25  0453 04/22/25  0526   WBC 9.81 10.87 9.99   HGB 7.0* 7.2* 7.0*   HCT 21.7* 23.0* 22.8*   * 623* 664*   LYMPH 0.94*  9.6* 1.07  9.8* 1.10  11.0*    MONO 8.5  0.83 6.3  0.68 6.8  0.68   EOS 4.8  0.47 3.7  0.40 3.0  0.30   BMP:  Recent Labs   Lab 04/20/25  0509 04/21/25  0453 04/22/25  0526   * 132* 133*   K 5.1 5.4* 5.7*    100 101   CO2 23 23 23   BUN 73* 68* 68*   CREATININE 2.4* 2.4* 2.3*   CALCIUM 9.2 9.2 9.0   MG 2.1 2.1 2.0   PHOS 3.6 4.2 4.6*     Significant Imaging: I have reviewed and interpreted all pertinent imaging results/findings within the past 24 hours.        Assessment & Plan  Acute pulmonary embolism  Fever  Leukocytosis  - Was on ciprofloxacin, metronidazole PO to complete course when he developed new fever 04/11-04/12.  - Continued cefepime. UA with >100RBCs, 5 WBCs, rare bacteria; likely related to mackay placement. CXR appears benign; LLL zone not well evaluated due to heart silhouette but appears stable.  - Tachycardia sinus tach; no evidence of arrhythmia on telemetry. Infectious etiology should be covered - pain related? Increased pain control with hydrocodone-acetaminophen 5-325mg, 10-325mg PO q4hr PRN.  - Broadened workup - has been mobilizing with family in the room, but does have potential for thrombosis with fracture. D dimer elevated with fall / infection and with CKD would prefer to avoid CT angiography. Checked V/Q scan with intermediate probability, U/S BLE negative for DVT but given symptoms and intermediate probability, anticoagulated with heparin gtt on 4/14  - ID consult appreciated, stopped all antibiotics on 4/16   - Remains AF and WBC remaining normal, monitor  - Transitioned to apixaban from heparin on 4/17  - Continue bowel regimen with Miralax and dulcolax. No BM earlier today and more distended, check KUB.  - Clinically improving and working with therapy, plan for moderate intensity as feasible though difficulty with placement options to date.  Syncope  - Appears was vasovagal 2/2 colitis / volume losses and BMs with the former.  - Troponin stable. No evidence of acute ischemic event. No recurrence  "of syncope.  - Echo with normal EF, grade I diastolic dysfunction likely related to HTN. Carotid U/S unremarkable.  - Monitor on telemetry.  Essential hypertension  - Chronic, with persistent elevation.  - Continue lisinopril 40mg PO daily, carvedilol at 25mg PO BID. Add nifedipine 60mg PO daily.  - Stopped furosemide 40mg PO daily, HCTZ 25mg PO daily due to low Na levels  - Continue labetalol 10mg IV q4hr PRN.  CKD (chronic kidney disease), stage III  - Chronic; stable at baseline.  - Avoid nephrotoxins, renally dose medications.  Asthma  - Chronic; stable.  - Continue albuterol-ipratropium 2.5-0.5mg inhaled q4hr PRN.  Schizophrenia  - Chronic; stable. Continue risperidone 1mg PO qHS.  Blind  Deaf, bilateral  - Chronic; fall precautions.  Left fibular fracture  - XR demonstrating L fibular fracture; follow-up XR of tib/fib to evaluate for further areas of potential fracture unremarkable. Reported R-sided knee discomfort in addition with XR pending final read but no fracture visible to my evaluation.  - Discussed with orthopedics, Dr. Moreira; weight bearing as tolerated, no splint/casting required, follow-up in clinic  - PT and OT evaluations recommending for moderate intensity  Urinary retention  - Had persistently elevated volumes on bladder scan despite straight catheterization. Clark placed and resumed tamsulosin 0.4mg PO daily.   - Successful voiding trial completed on 4/15    VTE Risk Mitigation (From admission, onward)           Ordered     apixaban tablet 5 mg  2 times daily        Placed in "Followed by" Linked Group    04/22/25 1319     apixaban tablet 10 mg  2 times daily        Placed in "Followed by" Linked Group    04/22/25 1319     heparin 25,000 units in dextrose 5% 250 mL (100 units/mL) infusion HIGH INTENSITY nomogram - [facility]  Continuous        Question:  Begin at (units/kg/hr)  Answer:  18    04/14/25 1727     IP VTE HIGH RISK PATIENT  Once         04/08/25 1943     Place sequential compression " device  Until discontinued         04/08/25 1943     Reason for No Pharmacological VTE Prophylaxis  Once        Question:  Reasons:  Answer:  Risk of Bleeding    04/08/25 1943                    Discharge Planning   JAQUAN: 4/22/2025     Code Status: Full Code   Medical Readiness for Discharge Date: 4/11/2025  Discharge Plan A: Skilled Nursing Facility   Discharge Delays: (!) Post-Acute Set-up      D Sunny Quesada MD  Department of Hospital Medicine   [facility] ([address])

## 2025-04-22 NOTE — PLAN OF CARE
Problem: Adult Inpatient Plan of Care  Goal: Plan of Care Review  4/22/2025 1711 by Lina Clarke LPN  Outcome: Progressing  4/22/2025 1710 by Lina Clarke LPN  Outcome: Progressing  Goal: Patient-Specific Goal (Individualized)  4/22/2025 1711 by Lina Clarke LPN  Outcome: Progressing  4/22/2025 1710 by Lina Clarke LPN  Outcome: Progressing  Goal: Absence of Hospital-Acquired Illness or Injury  Outcome: Progressing  Goal: Optimal Comfort and Wellbeing  Outcome: Progressing  Goal: Readiness for Transition of Care  Outcome: Progressing     Problem: Infection  Goal: Absence of Infection Signs and Symptoms  Outcome: Progressing     Problem: Acute Kidney Injury/Impairment  Goal: Fluid and Electrolyte Balance  Outcome: Progressing  Goal: Improved Oral Intake  Outcome: Progressing  Goal: Effective Renal Function  Outcome: Progressing

## 2025-04-23 VITALS
DIASTOLIC BLOOD PRESSURE: 65 MMHG | HEIGHT: 66 IN | RESPIRATION RATE: 16 BRPM | SYSTOLIC BLOOD PRESSURE: 145 MMHG | OXYGEN SATURATION: 97 % | WEIGHT: 201.94 LBS | TEMPERATURE: 98 F | HEART RATE: 82 BPM | BODY MASS INDEX: 32.45 KG/M2

## 2025-04-23 LAB
ABSOLUTE EOSINOPHIL (OHS): 0.22 K/UL
ABSOLUTE MONOCYTE (OHS): 0.42 K/UL (ref 0.3–1)
ABSOLUTE NEUTROPHIL COUNT (OHS): 5.18 K/UL (ref 1.8–7.7)
ANION GAP (OHS): 10 MMOL/L (ref 8–16)
BASOPHILS # BLD AUTO: 0.01 K/UL
BASOPHILS NFR BLD AUTO: 0.1 %
BUN SERPL-MCNC: 63 MG/DL (ref 6–20)
CALCIUM SERPL-MCNC: 9.2 MG/DL (ref 8.7–10.5)
CHLORIDE SERPL-SCNC: 97 MMOL/L (ref 95–110)
CO2 SERPL-SCNC: 23 MMOL/L (ref 23–29)
CREAT SERPL-MCNC: 2.2 MG/DL (ref 0.5–1.4)
ERYTHROCYTE [DISTWIDTH] IN BLOOD BY AUTOMATED COUNT: 19.1 % (ref 11.5–14.5)
GFR SERPLBLD CREATININE-BSD FMLA CKD-EPI: 33 ML/MIN/1.73/M2
GLUCOSE SERPL-MCNC: 79 MG/DL (ref 70–110)
HCT VFR BLD AUTO: 24.3 % (ref 40–54)
HGB BLD-MCNC: 7.5 GM/DL (ref 14–18)
IMM GRANULOCYTES # BLD AUTO: 0.09 K/UL (ref 0–0.04)
IMM GRANULOCYTES NFR BLD AUTO: 1.3 % (ref 0–0.5)
LYMPHOCYTES # BLD AUTO: 0.9 K/UL (ref 1–4.8)
MAGNESIUM SERPL-MCNC: 2 MG/DL (ref 1.6–2.6)
MCH RBC QN AUTO: 24.8 PG (ref 27–31)
MCHC RBC AUTO-ENTMCNC: 30.9 G/DL (ref 32–36)
MCV RBC AUTO: 81 FL (ref 82–98)
NUCLEATED RBC (/100WBC) (OHS): 0 /100 WBC
PHOSPHATE SERPL-MCNC: 5.4 MG/DL (ref 2.7–4.5)
PLATELET # BLD AUTO: 691 K/UL (ref 150–450)
PMV BLD AUTO: 8.9 FL (ref 9.2–12.9)
POTASSIUM SERPL-SCNC: 5.4 MMOL/L (ref 3.5–5.1)
RBC # BLD AUTO: 3.02 M/UL (ref 4.6–6.2)
RELATIVE EOSINOPHIL (OHS): 3.2 %
RELATIVE LYMPHOCYTE (OHS): 13.2 % (ref 18–48)
RELATIVE MONOCYTE (OHS): 6.2 % (ref 4–15)
RELATIVE NEUTROPHIL (OHS): 76 % (ref 38–73)
SODIUM SERPL-SCNC: 130 MMOL/L (ref 136–145)
WBC # BLD AUTO: 6.82 K/UL (ref 3.9–12.7)

## 2025-04-23 PROCEDURE — 80048 BASIC METABOLIC PNL TOTAL CA: CPT | Performed by: INTERNAL MEDICINE

## 2025-04-23 PROCEDURE — 84100 ASSAY OF PHOSPHORUS: CPT | Performed by: INTERNAL MEDICINE

## 2025-04-23 PROCEDURE — 85025 COMPLETE CBC W/AUTO DIFF WBC: CPT | Performed by: INTERNAL MEDICINE

## 2025-04-23 PROCEDURE — 97535 SELF CARE MNGMENT TRAINING: CPT

## 2025-04-23 PROCEDURE — 36415 COLL VENOUS BLD VENIPUNCTURE: CPT | Performed by: INTERNAL MEDICINE

## 2025-04-23 PROCEDURE — 94761 N-INVAS EAR/PLS OXIMETRY MLT: CPT

## 2025-04-23 PROCEDURE — 25000003 PHARM REV CODE 250: Performed by: INTERNAL MEDICINE

## 2025-04-23 PROCEDURE — 83735 ASSAY OF MAGNESIUM: CPT | Performed by: INTERNAL MEDICINE

## 2025-04-23 PROCEDURE — 25000003 PHARM REV CODE 250: Performed by: HOSPITALIST

## 2025-04-23 RX ORDER — LUBIPROSTONE 24 UG/1
24 CAPSULE ORAL ONCE
Status: COMPLETED | OUTPATIENT
Start: 2025-04-23 | End: 2025-04-23

## 2025-04-23 RX ORDER — ALBUTEROL SULFATE 90 UG/1
2 INHALANT RESPIRATORY (INHALATION) EVERY 6 HOURS PRN
Qty: 18 G | Refills: 0 | Status: SHIPPED | OUTPATIENT
Start: 2025-04-23

## 2025-04-23 RX ORDER — POLYETHYLENE GLYCOL 3350 17 G/17G
17 POWDER, FOR SOLUTION ORAL 3 TIMES DAILY PRN
Qty: 72 EACH | Refills: 0 | Status: SHIPPED | OUTPATIENT
Start: 2025-04-23

## 2025-04-23 RX ORDER — CARVEDILOL 25 MG/1
25 TABLET ORAL 2 TIMES DAILY WITH MEALS
Qty: 60 TABLET | Refills: 0 | Status: SHIPPED | OUTPATIENT
Start: 2025-04-23

## 2025-04-23 RX ORDER — NIFEDIPINE 30 MG/1
30 TABLET, EXTENDED RELEASE ORAL DAILY
Qty: 30 TABLET | Refills: 0 | Status: ON HOLD | OUTPATIENT
Start: 2025-04-24 | End: 2025-05-01 | Stop reason: HOSPADM

## 2025-04-23 RX ORDER — LISINOPRIL 40 MG/1
40 TABLET ORAL DAILY
Qty: 30 TABLET | Refills: 0 | Status: SHIPPED | OUTPATIENT
Start: 2025-04-24

## 2025-04-23 RX ADMIN — TAMSULOSIN HYDROCHLORIDE 0.4 MG: 0.4 CAPSULE ORAL at 09:04

## 2025-04-23 RX ADMIN — APIXABAN 10 MG: 2.5 TABLET, FILM COATED ORAL at 09:04

## 2025-04-23 RX ADMIN — SODIUM ZIRCONIUM CYCLOSILICATE 10 G: 10 POWDER, FOR SUSPENSION ORAL at 09:04

## 2025-04-23 RX ADMIN — ACETAMINOPHEN 1000 MG: 500 TABLET ORAL at 06:04

## 2025-04-23 RX ADMIN — LISINOPRIL 40 MG: 20 TABLET ORAL at 09:04

## 2025-04-23 RX ADMIN — ACETAMINOPHEN 1000 MG: 500 TABLET ORAL at 01:04

## 2025-04-23 RX ADMIN — LUBIPROSTONE 24 MCG: 24 CAPSULE, GELATIN COATED ORAL at 06:04

## 2025-04-23 RX ADMIN — NIFEDIPINE 30 MG: 30 TABLET, FILM COATED, EXTENDED RELEASE ORAL at 09:04

## 2025-04-23 RX ADMIN — POLYETHYLENE GLYCOL 3350 17 G: 17 POWDER, FOR SOLUTION ORAL at 09:04

## 2025-04-23 RX ADMIN — CARVEDILOL 25 MG: 12.5 TABLET, FILM COATED ORAL at 09:04

## 2025-04-23 NOTE — PLAN OF CARE
[facility] ([address])      HOME HEALTH ORDERS  FACE TO FACE ENCOUNTER    Patient Name: Frantz Sigala Jr.  YOB: 1965    PCP: Zachary Ramos MD   PCP Address: [address] [place] [address] / [place] [address]  PCP Phone Number: [phone]  PCP Fax: [phone]    Encounter Date: 4/8/25    Admit to Home Health    Diagnoses:  Active Hospital Problems    Diagnosis  POA    *Acute pulmonary embolism [I26.99]  Yes    Leukocytosis [D72.829]  Yes    Fever [R50.9]  Yes    Urinary retention [R33.9]  No    Left fibular fracture [S82.402A]  Yes    Syncope [R55]  Yes    CKD (chronic kidney disease), stage III [N18.30]  Yes     Chronic    Essential hypertension [I10]  Yes     Chronic    Asthma [J45.909]  Yes     Chronic    Schizophrenia [F20.9]  Yes     Chronic    Deaf, bilateral [H91.93]  Yes     Chronic    Blind [H54.7]  Yes     Chronic      Resolved Hospital Problems    Diagnosis Date Resolved POA    Colitis [K52.9] 04/21/2025 Yes    Elevated troponin [R79.89] 04/10/2025 Yes       Follow Up Appointments:  Future Appointments   Date Time Provider Department Center   5/5/2025  8:40 AM Zachary Ramos MD [facility]       Allergies:  Review of patient's allergies indicates:   Allergen Reactions    Penicillins Other (See Comments)       Medications: Review discharge medications with patient and family and provide education.    Current Medications[1]  Current Discharge Medication List        START taking these medications    Details   apixaban (ELIQUIS) 5 mg Tab Take 2 tablets (10 mg total) by mouth 2 (two) times daily for 1 day, THEN 1 tablet (5 mg total) 2 (two) times daily.  Qty: 64 tablet, Refills: 0      carvediloL (COREG) 25 MG tablet Take 1 tablet (25 mg total) by mouth 2 (two) times daily with meals.  Qty: 60 tablet, Refills: 0      ciprofloxacin HCl (CIPRO) 500 MG tablet Take 1 tablet (500 mg total) by mouth every 12 (twelve) hours.  Qty: 3 tablet, Refills: 0       HYDROcodone-acetaminophen (NORCO) 5-325 mg per tablet Take 1 tablet by mouth every 6 (six) hours as needed for Pain.  Qty: 15 tablet, Refills: 0    Comments: Quantity prescribed more than 7 day supply? No  Associated Diagnoses: Syncope      lisinopriL (PRINIVIL,ZESTRIL) 40 MG tablet Take 1 tablet (40 mg total) by mouth once daily.  Qty: 30 tablet, Refills: 0      metroNIDAZOLE (FLAGYL) 500 MG tablet Take 1 tablet (500 mg total) by mouth every 8 (eight) hours. for 1 day  Qty: 4 tablet, Refills: 0      NIFEdipine (PROCARDIA-XL) 30 MG (OSM) 24 hr tablet Take 1 tablet (30 mg total) by mouth once daily.  Qty: 30 tablet, Refills: 0      ondansetron (ZOFRAN-ODT) 4 MG TbDL Take 1 tablet (4 mg total) by mouth every 6 (six) hours as needed (nausea/vomiting).  Qty: 30 tablet, Refills: 0      polyethylene glycol (GLYCOLAX) 17 gram PwPk Take 17 g by mouth 3 (three) times daily as needed for Constipation.  Qty: 72 each, Refills: 0      tamsulosin (FLOMAX) 0.4 mg Cap Take 1 capsule (0.4 mg total) by mouth once daily.  Qty: 30 capsule, Refills: 0           CONTINUE these medications which have NOT CHANGED    Details   albuterol (PROVENTIL/VENTOLIN HFA) 90 mcg/actuation inhaler Inhale 2 puffs into the lungs every 6 (six) hours as needed for Wheezing. Rescue  Qty: 18 g, Refills: 0    Associated Diagnoses: Asthma, unspecified asthma severity, unspecified whether complicated, unspecified whether persistent      fluticasone propionate (FLONASE) 50 mcg/actuation nasal spray 1 spray (50 mcg total) by Each Nostril route once daily.  Qty: 9.9 mL, Refills: 0    Associated Diagnoses: COVID-19      risperiDONE (RISPERDAL) 1 MG tablet Take 1 tablet (1 mg total) by mouth once daily.  Qty: 90 tablet, Refills: 3    Associated Diagnoses: Residual schizophrenia           STOP taking these medications       cetirizine (ZYRTEC) 10 MG tablet Comments:   Reason for Stopping:         furosemide (LASIX) 40 MG tablet Comments:   Reason for Stopping:          lisinopriL-hydrochlorothiazide (PRINZIDE,ZESTORETIC) 20-12.5 mg per tablet Comments:   Reason for Stopping:         meloxicam (MOBIC) 15 MG tablet Comments:   Reason for Stopping:                 I have seen and examined this patient within the last 30 days. My clinical findings that support the need for the home health skilled services and home bound status are the following:   Weakness/numbness causing balance and gait disturbance due to Weakness/Debility making it taxing to leave home.     Diet:   regular diet    Referrals/ Consults  Physical Therapy to evaluate and treat. Evaluate for home safety and equipment needs; Establish/upgrade home exercise program. Perform / instruct on therapeutic exercises, gait training, transfer training, and Range of Motion.  Occupational Therapy to evaluate and treat. Evaluate home environment for safety and equipment needs. Perform/Instruct on transfers, ADL training, ROM, and therapeutic exercises.  Aide to provide assistance with personal care, ADLs, and vital signs.    Activities:   activity as tolerated    Nursing:   Agency to admit patient within 24 hours of hospital discharge unless specified on physician order or at patient request    SN to complete comprehensive assessment including routine vital signs. Instruct on disease process and s/s of complications to report to MD. Review/verify medication list sent home with the patient at time of discharge  and instruct patient/caregiver as needed. Frequency may be adjusted depending on start of care date.     Skilled nurse to perform up to 3 visits PRN for symptoms related to diagnosis    Notify MD if SBP > 160 or < 90; DBP > 90 or < 50; HR > 120 or < 50; Temp > 101; O2 < 88%    Ok to schedule additional visits based on staff availability and patient request on consecutive days within the home health episode.    When multiple disciplines ordered:    Start of Care occurs on Sunday - Wednesday schedule remaining discipline  evaluations as ordered on separate consecutive days following the start of care.    Thursday SOC -schedule subsequent evaluations Friday and Monday the following week.     Friday - Saturday SOC - schedule subsequent discipline evaluations on consecutive days starting Monday of the following week.    For all post-discharge communication and subsequent orders please contact patient's primary care physician.    Miscellaneous   Routine Skin for Bedridden Patients: Instruct patient/caregiver to apply moisture barrier cream to all skin folds and wet areas in perineal area daily and after baths and all bowel movements.    Wound Care Orders  Yes: Consult ET nurse  Left thigh: Cleanse with wound cleanser, then apply a thin layer of Triad ointment to affected areas BID/PRN after cleaning. Leave open to air. No cover dressing needed.    I certify that this patient is confined to his home and needs intermittent skilled nursing care, physical therapy, and occupational therapy.             [1]   Current Facility-Administered Medications   Medication Dose Route Frequency Provider Last Rate Last Admin    acetaminophen tablet 1,000 mg  1,000 mg Oral Q8H LALO Quesada MD   1,000 mg at 04/23/25 0606    acetaminophen tablet 650 mg  650 mg Oral Q4H PRN Brooklyn Wellington MD        albuterol-ipratropium 2.5 mg-0.5 mg/3 mL nebulizer solution 3 mL  3 mL Nebulization Q4H PRN Layla Smith NP   3 mL at 04/21/25 0801    ammonium lactate 12 % lotion   Topical (Top) BID PRN LALO Quesada MD   Given at 04/15/25 2100    apixaban tablet 10 mg  10 mg Oral BID LALO Quesada MD   10 mg at 04/23/25 0913    Followed by    [START ON 4/24/2025] apixaban tablet 5 mg  5 mg Oral BID LALO Quesada MD        carvediloL tablet 25 mg  25 mg Oral BID WM LALO Quesada MD   25 mg at 04/23/25 0906    HYDROcodone-acetaminophen 5-325 mg per tablet 1 tablet  1 tablet Oral Q4H PRN Brooklyn Wellington MD        kit prep Tc 99m-pentetic acid (aerosol)  20 mg SolR 40 millicurie  40 millicurie Inhalation ONCE PRN LALO Quesada MD        labetaloL injection 10 mg  10 mg Intravenous Q4H PRN LALO Quesada MD   10 mg at 04/17/25 2350    lisinopriL tablet 40 mg  40 mg Oral Daily LALO Quesada MD   40 mg at 04/23/25 0906    melatonin tablet 6 mg  6 mg Oral Nightly PRN Layla Smith, NP   6 mg at 04/15/25 2028    NIFEdipine 24 hr tablet 30 mg  30 mg Oral Daily LALO Quesada MD   30 mg at 04/23/25 0906    ondansetron disintegrating tablet 8 mg  8 mg Oral Q8H PRN Layla Smith, NP        ondansetron injection 8 mg  8 mg Intravenous Q6H PRN Layla Smith, NP        polyethylene glycol packet 17 g  17 g Oral BID Brooklyn Wellington MD   17 g at 04/23/25 0906    risperiDONE tablet 1 mg  1 mg Oral QHS Miriam Clemente PA-C   1 mg at 04/22/25 2124    senna-docusate 8.6-50 mg per tablet 1 tablet  1 tablet Oral Daily PRN Brooklyn Wellington MD        sodium chloride 0.9% flush 10 mL  10 mL Intravenous PRN Layla Smith, NP        sodium zirconium cyclosilicate packet 10 g  10 g Oral Daily LALO Quesada MD   10 g at 04/23/25 0906    tamsulosin 24 hr capsule 0.4 mg  0.4 mg Oral Daily LALO Quesada MD   0.4 mg at 04/23/25 0906

## 2025-04-23 NOTE — SUBJECTIVE & OBJECTIVE
Interval History: No acute events overnight. A little more distended this morning and no BM overnight. Discussed with family at bedside.    Review of Systems   Unable to perform ROS: Patient nonverbal     Objective:     Vital Signs (Most Recent):  Temp: 97.5 °F (36.4 °C) (04/22/25 1632)  Pulse: 88 (04/22/25 1632)  Resp: 16 (04/22/25 1632)  BP: (!) 174/71 (04/22/25 1632)  SpO2: 95 % (04/22/25 1632) Vital Signs (24h Range):  Temp:  [97.5 °F (36.4 °C)-98.9 °F (37.2 °C)] 97.5 °F (36.4 °C)  Pulse:  [] 88  Resp:  [16-18] 16  SpO2:  [94 %-97 %] 95 %  BP: (124-188)/(60-89) 174/71     Weight: 91.6 kg (201 lb 15.1 oz)  Body mass index is 32.59 kg/m².    Intake/Output Summary (Last 24 hours) at 4/22/2025 2129  Last data filed at 4/22/2025 1200  Gross per 24 hour   Intake 280 ml   Output 750 ml   Net -470 ml         Physical Exam  Vitals and nursing note reviewed.   Constitutional:       General: He is sleeping. He is not in acute distress.     Appearance: He is well-developed. He is ill-appearing (Chronically).   HENT:      Head: Normocephalic and atraumatic.   Eyes:      General:         Right eye: No discharge.         Left eye: No discharge.      Conjunctiva/sclera: Conjunctivae normal.   Cardiovascular:      Rate and Rhythm: Normal rate.      Pulses: Normal pulses.   Pulmonary:      Effort: Pulmonary effort is normal. No respiratory distress.   Abdominal:      General: There is distension.      Palpations: Abdomen is soft.      Tenderness: There is no abdominal tenderness.   Musculoskeletal:         General: No tenderness. Normal range of motion.      Right lower leg: No edema.      Left lower leg: No edema.   Skin:     General: Skin is warm and dry.   Neurological:      Mental Status: He is easily aroused. Mental status is at baseline.           Significant Labs:   CBC:  Recent Labs   Lab 04/20/25  0448 04/21/25  0453 04/22/25  0526   WBC 9.81 10.87 9.99   HGB 7.0* 7.2* 7.0*   HCT 21.7* 23.0* 22.8*   * 623*  664*   LYMPH 0.94*  9.6* 1.07  9.8* 1.10  11.0*   MONO 8.5  0.83 6.3  0.68 6.8  0.68   EOS 4.8  0.47 3.7  0.40 3.0  0.30   BMP:  Recent Labs   Lab 04/20/25  0509 04/21/25  0453 04/22/25  0526   * 132* 133*   K 5.1 5.4* 5.7*    100 101   CO2 23 23 23   BUN 73* 68* 68*   CREATININE 2.4* 2.4* 2.3*   CALCIUM 9.2 9.2 9.0   MG 2.1 2.1 2.0   PHOS 3.6 4.2 4.6*     Significant Imaging: I have reviewed and interpreted all pertinent imaging results/findings within the past 24 hours.

## 2025-04-23 NOTE — PLAN OF CARE
Case Management Final Discharge Note    Discharge Disposition: [facility]     New DME ordered / company name: [employer]; Family refused hospital bed    Relevant SDOH / Transition of Care Barriers:  None    Person available to provide assistance at home when needed and their contact information: Wendy Shipman (Sister)  [phone] (Home Phone)     Scheduled followup appointment: PCP    Referrals placed:     Transportation: Ambulance    Family educated on discharge services and updated on DC plan. Bedside RN notified, patient clear to discharge from Case Management Perspective.      04/23/25 1316   Final Note   Assessment Type Final Discharge Note   Anticipated Discharge Disposition Home-Health   Hospital Resources/Appts/Education Provided Provided patient/caregiver with written discharge plan information;Appointments scheduled and added to AVS   Post-Acute Status   Post-Acute Authorization Home Health   Home Health Status Set-up Complete/Auth obtained   Discharge Delays None known at this time     [facility] ([address])  Discharge Final Note    Primary Care Provider: Zachary Ramos MD    Expected Discharge Date: 4/23/2025    Final Discharge Note (most recent)       Final Note - 04/23/25 1316          Final Note    Assessment Type Final Discharge Note (P)      Anticipated Discharge Disposition Home-Health Care Svc (P)      Hospital Resources/Appts/Education Provided Provided patient/caregiver with written discharge plan information;Appointments scheduled and added to AVS (P)         Post-Acute Status    Post-Acute Authorization Home Health (P)      Home Health Status Set-up Complete/Auth obtained (P)      Discharge Delays None known at this time (P)                      Important Message from Medicare  Important Message from Medicare regarding Discharge Appeal Rights: Explained to patient/caregiver, Signed/date by patient/caregiver     Date IMM was signed: 04/21/25  Time IMM was  signed: 1155    Contact Info       Zachary Ramos MD   Specialty: Family Medicine   Relationship: PCP - General        Next Steps: Follow up in 2 week(s)    Instructions: post-hospital follow-up    [facility]   Specialty: Home Health Services, Home Therapy Services, Home Living Aide Services    [address]  [place] [address]   Phone: [phone]       Next Steps: Follow up

## 2025-04-23 NOTE — PLAN OF CARE
Met with sister Wendy to review discharge recommendation of HH and is agreeable to plan    Patient/family provided list of facilities in-network with patient's payor plan. Providers that are owned, operated, or affiliated with [facility] are included on the list.     Patient has declined to select a preferred provider and elects placement with the first accepting in network provider that is available to provide services as ordered by the physician       If an additional preferred facility not listed above is identified, additional referral to be sent. If above facilities unable to accept, will send additional referrals to in-network providers.     04/23/25 1315   Post-Acute Status   Post-Acute Authorization Home Health   Home Health Status Referrals Sent   Patient choice form signed by patient/caregiver List with quality metrics by geographic area provided;List from CMS Compare;List from System Post-Acute Care   Discharge Delays (!) Post-Acute Set-up   Discharge Plan   Discharge Plan A Home Health   Discharge Plan B Home with family

## 2025-04-23 NOTE — PT/OT/SLP PROGRESS
Physical Therapy      Patient Name:  Frantz Sigala .   MRN:  [id number]    Patient not seen today secondary to Therapist assessment (pt sleeping, no family member present (family had wanted to be present for therapy sessions)). Will follow-up later today.    ADDENDUM 2:12pm - sister present, pt had just worked with OT and was resting in recliner, pt due to d/c home shortly, no further needs

## 2025-04-23 NOTE — PROGRESS NOTES
[facility] - Med Surg ([address])  Wound Care    Patient Name:  Frantz Sigala Jr.   MRN:  [id number]  Date: 4/23/2025  Diagnosis: Acute pulmonary embolism    History:     Past Medical History:   Diagnosis Date    Anxiety     Asthma     Schizophrenia        Social History[1]    Precautions:     Allergies as of 04/08/2025 - Reviewed 04/08/2025   Allergen Reaction Noted    Penicillins Other (See Comments) 10/12/2015       WO Assessment Details/Treatment     Attempted follow up today. Nursing had already applied triad paste to affected area. Was able to see that large blister still intact and smaller ruptured blister with pink granulation. Will continue with Triad paste to promote moist wound healing. Nursing notified at bedside. Wound care will continue to follow.         04/23/2025         [1]   Social History  Socioeconomic History    Marital status: Single   Tobacco Use    Smoking status: Never    Smokeless tobacco: Never   Substance and Sexual Activity    Alcohol use: No     Alcohol/week: 0.0 standard drinks of alcohol    Drug use: No    Sexual activity: Never   Social History Narrative    Pt is blind and deaf     Social Drivers of Health     Financial Resource Strain: Patient Unable To Answer (4/9/2025)    Overall Financial Resource Strain (CARDIA)     Difficulty of Paying Living Expenses: Patient unable to answer   Food Insecurity: No Food Insecurity (4/9/2025)    Hunger Vital Sign     Worried About Running Out of Food in the Last Year: Never true     Ran Out of Food in the Last Year: Never true   Transportation Needs: No Transportation Needs (4/9/2025)    PRAPARE - Transportation     Lack of Transportation (Medical): No     Lack of Transportation (Non-Medical): No   Physical Activity: Inactive (4/9/2025)    Exercise Vital Sign     Days of Exercise per Week: 0 days     Minutes of Exercise per Session: 0 min   Stress: Patient Unable To Answer (4/9/2025)    [employer] - Occupational  Stress Questionnaire     Feeling of Stress : Patient unable to answer   Housing Stability: Low Risk  (4/9/2025)    Housing Stability Vital Sign     Unable to Pay for Housing in the Last Year: No     Homeless in the Last Year: No

## 2025-04-23 NOTE — PLAN OF CARE
Problem: Adult Inpatient Plan of Care  Goal: Plan of Care Review  Outcome: Progressing  Goal: Patient-Specific Goal (Individualized)  Outcome: Progressing  Goal: Absence of Hospital-Acquired Illness or Injury  Outcome: Progressing  Goal: Optimal Comfort and Wellbeing  Outcome: Progressing  Goal: Readiness for Transition of Care  Outcome: Progressing     Problem: Infection  Goal: Absence of Infection Signs and Symptoms  Outcome: Progressing     Problem: Acute Kidney Injury/Impairment  Goal: Fluid and Electrolyte Balance  Outcome: Progressing  Goal: Improved Oral Intake  Outcome: Progressing  Goal: Effective Renal Function  Outcome: Progressing     Problem: Syncope  Goal: Absence of Syncopal Symptoms  Outcome: Progressing     Problem: Fall Injury Risk  Goal: Absence of Fall and Fall-Related Injury  Outcome: Progressing     Problem: Skin Injury Risk Increased  Goal: Skin Health and Integrity  Outcome: Progressing

## 2025-04-23 NOTE — PT/OT/SLP PROGRESS
Occupational Therapy   Treatment    Name: Frantz Sigala Jr.  MRN: [id number]  Admitting Diagnosis:  Acute pulmonary embolism       Recommendations:     Discharge Recommendations: Moderate Intensity Therapy  Discharge Equipment Recommendations:  respiratory supplies, bedside commode, walker, rolling, hospital bed, wheelchair (would currently need BSC, RW, wheelchair)  Barriers to discharge:  None    Assessment:     Frantz Sigala Jr. is a 60 y.o. male with a medical diagnosis of Acute pulmonary embolism.  He presents with no pain. Performance deficits affecting function are weakness, gait instability, impaired balance, impaired cognition, impaired functional mobility, impaired self care skills, decreased lower extremity function, decreased upper extremity function. Pt agreeable to participating in therapy upon arrival to room.  Pt demonstrated improvement with sit <> stand transfer performing with Min A-CGA, RW several times from EOB.  Pt required Min A, RW to perform sit <> stand transfer from chair.  Total A, RW required to perform paul care in standing with pt able to maintain standing balance with CGA-SBA, RW.    Pt continuously having instances of bowel incontinence (three during session) with mobility during session.  Pt is making strong progress towards goals and would continue to benefit from skilled OT services to address problems listed above and increase independence with ADLs.  Moderate intensity therapy is recommended upon discharge; however, pt has made significant progress from time of initial evaluation and would be safe to return home in his familiar environment with 24/7 family support and supervision and proper equipment (hospital bed, BSC, RW, wheelchair).        Rehab Prognosis:  Good; patient would benefit from acute skilled OT services to address these deficits and reach maximum level of function.       Plan:     Patient to be seen 5 x/week to address the above listed problems via  self-care/home management, therapeutic activities, therapeutic exercises  Plan of Care Expires: 05/17/25  Plan of Care Reviewed with: family    Subjective     Chief Complaint: pt indicated he was finished with activity several times (made sleeping motion with hands next to face)  Patient/Family Comments/goals: be strong enough to return home and go up 5 stairs to enter home  Pain/Comfort:  Pain Rating 1: 0/10  Pain Rating Post-Intervention 1: 0/10    Objective:     Communicated with: RN (Lina) and PTA (Skinny) prior to session.  Patient found HOB elevated with peripheral IV, Condom Catheter upon OT entry to room.    General Precautions: Standard, deaf, blind, fall    Orthopedic Precautions:N/A  Braces: N/A  Respiratory Status: Room air     Occupational Performance:     Bed Mobility:    Supine <> sit: CGA  Scooting: CGA    Functional Mobility/Transfers:  Sit <> stand:   Min A, RW x 1 trial from EOB  CGA, RW x 2 trials from EOB  Min A, RW x 2 trials from chair  Notes:  Tactile cues for task initiation provided  Functional Mobility: Pt ambulated ~5 ft with Min A-CGA, RW.  Tactile cues provided for sequencing provided  Instances of bowel incontinence observed    Activities of Daily Living:  Toileting: total assistance and RW for performing paul care in standing position after several instances of bowel incontinence (3 during session)  Pt able to maintain balance with CGA-SBA, RW  Cues for posture provided      [facility] 6 Click ADL: 16    Treatment & Education:  *Pt and sister educated on role of OT in acute care setting  *Pt performed multiple trials of sit <> stand transfer from EOB and chair; tactile cues for sequencing and posture provided  *Pt stood during paul care x 3 trials after bowel movement at bed level; cues for posture provided  *Pt ambulated short distance within room to address coordination, endurance, and balance needed for functional mobility; cues for posture provided  *POC reviewed with pt and  sister      Patient left up in chair in reclined position with all lines intact, call button in reach, and PCT (Lisa) and sister present    GOALS:   Multidisciplinary Problems       Occupational Therapy Goals          Problem: Occupational Therapy    Goal Priority Disciplines Outcome Interventions   Occupational Therapy Goal     OT, PT/OT Progressing    Description: Goals to be met by: 5/1/2025     Patient will increase functional independence with ADLs by performing:    UE Dressing with Supervision.  LE Dressing with Minimal Assistance.  Grooming while standing at sink with Contact Guard Assistance.  Toileting from toilet with Contact Guard Assistance for hygiene and clothing management.   Toilet transfer to toilet with Contact Guard Assistance.                         DME Justifications:  Frantz requires a hospital bed due to him requiring positioning of the body in ways not feasible with an ordinary bed due to limited ability and cannot independently make changes in body position without the use of the bed.The positioning of the body cannot be sufficiently resolved by the use of pillows and wedges.    Time Tracking:     OT Date of Treatment: 04/23/25  OT Start Time: 1313  OT Stop Time: 1411  OT Total Time (min): 58 min    Billable Minutes:Self Care/Home Management 58    OT/MATT: OT        CORY Keane  4/23/2025

## 2025-04-24 ENCOUNTER — OUTPATIENT CASE MANAGEMENT (OUTPATIENT)
Dept: ADMINISTRATIVE | Facility: OTHER | Age: 60
End: 2025-04-24
Payer: MEDICARE

## 2025-04-24 ENCOUNTER — TELEPHONE (OUTPATIENT)
Dept: INTERNAL MEDICINE | Facility: CLINIC | Age: 60
End: 2025-04-24
Payer: MEDICARE

## 2025-04-24 NOTE — TELEPHONE ENCOUNTER
Called pt brother (russ)    They wanted to ask about pt     Pt had an appt scheduled on 5/5/25  Offered sooner appt.    Russ stated that he will call his sister to see what day is good to bring pt in.     Will callback to let us know.

## 2025-04-24 NOTE — LETTER
Frantz Sigala  [address] [facility] [address]      Dear Frantz Sigala,     I work with [facility] Outpatient Care Management Department. We received a referral to call you to discuss your medical history. These services are free of charge and are offered to [facility] patients who have recently been discharged from any of our facilities or who have medical conditions that may require the skill of a nurse to assist with management.     I am a Registered Nurse who specializes in connecting patients with available medical and financial resources as well as addressing any educational needs that may be indicated.    I attempted to reach you by telephone, but I was unsuccessful. Please call our department so that we can go over some questions with you, regarding your health.    The Outpatient Care Management Department can be reached at [phone], from 8:00AM to 4:30 PM, on Monday thru Friday.     Additionally, [facility] also has a program where a nurse is available 24/7 to answer questions or provide medical advice, their number is [phone].      Thanks,    Riddhi Redman RN  Outpatient Care Management  Phone #: [phone]

## 2025-04-24 NOTE — LETTER
Frantz Sigala  [facility] [address]      Dear Frantz,    Welcome to [facility] Complex Care Management Program.  It was a pleasure talking with you today.  My name is Riddhidelia Lazcanolynettemon, and I look forward to being your Care Manager.  My goal is to help you function at the healthiest and highest level possible.  You can contact me directly at [phone].    As an [facility] patient, some of the services we may be able to provide include:     Development of an individualized care plan with a Registered Nurse   Connection with a   Connection with available resources and services    Coordinate communication among your care team members   Provide coaching and education   Help you understand your doctors treatment plan  Help you obtain information about your insurance coverage.     All services provided by [facility]s Complex Care Managers and other care team members are coordinated with and communicated to your primary care team.      As part of your enrollment, you will be receiving education materials and more information about these services in your [facility] account, by phone or through the mail.  If you do not wish to participate or receive information, please contact our office at [phone].      [facility] Patient Rights and Responsibilities available upon request.    Sincerely,    Riddhi Redman, JUAN C  [facility]   Outpatient RN Complex Care Manager

## 2025-04-24 NOTE — PROGRESS NOTES
Outpatient Care Management  Initial Patient Assessment    Patient: Frantz Sigala Jr.  MRN: [id number]  Date of Service: 04/24/2025  Completed by: Marjorie Redman RN  Referral Date: 04/15/2025  Date of Eligibility: 4/16/2025  Program:   High Risk  Status: Identified  Start Date: 4/16/2025  Responsible Staff: Marjorie Redman RN        Reason for Visit   Patient presents with    OPCM Enrollment Call    Nursing Assessment       Brief Summary:  Frantz Sigala Jr. was referred by Dr. Brooklyn Wellington for CKD4, colitis, schizophrenia. Patient qualifies for program based on risk score of 60.4%.   Active problem list, medical, surgical and social history reviewed. Active comorbidities include anxiety, asthma, schizophrenia, deaf, blind, HTN, heart failure, CKD4, PE. Areas of need identified by patient's siblings, Wendy and Adonay, include CHF, CKD, and PE.  CM informed patient's siblings, Wendy and Adonay, about the [facility] [phone] for assistance with a shower chair and blood pressure cuff for patient.    CM sent referral to [facility].  Patient is in needs of information about COA to get incontinent supplies, needs a WC, Assistance with application for Long Term Personal Care Services through Medicaid, [facility], Walk in shower information, and an SDOH.     Next steps:   Follow up if received educational materials  Educate on S&S of CHF and CHF definition  Educate on S&S of CKD and CKD definition  Educate on low sodium diet, 2300 mg of sodium/day  Educate on fluid restriction  Educate on S&S of PE and VTE      Disability Status  Is the patient alert and oriented (person, place, time, and situation)?: Other (See Comment) (Patient is deaf, blind, and non verbal.)  Hearing Difficulty or Deaf: yes  Hearing Management: other  Visual Difficulty or Blind: yes  Visual and Hearing Conclusion Statement: Patient is deaf and blind and unable to communicate  Vision Management:  Other  Difficulty Concentrating, Remembering or Making Decisions: yes  Communication Difficulty: yes  Communication: unable to speak; unable to understand  Communication Management: Unable to communicate  Eating/Swallowing Difficulty: yes  Eating/Swallowing: swallowing solid food; swallowing liquids  Eating/Swallowing Management: having difficulty with swallowing food and water at this time  Walking or Climbing Stairs Difficulty: yes  Walking or Climbing Stairs: ambulation difficulty, requires equipment; ambulation difficulty, assistance 1 person; stair climbing difficulty, requires equipment; stair climbing difficulty, assistance 1 person; transferring difficulty, requires equipment; transferring difficulty, assistance 1 person  Mobility Management: Has only gotten out of bed a couple of times over last two weeks and had great difficulty when trying to ambulate.  Dressing/Bathing Difficulty: yes  Dressing/Bathing: bathing difficulty, assistance 1 person; dressing difficulty, assistance 1 person  Dressing/Bathing Management: requires the assistance to dress and bathe which is new for him since hospitalization.  Grooming: assistance required  Transferring (e.g., getting in and out of chairs): completely dependent  Toileting : Dependent  Continence : Incontinence - Bowel  Difficulty Managing Errands Independently: yes  Errands Management: siblings run his errands  Equipment Currently Used at Home: bedside commode; walker, rolling; shower chair  ADL Conclusion Statement: Patient is extremely dependent on his family to help him with all ADL's including getting up to the BSC.  He is deaf, blind, and cannot communicate with anyone.  He used to get up around the house prior to this last hospitalization but is unable to get up alone anymore.  Service Animal Required: no  Change in Functional Status Since Onset of Current Illness/Injury: no        Spiritual Beliefs  Spiritual, Cultural Beliefs, Bahai Practices, Values  that Affect Care: yes  Description of Beliefs that Will Affect Care: [place] [employer]      Social History     Socioeconomic History    Marital status: Single   Tobacco Use    Smoking status: Never    Smokeless tobacco: Never   Substance and Sexual Activity    Alcohol use: No     Alcohol/week: 0.0 standard drinks of alcohol    Drug use: No    Sexual activity: Never   Social History Narrative    Pt is blind and deaf     Social Drivers of Health     Financial Resource Strain: Patient Unable To Answer (4/9/2025)    Overall Financial Resource Strain (CARDIA)     Difficulty of Paying Living Expenses: Patient unable to answer   Food Insecurity: No Food Insecurity (4/9/2025)    Hunger Vital Sign     Worried About Running Out of Food in the Last Year: Never true     Ran Out of Food in the Last Year: Never true   Transportation Needs: No Transportation Needs (4/9/2025)    PRAPARE - Transportation     Lack of Transportation (Medical): No     Lack of Transportation (Non-Medical): No   Physical Activity: Inactive (4/9/2025)    Exercise Vital Sign     Days of Exercise per Week: 0 days     Minutes of Exercise per Session: 0 min   Stress: Patient Unable To Answer (4/9/2025)    [employer] - Occupational Stress Questionnaire     Feeling of Stress : Patient unable to answer   Housing Stability: Low Risk  (4/9/2025)    Housing Stability Vital Sign     Unable to Pay for Housing in the Last Year: No     Homeless in the Last Year: No       Roles and Relationships  Primary Source of Support/Comfort: sibling(s)  Name of Support/Comfort Primary Source: [facility]  Secondary Source of Support/Comfort: child(dhruv)  Name of Support/Comfort Secondary Source: Adonay      Advance Directives (For Healthcare)  Advance Directive  (If Adv Dir status is received, view document under Adv Dir in header or Chart Review Media tab): Patient does not have Advance Directive, requests information.  Patient Requests Assistance: Handouts  provided        Patient Reported Insurance  Verified current insurance plan:: Medicare; Medicaid            4/24/2025     2:16 PM 5/1/2024    10:00 AM 11/2/2023    10:06 AM 11/23/2021     2:50 PM 8/10/2020     2:28 PM   Depression Patient Health Questionnaire   Over the last two weeks how often have you been bothered by little interest or pleasure in doing things Several days Not at all Not at all Not at all  Not at all    Over the last two weeks how often have you been bothered by feeling down, depressed or hopeless Several days Not at all Not at all Not at all  Not at all    PHQ-2 Total Score 2 0 0 0 0       Data saved with a previous flowsheet row definition       Learning Assessment       04/24/2025 1507 [facility] (4/24/2025 - Present)   Created by Marjorie Redman, RN -  (Nurse) Status: Complete                 PRIMARY LEARNER     Primary Learner Name:  Frantz Sigala DB - 04/24/2025 1507    Relationship:  Patient DB - 04/24/2025 1507    Does the primary learner have any barriers to learning?:  Visual, Hearing DB - 04/24/2025 1507    What is the preferred language of the primary learner?:  English DB - 04/24/2025 1507    Is an  required?:  No DB - 04/24/2025 1507    How does the primary learner prefer to learn new concepts?:  Other DB - 04/24/2025 1507    How often do you need to have someone help you read instructions, pamphlets, or written material from your doctor or pharmacy?:  Always DB - 04/24/2025 1507        CO-LEARNER #1     Co-Learner Name (if applicable):  Wendy Shipman and Adonay Sigala, siblings DB - 04/24/2025 1507    Relationship:  Family DB - 04/24/2025 1507    Does the co-learner have any barriers to learning?:  No Barriers DB - 04/24/2025 1507    What is the preferred language of the co-learner?:  English DB - 04/24/2025 1507    Is an  required?:  No DB - 04/24/2025 1507    How does the co-learner prefer to learn new concepts?:  Listening,  Reading DB - 04/24/2025 9619        CO-LEARNER #2     No question answered        SPECIAL TOPICS     No question answered        ANSWERED BY:     No question answered        Comments         Edit History       Marjorie Redman, RN -  (Nurse)   04/24/2025 7854

## 2025-04-24 NOTE — TELEPHONE ENCOUNTER
----- Message from Gabrielle sent at 4/23/2025  3:39 PM CDT -----    Name of Who is Calling:pt brother (russ)     What is the request in detail:pt is requesting a call back regarding asthma pump. Pt brother stated pt is in the process of being discharged from the hospital and pt brother would like for the doctor to better explain what's going with the pt. Please contact to further discuss and advise.      Can the clinic reply by [facility]:call     What Number to Call Back if not in [facility]:.  [phone] (sister number)   [phone] (russ number)

## 2025-04-25 ENCOUNTER — HOSPITAL ENCOUNTER (INPATIENT)
Facility: OTHER | Age: 60
LOS: 6 days | Discharge: NURSING, REHAB OR LONG-TERM CARE | DRG: 291 | End: 2025-05-01
Admitting: INTERNAL MEDICINE
Payer: MEDICARE

## 2025-04-25 ENCOUNTER — PATIENT OUTREACH (OUTPATIENT)
Dept: ADMINISTRATIVE | Facility: CLINIC | Age: 60
End: 2025-04-25
Payer: MEDICARE

## 2025-04-25 DIAGNOSIS — N18.9 ANEMIA IN CHRONIC KIDNEY DISEASE, UNSPECIFIED CKD STAGE: ICD-10-CM

## 2025-04-25 DIAGNOSIS — J45.901 EXACERBATION OF ASTHMA, UNSPECIFIED ASTHMA SEVERITY, UNSPECIFIED WHETHER PERSISTENT: ICD-10-CM

## 2025-04-25 DIAGNOSIS — D50.9 IRON DEFICIENCY ANEMIA, UNSPECIFIED IRON DEFICIENCY ANEMIA TYPE: ICD-10-CM

## 2025-04-25 DIAGNOSIS — R09.02 HYPOXIA: ICD-10-CM

## 2025-04-25 DIAGNOSIS — F20.5 RESIDUAL SCHIZOPHRENIA: ICD-10-CM

## 2025-04-25 DIAGNOSIS — N18.9 ANEMIA ASSOCIATED WITH CHRONIC RENAL FAILURE: ICD-10-CM

## 2025-04-25 DIAGNOSIS — D63.1 ANEMIA ASSOCIATED WITH CHRONIC RENAL FAILURE: ICD-10-CM

## 2025-04-25 DIAGNOSIS — R06.02 SHORTNESS OF BREATH: ICD-10-CM

## 2025-04-25 DIAGNOSIS — D63.1 ANEMIA IN CHRONIC KIDNEY DISEASE, UNSPECIFIED CKD STAGE: ICD-10-CM

## 2025-04-25 PROBLEM — N18.30 CKD (CHRONIC KIDNEY DISEASE) STAGE 3, GFR 30-59 ML/MIN: Chronic | Status: ACTIVE | Noted: 2019-05-09

## 2025-04-25 PROBLEM — I50.33 ACUTE ON CHRONIC DIASTOLIC HEART FAILURE: Status: ACTIVE | Noted: 2023-03-01

## 2025-04-25 PROBLEM — R50.9 FEVER: Status: RESOLVED | Noted: 2025-04-12 | Resolved: 2025-04-25

## 2025-04-25 PROBLEM — I26.99 ACUTE PULMONARY EMBOLISM: Chronic | Status: ACTIVE | Noted: 2025-04-18

## 2025-04-25 PROBLEM — N18.30 CKD (CHRONIC KIDNEY DISEASE) STAGE 3, GFR 30-59 ML/MIN: Chronic | Status: ACTIVE | Noted: 2023-03-01

## 2025-04-25 PROBLEM — N17.9 AKI (ACUTE KIDNEY INJURY): Status: RESOLVED | Noted: 2018-12-11 | Resolved: 2025-04-25

## 2025-04-25 LAB
ABSOLUTE EOSINOPHIL (OHS): 0.02 K/UL
ABSOLUTE MONOCYTE (OHS): 0.58 K/UL (ref 0.3–1)
ABSOLUTE NEUTROPHIL COUNT (OHS): 14.24 K/UL (ref 1.8–7.7)
ALBUMIN SERPL BCP-MCNC: 2.5 G/DL (ref 3.5–5.2)
ALP SERPL-CCNC: 87 UNIT/L (ref 40–150)
ALT SERPL W/O P-5'-P-CCNC: 18 UNIT/L (ref 10–44)
ANION GAP (OHS): 10 MMOL/L (ref 8–16)
AST SERPL-CCNC: 24 UNIT/L (ref 11–45)
BACTERIA #/AREA URNS AUTO: NORMAL /HPF
BASOPHILS # BLD AUTO: 0.05 K/UL
BASOPHILS NFR BLD AUTO: 0.3 %
BILIRUB SERPL-MCNC: 0.2 MG/DL (ref 0.1–1)
BILIRUB UR QL STRIP.AUTO: NEGATIVE
BNP SERPL-MCNC: 610 PG/ML (ref 0–99)
BUN SERPL-MCNC: 48 MG/DL (ref 6–20)
CALCIUM SERPL-MCNC: 9.1 MG/DL (ref 8.7–10.5)
CHLORIDE SERPL-SCNC: 101 MMOL/L (ref 95–110)
CLARITY UR: CLEAR
CO2 SERPL-SCNC: 25 MMOL/L (ref 23–29)
COLOR UR AUTO: YELLOW
CREAT SERPL-MCNC: 2 MG/DL (ref 0.5–1.4)
ERYTHROCYTE [DISTWIDTH] IN BLOOD BY AUTOMATED COUNT: 19.4 % (ref 11.5–14.5)
GFR SERPLBLD CREATININE-BSD FMLA CKD-EPI: 38 ML/MIN/1.73/M2
GLUCOSE SERPL-MCNC: 98 MG/DL (ref 70–110)
GLUCOSE UR QL STRIP: NEGATIVE
HCT VFR BLD AUTO: 22.6 % (ref 40–54)
HGB BLD-MCNC: 7 GM/DL (ref 14–18)
HGB UR QL STRIP: ABNORMAL
HOLD SPECIMEN: NORMAL
HYALINE CASTS UR QL AUTO: 0 /LPF (ref 0–1)
IMM GRANULOCYTES # BLD AUTO: 0.1 K/UL (ref 0–0.04)
IMM GRANULOCYTES NFR BLD AUTO: 0.6 % (ref 0–0.5)
KETONES UR QL STRIP: NEGATIVE
LDH SERPL L TO P-CCNC: 0.7 MMOL/L (ref 0.5–2.2)
LEUKOCYTE ESTERASE UR QL STRIP: NEGATIVE
LYMPHOCYTES # BLD AUTO: 0.73 K/UL (ref 1–4.8)
MCH RBC QN AUTO: 25 PG (ref 27–31)
MCHC RBC AUTO-ENTMCNC: 31 G/DL (ref 32–36)
MCV RBC AUTO: 81 FL (ref 82–98)
MICROSCOPIC COMMENT: NORMAL
NITRITE UR QL STRIP: NEGATIVE
NUCLEATED RBC (/100WBC) (OHS): 0 /100 WBC
PH UR STRIP: 6 [PH]
PLATELET # BLD AUTO: 785 K/UL (ref 150–450)
PMV BLD AUTO: 8.5 FL (ref 9.2–12.9)
POTASSIUM SERPL-SCNC: 5.5 MMOL/L (ref 3.5–5.1)
PROT SERPL-MCNC: 8.8 GM/DL (ref 6–8.4)
PROT UR QL STRIP: ABNORMAL
RBC # BLD AUTO: 2.8 M/UL (ref 4.6–6.2)
RBC #/AREA URNS AUTO: 2 /HPF (ref 0–4)
RELATIVE EOSINOPHIL (OHS): 0.1 %
RELATIVE LYMPHOCYTE (OHS): 4.6 % (ref 18–48)
RELATIVE MONOCYTE (OHS): 3.7 % (ref 4–15)
RELATIVE NEUTROPHIL (OHS): 90.7 % (ref 38–73)
SAMPLE: NORMAL
SODIUM SERPL-SCNC: 136 MMOL/L (ref 136–145)
SP GR UR STRIP: 1.01
SQUAMOUS #/AREA URNS AUTO: <1 /HPF
TROPONIN I SERPL DL<=0.01 NG/ML-MCNC: 0.03 NG/ML
UROBILINOGEN UR STRIP-ACNC: NEGATIVE EU/DL
WBC # BLD AUTO: 15.72 K/UL (ref 3.9–12.7)
WBC #/AREA URNS AUTO: 2 /HPF (ref 0–5)

## 2025-04-25 PROCEDURE — 85025 COMPLETE CBC W/AUTO DIFF WBC: CPT

## 2025-04-25 PROCEDURE — 25000242 PHARM REV CODE 250 ALT 637 W/ HCPCS: Performed by: INTERNAL MEDICINE

## 2025-04-25 PROCEDURE — 25000003 PHARM REV CODE 250: Performed by: INTERNAL MEDICINE

## 2025-04-25 PROCEDURE — 87040 BLOOD CULTURE FOR BACTERIA: CPT

## 2025-04-25 PROCEDURE — 21400001 HC TELEMETRY ROOM

## 2025-04-25 PROCEDURE — 83880 ASSAY OF NATRIURETIC PEPTIDE: CPT

## 2025-04-25 PROCEDURE — 80053 COMPREHEN METABOLIC PANEL: CPT

## 2025-04-25 PROCEDURE — 94761 N-INVAS EAR/PLS OXIMETRY MLT: CPT

## 2025-04-25 PROCEDURE — 93005 ELECTROCARDIOGRAM TRACING: CPT

## 2025-04-25 PROCEDURE — 93010 ELECTROCARDIOGRAM REPORT: CPT | Mod: ,,, | Performed by: INTERNAL MEDICINE

## 2025-04-25 PROCEDURE — 25000003 PHARM REV CODE 250

## 2025-04-25 PROCEDURE — 11000001 HC ACUTE MED/SURG PRIVATE ROOM

## 2025-04-25 PROCEDURE — 99285 EMERGENCY DEPT VISIT HI MDM: CPT | Mod: 25

## 2025-04-25 PROCEDURE — 96375 TX/PRO/DX INJ NEW DRUG ADDON: CPT

## 2025-04-25 PROCEDURE — 81003 URINALYSIS AUTO W/O SCOPE: CPT

## 2025-04-25 PROCEDURE — 27100171 HC OXYGEN HIGH FLOW UP TO 24 HOURS

## 2025-04-25 PROCEDURE — 63600175 PHARM REV CODE 636 W HCPCS

## 2025-04-25 PROCEDURE — 96365 THER/PROPH/DIAG IV INF INIT: CPT

## 2025-04-25 PROCEDURE — 94640 AIRWAY INHALATION TREATMENT: CPT

## 2025-04-25 PROCEDURE — 84484 ASSAY OF TROPONIN QUANT: CPT

## 2025-04-25 PROCEDURE — 25000242 PHARM REV CODE 250 ALT 637 W/ HCPCS

## 2025-04-25 PROCEDURE — 94644 CONT INHLJ TX 1ST HOUR: CPT

## 2025-04-25 RX ORDER — VANCOMYCIN 2 GRAM/500 ML IN 0.9 % SODIUM CHLORIDE INTRAVENOUS
20 ONCE
Status: COMPLETED | OUTPATIENT
Start: 2025-04-25 | End: 2025-04-25

## 2025-04-25 RX ORDER — LISINOPRIL 20 MG/1
40 TABLET ORAL DAILY
Status: DISCONTINUED | OUTPATIENT
Start: 2025-04-26 | End: 2025-05-01 | Stop reason: HOSPADM

## 2025-04-25 RX ORDER — IPRATROPIUM BROMIDE AND ALBUTEROL SULFATE 2.5; .5 MG/3ML; MG/3ML
3 SOLUTION RESPIRATORY (INHALATION) EVERY 4 HOURS PRN
Status: DISCONTINUED | OUTPATIENT
Start: 2025-04-25 | End: 2025-05-01 | Stop reason: HOSPADM

## 2025-04-25 RX ORDER — CEFEPIME HYDROCHLORIDE 1 G/1
1 INJECTION, POWDER, FOR SOLUTION INTRAMUSCULAR; INTRAVENOUS
Status: COMPLETED | OUTPATIENT
Start: 2025-04-25 | End: 2025-04-25

## 2025-04-25 RX ORDER — ACETAMINOPHEN 325 MG/1
650 TABLET ORAL EVERY 4 HOURS PRN
Status: DISCONTINUED | OUTPATIENT
Start: 2025-04-25 | End: 2025-04-25

## 2025-04-25 RX ORDER — IPRATROPIUM BROMIDE AND ALBUTEROL SULFATE 2.5; .5 MG/3ML; MG/3ML
3 SOLUTION RESPIRATORY (INHALATION)
Status: DISCONTINUED | OUTPATIENT
Start: 2025-04-25 | End: 2025-04-28

## 2025-04-25 RX ORDER — FUROSEMIDE 10 MG/ML
80 INJECTION INTRAMUSCULAR; INTRAVENOUS 2 TIMES DAILY
Status: DISCONTINUED | OUTPATIENT
Start: 2025-04-26 | End: 2025-04-27

## 2025-04-25 RX ORDER — LABETALOL HYDROCHLORIDE 5 MG/ML
20 INJECTION, SOLUTION INTRAVENOUS
Status: COMPLETED | OUTPATIENT
Start: 2025-04-25 | End: 2025-04-25

## 2025-04-25 RX ORDER — CARVEDILOL 12.5 MG/1
25 TABLET ORAL 2 TIMES DAILY WITH MEALS
Status: DISCONTINUED | OUTPATIENT
Start: 2025-04-25 | End: 2025-05-01 | Stop reason: HOSPADM

## 2025-04-25 RX ORDER — ONDANSETRON HYDROCHLORIDE 2 MG/ML
4 INJECTION, SOLUTION INTRAVENOUS EVERY 6 HOURS PRN
Status: DISCONTINUED | OUTPATIENT
Start: 2025-04-25 | End: 2025-05-01 | Stop reason: HOSPADM

## 2025-04-25 RX ORDER — ALBUTEROL SULFATE 2.5 MG/.5ML
15 SOLUTION RESPIRATORY (INHALATION)
Status: COMPLETED | OUTPATIENT
Start: 2025-04-25 | End: 2025-04-25

## 2025-04-25 RX ORDER — PREDNISONE 20 MG/1
40 TABLET ORAL DAILY
Status: DISCONTINUED | OUTPATIENT
Start: 2025-04-26 | End: 2025-04-30

## 2025-04-25 RX ORDER — ONDANSETRON 4 MG/1
4 TABLET, ORALLY DISINTEGRATING ORAL EVERY 6 HOURS PRN
Status: DISCONTINUED | OUTPATIENT
Start: 2025-04-25 | End: 2025-05-01 | Stop reason: HOSPADM

## 2025-04-25 RX ORDER — TAMSULOSIN HYDROCHLORIDE 0.4 MG/1
0.4 CAPSULE ORAL DAILY
Status: DISCONTINUED | OUTPATIENT
Start: 2025-04-26 | End: 2025-05-01 | Stop reason: HOSPADM

## 2025-04-25 RX ORDER — HYDROCODONE BITARTRATE AND ACETAMINOPHEN 5; 325 MG/1; MG/1
1 TABLET ORAL EVERY 6 HOURS PRN
Refills: 0 | Status: DISCONTINUED | OUTPATIENT
Start: 2025-04-25 | End: 2025-05-01 | Stop reason: HOSPADM

## 2025-04-25 RX ORDER — SODIUM CHLORIDE 0.9 % (FLUSH) 0.9 %
10 SYRINGE (ML) INJECTION
Status: DISCONTINUED | OUTPATIENT
Start: 2025-04-25 | End: 2025-05-01 | Stop reason: HOSPADM

## 2025-04-25 RX ORDER — ACETAMINOPHEN 325 MG/1
650 TABLET ORAL EVERY 6 HOURS PRN
Status: DISCONTINUED | OUTPATIENT
Start: 2025-04-25 | End: 2025-05-01 | Stop reason: HOSPADM

## 2025-04-25 RX ORDER — NIFEDIPINE 30 MG/1
60 TABLET, EXTENDED RELEASE ORAL DAILY
Status: DISCONTINUED | OUTPATIENT
Start: 2025-04-26 | End: 2025-05-01 | Stop reason: HOSPADM

## 2025-04-25 RX ORDER — FUROSEMIDE 10 MG/ML
80 INJECTION INTRAMUSCULAR; INTRAVENOUS
Status: COMPLETED | OUTPATIENT
Start: 2025-04-25 | End: 2025-04-25

## 2025-04-25 RX ORDER — TALC
6 POWDER (GRAM) TOPICAL NIGHTLY PRN
Status: DISCONTINUED | OUTPATIENT
Start: 2025-04-25 | End: 2025-05-01 | Stop reason: HOSPADM

## 2025-04-25 RX ORDER — METHYLPREDNISOLONE SOD SUCC 125 MG
125 VIAL (EA) INJECTION
Status: COMPLETED | OUTPATIENT
Start: 2025-04-25 | End: 2025-04-25

## 2025-04-25 RX ORDER — IPRATROPIUM BROMIDE 0.5 MG/2.5ML
1 SOLUTION RESPIRATORY (INHALATION)
Status: COMPLETED | OUTPATIENT
Start: 2025-04-25 | End: 2025-04-25

## 2025-04-25 RX ORDER — RISPERIDONE 1 MG/1
1 TABLET ORAL NIGHTLY
Status: DISCONTINUED | OUTPATIENT
Start: 2025-04-25 | End: 2025-05-01 | Stop reason: HOSPADM

## 2025-04-25 RX ORDER — LABETALOL HYDROCHLORIDE 5 MG/ML
10 INJECTION, SOLUTION INTRAVENOUS EVERY 4 HOURS PRN
Status: DISCONTINUED | OUTPATIENT
Start: 2025-04-25 | End: 2025-05-01 | Stop reason: HOSPADM

## 2025-04-25 RX ORDER — NIFEDIPINE 30 MG/1
30 TABLET, EXTENDED RELEASE ORAL
Status: DISCONTINUED | OUTPATIENT
Start: 2025-04-25 | End: 2025-04-25

## 2025-04-25 RX ADMIN — VANCOMYCIN HYDROCHLORIDE 2000 MG: 10 INJECTION, POWDER, LYOPHILIZED, FOR SOLUTION INTRAVENOUS at 01:04

## 2025-04-25 RX ADMIN — RISPERIDONE 1 MG: 1 TABLET, FILM COATED ORAL at 09:04

## 2025-04-25 RX ADMIN — CEFEPIME 1 G: 1 INJECTION, POWDER, FOR SOLUTION INTRAMUSCULAR; INTRAVENOUS at 01:04

## 2025-04-25 RX ADMIN — FUROSEMIDE 80 MG: 10 INJECTION, SOLUTION INTRAMUSCULAR; INTRAVENOUS at 02:04

## 2025-04-25 RX ADMIN — CARVEDILOL 25 MG: 12.5 TABLET, FILM COATED ORAL at 04:04

## 2025-04-25 RX ADMIN — IPRATROPIUM BROMIDE AND ALBUTEROL SULFATE 3 ML: 2.5; .5 SOLUTION RESPIRATORY (INHALATION) at 03:04

## 2025-04-25 RX ADMIN — METHYLPREDNISOLONE SODIUM SUCCINATE 125 MG: 125 INJECTION, POWDER, FOR SOLUTION INTRAMUSCULAR; INTRAVENOUS at 12:04

## 2025-04-25 RX ADMIN — LABETALOL HYDROCHLORIDE 20 MG: 5 INJECTION, SOLUTION INTRAVENOUS at 01:04

## 2025-04-25 RX ADMIN — IPRATROPIUM BROMIDE 1 MG: 0.5 SOLUTION RESPIRATORY (INHALATION) at 11:04

## 2025-04-25 RX ADMIN — ALBUTEROL SULFATE 15 MG: 2.5 SOLUTION RESPIRATORY (INHALATION) at 11:04

## 2025-04-25 RX ADMIN — APIXABAN 5 MG: 2.5 TABLET, FILM COATED ORAL at 09:04

## 2025-04-25 NOTE — ED TRIAGE NOTES
Patient presents to ED via EMS from home with shortness of breath, SpO2 78% on RA upon EMS arrival, pt placed on non-rebreather at 15 liters, SpO2 came up to 100%, pt is unable to communicate secondary to being deaf and blind, EMS hand signed to patient, patient responded with a nod, pt was recently discharged from hospital 2 days ago, Incontinent at this time, Hx of Asthma.

## 2025-04-25 NOTE — ASSESSMENT & PLAN NOTE
>>ASSESSMENT AND PLAN FOR CKD (CHRONIC KIDNEY DISEASE), STAGE III WRITTEN ON 4/25/2025  2:30 PM BY LALO SELLERS MD    - Chronic; stable at baseline.  - Avoid nephrotoxins, renally dose medications.

## 2025-04-25 NOTE — H&P
[facility] Medicine  History & Physical    Patient Name: Frantz Sigala Jr.  MRN: [id number]  Patient Class: IP- Inpatient  Admission Date: 4/25/2025  Attending Physician: DAVID Quesada MD  Primary Care Provider: Zachary Ramos MD    Patient information was obtained from relative(s), past medical records, and ER records.     Subjective:     Principal Problem:Asthma with acute exacerbation    Chief Complaint:   Chief Complaint   Patient presents with    Shortness of Breath     Pt. Is a 60 yr. Old male that was brought in by EMS. Pt. Was at 78% on R/A. EMS states he was recently admitted to the hospital for a PE. EMS states unknown baseline. Pt. Is on a NRB at 15 litters an now SPO2% at 100%.        HPI: Mr. Sigala is a 60/M with PMH deafness, blindness, schizophrenia, CKDIII, chronic diastolic heart failure (Echo 04/08/25 EF 60-65%, grade I diastolic dysfunction), asthma who presented to [facility] 04/25 with worsening dyspnea and decreased PO intake for the past two days. History obtained from the patient's family members as he is unable to communicate. He was admitted from 04/08-04/23 with syncope and colitis initially. During his hospitalization he had intermittent fevers, tachycardia and hypertension, had V/Q scan demonstrating intermediate probability for PE and was started on anticoagulation, and had hyponatremia prompting discontinuation of HCTZ and furosemide. Afterward, family noted he had decrease in appetite, increasing leg swelling, SOB and wheezing. He did not have fevers at home, but did have diaphoresis. Additionally he had loose stool in the setting of increased stool softener use after discharge. A family friend came to visit 04/25 and used a pulse oximeter while he felt SOB and found his oxygen level to be 77%. EMS was contacted and he received nebulization and was brought to ED for further evaluation. ED workup was notable for leukocytosis with left shift, CXR  with increased bilateral infiltrates concerning for pulmonary edema, elevated BNP to 600s, increased WOB and wheezing. He received methylprednisolone 125mg IV x1, albuterol nebulization, furosemide 80mg IV and hospital medicine was contacted for admission.    Past Medical History:   Diagnosis Date    Anxiety     Asthma     Schizophrenia        Past Surgical History:   Procedure Laterality Date    CORNEAL TRANSPLANT         Review of patient's allergies indicates:   Allergen Reactions    Penicillins Other (See Comments)       No current facility-administered medications on file prior to encounter.     Current Outpatient Medications on File Prior to Encounter   Medication Sig    albuterol (PROVENTIL/VENTOLIN HFA) 90 mcg/actuation inhaler Inhale 2 puffs into the lungs every 6 (six) hours as needed for Wheezing. Rescue    apixaban (ELIQUIS) 5 mg Tab Take 2 tablets (10 mg total) by mouth 2 (two) times daily for 1 day, THEN 1 tablet (5 mg total) 2 (two) times daily.    carvediloL (COREG) 25 MG tablet Take 1 tablet (25 mg total) by mouth 2 (two) times daily with meals.    ciprofloxacin HCl (CIPRO) 500 MG tablet Take 1 tablet (500 mg total) by mouth every 12 (twelve) hours. (Patient not taking: Reported on 4/24/2025)    fluticasone propionate (FLONASE) 50 mcg/actuation nasal spray 1 spray (50 mcg total) by Each Nostril route once daily. (Patient not taking: Reported on 4/24/2025)    HYDROcodone-acetaminophen (NORCO) 5-325 mg per tablet Take 1 tablet by mouth every 6 (six) hours as needed for Pain. (Patient not taking: Reported on 4/24/2025)    lisinopriL (PRINIVIL,ZESTRIL) 40 MG tablet Take 1 tablet (40 mg total) by mouth once daily.    NIFEdipine (PROCARDIA-XL) 30 MG (OSM) 24 hr tablet Take 1 tablet (30 mg total) by mouth once daily.    ondansetron (ZOFRAN-ODT) 4 MG TbDL Take 1 tablet (4 mg total) by mouth every 6 (six) hours as needed (nausea/vomiting). (Patient not taking: Reported on 4/24/2025)    polyethylene glycol  (GLYCOLAX) 17 gram PwPk Take 17 g by mouth 3 (three) times daily as needed for Constipation. (Patient not taking: Reported on 4/24/2025)    risperiDONE (RISPERDAL) 1 MG tablet Take 1 tablet (1 mg total) by mouth once daily.    tamsulosin (FLOMAX) 0.4 mg Cap Take 1 capsule (0.4 mg total) by mouth once daily.     Family History    None       Tobacco Use    Smoking status: Never    Smokeless tobacco: Never   Substance and Sexual Activity    Alcohol use: No     Alcohol/week: 0.0 standard drinks of alcohol    Drug use: No    Sexual activity: Never     Review of Systems   Unable to perform ROS: Patient nonverbal   Constitutional:  Positive for diaphoresis.   Respiratory:  Positive for shortness of breath and wheezing.    Cardiovascular:  Positive for leg swelling.   Gastrointestinal:  Positive for diarrhea.     Objective:     Vital Signs (Most Recent):  Temp: 98.3 °F (36.8 °C) (04/25/25 1915)  Pulse: 87 (04/25/25 1915)  Resp: (!) 22 (04/25/25 1915)  BP: (!) 192/87 (04/25/25 1915)  SpO2: 98 % (04/25/25 1915) Vital Signs (24h Range):  Temp:  [98 °F (36.7 °C)-98.5 °F (36.9 °C)] 98.3 °F (36.8 °C)  Pulse:  [] 87  Resp:  [11-24] 22  SpO2:  [93 %-100 %] 98 %  BP: (150-220)/(67-98) 192/87     Weight: 95.3 kg (210 lb)  Body mass index is 32.89 kg/m².     Physical Exam  Vitals and nursing note reviewed.   Constitutional:       General: He is not in acute distress.     Appearance: He is well-developed.   HENT:      Head: Normocephalic and atraumatic.   Eyes:      General:         Right eye: No discharge.         Left eye: No discharge.   Cardiovascular:      Rate and Rhythm: Normal rate.      Pulses: Normal pulses.   Pulmonary:      Comments: Tight bilaterally; expiratory wheezes and diminished bases bilaterally.  Abdominal:      Palpations: Abdomen is soft.      Tenderness: There is no abdominal tenderness.   Musculoskeletal:         General: Normal range of motion.      Right lower leg: Edema present.      Left lower leg:  Edema present.   Skin:     General: Skin is warm and dry.   Neurological:      Mental Status: He is alert and oriented to person, place, and time. Mental status is at baseline.        Significant Labs:   CBC:  Recent Labs   Lab 04/23/25  0526 04/25/25  1212   WBC 6.82 15.72*   HGB 7.5* 7.0*   HCT 24.3* 22.6*   * 785*   LYMPH 0.90*  13.2* 0.73*  4.6*   MONO 6.2  0.42 3.7*  0.58   EOS 3.2  0.22 0.1  0.02   CMP:  Recent Labs   Lab 04/23/25  0526 04/25/25  1212   * 136   K 5.4* 5.5*   CL 97 101   CO2 23 25   BUN 63* 48*   CREATININE 2.2* 2.0*   CALCIUM 9.2 9.1   MG 2.0  --    PHOS 5.4*  --    ALKPHOS  --  87   AST  --  24   ALT  --  18   BILITOT  --  0.2   ALBUMIN  --  2.5*   ANIONGAP 10 10     Recent Labs   Lab 04/25/25  1212   *     Significant Imaging: I have reviewed and interpreted all pertinent imaging results/findings within the past 24 hours.  Imaging Results              X-Ray Chest AP Portable (Final result)  Result time 04/25/25 13:11:29      Final result by Mello Mathew MD (04/25/25 13:11:29)                   Impression:      No acute findings.      Electronically signed by: Mello Mathew MD  Date:    04/25/2025  Time:    13:11               Narrative:    EXAMINATION:  XR CHEST AP PORTABLE    CLINICAL HISTORY:  CHF;    TECHNIQUE:  Single frontal view of the chest was performed.    COMPARISON:  04/12/2025    FINDINGS:  Enlarged cardiac silhouette.The lungs are grossly clear.  No pneumothorax.No pleural effusions.                                    Assessment/Plan:     Assessment & Plan  Asthma with acute exacerbation  Leukocytosis  Acute on chronic diastolic heart failure  Essential hypertension  - Acute respiratory distress with reported SpO2 to 77% at home prior to EMS intervention.  - In ED, placed on NRB, diuresis initiated as well as steroids and nebulizations with significant improvement in saturations. Wean supplemental O2 as tolerated.  - Suspect leukocytosis is reactive;  no clear evidence of infectious process. Received cefepime, vancomycin in ED; defer continuation for now. Low threshold to resume empiric antibiotics if febrile or concern rises for sepsis.  - Likely a combination of acute asthma exacerbation with acute volume overload with diastolic dysfunction and recent discontinuation of furosemide/HCTZ contributing to his worsening respiratory status.  - Continue furosemide 80mg IV BID; strict intake/output, daily weights.  - Continue albuterol-ipratropium 2.5-0.5mg inhaled q4hr wake, q4hr PRN. Continue steroids with prednisone 40mg PO daily.  - Resume PT/OT given debility.  CKD (chronic kidney disease) stage 3, GFR 30-59 ml/min  - Improving from recent elevation.  - Avoid nephrotoxins, renally dose medications.  - Monitor closely in setting of diuresis.  Acute pulmonary embolism  - Continue apixaban 5mg PO BID.  Schizophrenia  Deaf, bilateral  Blind  - At baseline.  - Continue risperidone 1mg PO qHS.    VTE Risk Mitigation (From admission, onward)           Ordered     apixaban tablet 5 mg  2 times daily         04/25/25 1528     IP VTE HIGH RISK PATIENT  Once         04/25/25 1454                     D Sunny Quesada MD  Department of Hospital Medicine  [facility] Emergency Dept

## 2025-04-25 NOTE — ASSESSMENT & PLAN NOTE
Patient's blood pressure range in the last 24 hours was: BP  Min: 150/67  Max: 220/98.The patient's inpatient anti-hypertensive regimen is listed below:  Current Antihypertensives  carvediloL tablet 25 mg, 2 times daily with meals, Oral  lisinopriL tablet 40 mg, Daily, Oral  NIFEdipine 24 hr tablet 60 mg, Daily, Oral    Plan  -   - ***

## 2025-04-25 NOTE — ED PROVIDER NOTES
Encounter Date: 4/25/2025       History     Chief Complaint   Patient presents with    Shortness of Breath     Pt. Is a 60 yr. Old male that was brought in by EMS. Pt. Was at 78% on R/A. EMS states he was recently admitted to the hospital for a PE. EMS states unknown baseline. Pt. Is on a NRB at 15 litters an now SPO2% at 100%.     60-year-old male with schizophrenia, asthma, congestive heart failure, blind and deaf is presenting to the emergency department for respiratory distress.  EMS was called by neighbor who reports the patient was in respiratory distress and on EMS arrival his oxygen saturation on room air with 78%.  He has been needing his breathing treatments with increased frequency over the past day.  History is extremely limited due the patient's inability to communicate, no family at bedside at the time of my evaluation.  Patient reportedly able to do some tactile sign language.    The history is provided by a relative and the EMS personnel. The history is limited by the absence of a caregiver and a language barrier. No  was used.     Review of patient's allergies indicates:   Allergen Reactions    Penicillins Other (See Comments)     Past Medical History:   Diagnosis Date    Anxiety     Asthma     Schizophrenia      Past Surgical History:   Procedure Laterality Date    CORNEAL TRANSPLANT       No family history on file.  Social History[1]  Review of Systems    Physical Exam     Initial Vitals [04/25/25 1053]   BP Pulse Resp Temp SpO2   (!) 167/91 102 (!) 24 98 °F (36.7 °C) 98 %      MAP       --         Physical Exam    Nursing note and vitals reviewed.  Constitutional: He is not diaphoretic. No distress.   Obese, chronically ill-appearing, not responding to verbal   HENT:   Head: Normocephalic and atraumatic.   Eyes:   Corneal clouding   Neck: Neck supple.   Normal range of motion.  Cardiovascular:  Normal rate and regular rhythm.           Pulmonary/Chest: No respiratory distress.  He has no wheezes.   Poor air movement bilaterally   Abdominal: Abdomen is soft. He exhibits no distension. There is no abdominal tenderness. There is no rebound and no guarding.   Musculoskeletal:         General: Edema (Bilateral lower extremity edema 1+ of the knee) present.      Cervical back: Normal range of motion and neck supple.     Neurological:   Unable to here, can not follow commands  Moving all 4 extremities spontaneously   Skin: Skin is warm and dry.   Psychiatric:   Unable to assess         ED Course   Procedures  Labs Reviewed   COMPREHENSIVE METABOLIC PANEL - Abnormal       Result Value    Sodium 136      Potassium 5.5 (*)     Chloride 101      CO2 25      Glucose 98      BUN 48 (*)     Creatinine 2.0 (*)     Calcium 9.1      Protein Total 8.8 (*)     Albumin 2.5 (*)     Bilirubin Total 0.2      ALP 87      AST 24      ALT 18      Anion Gap 10      eGFR 38 (*)    TROPONIN I - Abnormal    Troponin-I 0.029 (*)    B-TYPE NATRIURETIC PEPTIDE - Abnormal     (*)    CBC WITH DIFFERENTIAL - Abnormal    WBC 15.72 (*)     RBC 2.80 (*)     HGB 7.0 (*)     HCT 22.6 (*)     MCV 81 (*)     MCH 25.0 (*)     MCHC 31.0 (*)     RDW 19.4 (*)     Platelet Count 785 (*)     MPV 8.5 (*)     Nucleated RBC 0      Neut % 90.7 (*)     Lymph % 4.6 (*)     Mono % 3.7 (*)     Eos % 0.1      Basophil % 0.3      Imm Grans % 0.6 (*)     Neut # 14.24 (*)     Lymph # 0.73 (*)     Mono # 0.58      Eos # 0.02      Baso # 0.05      Imm Grans # 0.10 (*)    CULTURE, BLOOD   CULTURE, BLOOD   CBC W/ AUTO DIFFERENTIAL    Narrative:     The following orders were created for panel order CBC auto differential.  Procedure                               Abnormality         Status                     ---------                               -----------         ------                     CBC with Differential[[id number]]       Abnormal            Final result                 Please view results for these tests on the individual orders.    URINALYSIS, REFLEX TO URINE CULTURE   ISTAT LACTATE    POC Lactate 0.70      Sample VENOUS       EKG Readings: (Independently Interpreted)   Initial Reading: No STEMI. Previous EKG: Compared with most recent EKG Rhythm: Normal Sinus Rhythm. Heart Rate: 90. ST Segments: Normal ST Segments. T Waves: Normal.       Imaging Results              X-Ray Chest AP Portable (Final result)  Result time 04/25/25 13:11:29      Final result by Mello Mathew MD (04/25/25 13:11:29)                   Impression:      No acute findings.      Electronically signed by: Mello Mathew MD  Date:    04/25/2025  Time:    13:11               Narrative:    EXAMINATION:  XR CHEST AP PORTABLE    CLINICAL HISTORY:  CHF;    TECHNIQUE:  Single frontal view of the chest was performed.    COMPARISON:  04/12/2025    FINDINGS:  Enlarged cardiac silhouette.The lungs are grossly clear.  No pneumothorax.No pleural effusions.                                       Medications   nitroGLYCERIN 2% TD oint ointment 1 inch (1 inch Topical (Top) Not Given 4/25/25 1130)   vancomycin 2 g in 0.9% sodium chloride 500 mL IVPB (2,000 mg Intravenous New Bag 4/25/25 1333)   sodium chloride 0.9% flush 10 mL (has no administration in time range)   melatonin tablet 6 mg (has no administration in time range)   ondansetron disintegrating tablet 4 mg (has no administration in time range)   ondansetron injection 4 mg (has no administration in time range)   acetaminophen tablet 650 mg (has no administration in time range)   albuterol sulfate nebulizer solution 15 mg (15 mg Nebulization Given 4/25/25 1151)   ipratropium 0.02 % nebulizer solution 1 mg (1 mg Nebulization Given 4/25/25 1151)   methylPREDNISolone sodium succinate injection 125 mg (125 mg Intravenous Given 4/25/25 1256)   labetaloL injection 20 mg (20 mg Intravenous Given 4/25/25 1303)   ceFEPIme injection 1 g (1 g Intravenous Given 4/25/25 1315)   furosemide injection 80 mg (80 mg Intravenous Given 4/25/25 1413)      Medical Decision Making  60-year-old male with schizophrenia, asthma, congestive heart failure, blind and deaf is presenting to the emergency department for respiratory distress.  EMS was called by neighbor who reports the patient was in respiratory distress and on EMS arrival his oxygen saturation on room air with 78%.      History is extremely limited due to the patient's inability to communicate.  Poor air movement bilaterally.  Placed on non-rebreather and breathing treatments ordered, steroids for presumed asthma exacerbation.  Family states that aside from today he has been getting all of the normal medications.  Cardiac workup ordered.        Amount and/or Complexity of Data Reviewed  Independent Historian: caregiver  Labs: ordered. Decision-making details documented in ED Course.  Radiology: ordered and independent interpretation performed. Decision-making details documented in ED Course.    Risk  Prescription drug management.               ED Course as of 04/25/25 1458   Fri Apr 25, 2025   1238 Given inability to communicate with the patient nursing did not feel comfortable attempting p.o. medications.  No family at bedside.  Will give IV antihypertensive. [KL]   1239 WBC(!): 15.72  Significant leukocytosis.  Patient was tachypneic and tachycardic on arrival.  He was afebrile.  Will add on to complete sepsis orders set.  Broad-spectrum antibiotics ordered. [KL]   1240 Hemoglobin(!): 7.0  Chronic stable anemia. [KL]   1402 Troponin I(!): 0.029  Troponin mildly elevated chronically.  Will continue to trend.  No ischemic changes on EKG. [KL]   1404 BP(!): 153/70  Blood pressure improving after medications. [KL]   1404 On re-evaluation patient is more comfortable.  Continues to be afebrile.  De-escalated from non-rebreather.  Diuresis ordered for his elevated BNP. [KL]   1405 Nine days ago patient had a V/Q scan that was intermittent suspicion for PE.  And he was discharged home on Eliquis. [KL]   1421 POC  Lactate: 0.70  Lactate within normal limit [KL]   1442 Discussed case with Hospital Medicine. Plan for admission in the hospital for continued monitoring, evaluation and treatment.  Blood cultures, urinalysis pending at the time of admission. To be followed by inpatient team.  [KL]   1444 SpO2(!): 93 %  Patient is desatted to 93% on room air.  Placed on 2 L nasal cannula. [KL]   1457 This patient does have evidence of infective focus  My overall impression is sepsis.  Source: Respiratory  Antibiotics given- Antibiotics (72h ago, onward)    Start     Stop Route Frequency Ordered    04/25/25 1245  vancomycin 2 g in 0.9% sodium chloride 500 mL IVPB  (ED   Adult Sepsis Treatment)         -- IV Once 04/25/25 1240      Latest lactate reviewed-  @[id number](lactate,poclac)@  Organ dysfunction indicated by Acute heart failure    Fluid challenge Fluid Not Needed - Patient is not hypotensive and/or lactate is less than 4.0.     Post- resuscitation assessment Yes - I attest a sepsis perfusion exam was performed within 6 hours of sepsis, severe sepsis, or septic shock presentation, following fluid resuscitation.      Will Start Pressors- Levophed for MAP of 65  Source control achieved by:  Cefepime and vancomycin   [KL]   1458 Critical care time spent on the evaluation and treatment of severe organ dysfunction, review of pertinent labs and imaging studies, discussions with consulting providers and discussions with patient/family: 55 minutes.   [KL]      ED Course User Index  [KL] Mariajose Cruz MD                           Clinical Impression:  Final diagnoses:  [R06.02] Shortness of breath  [J45.901] Exacerbation of asthma, unspecified asthma severity, unspecified whether persistent (Primary)  [R09.02] Hypoxia          ED Disposition Condition    Admit                     [1]   Social History  Tobacco Use    Smoking status: Never    Smokeless tobacco: Never   Substance Use Topics    Alcohol use: No     Alcohol/week: 0.0  standard drinks of alcohol    Drug use: No        Mariajose Cruz MD  04/25/25 6119

## 2025-04-25 NOTE — ED NOTES
Adonay Sigala (pt's brother) [phone]   SLEEP CLINIC FOLLOW UP VISIT:     Date of visit: May 2, 2018    Purpose of visit: Review results of recently obtained polysomnography    History of present illness: Patient is an 88-year-old male who presents to the sleep clinic today accompanied by his wife to review the results of recently  obtained PSG.    PSG RESULTS:  Sleep Architecture: reduced sleep efficiency due to increased wake after sleep onset. All sleep stages except stage N3 were seen. REM sleep was increased.   The total recording time of the polysomnogram was 505.1 minutes. The total sleep time was 322.5 minutes. Sleep latency was increased at 29.6 minutes without the use of a sleep aid. REM latency was 75.5 minutes. Arousal index was normal at 17.1 arousals per hour. Sleep efficiency was decreased at 63.8% due to increased wake after sleep onset at 144.5 minutes. The patient spent 12.9% of total sleep time in Stage N1, 62.0% in Stage N2, 0.0% in Stage N3, and 25.1% in REM. Time in REM supine was 0 minutes.     Respiration: snoring was reported, but there is no evidence of clinically significant ASHLEY. The obstructive events were pronounced during supine position. Since REM supine was not observed, it is plausible that the overall severity of the sleep apnea may have been underestimated.     Events ? The polysomnogram revealed a presence of 8 obstructive, 0 central, and 2 mixed apneas resulting in an apnea index of 1.9 events per hour. There were 4 obstructive hypopneas and 0 central hypopneas resulting in an obstructive hypopnea index of 0.7 and central hypopnea index of 0 events per hour. The combined apnea/hypopnea index was 2.6 events per hour (central apnea/hypopnea index was 0 events per hour). The REM AHI was 3.0 events per hour. The supine AHI was 6.9 events per hour. The RERA index was 4.1 events per hour. The RDI was 6.7 events per hour.     Snoring - was reported as mild to moderate.     Respiratory rate and pattern - was notable for normal  respiratory rate and pattern.     Sustained Sleep Associated Hypoventilation - Transcutaneous carbon dioxide monitoring was not used, however significant hypoventilation was not suggested by oximetry.     Sleep Associated Hypoxemia - (Greater than 5 minutes O2 sat at or below 88%) was not present. Baseline oxygen saturation was 93.0%. Lowest oxygen saturation was 87.5%. Time spent less than or equal to 88% was 0.2 minutes. Time spent less than or equal to 89% was 0.8 minutes.     Movement Activity: frequent periodic limb movements were seen but they were not associated with arousals.     Periodic Limb Activity - There were 127 PLMs during the entire study. The PLM index was 23.6 movements per hour. The PLM Arousal Index was 0 per hour.     REM EMG Activity - Excessive transient/sustained muscle activity was not present.       Nocturnal Behavior - Abnormal sleep related behaviors were not noted during/arising out of NREM / REM sleep.     Bruxism - None apparent.     Cardiac Summary: Frequent PACs were seen with occasional bradycardia..   The average pulse rate was 55.2 bpm. The minimum pulse rate was 30.9 bpm while the maximum pulse rate was 87.5 bpm. Frequent PACs were seen with occasional bradycardia.       The results were discussed with the patient in detail.  He mentioned that he got lost when he came for the sleep study and was under stress and had reservations about how his sleep study would be.  He had difficulty sleeping during the night of the study, did  not feel comfortable and felt as if he was tied up with all the wires.     Current meds:  Current Outpatient Prescriptions   Medication Sig Dispense Refill     acetaminophen (TYLENOL) 325 MG tablet Take 2 tablets (650 mg) by mouth every 6 hours as needed for pain 100 tablet 0     acetaminophen-codeine (TYLENOL W/CODEINE NO. 3) 300-30 MG per tablet Take 1-2 tablets by mouth every 4 hours as needed for mild pain 30 tablet 1     amLODIPine (NORVASC) 10 MG  tablet TAKE 1 TABLET DAILY 90 tablet 2     aspirin 81 MG EC tablet Take 1 tablet (81 mg) by mouth daily 90 tablet 3     atorvastatin (LIPITOR) 20 MG tablet Take 1 tablet (20 mg) by mouth daily 90 tablet 2     benazepril (LOTENSIN) 40 MG tablet Take 1 tablet (40 mg) by mouth daily 90 tablet 3     bisacodyl (DULCOLAX) 10 MG suppository Place 1 suppository rectally daily as needed. 3 suppository 0     desonide (DESOWEN) 0.05 % ointment Apply topically 2 times daily To affected areas of scale and redness on the face and ears as needed until clear. 60 g 11     docusate sodium 100 MG tablet Take 100 mg by mouth 2 times daily 60 tablet 1     gentamicin (GARAMYCIN) 0.1 % ointment Apply topically 4 times daily   3     Guar Gum (BENEFIBER) packet Take 1 packet by mouth every morning        Magnesium Hydroxide (MILK OF MAGNESIA PO) Take by mouth At Bedtime       melatonin 3 MG tablet Take 5 mg by mouth At Bedtime        metoprolol succinate (TOPROL-XL) 25 MG 24 hr tablet Take 0.5 tablets (12.5 mg) by mouth daily 45 tablet 3     Multiple Vitamins-Minerals (VITEYES AREDS FORMULA/LUTEIN) CAPS Take by mouth daily       nitroglycerin (NITROSTAT) 0.4 MG sublingual tablet Place 1 tablet (0.4 mg) under the tongue every 5 minutes as needed 30 tablet 2     psyllium 0.52 G capsule Take by mouth daily Powder       ranitidine (ZANTAC) 150 MG tablet Take 1 tablet (150 mg) by mouth 2 times daily 180 tablet 3     sildenafil (VIAGRA) 100 MG tablet Take 1 tablet (100 mg) by mouth daily as needed 10 tablet 2     tamsulosin (FLOMAX) 0.4 MG capsule Take 1 capsule (0.4 mg) by mouth daily 90 capsule 3     tolterodine (DETROL) 2 MG tablet Take 1 tablet (2 mg )twice a day 180 tablet 3     triamcinolone (KENALOG) 0.025 % cream Apply topically 2 times daily 45 g 0     triamcinolone (KENALOG) 0.1 % ointment Apply topically 2 times daily To itchy rashes 80 g 3     Past medical history:  Past Medical History:   Diagnosis Date     Anal stricture 7/29/2011      Baker's cyst      Basal cell carcinoma      Chronic pain     left hip     Coronary artery disease 9/5/2012     Esophageal reflux 3/20/2013     Hearing loss      Hypertrophy of prostate without urinary obstruction and other lower urinary tract symptoms (LUTS) 7/21/2011     Idiopathic gynecomastia 7/29/2011     Impotence of organic origin 7/21/2011     Lymphoma (H) 1/25/2012     Macular degeneration, dry      Other and unspecified hyperlipidemia 7/21/2011     Snoring      Spinal stenosis, lumbar region, without neurogenic claudication 7/21/2011     Stented coronary artery 2003     Unspecified essential hypertension 7/21/2011     Patient Active Problem List   Diagnosis     Actinic keratosis     Stenosis of rectum and anus     Hypertrophy of prostate without urinary obstruction     Breast lump     Choroidal detachment     Exudative senile macular degeneration of retina (H)     Conductive hearing loss, tympanic membrane     Nonexudative senile macular degeneration of retina     Essential hypertension     Hemorrhoids     Hypertonicity of bladder     Hyperlipidemia     Spinal stenosis, lumbar region, without neurogenic claudication     Neoplasm of uncertain behavior     Senile nuclear sclerosis     Impotence of organic origin     Osteoarthritis     Hematoma complicating a procedure     Vitreous degeneration     Lens replaced by other means     Other seborrheic keratosis     Senile cataract     Anal stricture     Idiopathic gynecomastia     H. pylori infection     Mechanical complication of prosthetic hip implant (H)     Lymphoma of lymph nodes in abdomen (H)     Coronary artery disease     Esophageal reflux     Sebaceous cyst     Degenerative spondylolisthesis     S/P revision of total hip     GI bleed     Anemia due to blood loss, acute     Demand ischemia of myocardium (H)     Hemorrhage of gastrointestinal tract     Pain in joint, pelvic region and thigh     Sacroiliitis (H)     Abdominal pain, unspecified abdominal  location     Facet arthropathy of spine     Malignant lymphomas of lymph nodes of multiple sites (H)     Benign essential hypertension     Coronary artery disease involving native coronary artery of native heart without angina pectoris     Advance Care Planning     Osteoarthritis of multiple joints, unspecified osteoarthritis type     Hyperlipidemia, unspecified hyperlipidemia type     Other fatigue     B-cell lymphoma, unspecified B-cell lymphoma type, unspecified body region (H)     IPMN (intraductal papillary mucinous neoplasm)     Past surgical history:  Past Surgical History:   Procedure Laterality Date     ARTHROPLASTY HIP  10/2008    left     ARTHROPLASTY HIP  1999    right     ARTHROPLASTY REVISION HIP  1/21/2014    Procedure: ARTHROPLASTY REVISION HIP;  Revision Left Total Hip;  Surgeon: Edgar Grewal MD;  Location: [facility] OR     BACK SURGERY  2006     CARDIAC SURGERY  2001    stent x 5      CARDIAC SURGERY  2002     CATARACT IOL, RT/LT Bilateral      CYSTOSCOPY, TRANSURETHRAL RESECTION (TUR) PROSTATE, COMBINED  11/5/2013    Procedure: COMBINED CYSTOSCOPY, TRANSURETHRAL RESECTION (TUR) PROSTATE;  Transurethral Resection Of Prostate, Bipolar;  Surgeon: Lai Street MD;  Location: [facility] OR     ESOPHAGOSCOPY, GASTROSCOPY, DUODENOSCOPY (EGD), COMBINED  2/3/2014    Procedure: COMBINED ESOPHAGOSCOPY, GASTROSCOPY, DUODENOSCOPY (EGD);;  Surgeon: Ezra Fiore MD;  Location: [facility] GI     ESOPHAGOSCOPY, GASTROSCOPY, DUODENOSCOPY (EGD), COMBINED  4/15/2014    Procedure: COMBINED ESOPHAGOSCOPY, GASTROSCOPY, DUODENOSCOPY (EGD), BIOPSY SINGLE OR MULTIPLE;  Surgeon: Ezra Fiore MD;  Location: [facility] GI     EXTRACAPSULAR CATARACT EXTRATION WITH INTRAOCULAR LENS IMPLANT  2005     EXTRACAPSULAR CATARACT EXTRATION WITH INTRAOCULAR LENS IMPLANT  2010     HERNIA REPAIR       MASTOID SURGERY  1939     MOHS MICROGRAPHIC PROCEDURE       OPTICAL TRACKING SYSTEM FUSION SPINE POSTERIOR LUMBAR TWO LEVELS  7/11/2013     "Procedure: OPTICAL TRACKING SYSTEM FUSION SPINE POSTERIOR LUMBAR TWO LEVELS;  Stealth Assisted Lumbar 3-4 Transforaminal Lumbar Interbody Fusion, Lumbar 2-3 Lumbar 3-4 Laminectomy Decompression;  Surgeon: Edgar Lew MD;  Location: [facility] OR     Allergies:  Allergies   Allergen Reactions     Nka [No Known Allergies]         Physical exam:  /71  Pulse 56  Resp 16  Ht 1.778 m (5' 10\")  Wt 88 kg (194 lb)  SpO2 98%  BMI 27.84 kg/m2  General appearance:  in no apparent distress  Pt is dressed casually, cooperative with good eye contact.   Speech is spontaneous with regular rate and volume.   Mood: euthymic; affect congruent with full range and intensity.   Sensorium: awake, alert and oriented to person, place, time, and situation.      Assessment/Plan:   1. Based on the polysomnography evaluation, snoring was reported, obstructive events were pronounced during supine position, however,  the events did not qualify for the diagnosis of clinically significant ASHLEY.  Recommended maximizing sleep on sides avoiding supine sleep using positional restricting device such as tennis ball T shirt or U shaped body pillow or FDA approved zzoma pillow. (According to his wife snoring is a concern only when he is on his back but not when he sleeps on his sides).  If  the positional therapy doesn't work for him, we discussed the option of oral appliance through referral to dentistry.     2. Frequent periodic limb movements were seen during the sleep study. No pharmacological intervention has been considered since the PLMs  were not associated with arousals and he denied RLS symptoms.      3.  During the polysomnogram, frequent PACs were seen with occasional bradycardia.  He was seen by his cardiologist Dr. Martinez, on December 5, 2017, for bradycardia. He was recommended to continue the beta-blocker and no further workup was indicated due to absence of symptomatic bradycardia. He reports dyspnea on exertion " occasionally which is a change since his visit with cardiologist in December 2017 but,  he denies any chest pain, dyspnea at rest, PND, orthopnea, palpitations, lightheadedness or syncope.   I will communicate with his cardiologist about the EKG findings during the recent PSG and his symptoms of dyspnea on exertion.      4. Insomnia: He denies difficulty falling asleep and also denies trouble falling asleep following the nocturnal awakenings but his main concern is that he will then wake up around 4-5 AM and not be able to fall back asleep. He will start to have anxiety and ruminating thoughts about not being able to fall back asleep. He is able to get back to sleep sometimes eventually, but thinks that he is more in the lighter sleep and not  getting into deeper sleep and  wakes up around 7:30 AM.   We discussed optimizing sleep hygiene measures and the following instructions were provided to him:    Listening to radio in a different room and going to bed when ready to sleep.    If it takes more than 30 minutes to resume sleep after waking up between 4-5 AM, he was instructed to leave the bed room, listen to music or read and return to bed only if sleepy.     Avoid spending excessive time in bed awake.    Limiting the duration of nap to no more than 30 minutes a day.    Reducing caffeine consumption.  Avoiding caffeine within 6 hours before bed.  He was instructed to communicate with me via my chart if he continues to have the insomnia concerns, in which case,  will consider referral to sleep psychologist for cognitive behavioral therapy and he agreed with the plan.    Patient was counseled on  avoiding driving/operating heavy machinery, if drowsy or sleepy and to pull over if drowsy.    We discussed weight management.    Follow-up at the sleep clinic on an as-needed basis.    The above note was dictated using voice recognition software. Although reviewed after completion, some word and grammatical error may remain  ". Please contact the author for any clarifications.    \"I spent a total of  25 minutes face to face with Daniel Mcnair during today's office visit. Over 50% of this time was spent counseling the patient and  coordinating care regarding snoring, positional therapy during sleep, abnormal EKG findings on PSG, insomnia, stimulus control measures\"       Betzaida Vasquez MD   of Medicine,  Division of Pulmonary/Sleep Medicine  [facility].        "

## 2025-04-25 NOTE — DISCHARGE SUMMARY
[facility] [address] Medicine  Discharge Summary      Patient Name: Frantz Sigala Jr.  MRN: [id number]  [place]: [id number]  Patient Class: IP- Inpatient  Admission Date: 4/8/2025  Hospital Length of Stay: 15 days  Discharge Date and Time: 4/23/2025  6:08 PM  Attending Physician: No att. providers found   Discharging Provider: DAVID Quesada MD  Primary Care Provider: Zachary Ramos MD    Primary Care Team: Networked reference to record PCT     HPI:   Mr. Sigala is a 60 YOM with PMHx of HTN, asthma, CKD (baseline SCr 2-2.5), schizophrenia, blindness, and deafness.     He presents to ED via EMS with family due to syncopal event just PTA. Family provides history due patients deafness and blindness. They note that he was in his normal state of health today until right after lunch. He ate a salami sandwich with potato chips and drank orange juice; shortly thereafter he was in the bathroom when his mother heard a loud thud and patient was found by his brother on the ground unconscious.  His brother notes that he called out the patients name and slapped him on his back a few times at which time he seemed to come to and began vomiting with concurrent episodes of diarrhea.  Sister notes she has had a recent respiratory illness with abdominal cramping and some diarrhea. Family notes that patient has lived in the home he is in since a small child and that he is able to navigate the home independently including going upstairs and outside.     In the ED he is hypertensive, heart rate stable, saturations initially 80% on room air with improvement after 2 L nasal cannula application, and afebrile.  CBC with WBC 21, H&H 9.4/29.4, platelets 497.  Chemistry was , K 4.4, chloride 100, CO2 22, BUN 49, SCr 2.2, glucose 125.  LFTs unremarkable.  BNP 35.  Troponin 0.035 >> 0.059 (appear similar to priors however these were in 2015).  Lactic acid 2.2.  Flu and COVID negative.  Hepatitis-C and HIV  nonreactive.  UA noninfectious.  CXR with chronic appearing interstitial findings, congestive change or edema is a consideration; no large focal consolidation. CTH with no acute intracranial abnormality detected, bilateral partial opacification of the mastoid air cells, sphenoid sinus disease.     CT A/P:  Wall thickening of the transverse colon, suggestive of nonspecific colitis.   Wall thickening of the urinary bladder.  The findings may be seen with cystitis.   Advanced calcifications of the abdominal aorta and the mesenteric arteries.  Correlation with possible vascular ultrasound of the mesenteric vessels may be obtained for further assessment. 1.2 cm hypodensity in the upper pole of the right kidney with Hounsfield of 23.  This can be assessed with renal ultrasound.     The patient was admitted to the Hospital Medicine Service for further evaluation and management.     * No surgery found *      Hospital Course:   Admitted with syncope and fall suspected secondary to vasovagal episode due to underlying colitis. Started ciprofloxacin, metronidazole and stool studies ordered though had no further loose stool to collect. Echo showed normal EF and grade I diastolic dysfunction, carotid U/S unremarkable. Complained of increased LLE pain and XR obtained demonstrating proximal fibular fracture. Orthopedics consulted/ in discussion with Dr. Moreira recommended weight bearing as tolerated, no splint/boot required and follow-up in clinic. Developed urinary retention and increasing tachycardia; after tamsulosin resumed and straight catheterization, had persistent retention and mackay placed. Urology consulted and recommended maintaining further prior to void trial. Had new onset fevers and converted to cefepime. Hypertension and tachycardia persisted; pain control adjusted and use liberalized, but persisted. ID consulted with uptrending WBCs and evaluation for thrombosis initiated. All antibiotics stopped and patient  anticoagulated for probable PE with heparin gtt. Later transitioned to apixaban. Therapy services consulted and recommended moderate intensity but no accepting facility could be found. Antihypertensives were adjusted with hyponatremia for improved pressure control. With clinical improvement and vital stability, he was prepared for discharge home with home health.    Goals of Care Treatment Preferences:  Code Status: Full Code      SDOH Screening:  The patient was screened for utility difficulties, food insecurity, transport difficulties, housing insecurity, and interpersonal safety and there were no concerns identified this admission.     Consults:   Consults (From admission, onward)          Status Ordering Provider     Inpatient consult to Infectious Diseases  Once        Provider:  Denae Koo MD    Completed LALO SELLERS     Inpatient consult to Urology  Once        Provider:  Adonay Tineo MD    Completed LALO SELLERS     Inpatient consult to Gastroenterology  Once        Provider:  Alfonso Reid MD    Completed NATE PERSON            Assessment & Plan  Acute pulmonary embolism  Fever  Leukocytosis  - Was on ciprofloxacin, metronidazole PO to complete course when he developed new fever 04/11-04/12.  - Continued cefepime. UA with >100RBCs, 5 WBCs, rare bacteria; likely related to mackay placement. CXR appears benign; LLL zone not well evaluated due to heart silhouette but appears stable.  - Tachycardia sinus tach; no evidence of arrhythmia on telemetry. Infectious etiology should be covered - pain related? Increased pain control with hydrocodone-acetaminophen 5-325mg, 10-325mg PO q4hr PRN.  - Broadened workup - has been mobilizing with family in the room, but does have potential for thrombosis with fracture. D dimer elevated with fall / infection and with CKD would prefer to avoid CT angiography. Checked V/Q scan with intermediate probability, U/S BLE negative for DVT but given  symptoms and intermediate probability, anticoagulated with heparin gtt on 4/14  - ID consult appreciated, stopped all antibiotics on 4/16   - Remains AF and WBC remaining normal, monitor  - Transitioned to apixaban from heparin on 4/17  - Continue bowel regimen with Miralax and dulcolax. No BM earlier today and more distended, check KUB.  - Clinically improving and working with therapy, plan for moderate intensity as feasible though difficulty with placement options to date.  Syncope  - Appears was vasovagal 2/2 colitis / volume losses and BMs with the former.  - Troponin stable. No evidence of acute ischemic event. No recurrence of syncope.  - Echo with normal EF, grade I diastolic dysfunction likely related to HTN. Carotid U/S unremarkable.  - Monitor on telemetry.  Essential hypertension  - Chronic, with persistent elevation.  - Continue lisinopril 40mg PO daily, carvedilol at 25mg PO BID. Add nifedipine 60mg PO daily.  - Stopped furosemide 40mg PO daily, HCTZ 25mg PO daily due to low Na levels  - Continue labetalol 10mg IV q4hr PRN.  CKD (chronic kidney disease), stage III  - Chronic; stable at baseline.  - Avoid nephrotoxins, renally dose medications.  Asthma  - Chronic; stable.  - Continue albuterol-ipratropium 2.5-0.5mg inhaled q4hr PRN.  Schizophrenia  - Chronic; stable. Continue risperidone 1mg PO qHS.  Blind  Deaf, bilateral  - Chronic; fall precautions.  Left fibular fracture  - XR demonstrating L fibular fracture; follow-up XR of tib/fib to evaluate for further areas of potential fracture unremarkable. Reported R-sided knee discomfort in addition with XR pending final read but no fracture visible to my evaluation.  - Discussed with orthopedics, Dr. Moreira; weight bearing as tolerated, no splint/casting required, follow-up in clinic  - PT and OT evaluations recommending for moderate intensity  Urinary retention  - Had persistently elevated volumes on bladder scan despite straight catheterization. Clark  "placed and resumed tamsulosin 0.4mg PO daily.   - Successful voiding trial completed on 4/15  Final Active Diagnoses:    Diagnosis Date Noted POA    PRINCIPAL PROBLEM:  Acute pulmonary embolism [I26.99] 04/18/2025 Yes    Leukocytosis [D72.829] 04/16/2025 Yes    Fever [R50.9] 04/12/2025 Yes    Urinary retention [R33.9] 04/11/2025 No    Left fibular fracture [S82.402A] 04/10/2025 Yes    Syncope [R55] 04/08/2025 Yes    CKD (chronic kidney disease), stage III [N18.30] 05/09/2019 Yes     Chronic    Essential hypertension [I10] 05/09/2019 Yes     Chronic    Asthma [J45.909] 10/12/2015 Yes     Chronic    Schizophrenia [F20.9] 09/02/2014 Yes     Chronic    Deaf, bilateral [H91.93] 09/02/2014 Yes     Chronic    Blind [H54.7] 09/02/2014 Yes     Chronic      Problems Resolved During this Admission:    Diagnosis Date Noted Date Resolved POA    Colitis [K52.9] 04/08/2025 04/21/2025 Yes    Elevated troponin [R79.89] 10/12/2015 04/10/2025 Yes       Discharged Condition: good    Disposition: Home-Health Care [facility]    Follow Up:   Follow-up Information       Zachary Ramos MD Follow up in 2 week(s).    Specialty: Family Medicine  Why: post-hospital follow-up  Contact information:  [address] [id number] [phone]               [facility] Follow up.    Specialties: Home Health Services, Home Therapy Services, Home Living Aide Services  Contact information:  [address] [facility] [address]  [phone]                         Patient Instructions:      WALKER FOR HOME USE     Order Specific Question Answer Comments   Type of Walker: Adult (5'4"-6'6")    With wheels? Yes    Height: 5' 6" (1.676 m)    Weight: 91.6 kg (201 lb 15.1 oz)    Length of need (1-99 months): 99    Does patient have medical equipment at home? none    Please check all that apply: Patient's condition impairs ambulation.    Please check all that apply: Patient needs help to get in and out of chair.  " "  Please check all that apply: Walker will be used for gait training.    Please check all that apply: Patient is unable to safely ambulate without equipment.      COMMODE FOR HOME USE     Order Specific Question Answer Comments   Type: Standard    Height: 5' 6" (1.676 m)    Weight: 91.6 kg (201 lb 15.1 oz)    Does patient have medical equipment at home? none    Length of need (1-99 months): 99      WHEELCHAIR FOR HOME USE     Order Specific Question Answer Comments   Hours in W/C per day: 12    Type of Wheelchair: Standard    Size(Width): 18"(STD adult)    Leg Support: STD footrests    Lap Belt: Velcro    Accessories: Anti-tippers    Cushion: Basic    Reclining Back No    Height: 5' 6" (1.676 m)    Weight: 91.6 kg (201 lb 15.1 oz)    Does patient have medical equipment at home? none    Length of need (1-99 months): 99    Please check all that apply: Caregiver is capable and willing to operate wheelchair safely.    Please check all that apply: Patient's upper body strength is sufficient for propulsion.    Please check all that apply: The patient has significant edema of the lower extremities that requires an elevating leg rest.    Please check all that apply: The patient requires the use of a w/c for activities of daily living within the Home.    Please check all that apply: Patient mobility limitations cannot be sufficiently resolved by the use of other ambulatory therapies.      HOSPITAL BED FOR HOME USE     Order Specific Question Answer Comments   Type: Semi-electric    Length of need (1-99 months): 99    Does patient have medical equipment at home? none    Height: 5' 6" (1.676 m)    Weight: 91.6 kg (201 lb 15.1 oz)    Please check all that apply: Patient requires positioning of the body in ways not feasible in an ordinary bed due to a medical condition which is expected to last at least one month.    Please check all that apply: Patient requires, for the alleviation of pain, positioning of the body in ways not " feasible in an ordinary bed.    Please check all that apply: Patient requires the head of bed to be elevated more than 30 degrees most of the time due to congestive heart failure, chronic pulmonary disease, or aspiration.  Pillows and wedges have been considered and ruled out.    Please check all that apply: Patient requires a bed height different than a fixed height hospital bed to permit transfers to chair, wheelchair, or standing.    Please check all that apply: Patient requires frequent changes in body position and/or has an immediate need for a change in body position.      Ambulatory referral/consult to Orthopedics   Standing Status: Future   Referral Priority: Routine Referral Type: Consultation   Requested Specialty: Orthopedic Surgery   Number of Visits Requested: 1     Ambulatory referral/consult to Outpatient Case Management   Referral Priority: Routine Referral Type: Consultation   Referral Reason: Specialty Services Required   Number of Visits Requested: 1     Diet Adult Regular     Notify your health care provider if you experience any of the following:  persistent nausea and vomiting or diarrhea     Notify your health care provider if you experience any of the following:  severe uncontrolled pain     Notify your health care provider if you experience any of the following:  temperature >100.4     Notify your health care provider if you experience any of the following:  increased confusion or weakness     Notify your health care provider if you experience any of the following:  persistent dizziness, light-headedness, or visual disturbances     Activity as tolerated       Significant Diagnostic Studies:   CBC:  Recent Labs   Lab 04/22/25  0526 04/23/25  0526 04/25/25  1212   WBC 9.99 6.82 15.72*   HGB 7.0* 7.5* 7.0*   HCT 22.8* 24.3* 22.6*   * 691* 785*   LYMPH 1.10  11.0* 0.90*  13.2* 0.73*  4.6*   MONO 6.8  0.68 6.2  0.42 3.7*  0.58   EOS 3.0  0.30 3.2  0.22 0.1  0.02     BMP:  Recent  Labs   Lab 04/21/25  0453 04/22/25  0526 04/23/25  0526   * 133* 130*   K 5.4* 5.7* 5.4*    101 97   CO2 23 23 23   BUN 68* 68* 63*   CREATININE 2.4* 2.3* 2.2*   CALCIUM 9.2 9.0 9.2   MG 2.1 2.0 2.0   PHOS 4.2 4.6* 5.4*     CMP:  Recent Labs   Lab 04/08/25  1505 04/09/25  0533 04/10/25  0843 04/21/25  0453 04/22/25  0526 04/23/25  0526    139   < > 132* 133* 130*   K 4.4 4.2   < > 5.4* 5.7* 5.4*    101   < > 100 101 97   CO2 22* 27   < > 23 23 23   BUN 49* 42*   < > 68* 68* 63*   CREATININE 2.2* 2.2*   < > 2.4* 2.3* 2.2*   CALCIUM 9.0 9.0   < > 9.2 9.0 9.2   MG  --  1.6   < > 2.1 2.0 2.0   PHOS  --   --    < > 4.2 4.6* 5.4*   ALKPHOS 105 95  --   --   --   --    AST 19 11  --   --   --   --    ALT 10 10  --   --   --   --    BILITOT 0.2 0.3  --   --   --   --    ALBUMIN 2.9* 2.7*  --   --   --   --    ANIONGAP 14 11   < > 9 9 10    < > = values in this interval not displayed.     Imaging Results              US Carotid Bilateral (Final result)  Result time 04/08/25 22:35:39      Final result by Kuldip Griffin MD (04/08/25 22:35:39)                   Impression:      No evidence of a hemodynamically significant carotid bifurcation stenosis.      Electronically signed by: Kuldip Griffin MD  Date:    04/08/2025  Time:    22:35               Narrative:    EXAMINATION:  US CAROTID BILATERAL    CLINICAL HISTORY:  Syncope;    TECHNIQUE:  Grayscale and color Doppler ultrasound examination of the carotid and vertebral artery systems bilaterally.  Stenosis estimates are per the NASCET measurement criteria.    COMPARISON:  None.    FINDINGS:  Right:    Internal Carotid Artery (ICA) peak systolic velocity 115 cm/sec    ICA/CCA peak systolic velocity ratio: 1.4    Plaque formation: No significant    Vertebral artery: Antegrade flow and normal waveform.    Left:    Internal Carotid Artery (ICA)  peak systolic velocity 102 cm/sec    ICA/CCA peak systolic velocity ratio: 1.3    Plaque formation: No  significant    Vertebral artery: Antegrade flow and normal waveform.                                       US Retroperitoneal Complete (Final result)  Result time 04/08/25 22:40:57      Final result by Gloria Rosa MD (04/08/25 22:40:57)                   Impression:      Elevated bilateral renal indices, which may suggest medical renal disease.    Findings may suggest 9 x 9 x 9 mm bilateral subcentimeter renal cysts.  Consider interval follow-up, given their subcentimeter size.      Electronically signed by: Gloria Rosa  Date:    04/08/2025  Time:    22:40               Narrative:    EXAMINATION:  ULTRASOUND RETROPERITONEAL COMPLETE    CLINICAL HISTORY:  hypodensity in the upper pole of the right kidney;    TECHNIQUE:  Real-time ultrasound of the retroperitoneum was performed, including the urinary bladder and kidneys.    COMPARISON:  CT abdomen pelvis 04/08/2025 reportedly hypodensity in the right kidney    FINDINGS:  Right kidney measures 8.0 cm.  The resistive index is 8.0 cm.  9 x 9 x 9 mm anechoic exophytic lesion arising from the upper to mid pole kidney.  No hydronephrosis is seen.    Left kidney measures 9.2.  The resistive index is 9.2 cm.  9 x 9 x 9 mm anechoic exophytic lesion arising from the lower pole of the left kidney.  No hydronephrosis is seen.    The bladder is within normal limits.                                        CT Abdomen Pelvis With IV Contrast NO Oral Contrast (Final result)  Result time 04/08/25 17:50:11      Final result by Kuldip Griffin MD (04/08/25 17:50:11)                   Impression:      Wall thickening of the transverse colon, suggestive of nonspecific colitis.    Wall thickening of the urinary bladder.  The findings may be seen with cystitis.    Advanced calcifications of the abdominal aorta and the mesenteric arteries.  Correlation with possible vascular ultrasound of the mesenteric vessels may be obtained for further assessment.    1.2 cm hypodensity in the  upper pole of the right kidney with Hounsfield of 23.  This can be assessed with renal ultrasound.    Additional findings as above.    This report was flagged in Epic as abnormal.      Electronically signed by: Kuldip Griffin MD  Date:    04/08/2025  Time:    17:50               Narrative:    EXAMINATION:  CT ABDOMEN PELVIS WITH IV CONTRAST    CLINICAL HISTORY:  Nausea/vomiting;    TECHNIQUE:  Low dose axial images, sagittal and coronal reformations were obtained from the lung bases to the pubic symphysis following the IV administration of 75 mL of Omnipaque 350 .  Oral contrast was not given.    COMPARISON:  X-ray dated 11/27/2011.    FINDINGS:  There are no pleural effusions.  There is no evidence of a pneumothorax.  No airspace opacity is present.  No pulmonary nodules identified.    The heart is unremarkable.  There is normal tapering of the abdominal aorta.  There are extensive calcifications along the course of the abdominal aorta and its branch vessels.  The signal calcifications at the ostia of the SMA.  There also calcifications identified along the course of the SMA.  No occlusion is identified.  The portal veins and mesenteric veins are within normal limits.  IVC and the remainder of the venous structures are within normal limits    There is no evidence of lymphadenopathy in the abdomen or pelvis.    The esophagus is unremarkable.  The stomach is within normal limits.  The duodenum is unremarkable.  The small bowel loops are unremarkable.  The appendix is within normal limits.  There is wall thickening of the transverse colon..  There is colonic diverticula without evidence of acute diverticulitis.  There is no CT evidence of bowel obstruction.    The liver is unremarkable.  The gallbladder is within normal limits.  The biliary tree is unremarkable.  The spleen is within normal limits.  The pancreas is unremarkable.  The adrenal glands are within normal limits.    There is a 1.2 cm hypodensity in the upper  pole of the right kidney with Hounsfield of 23.  There are additional hypodensities in both kidneys.  The ureters are unremarkable.  There is circumferential wall thickening of the urinary bladder.  There is a small diverticulum along the dome of the urinary bladder.  The prostate gland is within normal limits.    There is no evidence of free fluid in the abdomen or pelvis.  There is no evidence of free air.  There is no evidence of pneumatosis.  No portal venous air is identified.    The psoas margins are unremarkable.  There is an umbilical hernia containing omental fat.  The remainder the abdominal wall is unremarkable.    There are degenerative changes in the osseous structures. There is no evidence of a fracture.                                       CT Head Without Contrast (Final result)  Result time 04/08/25 17:44:57      Final result by Gloria Rosa MD (04/08/25 17:44:57)                   Impression:      No acute intracranial abnormality detected.    Bilateral partial opacification of the mastoid air cells.    Sphenoid sinus disease.      Electronically signed by: Gloria Rosa  Date:    04/08/2025  Time:    17:44               Narrative:    EXAMINATION:  CT OF THE HEAD WITHOUT    CLINICAL HISTORY:  Head trauma, repeat vomiting (Age 19-64y);Mental status change, unknown cause;    TECHNIQUE:  5 mm unenhanced axial images were obtained from the skull base to the vertex.    COMPARISON:  11/04/2010    FINDINGS:  The ventricles, basal cisterns, and cortical sulci are within normal limits for patient's stated age.  Moderate chronic small vessel ischemic changes are present.  There is no acute intracranial hemorrhage, territorial infarct or mass effect, or midline shift.  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  In the visualized paranasal sinuses, there is bilateral maxillary sinus mucoperiosteal thickening left greater than right.  There is partial opacification of bilateral mastoid air cells.  Dental  periapical lucencies are visualized.                                       X-Ray Chest 1 View (Final result)  Result time 04/08/25 16:10:32      Final result by Librado Brar MD (04/08/25 16:10:32)                   Impression:      1. Chronic appearing interstitial findings, congestive change or edema is a consideration.  No large focal consolidation.      Electronically signed by: Librado Brar MD  Date:    04/08/2025  Time:    16:10               Narrative:    EXAMINATION:  XR CHEST 1 VIEW    CLINICAL HISTORY:  Chest Pain;    TECHNIQUE:  Single frontal view of the chest was performed.    COMPARISON:  10/12/2015    FINDINGS:  The cardiomediastinal silhouette is prominent, similar to the previous exam noting calcification of the aorta.  Patient is rotated..  There is stable prominence of the right paratracheal stripe.  There is no pleural effusion.  The trachea is midline.  The lungs are symmetrically expanded bilaterally with coarse central hilar interstitial attenuation.  No large focal consolidation seen.  There is no pneumothorax.  The osseous structures are remarkable for degenerative change..                                      Pending Diagnostic Studies:       None           Medications:  Reconciled Home Medications:      Medication List        START taking these medications      apixaban 5 mg Tab  Commonly known as: ELIQUIS  Take 2 tablets (10 mg total) by mouth 2 (two) times daily for 1 day, THEN 1 tablet (5 mg total) 2 (two) times daily.  Start taking on: April 23, 2025     carvediloL 25 MG tablet  Commonly known as: COREG  Take 1 tablet (25 mg total) by mouth 2 (two) times daily with meals.     ciprofloxacin HCl 500 MG tablet  Commonly known as: CIPRO  Take 1 tablet (500 mg total) by mouth every 12 (twelve) hours.     HYDROcodone-acetaminophen 5-325 mg per tablet  Commonly known as: NORCO  Take 1 tablet by mouth every 6 (six) hours as needed for Pain.     lisinopriL 40 MG tablet  Commonly known  as: PRINIVIL,ZESTRIL  Take 1 tablet (40 mg total) by mouth once daily.     NIFEdipine 30 MG (OSM) 24 hr tablet  Commonly known as: PROCARDIA-XL  Take 1 tablet (30 mg total) by mouth once daily.     ondansetron 4 MG Tbdl  Commonly known as: ZOFRAN-ODT  Take 1 tablet (4 mg total) by mouth every 6 (six) hours as needed (nausea/vomiting).     polyethylene glycol 17 gram Pwpk  Commonly known as: GLYCOLAX  Take 17 g by mouth 3 (three) times daily as needed for Constipation.     tamsulosin 0.4 mg Cap  Commonly known as: FLOMAX  Take 1 capsule (0.4 mg total) by mouth once daily.            CONTINUE taking these medications      albuterol 90 mcg/actuation inhaler  Commonly known as: PROVENTIL/VENTOLIN HFA  Inhale 2 puffs into the lungs every 6 (six) hours as needed for Wheezing. Rescue     fluticasone propionate 50 mcg/actuation nasal spray  Commonly known as: FLONASE  1 spray (50 mcg total) by Each Nostril route once daily.     risperiDONE 1 MG tablet  Commonly known as: RISPERDAL  Take 1 tablet (1 mg total) by mouth once daily.            STOP taking these medications      furosemide 40 MG tablet  Commonly known as: LASIX     lisinopriL-hydrochlorothiazide 20-12.5 mg per tablet  Commonly known as: PRINZIDABDIAZIZZESTORETIC              Indwelling Lines/Drains at time of discharge:   Lines/Drains/Airways       None                   Time spent on the discharge of patient: 35 minutes         DAVID Quesada MD  Department of Hospital Medicine  [facility] ([address])

## 2025-04-25 NOTE — ED NOTES
URINE COLLECTION PENDING: Pt haven't voided yet, male external urinary catheter in place, Sterile specimen container at bedside, Will continue to monitor.

## 2025-04-25 NOTE — ASSESSMENT & PLAN NOTE
Creatine stable for now. BMP reviewed- noted Estimated Creatinine Clearance: 43.2 mL/min (A) (based on SCr of 2 mg/dL (H)). according to latest data. Based on current GFR, CKD stage is .  Monitor UOP and serial BMP and adjust therapy as needed. Renally dose meds. Avoid nephrotoxic medications and procedures.

## 2025-04-26 LAB
ABSOLUTE EOSINOPHIL (OHS): 0 K/UL
ABSOLUTE MONOCYTE (OHS): 0.13 K/UL (ref 0.3–1)
ABSOLUTE NEUTROPHIL COUNT (OHS): 4.77 K/UL (ref 1.8–7.7)
ANION GAP (OHS): 12 MMOL/L (ref 8–16)
BASOPHILS # BLD AUTO: 0 K/UL
BASOPHILS NFR BLD AUTO: 0 %
BUN SERPL-MCNC: 52 MG/DL (ref 6–20)
CALCIUM SERPL-MCNC: 9 MG/DL (ref 8.7–10.5)
CHLORIDE SERPL-SCNC: 101 MMOL/L (ref 95–110)
CO2 SERPL-SCNC: 24 MMOL/L (ref 23–29)
CREAT SERPL-MCNC: 2 MG/DL (ref 0.5–1.4)
ERYTHROCYTE [DISTWIDTH] IN BLOOD BY AUTOMATED COUNT: 19.4 % (ref 11.5–14.5)
GFR SERPLBLD CREATININE-BSD FMLA CKD-EPI: 38 ML/MIN/1.73/M2
GLUCOSE SERPL-MCNC: 93 MG/DL (ref 70–110)
HCT VFR BLD AUTO: 23.2 % (ref 40–54)
HGB BLD-MCNC: 7.2 GM/DL (ref 14–18)
IMM GRANULOCYTES # BLD AUTO: 0.04 K/UL (ref 0–0.04)
IMM GRANULOCYTES NFR BLD AUTO: 0.7 % (ref 0–0.5)
LYMPHOCYTES # BLD AUTO: 0.43 K/UL (ref 1–4.8)
MAGNESIUM SERPL-MCNC: 1.9 MG/DL (ref 1.6–2.6)
MCH RBC QN AUTO: 25.3 PG (ref 27–31)
MCHC RBC AUTO-ENTMCNC: 31 G/DL (ref 32–36)
MCV RBC AUTO: 81 FL (ref 82–98)
NUCLEATED RBC (/100WBC) (OHS): 0 /100 WBC
PHOSPHATE SERPL-MCNC: 4.9 MG/DL (ref 2.7–4.5)
PLATELET # BLD AUTO: 662 K/UL (ref 150–450)
PMV BLD AUTO: 8.6 FL (ref 9.2–12.9)
POTASSIUM SERPL-SCNC: 5.3 MMOL/L (ref 3.5–5.1)
RBC # BLD AUTO: 2.85 M/UL (ref 4.6–6.2)
RELATIVE EOSINOPHIL (OHS): 0 %
RELATIVE LYMPHOCYTE (OHS): 8 % (ref 18–48)
RELATIVE MONOCYTE (OHS): 2.4 % (ref 4–15)
RELATIVE NEUTROPHIL (OHS): 88.9 % (ref 38–73)
SODIUM SERPL-SCNC: 137 MMOL/L (ref 136–145)
WBC # BLD AUTO: 5.37 K/UL (ref 3.9–12.7)

## 2025-04-26 PROCEDURE — 83735 ASSAY OF MAGNESIUM: CPT | Performed by: INTERNAL MEDICINE

## 2025-04-26 PROCEDURE — 25000242 PHARM REV CODE 250 ALT 637 W/ HCPCS: Performed by: INTERNAL MEDICINE

## 2025-04-26 PROCEDURE — 94640 AIRWAY INHALATION TREATMENT: CPT

## 2025-04-26 PROCEDURE — 97166 OT EVAL MOD COMPLEX 45 MIN: CPT

## 2025-04-26 PROCEDURE — 97535 SELF CARE MNGMENT TRAINING: CPT

## 2025-04-26 PROCEDURE — 21400001 HC TELEMETRY ROOM

## 2025-04-26 PROCEDURE — 63600175 PHARM REV CODE 636 W HCPCS: Performed by: INTERNAL MEDICINE

## 2025-04-26 PROCEDURE — 85025 COMPLETE CBC W/AUTO DIFF WBC: CPT | Performed by: INTERNAL MEDICINE

## 2025-04-26 PROCEDURE — 97530 THERAPEUTIC ACTIVITIES: CPT

## 2025-04-26 PROCEDURE — 25000003 PHARM REV CODE 250: Performed by: INTERNAL MEDICINE

## 2025-04-26 PROCEDURE — 36415 COLL VENOUS BLD VENIPUNCTURE: CPT | Performed by: INTERNAL MEDICINE

## 2025-04-26 PROCEDURE — 84100 ASSAY OF PHOSPHORUS: CPT | Performed by: INTERNAL MEDICINE

## 2025-04-26 PROCEDURE — 80048 BASIC METABOLIC PNL TOTAL CA: CPT | Performed by: INTERNAL MEDICINE

## 2025-04-26 PROCEDURE — 97161 PT EVAL LOW COMPLEX 20 MIN: CPT

## 2025-04-26 PROCEDURE — 94761 N-INVAS EAR/PLS OXIMETRY MLT: CPT

## 2025-04-26 RX ADMIN — CARVEDILOL 25 MG: 12.5 TABLET, FILM COATED ORAL at 05:04

## 2025-04-26 RX ADMIN — RISPERIDONE 1 MG: 1 TABLET, FILM COATED ORAL at 08:04

## 2025-04-26 RX ADMIN — TAMSULOSIN HYDROCHLORIDE 0.4 MG: 0.4 CAPSULE ORAL at 08:04

## 2025-04-26 RX ADMIN — NIFEDIPINE 60 MG: 30 TABLET, FILM COATED, EXTENDED RELEASE ORAL at 08:04

## 2025-04-26 RX ADMIN — IPRATROPIUM BROMIDE AND ALBUTEROL SULFATE 3 ML: 2.5; .5 SOLUTION RESPIRATORY (INHALATION) at 11:04

## 2025-04-26 RX ADMIN — IPRATROPIUM BROMIDE AND ALBUTEROL SULFATE 3 ML: 2.5; .5 SOLUTION RESPIRATORY (INHALATION) at 08:04

## 2025-04-26 RX ADMIN — LISINOPRIL 40 MG: 20 TABLET ORAL at 08:04

## 2025-04-26 RX ADMIN — CARVEDILOL 25 MG: 12.5 TABLET, FILM COATED ORAL at 08:04

## 2025-04-26 RX ADMIN — FUROSEMIDE 80 MG: 10 INJECTION, SOLUTION INTRAVENOUS at 08:04

## 2025-04-26 RX ADMIN — FUROSEMIDE 80 MG: 10 INJECTION, SOLUTION INTRAVENOUS at 05:04

## 2025-04-26 RX ADMIN — IPRATROPIUM BROMIDE AND ALBUTEROL SULFATE 3 ML: 2.5; .5 SOLUTION RESPIRATORY (INHALATION) at 03:04

## 2025-04-26 RX ADMIN — IPRATROPIUM BROMIDE AND ALBUTEROL SULFATE 3 ML: 2.5; .5 SOLUTION RESPIRATORY (INHALATION) at 07:04

## 2025-04-26 RX ADMIN — APIXABAN 5 MG: 2.5 TABLET, FILM COATED ORAL at 08:04

## 2025-04-26 RX ADMIN — SODIUM ZIRCONIUM CYCLOSILICATE 10 G: 10 POWDER, FOR SUSPENSION ORAL at 08:04

## 2025-04-26 RX ADMIN — PREDNISONE 40 MG: 20 TABLET ORAL at 08:04

## 2025-04-26 NOTE — PROGRESS NOTES
[facility] ([address] Medicine  Progress Note    Patient Name: Frantz Sigala Jr.  MRN: [id number]  Patient Class: IP- Inpatient   Admission Date: 4/25/2025  Length of Stay: 1 days  Attending Physician: LALO Quesada MD  Primary Care Provider: Zachary Ramos MD        Subjective     Principal Problem:Asthma with acute exacerbation        HPI:  Mr. Sigala is a 60/M with PMH deafness, blindness, schizophrenia, CKDIII, chronic diastolic heart failure (Echo 04/08/25 EF 60-65%, grade I diastolic dysfunction), asthma who presented to [facility] 04/25 with worsening dyspnea and decreased PO intake for the past two days. History obtained from the patient's family members as he is unable to communicate. He was admitted from 04/08-04/23 with syncope and colitis initially. During his hospitalization he had intermittent fevers, tachycardia and hypertension, had V/Q scan demonstrating intermediate probability for PE and was started on anticoagulation, and had hyponatremia prompting discontinuation of HCTZ and furosemide. Afterward, family noted he had decrease in appetite, increasing leg swelling, SOB and wheezing. He did not have fevers at home, but did have diaphoresis. Additionally he had loose stool in the setting of increased stool softener use after discharge. A family friend came to visit 04/25 and used a pulse oximeter while he felt SOB and found his oxygen level to be 77%. EMS was contacted and he received nebulization and was brought to ED for further evaluation. ED workup was notable for leukocytosis with left shift, CXR with increased bilateral infiltrates concerning for pulmonary edema, elevated BNP to 600s, increased WOB and wheezing. He received methylprednisolone 125mg IV x1, albuterol nebulization, furosemide 80mg IV and hospital medicine was contacted for admission.    Overview/Hospital Course:  No notes on file    Interval History: No acute events overnight. Diuresing well  and respiratory status improving - ate some last night. Discussed with family at bedside/via phone. No new concerns at this time.    Review of Systems   Unable to perform ROS: Patient nonverbal     Objective:     Vital Signs (Most Recent):  Temp: 97.8 °F (36.6 °C) (04/26/25 0812)  Pulse: 66 (04/26/25 1059)  Resp: 12 (04/26/25 0812)  BP: (!) 160/63 (04/26/25 0812)  SpO2: 96 % (04/26/25 1004) Vital Signs (24h Range):  Temp:  [97.8 °F (36.6 °C)-98.5 °F (36.9 °C)] 97.8 °F (36.6 °C)  Pulse:  [] 66  Resp:  [11-22] 12  SpO2:  [93 %-100 %] 96 %  BP: (150-210)/(63-97) 160/63     Weight: 95.3 kg (210 lb)  Body mass index is 32.89 kg/m².    Intake/Output Summary (Last 24 hours) at 4/26/2025 1111  Last data filed at 4/26/2025 0800  Gross per 24 hour   Intake 500 ml   Output 1250 ml   Net -750 ml         Physical Exam  Vitals and nursing note reviewed.   Constitutional:       General: He is not in acute distress.     Appearance: He is well-developed.   HENT:      Head: Normocephalic and atraumatic.   Eyes:      General:         Right eye: No discharge.         Left eye: No discharge.   Cardiovascular:      Rate and Rhythm: Normal rate.      Pulses: Normal pulses.   Pulmonary:      Comments: Improving bilateral expiratory wheezes and diminished bases.  Abdominal:      Palpations: Abdomen is soft.      Tenderness: There is no abdominal tenderness.   Musculoskeletal:         General: Normal range of motion.      Right lower leg: Edema present.      Left lower leg: Edema present.   Skin:     General: Skin is warm and dry.   Neurological:      Mental Status: He is alert. Mental status is at baseline.           Significant Labs:   CBC:  Recent Labs   Lab 04/25/25  1212 04/26/25  0641   WBC 15.72* 5.37   HGB 7.0* 7.2*   HCT 22.6* 23.2*   * 662*   LYMPH 0.73*  4.6* 0.43*  8.0*   MONO 3.7*  0.58 2.4*  0.13*   EOS 0.1  0.02 0.0  0.00   CMP:  Recent Labs   Lab 04/25/25  1212 04/26/25  0641    137   K 5.5* 5.3*   CL  101 101   CO2 25 24   BUN 48* 52*   CREATININE 2.0* 2.0*   CALCIUM 9.1 9.0   MG  --  1.9   PHOS  --  4.9*   ALKPHOS 87  --    AST 24  --    ALT 18  --    BILITOT 0.2  --    ALBUMIN 2.5*  --    ANIONGAP 10 12      Significant Imaging: I have reviewed and interpreted all pertinent imaging results/findings within the past 24 hours.        Assessment & Plan  Asthma with acute exacerbation  Leukocytosis  Acute on chronic diastolic heart failure  Essential hypertension  - Acute respiratory distress with reported SpO2 to 77% at home prior to EMS intervention.  - In ED, placed on NRB, diuresis initiated as well as steroids and nebulizations with significant improvement in saturations. Wean supplemental O2 as tolerated.  - Likely a combination of acute asthma exacerbation with acute volume overload with diastolic dysfunction and recent discontinuation of furosemide/HCTZ contributing to his worsening respiratory status.  - Continue furosemide 80mg IV BID; strict intake/output, daily weights.  - Continue albuterol-ipratropium 2.5-0.5mg inhaled q4hr wake, q4hr PRN. Continue steroids with prednisone 40mg PO daily.  - Continue PT/OT given debility.  CKD (chronic kidney disease) stage 3, GFR 30-59 ml/min  - Improving from recent elevation.  - Avoid nephrotoxins, renally dose medications.  - Monitor closely in setting of diuresis.  Acute pulmonary embolism  - Continue apixaban 5mg PO BID.  Schizophrenia  Deaf, bilateral  Blind  - At baseline.  - Continue risperidone 1mg PO qHS.    VTE Risk Mitigation (From admission, onward)           Ordered     apixaban tablet 5 mg  2 times daily         04/25/25 1528     IP VTE HIGH RISK PATIENT  Once         04/25/25 1454                    Discharge Planning   JAQUAN:      Code Status: Full Code   Medical Readiness for Discharge Date:   Discharge Plan A: Home Health        D Sunny Quesada MD  Department of Hospital Medicine   [facility] ([address])

## 2025-04-26 NOTE — PT/OT/SLP EVAL
Physical Therapy Evaluation    Patient Name:  Frantz Sigala Jr.   MRN:  [id number]    Recommendations:     Discharge Recommendations: Moderate Intensity Therapy   Discharge Equipment Recommendations: bath bench, wheelchair   Barriers to discharge: None    Assessment:     Frantz Sigala Jr. is a 60 y.o. male admitted with a medical diagnosis of Asthma with acute exacerbation.  He presents with the following impairments/functional limitations: weakness, impaired endurance, impaired self care skills, impaired functional mobility, gait instability, impaired balance, decreased coordination, decreased upper extremity function, decreased lower extremity function, decreased safety awareness, pain, edema. Pt required Min A x 2 to transfer supine to sit.  He was able to sit at EOB for 14 minutes with CGA/SBA.  He transferred sit to stand with RW and Min A x 2 one time and with CGA with RW a second time.  He required Min A x 2 to take 3 side steps to the L with a RW.  Pt required Min A to transfer sit to supine.      Rec: Moderate Intensity Therapy  DME: WC, transfer tub bench.    Rehab Prognosis: Good; patient would benefit from acute skilled PT services to address these deficits and reach maximum level of function.    Recent Surgery: * No surgery found *      Plan:     During this hospitalization, patient to be seen 5 x/week to address the identified rehab impairments via gait training, therapeutic activities, therapeutic exercises, neuromuscular re-education and progress toward the following goals:    Plan of Care Expires:  05/26/25    Subjective     Chief Complaint: Pt was none verbal  Patient/Family Comments/goals: Pt participated with Tx.  Pain/Comfort:  Pain Rating 1:  (Pt is non verbal, blind and deaf)  Pain Rating Post-Intervention 1:  (Pt is non verbal, deaf and blind)    Patients cultural, spiritual, Advent conflicts given the current situation: no    Living Environment:  Pt lives with his family in a single  story home  Prior to admission, patients level of function - Pt needed assist got get out of bed.  Before his prior admission in early April he was Mod I with no AD as he was familiar with his surroundings.  Equipment used at home: bedside commode, walker, rolling.  DME owned (not currently used): none.  Upon discharge, patient will have assistance from family.    Objective:     Communicated with Nurse prior to session.  Patient found HOB elevated with PureWick, peripheral IV, pulse ox (continuous), telemetry  upon PT entry to room.    General Precautions: Standard, blind, deaf, fall, other (see comments) (non verbal)  Orthopedic Precautions:N/A   Braces: N/A  Respiratory Status: Room air    Exams:  RLE ROM: WFL  RLE Strength: Deficits: Hip flexion 2/5, Knee extension at least 3/5  LLE ROM: WFL  LLE Strength: Deficits: Hip flexion 2/5, Knee extension at lease 3/5    Functional Mobility:  Bed Mobility:     Rolling Left:  minimum assistance  Rolling Right: minimum assistance  Supine to Sit: minimum assistance and of 2 persons  Sit to Supine: minimum assistance  Transfers:     Sit to Stand:  Min x 2 with first attempt, CGA with second attempt with rolling walker  Gait: Pt was able to take 3 side steps to the L with Min A x 2 and a RW      AM-PAC 6 CLICK MOBILITY  Total Score:15       Treatment & Education:  Role of Physical Therapy to his sister  Plan of Care to his sister  Transfer training with tactile cues  Side stepping with tactile cues    Patient left left sidelying with all lines intact, call button in reach, Nurse notified, and Sister present.    GOALS:   Multidisciplinary Problems       Physical Therapy Goals          Problem: Physical Therapy    Goal Priority Disciplines Outcome Interventions   Physical Therapy Goal     PT, PT/OT Progressing    Description: Goals to be met by: 2025    Patient will increase functional independence with mobility by performin. Sit<>stand with SBA with RW.  2. Gait  x 40 feet with RW with Min A.  3. Supine<>sit with CGA.                           DME Justifications:  Frantz Sigala Jr. has a mobility limitation that significantly impairs his ability to participate in one or more mobility related activities of daily living (MRADLs) such as toileting, feeding, dressing, grooming, and bathing in customary locations in the home.  The mobility limitation cannot be sufficiently resolved by the use of a cane or walker.   The use of a manual wheelchair will significantly improve the patients ability to participate in MRADLS and the patient will use it on regular basis in the home.  Frantz Sigala Jr. has expressed his willingness to use a manual wheelchair in the home. Patients upper body strength is sufficient for propulsion.  He also has a caregiver who is available, willing, and able to provide assistance with the wheelchair when needed.      History:     Past Medical History:   Diagnosis Date    Anxiety     Asthma     Schizophrenia        Past Surgical History:   Procedure Laterality Date    CORNEAL TRANSPLANT         Time Tracking:     PT Received On: 04/26/25  PT Start Time: 1330     PT Stop Time: 1353  PT Total Time (min): 23 min     Billable Minutes: Evaluation 10 and Therapeutic Activity 13      04/26/2025

## 2025-04-26 NOTE — PLAN OF CARE
Problem: Physical Therapy  Goal: Physical Therapy Goal  Description: Goals to be met by: 2025    Patient will increase functional independence with mobility by performin. Sit<>stand with SBA with RW.  2. Gait x 40 feet with RW with Min A.  3. Supine<>sit with CGA.      Outcome: Progressing   Orders received and Physical Therapy evaluation completed.    Pt required Min A x 2 to transfer supine to sit.  He was able to sit at EOB for 14 minutes with CGA/SBA.  He transferred sit to stand with RW and Min A x 2 one time and with CGA with RW a second time.  He required Min A x 2 to take 3 side steps to the L with a RW.  Pt required Min A to transfer sit to supine.      Rec: Moderate Intensity Therapy  DME: WC, transfer tub bench

## 2025-04-26 NOTE — SUBJECTIVE & OBJECTIVE
Past Medical History:   Diagnosis Date    Anxiety     Asthma     Schizophrenia        Past Surgical History:   Procedure Laterality Date    CORNEAL TRANSPLANT         Review of patient's allergies indicates:   Allergen Reactions    Penicillins Other (See Comments)       No current facility-administered medications on file prior to encounter.     Current Outpatient Medications on File Prior to Encounter   Medication Sig    albuterol (PROVENTIL/VENTOLIN HFA) 90 mcg/actuation inhaler Inhale 2 puffs into the lungs every 6 (six) hours as needed for Wheezing. Rescue    apixaban (ELIQUIS) 5 mg Tab Take 2 tablets (10 mg total) by mouth 2 (two) times daily for 1 day, THEN 1 tablet (5 mg total) 2 (two) times daily.    carvediloL (COREG) 25 MG tablet Take 1 tablet (25 mg total) by mouth 2 (two) times daily with meals.    ciprofloxacin HCl (CIPRO) 500 MG tablet Take 1 tablet (500 mg total) by mouth every 12 (twelve) hours. (Patient not taking: Reported on 4/24/2025)    fluticasone propionate (FLONASE) 50 mcg/actuation nasal spray 1 spray (50 mcg total) by Each Nostril route once daily. (Patient not taking: Reported on 4/24/2025)    HYDROcodone-acetaminophen (NORCO) 5-325 mg per tablet Take 1 tablet by mouth every 6 (six) hours as needed for Pain. (Patient not taking: Reported on 4/24/2025)    lisinopriL (PRINIVIL,ZESTRIL) 40 MG tablet Take 1 tablet (40 mg total) by mouth once daily.    NIFEdipine (PROCARDIA-XL) 30 MG (OSM) 24 hr tablet Take 1 tablet (30 mg total) by mouth once daily.    ondansetron (ZOFRAN-ODT) 4 MG TbDL Take 1 tablet (4 mg total) by mouth every 6 (six) hours as needed (nausea/vomiting). (Patient not taking: Reported on 4/24/2025)    polyethylene glycol (GLYCOLAX) 17 gram PwPk Take 17 g by mouth 3 (three) times daily as needed for Constipation. (Patient not taking: Reported on 4/24/2025)    risperiDONE (RISPERDAL) 1 MG tablet Take 1 tablet (1 mg total) by mouth once daily.    tamsulosin (FLOMAX) 0.4 mg Cap Take  1 capsule (0.4 mg total) by mouth once daily.     Family History    None       Tobacco Use    Smoking status: Never    Smokeless tobacco: Never   Substance and Sexual Activity    Alcohol use: No     Alcohol/week: 0.0 standard drinks of alcohol    Drug use: No    Sexual activity: Never     Review of Systems   Unable to perform ROS: Patient nonverbal   Constitutional:  Positive for diaphoresis.   Respiratory:  Positive for shortness of breath and wheezing.    Cardiovascular:  Positive for leg swelling.   Gastrointestinal:  Positive for diarrhea.     Objective:     Vital Signs (Most Recent):  Temp: 98.3 °F (36.8 °C) (04/25/25 1915)  Pulse: 87 (04/25/25 1915)  Resp: (!) 22 (04/25/25 1915)  BP: (!) 192/87 (04/25/25 1915)  SpO2: 98 % (04/25/25 1915) Vital Signs (24h Range):  Temp:  [98 °F (36.7 °C)-98.5 °F (36.9 °C)] 98.3 °F (36.8 °C)  Pulse:  [] 87  Resp:  [11-24] 22  SpO2:  [93 %-100 %] 98 %  BP: (150-220)/(67-98) 192/87     Weight: 95.3 kg (210 lb)  Body mass index is 32.89 kg/m².     Physical Exam  Vitals and nursing note reviewed.   Constitutional:       General: He is not in acute distress.     Appearance: He is well-developed.   HENT:      Head: Normocephalic and atraumatic.   Eyes:      General:         Right eye: No discharge.         Left eye: No discharge.   Cardiovascular:      Rate and Rhythm: Normal rate.      Pulses: Normal pulses.   Pulmonary:      Comments: Tight bilaterally; expiratory wheezes and diminished bases bilaterally.  Abdominal:      Palpations: Abdomen is soft.      Tenderness: There is no abdominal tenderness.   Musculoskeletal:         General: Normal range of motion.      Right lower leg: Edema present.      Left lower leg: Edema present.   Skin:     General: Skin is warm and dry.   Neurological:      Mental Status: He is alert and oriented to person, place, and time. Mental status is at baseline.        Significant Labs:   CBC:  Recent Labs   Lab 04/23/25  0526 04/25/25  1212   WBC  6.82 15.72*   HGB 7.5* 7.0*   HCT 24.3* 22.6*   * 785*   LYMPH 0.90*  13.2* 0.73*  4.6*   MONO 6.2  0.42 3.7*  0.58   EOS 3.2  0.22 0.1  0.02   CMP:  Recent Labs   Lab 04/23/25  0526 04/25/25  1212   * 136   K 5.4* 5.5*   CL 97 101   CO2 23 25   BUN 63* 48*   CREATININE 2.2* 2.0*   CALCIUM 9.2 9.1   MG 2.0  --    PHOS 5.4*  --    ALKPHOS  --  87   AST  --  24   ALT  --  18   BILITOT  --  0.2   ALBUMIN  --  2.5*   ANIONGAP 10 10     Recent Labs   Lab 04/25/25  1212   *     Significant Imaging: I have reviewed and interpreted all pertinent imaging results/findings within the past 24 hours.  Imaging Results              X-Ray Chest AP Portable (Final result)  Result time 04/25/25 13:11:29      Final result by Mello Mathew MD (04/25/25 13:11:29)                   Impression:      No acute findings.      Electronically signed by: Mello Mathew MD  Date:    04/25/2025  Time:    13:11               Narrative:    EXAMINATION:  XR CHEST AP PORTABLE    CLINICAL HISTORY:  CHF;    TECHNIQUE:  Single frontal view of the chest was performed.    COMPARISON:  04/12/2025    FINDINGS:  Enlarged cardiac silhouette.The lungs are grossly clear.  No pneumothorax.No pleural effusions.

## 2025-04-26 NOTE — PLAN OF CARE
Problem: Occupational Therapy  Goal: Occupational Therapy Goal  Description: Goals to be met by: 5/10/2025     Patient will increase functional independence with ADLs by performing:    UE Dressing with Minimal Assistance.  LE Dressing with Moderate Assistance.  Grooming while seated with Stand-by Assistance.  Toileting from bedside commode with Moderate Assistance for hygiene and clothing management.   Toilet transfer to bedside commode with Contact Guard Assistance.    Outcome: Progressing     Initial OT eval/treat complete.  Has RW and BSC not in use.  Pt. Recently discharged from [facility] on 4/23/2025 though returning yesterday 4/25/2025.  SBA to supervision with ADL prior to hospitalizations though Pt. Requiring TOTAL A once home for last 2 days.  Needs W/C and TTB currently though will defer to next level of care.  Recommend post acute Moderate Intensity therapy.  To benefit from continued acute care OT services to increase independence in self-care/functional transfers.  OT to follow.

## 2025-04-26 NOTE — HPI
Mr. Sigala is a 60/M with PMH deafness, blindness, schizophrenia, CKDIII, chronic diastolic heart failure (Echo 04/08/25 EF 60-65%, grade I diastolic dysfunction), asthma who [facility] with worsening dyspnea and decreased PO intake for the past two days. History obtained from the patient's family members as he is unable to communicate. He was admitted from 04/08-04/23 with syncope and colitis initially. During his hospitalization he had intermittent fevers, tachycardia and hypertension, had V/Q scan demonstrating intermediate probability for PE and was started on anticoagulation, and had hyponatremia prompting discontinuation of HCTZ and furosemide. Afterward, family noted he had decrease in appetite, increasing leg swelling, SOB and wheezing. He did not have fevers at home, but did have diaphoresis. Additionally he had loose stool in the setting of increased stool softener use after discharge. A family friend came to visit 04/25 and used a pulse oximeter while he felt SOB and found his oxygen level to be 77%. EMS was contacted and he received nebulization and was brought to ED for further evaluation. ED workup was notable for leukocytosis with left shift, CXR with increased bilateral infiltrates concerning for pulmonary edema, elevated BNP to 600s, increased WOB and wheezing. He received methylprednisolone 125mg IV x1, albuterol nebulization, furosemide 80mg IV and hospital medicine was contacted for admission.

## 2025-04-26 NOTE — PLAN OF CARE
04/26/25 1003   Readmission   Was this a planned readmission? No   Why were you readmitted? Related to previous admission   When you left the hospital where did you go? Home with Home Health   Did you try to manage your symptoms your self? Yes   Did you call anyone? No   Did you try to see or did see a doctor or nurse before you came? No   Did you have  a follow-up appointment on discharge? Yes   Did you go? No   Why?   (it was canceled)

## 2025-04-26 NOTE — ASSESSMENT & PLAN NOTE
- Acute respiratory distress with reported SpO2 to 77% at home prior to EMS intervention.  - In ED, placed on NRB, diuresis initiated as well as steroids and nebulizations with significant improvement in saturations. Wean supplemental O2 as tolerated.  - Suspect leukocytosis is reactive; no clear evidence of infectious process. Received cefepime, vancomycin in ED; defer continuation for now. Low threshold to resume empiric antibiotics if febrile or concern rises for sepsis.  - Likely a combination of acute asthma exacerbation with acute volume overload with diastolic dysfunction and recent discontinuation of furosemide/HCTZ contributing to his worsening respiratory status.  - Continue furosemide 80mg IV BID; strict intake/output, daily weights.  - Continue albuterol-ipratropium 2.5-0.5mg inhaled q4hr wake, q4hr PRN. Continue steroids with prednisone 40mg PO daily.  - Resume PT/OT given debility.

## 2025-04-26 NOTE — PT/OT/SLP EVAL
Occupational Therapy   Evaluation and Treatment    Name: Frantz Sigala Jr.  MRN: [id number]  Admitting Diagnosis: Asthma with acute exacerbation  Recent Surgery: * No surgery found *      Recommendations:     Discharge Recommendations: Moderate Intensity Therapy  Discharge Equipment Recommendations:  bath bench, wheelchair, to be determined by next level of care  Barriers to discharge:   (current functional level)    Assessment:   Initial OT eval/treat complete.  Initial sit<>stand EOB RW MIN A of 2 persons, HOHA for hand placement at RW to initiate task, MIN lift assist; 2nd trial with CGA for steadying/safety.  Lateral steps X 3 along EOB with MIN A of 2 persons for weight shifts to initiate steps and assist for RW management.  TOTAL A for back paul hygiene in stance with BUE supported at RW and steadying assist needed throughout task.  Has RW and BSC not in use.  Pt. Recently discharged from [facility] on 4/23/2025 though returning yesterday 4/25/2025.  SBA to supervision with ADL prior to hospitalizations though Pt. Requiring TOTAL A once home for last 2 days.  Needs W/C and TTB currently though will defer to next level of care.  Recommend post acute Moderate Intensity therapy.  To benefit from continued acute care OT services to increase independence in self-care/functional transfers.  OT to follow.      Frantz Sigala Jr. is a 60 y.o. male with a medical diagnosis of Asthma with acute exacerbation.  He presents with below deficits decreasing independence in self-care/functional transfers. Performance deficits affecting function: weakness, impaired endurance, impaired self care skills, impaired functional mobility, gait instability, impaired balance, decreased upper extremity function, decreased lower extremity function, decreased safety awareness, impaired cardiopulmonary response to activity (impaired communication).      Rehab Prognosis: Good; patient would benefit from acute skilled OT services to  address these deficits and reach maximum level of function.       Plan:     Patient to be seen 4 x/week to address the above listed problems via self-care/home management, therapeutic activities, therapeutic exercises  Plan of Care Expires: 05/10/25  Plan of Care Reviewed with: patient, sibling    Subjective     Chief Complaint: No signs of pain.   Patient/Family Comments/goals: Sister present and reports that Pt. Has not been out of bed since home.     Occupational Profile:  Lives with mother and brother  [facility]   Bathroom setup  Tub/shower combo  Standard toilet  Has access to BSC obtained from previous admit  Pt. Has not been OOB since d/c from [facility] [place] on 4/23/2025  Prior to previous hospitalizations Pt. Independent with supervision with ADL and ambulation  Equipment Used at Home:  (has BSC and RW at home; not in use PTA)  Assistance upon Discharge:  Siblings willing/able to assist.     Pain/Comfort:  Pain Rating 1:  (No signs of pain including moaning, rigidity, guarding, furrowed brows, or facial grimace noted during activity or at rest.  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  Patient found HOB elevated with peripheral IV, telemetry, pulse ox (continuous) (male external catheter) with sister present at bedside upon OT entry to room.    General Precautions: Standard, blind, deaf, fall (nonverbal; 1500mL fluid restriction)  Orthopedic Precautions: N/A  Braces: N/A  Respiratory Status: Room air    Occupational Performance:    Bed Mobility:    Supine>sit MIN A of 2 persons   Assist for upright trunk  Assist for LE management  Increased time  HOB elevated   Sit>supine MIN A   Assist for LLE management  Tactile cues for task initiation  Increased time  B-rolling MIN A  Assist for lower  trunk management   Increased tactile cuing and guiding for bed rail use    Functional Mobility/Transfers:  Sit<>stand X 2 trials EOB<>RW  1st trial MIN A of 2 persons  2nd trial CGA for steadying/safety  L-lateral steps X 3 along EOB MIN A of 2 persons  Assist for weight shift to initiate steps  Assist with RW management    Activities of Daily Living:  Stool smudges noted at pads upon stance  TOTAL A for back paul hygiene in stance with BUE supported  Fresh pads placed on bed and between inner thighs  Male external catheter present for voiding    Cognitive/Visual Perceptual:  Cognitive/Psychosocial Skills:  -Pt. Is blind and deaf; 3 incidents of quick repetitive vocalizations once seated EOB and twice in stance; sister of Pt. Present and reports this is how Pt. Indicates discomfort with sister reporting Pt. Is cold once seated EOB and needing to sit upon standing   Visual/Perceptual:    -Impaired      Physical Exam:  Postural examination/scapula alignment: -       Rounded shoulders  -       Forward head  -       Static sitting balance EOB with SBA and BUE supported at bed  Skin integrity: thigh blisters well documented in EMR  Upper Extremity Range of Motion:  -       Right Upper Extremity: AAROM found to be WFL; holds RUE in full shoulder flex for 3-4 seconds unsupported with tactile cuing and assist for returning UE to rest  -       Left Upper Extremity: AAROM found to be WFL   Strength: -       Right Upper Extremity: WFL for gripping RW  -       Left Upper Extremity: WFL for gripping RW; 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        Patient left left sidelying with all lines intact, call button in reach, nursing notified, and sister present.    GOALS:   Multidisciplinary Problems        Occupational Therapy Goals          Problem: Occupational Therapy    Goal Priority Disciplines Outcome Interventions   Occupational Therapy Goal     OT, PT/OT Progressing    Description: Goals to be met by: 5/10/2025     Patient will increase functional independence with ADLs by performing:    UE Dressing with Minimal Assistance.  LE Dressing with Moderate Assistance.  Grooming while seated with Stand-by Assistance.  Toileting from bedside commode with Moderate Assistance for hygiene and clothing management.   Toilet transfer to bedside commode with Contact Guard Assistance.                         DME Justifications:  DME currently needed though will defer to next level of care.   Frantz Sigala Jr. has a mobility limitation that significantly impairs his ability to participate in one or more mobility related activities of daily living (MRADLs) such as toileting, feeding, dressing, grooming, and bathing in customary locations in the home.  The mobility limitation cannot be sufficiently resolved by the use of a cane or walker.   The use of a manual wheelchair will significantly improve the patients ability to participate in MRADLS and the patient will use it on regular basis in the home.  Frantz Sigala Jr. has expressed his willingness to use a manual wheelchair in the home. Patients upper body strength is sufficient for propulsion.  He also has a caregiver who is available, willing, and able to provide assistance with the wheelchair when needed.      History:     Past Medical History:   Diagnosis Date    Anxiety     Asthma     Schizophrenia          Past Surgical History:   Procedure Laterality Date    CORNEAL TRANSPLANT         Time Tracking:     OT Date of Treatment: 04/26/25  OT Start Time: 1329  OT Stop Time: 1357  OT Total Time (min): 28 min    Overlap with PT for portions of session due to complex nature of patient, tolerance for therapeutic modalities, and safety with mobility to decrease fall risk for  patient and caregiver injury requiring two skilled therapists to provide interventions.    Billable Minutes:Evaluation 10  Self Care/Home Management 8    4/26/2025

## 2025-04-26 NOTE — ASSESSMENT & PLAN NOTE
- Improving from recent elevation.  - Avoid nephrotoxins, renally dose medications.  - Monitor closely in setting of diuresis.

## 2025-04-26 NOTE — ASSESSMENT & PLAN NOTE
- Acute respiratory distress with reported SpO2 to 77% at home prior to EMS intervention.  - In ED, placed on NRB, diuresis initiated as well as steroids and nebulizations with significant improvement in saturations. Wean supplemental O2 as tolerated.  - Likely a combination of acute asthma exacerbation with acute volume overload with diastolic dysfunction and recent discontinuation of furosemide/HCTZ contributing to his worsening respiratory status.  - Continue furosemide 80mg IV BID; strict intake/output, daily weights.  - Continue albuterol-ipratropium 2.5-0.5mg inhaled q4hr wake, q4hr PRN. Continue steroids with prednisone 40mg PO daily.  - Continue PT/OT given debility.

## 2025-04-26 NOTE — SUBJECTIVE & OBJECTIVE
Interval History: No acute events overnight. Diuresing well and respiratory status improving - ate some last night. Discussed with family at bedside/via phone. No new concerns at this time.    Review of Systems   Unable to perform ROS: Patient nonverbal     Objective:     Vital Signs (Most Recent):  Temp: 97.8 °F (36.6 °C) (04/26/25 0812)  Pulse: 66 (04/26/25 1059)  Resp: 12 (04/26/25 0812)  BP: (!) 160/63 (04/26/25 0812)  SpO2: 96 % (04/26/25 1004) Vital Signs (24h Range):  Temp:  [97.8 °F (36.6 °C)-98.5 °F (36.9 °C)] 97.8 °F (36.6 °C)  Pulse:  [] 66  Resp:  [11-22] 12  SpO2:  [93 %-100 %] 96 %  BP: (150-210)/(63-97) 160/63     Weight: 95.3 kg (210 lb)  Body mass index is 32.89 kg/m².    Intake/Output Summary (Last 24 hours) at 4/26/2025 1111  Last data filed at 4/26/2025 0800  Gross per 24 hour   Intake 500 ml   Output 1250 ml   Net -750 ml         Physical Exam  Vitals and nursing note reviewed.   Constitutional:       General: He is not in acute distress.     Appearance: He is well-developed.   HENT:      Head: Normocephalic and atraumatic.   Eyes:      General:         Right eye: No discharge.         Left eye: No discharge.   Cardiovascular:      Rate and Rhythm: Normal rate.      Pulses: Normal pulses.   Pulmonary:      Comments: Improving bilateral expiratory wheezes and diminished bases.  Abdominal:      Palpations: Abdomen is soft.      Tenderness: There is no abdominal tenderness.   Musculoskeletal:         General: Normal range of motion.      Right lower leg: Edema present.      Left lower leg: Edema present.   Skin:     General: Skin is warm and dry.   Neurological:      Mental Status: He is alert. Mental status is at baseline.           Significant Labs:   CBC:  Recent Labs   Lab 04/25/25  1212 04/26/25  0641   WBC 15.72* 5.37   HGB 7.0* 7.2*   HCT 22.6* 23.2*   * 662*   LYMPH 0.73*  4.6* 0.43*  8.0*   MONO 3.7*  0.58 2.4*  0.13*   EOS 0.1  0.02 0.0  0.00   CMP:  Recent Labs   Lab  04/25/25  1212 04/26/25  0641    137   K 5.5* 5.3*    101   CO2 25 24   BUN 48* 52*   CREATININE 2.0* 2.0*   CALCIUM 9.1 9.0   MG  --  1.9   PHOS  --  4.9*   ALKPHOS 87  --    AST 24  --    ALT 18  --    BILITOT 0.2  --    ALBUMIN 2.5*  --    ANIONGAP 10 12      Significant Imaging: I have reviewed and interpreted all pertinent imaging results/findings within the past 24 hours.

## 2025-04-26 NOTE — PLAN OF CARE
Case Management Assessment     PCP: Zachary Ramos MD  Pharmacy: [facility]     Patient Arrived From: Home  Existing Help at Home: Mother and siblings     Barriers to Discharge: None    Discharge Plan:    A. Home with     B. SNF  [facility] ([address])  Initial Discharge Assessment       Primary Care Provider: Zachary Ramos MD    Admission Diagnosis: Shortness of breath [R06.02]  Hypoxia [R09.02]  Exacerbation of asthma, unspecified asthma severity, unspecified whether persistent [J45.901]    Admission Date: 4/25/2025  Expected Discharge Date:     Transition of Care Barriers: (P) Transportation    Payor: MEDICARE / Plan: MEDICARE PART A & B / Product Type: Government /     Extended Emergency Contact Information  Primary Emergency Contact: Wendy Shipman  Address: [address] [place] [address] [facility]  Home Phone: [phone]  Relation: Sister  Secondary Emergency Contact: Eliana Sigala   [facility]  Home Phone: [phone]  Relation: Sister    Discharge Plan A: (P) Home Health  Discharge Plan B: (P) Skilled Nursing Facility      [id number] [address] [facility] [address] [facility] [address]  [address] [facility] [address]  Phone: [phone] Fax: [phone]      Initial Assessment (most recent)       Adult Discharge Assessment - 04/26/25 1000          Discharge Assessment    Assessment Type Discharge Planning Assessment     Confirmed/corrected address, phone number and insurance Yes     Confirmed Demographics Correct on Facesheet     Source of Information patient;health record     People in Home parent(s);sibling(s)     Do you expect to return to your current living situation? Yes     Do you have help at home or someone to help you manage your care at home? Yes     Prior to hospitilization cognitive status: Unable to Assess     Current cognitive status: Unable to Assess     Equipment  Currently Used at Home walker, rolling;bedside commode     Readmission within 30 days? Yes     Patient currently being followed by outpatient case management? No     Do you currently have service(s) that help you manage your care at home? Yes     Name and Contact number of agency [facility] (P)      Is the pt/caregiver preference to resume services with current agency Yes (P)      Do you take prescription medications? Yes (P)      Do you have prescription coverage? Yes (P)      Do you have any problems affording any of your prescribed medications? No (P)      Is the patient taking medications as prescribed? yes (P)      Who is going to help you get home at discharge? Sister (P)      How do you get to doctors appointments? family or friend will provide (P)      Are you on dialysis? No (P)      Do you take coumadin? No (P)      Discharge Plan A Home Health (P)      Discharge Plan B Skilled Nursing Facility (P)      Discharge Plan discussed with: Sibling (P)      Transition of Care Barriers Transportation (P)

## 2025-04-27 PROBLEM — D64.9 ANEMIA: Status: ACTIVE | Noted: 2025-04-27

## 2025-04-27 LAB
ABORH RETYPE: NORMAL
ABSOLUTE EOSINOPHIL (OHS): 0 K/UL
ABSOLUTE MONOCYTE (OHS): 0.5 K/UL (ref 0.3–1)
ABSOLUTE NEUTROPHIL COUNT (OHS): 8.72 K/UL (ref 1.8–7.7)
ANION GAP (OHS): 12 MMOL/L (ref 8–16)
BASOPHILS # BLD AUTO: 0.01 K/UL
BASOPHILS NFR BLD AUTO: 0.1 %
BUN SERPL-MCNC: 65 MG/DL (ref 6–20)
CALCIUM SERPL-MCNC: 8.8 MG/DL (ref 8.7–10.5)
CHLORIDE SERPL-SCNC: 100 MMOL/L (ref 95–110)
CO2 SERPL-SCNC: 24 MMOL/L (ref 23–29)
CREAT SERPL-MCNC: 2.4 MG/DL (ref 0.5–1.4)
ERYTHROCYTE [DISTWIDTH] IN BLOOD BY AUTOMATED COUNT: 19.2 % (ref 11.5–14.5)
ERYTHROCYTE [DISTWIDTH] IN BLOOD BY AUTOMATED COUNT: 19.3 % (ref 11.5–14.5)
GFR SERPLBLD CREATININE-BSD FMLA CKD-EPI: 30 ML/MIN/1.73/M2
GLUCOSE SERPL-MCNC: 182 MG/DL (ref 70–110)
HCT VFR BLD AUTO: 19.3 % (ref 40–54)
HCT VFR BLD AUTO: 20.9 % (ref 40–54)
HGB BLD-MCNC: 6.3 GM/DL (ref 14–18)
HGB BLD-MCNC: 6.5 GM/DL (ref 14–18)
IMM GRANULOCYTES # BLD AUTO: 0.06 K/UL (ref 0–0.04)
IMM GRANULOCYTES NFR BLD AUTO: 0.6 % (ref 0–0.5)
INDIRECT COOMBS: NORMAL
LACTATE SERPL-SCNC: 1.1 MMOL/L (ref 0.5–2.2)
LYMPHOCYTES # BLD AUTO: 0.68 K/UL (ref 1–4.8)
MAGNESIUM SERPL-MCNC: 1.7 MG/DL (ref 1.6–2.6)
MCH RBC QN AUTO: 24.7 PG (ref 27–31)
MCH RBC QN AUTO: 25.3 PG (ref 27–31)
MCHC RBC AUTO-ENTMCNC: 31.1 G/DL (ref 32–36)
MCHC RBC AUTO-ENTMCNC: 32.6 G/DL (ref 32–36)
MCV RBC AUTO: 78 FL (ref 82–98)
MCV RBC AUTO: 80 FL (ref 82–98)
NUCLEATED RBC (/100WBC) (OHS): 0 /100 WBC
PHOSPHATE SERPL-MCNC: 2.7 MG/DL (ref 2.7–4.5)
PLATELET # BLD AUTO: 601 K/UL (ref 150–450)
PLATELET # BLD AUTO: 618 K/UL (ref 150–450)
PMV BLD AUTO: 8.2 FL (ref 9.2–12.9)
PMV BLD AUTO: 8.2 FL (ref 9.2–12.9)
POTASSIUM SERPL-SCNC: 4.2 MMOL/L (ref 3.5–5.1)
RBC # BLD AUTO: 2.49 M/UL (ref 4.6–6.2)
RBC # BLD AUTO: 2.63 M/UL (ref 4.6–6.2)
RELATIVE EOSINOPHIL (OHS): 0 %
RELATIVE LYMPHOCYTE (OHS): 6.8 % (ref 18–48)
RELATIVE MONOCYTE (OHS): 5 % (ref 4–15)
RELATIVE NEUTROPHIL (OHS): 87.5 % (ref 38–73)
RH BLD: NORMAL
SODIUM SERPL-SCNC: 136 MMOL/L (ref 136–145)
SPECIMEN OUTDATE: NORMAL
WBC # BLD AUTO: 13 K/UL (ref 3.9–12.7)
WBC # BLD AUTO: 9.97 K/UL (ref 3.9–12.7)

## 2025-04-27 PROCEDURE — 83605 ASSAY OF LACTIC ACID: CPT | Performed by: INTERNAL MEDICINE

## 2025-04-27 PROCEDURE — 86901 BLOOD TYPING SEROLOGIC RH(D): CPT | Performed by: INTERNAL MEDICINE

## 2025-04-27 PROCEDURE — 83735 ASSAY OF MAGNESIUM: CPT | Performed by: INTERNAL MEDICINE

## 2025-04-27 PROCEDURE — 36415 COLL VENOUS BLD VENIPUNCTURE: CPT | Performed by: INTERNAL MEDICINE

## 2025-04-27 PROCEDURE — 85027 COMPLETE CBC AUTOMATED: CPT | Performed by: INTERNAL MEDICINE

## 2025-04-27 PROCEDURE — 21400001 HC TELEMETRY ROOM

## 2025-04-27 PROCEDURE — 25000242 PHARM REV CODE 250 ALT 637 W/ HCPCS: Performed by: INTERNAL MEDICINE

## 2025-04-27 PROCEDURE — 63600175 PHARM REV CODE 636 W HCPCS: Performed by: INTERNAL MEDICINE

## 2025-04-27 PROCEDURE — 84100 ASSAY OF PHOSPHORUS: CPT | Performed by: INTERNAL MEDICINE

## 2025-04-27 PROCEDURE — 80048 BASIC METABOLIC PNL TOTAL CA: CPT | Performed by: INTERNAL MEDICINE

## 2025-04-27 PROCEDURE — 94640 AIRWAY INHALATION TREATMENT: CPT

## 2025-04-27 PROCEDURE — 25000003 PHARM REV CODE 250: Performed by: INTERNAL MEDICINE

## 2025-04-27 PROCEDURE — 85025 COMPLETE CBC W/AUTO DIFF WBC: CPT | Performed by: INTERNAL MEDICINE

## 2025-04-27 PROCEDURE — 94761 N-INVAS EAR/PLS OXIMETRY MLT: CPT

## 2025-04-27 RX ADMIN — CARVEDILOL 25 MG: 12.5 TABLET, FILM COATED ORAL at 09:04

## 2025-04-27 RX ADMIN — IPRATROPIUM BROMIDE AND ALBUTEROL SULFATE 3 ML: 2.5; .5 SOLUTION RESPIRATORY (INHALATION) at 12:04

## 2025-04-27 RX ADMIN — RISPERIDONE 1 MG: 1 TABLET, FILM COATED ORAL at 08:04

## 2025-04-27 RX ADMIN — IPRATROPIUM BROMIDE AND ALBUTEROL SULFATE 3 ML: 2.5; .5 SOLUTION RESPIRATORY (INHALATION) at 03:04

## 2025-04-27 RX ADMIN — IPRATROPIUM BROMIDE AND ALBUTEROL SULFATE 3 ML: 2.5; .5 SOLUTION RESPIRATORY (INHALATION) at 07:04

## 2025-04-27 RX ADMIN — TAMSULOSIN HYDROCHLORIDE 0.4 MG: 0.4 CAPSULE ORAL at 09:04

## 2025-04-27 RX ADMIN — LISINOPRIL 40 MG: 20 TABLET ORAL at 09:04

## 2025-04-27 RX ADMIN — CARVEDILOL 25 MG: 12.5 TABLET, FILM COATED ORAL at 04:04

## 2025-04-27 RX ADMIN — PREDNISONE 40 MG: 20 TABLET ORAL at 09:04

## 2025-04-27 RX ADMIN — IPRATROPIUM BROMIDE AND ALBUTEROL SULFATE 3 ML: 2.5; .5 SOLUTION RESPIRATORY (INHALATION) at 08:04

## 2025-04-27 RX ADMIN — NIFEDIPINE 60 MG: 30 TABLET, FILM COATED, EXTENDED RELEASE ORAL at 09:04

## 2025-04-27 RX ADMIN — APIXABAN 5 MG: 2.5 TABLET, FILM COATED ORAL at 09:04

## 2025-04-27 RX ADMIN — ACETAMINOPHEN 650 MG: 325 TABLET, FILM COATED ORAL at 09:04

## 2025-04-27 RX ADMIN — FUROSEMIDE 80 MG: 10 INJECTION, SOLUTION INTRAVENOUS at 09:04

## 2025-04-27 NOTE — SUBJECTIVE & OBJECTIVE
Interval History: No acute events overnight. Net negative, respiratory status improving gradually. Renal function uptrending and hemoglobin downtrending without evidence of acute bleeding. Discussed with family at bedside.    Review of Systems   Unable to perform ROS: Patient nonverbal     Objective:     Vital Signs (Most Recent):  Temp: 98 °F (36.7 °C) (04/27/25 1703)  Pulse: 92 (04/27/25 1703)  Resp: 18 (04/27/25 1703)  BP: (!) 167/74 (04/27/25 1703)  SpO2: (!) 94 % (04/27/25 1703) Vital Signs (24h Range):  Temp:  [97.6 °F (36.4 °C)-99 °F (37.2 °C)] 98 °F (36.7 °C)  Pulse:  [] 92  Resp:  [16-19] 18  SpO2:  [91 %-96 %] 94 %  BP: (124-170)/(60-76) 167/74     Weight: 100.7 kg (222 lb 0.1 oz)  Body mass index is 34.77 kg/m².    Intake/Output Summary (Last 24 hours) at 4/27/2025 1744  Last data filed at 4/27/2025 1549  Gross per 24 hour   Intake 880 ml   Output 2570 ml   Net -1690 ml         Physical Exam  Vitals and nursing note reviewed.   Constitutional:       General: He is not in acute distress.     Appearance: He is well-developed.   HENT:      Head: Normocephalic and atraumatic.   Eyes:      General:         Right eye: No discharge.         Left eye: No discharge.   Cardiovascular:      Rate and Rhythm: Normal rate.      Pulses: Normal pulses.   Pulmonary:      Comments: Bilateral expiratory wheezes. No noted crackles.  Abdominal:      Palpations: Abdomen is soft.      Tenderness: There is no abdominal tenderness.   Musculoskeletal:         General: Normal range of motion.      Right lower leg: Edema present.      Left lower leg: Edema present.   Skin:     General: Skin is warm and dry.   Neurological:      Mental Status: He is alert. Mental status is at baseline.           Significant Labs:   CBC:  Recent Labs   Lab 04/26/25  0641 04/27/25  0655 04/27/25  1104   WBC 5.37 9.97 13.00*   HGB 7.2* 6.5* 6.3*   HCT 23.2* 20.9* 19.3*   * 618* 601*   LYMPH 0.43*  8.0* 0.68*  6.8*  --    MONO 2.4*  0.13*  5.0  0.50  --    EOS 0.0  0.00 0.0  0.00  --    CMP:  Recent Labs   Lab 04/25/25  1212 04/26/25  0641 04/27/25  0655    137 136   K 5.5* 5.3* 4.2    101 100   CO2 25 24 24   BUN 48* 52* 65*   CREATININE 2.0* 2.0* 2.4*   CALCIUM 9.1 9.0 8.8   MG  --  1.9 1.7   PHOS  --  4.9* 2.7   ALKPHOS 87  --   --    AST 24  --   --    ALT 18  --   --    BILITOT 0.2  --   --    ALBUMIN 2.5*  --   --    ANIONGAP 10 12 12      Significant Imaging: I have reviewed and interpreted all pertinent imaging results/findings within the past 24 hours.

## 2025-04-27 NOTE — PLAN OF CARE
Problem: Adult Inpatient Plan of Care  Goal: Plan of Care Review  4/27/2025 0239 by Marci Lu RN  Outcome: Progressing  4/27/2025 0239 by Marci Lu RN  Outcome: Progressing  Goal: Patient-Specific Goal (Individualized)  4/27/2025 0239 by Marci Lu RN  Outcome: Progressing  4/27/2025 0239 by Marci Lu RN  Outcome: Progressing  Goal: Absence of Hospital-Acquired Illness or Injury  4/27/2025 0239 by Marci Lu RN  Outcome: Progressing  4/27/2025 0239 by Marci Lu RN  Outcome: Progressing  Goal: Optimal Comfort and Wellbeing  4/27/2025 0239 by Marci Lu RN  Outcome: Progressing  4/27/2025 0239 by Marci Lu RN  Outcome: Progressing  Goal: Readiness for Transition of Care  4/27/2025 0239 by Marci Lu RN  Outcome: Progressing  4/27/2025 0239 by Marci Lu RN  Outcome: Progressing     Problem: Skin Injury Risk Increased  Goal: Skin Health and Integrity  4/27/2025 0239 by Marci Lu RN  Outcome: Progressing  4/27/2025 0239 by Marci Lu RN  Outcome: Progressing     Problem: Gas Exchange Impaired  Goal: Optimal Gas Exchange  Outcome: Progressing

## 2025-04-27 NOTE — PROGRESS NOTES
[facility] ([address] Medicine  Progress Note    Patient Name: Frantz Sigala Jr.  MRN: [id number]  Patient Class: IP- Inpatient   Admission Date: 4/25/2025  Length of Stay: 2 days  Attending Physician: LALO Quesada MD  Primary Care Provider: Zachary Ramos MD        Subjective     Principal Problem:Asthma with acute exacerbation        HPI:  Mr. Sigala is a 60/M with PMH deafness, blindness, schizophrenia, CKDIII, chronic diastolic heart failure (Echo 04/08/25 EF 60-65%, grade I diastolic dysfunction), asthma who presented to [facility] 04/25 with worsening dyspnea and decreased PO intake for the past two days. History obtained from the patient's family members as he is unable to communicate. He was admitted from 04/08-04/23 with syncope and colitis initially. During his hospitalization he had intermittent fevers, tachycardia and hypertension, had V/Q scan demonstrating intermediate probability for PE and was started on anticoagulation, and had hyponatremia prompting discontinuation of HCTZ and furosemide. Afterward, family noted he had decrease in appetite, increasing leg swelling, SOB and wheezing. He did not have fevers at home, but did have diaphoresis. Additionally he had loose stool in the setting of increased stool softener use after discharge. A family friend came to visit 04/25 and used a pulse oximeter while he felt SOB and found his oxygen level to be 77%. EMS was contacted and he received nebulization and was brought to ED for further evaluation. ED workup was notable for leukocytosis with left shift, CXR with increased bilateral infiltrates concerning for pulmonary edema, elevated BNP to 600s, increased WOB and wheezing. He received methylprednisolone 125mg IV x1, albuterol nebulization, furosemide 80mg IV and hospital medicine was contacted for admission.    Overview/Hospital Course:  No notes on file    Interval History: No acute events overnight. Net negative,  respiratory status improving gradually. Renal function uptrending and hemoglobin downtrending without evidence of acute bleeding. Discussed with family at bedside.    Review of Systems   Unable to perform ROS: Patient nonverbal     Objective:     Vital Signs (Most Recent):  Temp: 98 °F (36.7 °C) (04/27/25 1703)  Pulse: 92 (04/27/25 1703)  Resp: 18 (04/27/25 1703)  BP: (!) 167/74 (04/27/25 1703)  SpO2: (!) 94 % (04/27/25 1703) Vital Signs (24h Range):  Temp:  [97.6 °F (36.4 °C)-99 °F (37.2 °C)] 98 °F (36.7 °C)  Pulse:  [] 92  Resp:  [16-19] 18  SpO2:  [91 %-96 %] 94 %  BP: (124-170)/(60-76) 167/74     Weight: 100.7 kg (222 lb 0.1 oz)  Body mass index is 34.77 kg/m².    Intake/Output Summary (Last 24 hours) at 4/27/2025 1744  Last data filed at 4/27/2025 1549  Gross per 24 hour   Intake 880 ml   Output 2570 ml   Net -1690 ml         Physical Exam  Vitals and nursing note reviewed.   Constitutional:       General: He is not in acute distress.     Appearance: He is well-developed.   HENT:      Head: Normocephalic and atraumatic.   Eyes:      General:         Right eye: No discharge.         Left eye: No discharge.   Cardiovascular:      Rate and Rhythm: Normal rate.      Pulses: Normal pulses.   Pulmonary:      Comments: Bilateral expiratory wheezes. No noted crackles.  Abdominal:      Palpations: Abdomen is soft.      Tenderness: There is no abdominal tenderness.   Musculoskeletal:         General: Normal range of motion.      Right lower leg: Edema present.      Left lower leg: Edema present.   Skin:     General: Skin is warm and dry.   Neurological:      Mental Status: He is alert. Mental status is at baseline.           Significant Labs:   CBC:  Recent Labs   Lab 04/26/25  0641 04/27/25  0655 04/27/25  1104   WBC 5.37 9.97 13.00*   HGB 7.2* 6.5* 6.3*   HCT 23.2* 20.9* 19.3*   * 618* 601*   LYMPH 0.43*  8.0* 0.68*  6.8*  --    MONO 2.4*  0.13* 5.0  0.50  --    EOS 0.0  0.00 0.0  0.00  --     CMP:  Recent Labs   Lab 04/25/25  1212 04/26/25  0641 04/27/25  0655    137 136   K 5.5* 5.3* 4.2    101 100   CO2 25 24 24   BUN 48* 52* 65*   CREATININE 2.0* 2.0* 2.4*   CALCIUM 9.1 9.0 8.8   MG  --  1.9 1.7   PHOS  --  4.9* 2.7   ALKPHOS 87  --   --    AST 24  --   --    ALT 18  --   --    BILITOT 0.2  --   --    ALBUMIN 2.5*  --   --    ANIONGAP 10 12 12      Significant Imaging: I have reviewed and interpreted all pertinent imaging results/findings within the past 24 hours.        Assessment & Plan  Asthma with acute exacerbation  Leukocytosis  Acute on chronic diastolic heart failure  Essential hypertension  - Acute respiratory distress with reported SpO2 to 77% at home prior to EMS intervention.  - In ED, placed on NRB, diuresis initiated as well as steroids and nebulizations with significant improvement in saturations. Wean supplemental O2 as tolerated.  - Likely a combination of acute asthma exacerbation with acute volume overload with diastolic dysfunction and recent discontinuation of furosemide/HCTZ contributing to his worsening respiratory status.  - Appears to be approaching euvolemia with improvement in respiratory status though still with some peripheral edema. Hold further furosemide IV; strict intake/output, daily weights.  - Continue albuterol-ipratropium 2.5-0.5mg inhaled q4hr wake, q4hr PRN. Continue steroids with prednisone 40mg PO daily.  - Continue PT/OT given debility.  CKD (chronic kidney disease) stage 3, GFR 30-59 ml/min  - Improving from recent elevation.  - Avoid nephrotoxins, renally dose medications.  - Monitor closely in setting of diuresis.  Acute pulmonary embolism  Anemia  - Anemia likely of chronic disease with contributions from CKD, small volume losses with blood draws over admissions.  - No evidence of acute losses; will hold apixaban 5mg PO BID for time being while evaluating further.  - Discussed with family; as they are [employer]hov[employer] Witnesses, they would not be  interested in blood products.  - Check iron studies, reticulocyte count, ferritin; dependent on results likely consider initiation of erythropoetin, iron sucrose infusion.  Schizophrenia  Deaf, bilateral  Blind  - At baseline.  - Continue risperidone 1mg PO qHS.    VTE Risk Mitigation (From admission, onward)           Ordered     IP VTE HIGH RISK PATIENT  Once         04/25/25 5591                    Discharge Planning   JAQUAN:      Code Status: Full Code   Medical Readiness for Discharge Date:   Discharge Plan A: Home Health      D Sunny Quesada MD  Department of Hospital Medicine   [facility] ([address])

## 2025-04-27 NOTE — ASSESSMENT & PLAN NOTE
- Anemia likely of chronic disease with contributions from CKD, small volume losses with blood draws over admissions.  - No evidence of acute losses; will hold apixaban 5mg PO BID for time being while evaluating further.  - Discussed with family; as they are Jehov[employer] Witnesses, they would not be interested in blood products.  - Check iron studies, reticulocyte count, ferritin; dependent on results likely consider initiation of erythropoetin, iron sucrose infusion.

## 2025-04-27 NOTE — ASSESSMENT & PLAN NOTE
- Acute respiratory distress with reported SpO2 to 77% at home prior to EMS intervention.  - In ED, placed on NRB, diuresis initiated as well as steroids and nebulizations with significant improvement in saturations. Wean supplemental O2 as tolerated.  - Likely a combination of acute asthma exacerbation with acute volume overload with diastolic dysfunction and recent discontinuation of furosemide/HCTZ contributing to his worsening respiratory status.  - Appears to be approaching euvolemia with improvement in respiratory status though still with some peripheral edema. Hold further furosemide IV; strict intake/output, daily weights.  - Continue albuterol-ipratropium 2.5-0.5mg inhaled q4hr wake, q4hr PRN. Continue steroids with prednisone 40mg PO daily.  - Continue PT/OT given debility.

## 2025-04-28 LAB
ABSOLUTE EOSINOPHIL (OHS): 0 K/UL
ABSOLUTE MONOCYTE (OHS): 1.25 K/UL (ref 0.3–1)
ABSOLUTE NEUTROPHIL COUNT (OHS): 13.47 K/UL (ref 1.8–7.7)
ANION GAP (OHS): 10 MMOL/L (ref 8–16)
BASOPHILS # BLD AUTO: 0.02 K/UL
BASOPHILS NFR BLD AUTO: 0.1 %
BUN SERPL-MCNC: 69 MG/DL (ref 6–20)
CALCIUM SERPL-MCNC: 9.3 MG/DL (ref 8.7–10.5)
CHLORIDE SERPL-SCNC: 98 MMOL/L (ref 95–110)
CO2 SERPL-SCNC: 27 MMOL/L (ref 23–29)
CREAT SERPL-MCNC: 2.2 MG/DL (ref 0.5–1.4)
ERYTHROCYTE [DISTWIDTH] IN BLOOD BY AUTOMATED COUNT: 19.5 % (ref 11.5–14.5)
FERRITIN SERPL-MCNC: 87 NG/ML (ref 20–300)
GFR SERPLBLD CREATININE-BSD FMLA CKD-EPI: 33 ML/MIN/1.73/M2
GLUCOSE SERPL-MCNC: 88 MG/DL (ref 70–110)
HCT VFR BLD AUTO: 26.5 % (ref 40–54)
HGB BLD-MCNC: 8.4 GM/DL (ref 14–18)
IMM GRANULOCYTES # BLD AUTO: 0.12 K/UL (ref 0–0.04)
IMM GRANULOCYTES NFR BLD AUTO: 0.7 % (ref 0–0.5)
IRON SATN MFR SERPL: 7 % (ref 20–50)
IRON SERPL-MCNC: 23 UG/DL (ref 45–160)
LYMPHOCYTES # BLD AUTO: 1.38 K/UL (ref 1–4.8)
MAGNESIUM SERPL-MCNC: 1.6 MG/DL (ref 1.6–2.6)
MCH RBC QN AUTO: 25.1 PG (ref 27–31)
MCHC RBC AUTO-ENTMCNC: 31.7 G/DL (ref 32–36)
MCV RBC AUTO: 79 FL (ref 82–98)
NUCLEATED RBC (/100WBC) (OHS): 0 /100 WBC
OHS QRS DURATION: 76 MS
OHS QTC CALCULATION: 428 MS
PHOSPHATE SERPL-MCNC: 2.9 MG/DL (ref 2.7–4.5)
PLATELET # BLD AUTO: 830 K/UL (ref 150–450)
PLATELET BLD QL SMEAR: ABNORMAL
PMV BLD AUTO: 8.8 FL (ref 9.2–12.9)
POTASSIUM SERPL-SCNC: 4.2 MMOL/L (ref 3.5–5.1)
RBC # BLD AUTO: 3.34 M/UL (ref 4.6–6.2)
RELATIVE EOSINOPHIL (OHS): 0 %
RELATIVE LYMPHOCYTE (OHS): 8.5 % (ref 18–48)
RELATIVE MONOCYTE (OHS): 7.7 % (ref 4–15)
RELATIVE NEUTROPHIL (OHS): 83 % (ref 38–73)
RETICS/RBC NFR AUTO: 4.1 % (ref 0.4–2)
RETICS/RBC NFR AUTO: NORMAL %
SODIUM SERPL-SCNC: 135 MMOL/L (ref 136–145)
TIBC SERPL-MCNC: 339 UG/DL (ref 250–450)
TRANSFERRIN SERPL-MCNC: 229 MG/DL (ref 200–375)
WBC # BLD AUTO: 16.24 K/UL (ref 3.9–12.7)

## 2025-04-28 PROCEDURE — 63600175 PHARM REV CODE 636 W HCPCS: Performed by: INTERNAL MEDICINE

## 2025-04-28 PROCEDURE — 25000003 PHARM REV CODE 250: Performed by: INTERNAL MEDICINE

## 2025-04-28 PROCEDURE — 25000242 PHARM REV CODE 250 ALT 637 W/ HCPCS: Performed by: INTERNAL MEDICINE

## 2025-04-28 PROCEDURE — 97530 THERAPEUTIC ACTIVITIES: CPT | Mod: CQ

## 2025-04-28 PROCEDURE — 83540 ASSAY OF IRON: CPT | Performed by: INTERNAL MEDICINE

## 2025-04-28 PROCEDURE — 94640 AIRWAY INHALATION TREATMENT: CPT

## 2025-04-28 PROCEDURE — 97535 SELF CARE MNGMENT TRAINING: CPT

## 2025-04-28 PROCEDURE — 84100 ASSAY OF PHOSPHORUS: CPT | Performed by: INTERNAL MEDICINE

## 2025-04-28 PROCEDURE — 80048 BASIC METABOLIC PNL TOTAL CA: CPT | Performed by: INTERNAL MEDICINE

## 2025-04-28 PROCEDURE — 82728 ASSAY OF FERRITIN: CPT | Performed by: INTERNAL MEDICINE

## 2025-04-28 PROCEDURE — 83735 ASSAY OF MAGNESIUM: CPT | Performed by: INTERNAL MEDICINE

## 2025-04-28 PROCEDURE — 94761 N-INVAS EAR/PLS OXIMETRY MLT: CPT

## 2025-04-28 PROCEDURE — 85025 COMPLETE CBC W/AUTO DIFF WBC: CPT | Performed by: INTERNAL MEDICINE

## 2025-04-28 PROCEDURE — 21400001 HC TELEMETRY ROOM

## 2025-04-28 PROCEDURE — 36415 COLL VENOUS BLD VENIPUNCTURE: CPT | Performed by: INTERNAL MEDICINE

## 2025-04-28 PROCEDURE — 85045 AUTOMATED RETICULOCYTE COUNT: CPT | Performed by: INTERNAL MEDICINE

## 2025-04-28 RX ORDER — IPRATROPIUM BROMIDE AND ALBUTEROL SULFATE 2.5; .5 MG/3ML; MG/3ML
3 SOLUTION RESPIRATORY (INHALATION)
Status: DISCONTINUED | OUTPATIENT
Start: 2025-04-29 | End: 2025-05-01 | Stop reason: HOSPADM

## 2025-04-28 RX ADMIN — IPRATROPIUM BROMIDE AND ALBUTEROL SULFATE 3 ML: 2.5; .5 SOLUTION RESPIRATORY (INHALATION) at 04:04

## 2025-04-28 RX ADMIN — PREDNISONE 40 MG: 20 TABLET ORAL at 08:04

## 2025-04-28 RX ADMIN — LISINOPRIL 40 MG: 20 TABLET ORAL at 08:04

## 2025-04-28 RX ADMIN — RISPERIDONE 1 MG: 1 TABLET, FILM COATED ORAL at 08:04

## 2025-04-28 RX ADMIN — CARVEDILOL 25 MG: 12.5 TABLET, FILM COATED ORAL at 05:04

## 2025-04-28 RX ADMIN — IPRATROPIUM BROMIDE AND ALBUTEROL SULFATE 3 ML: 2.5; .5 SOLUTION RESPIRATORY (INHALATION) at 07:04

## 2025-04-28 RX ADMIN — TAMSULOSIN HYDROCHLORIDE 0.4 MG: 0.4 CAPSULE ORAL at 08:04

## 2025-04-28 RX ADMIN — CARVEDILOL 25 MG: 12.5 TABLET, FILM COATED ORAL at 08:04

## 2025-04-28 RX ADMIN — LABETALOL HYDROCHLORIDE 10 MG: 5 INJECTION INTRAVENOUS at 01:04

## 2025-04-28 RX ADMIN — NIFEDIPINE 60 MG: 30 TABLET, FILM COATED, EXTENDED RELEASE ORAL at 08:04

## 2025-04-28 RX ADMIN — IPRATROPIUM BROMIDE AND ALBUTEROL SULFATE 3 ML: 2.5; .5 SOLUTION RESPIRATORY (INHALATION) at 11:04

## 2025-04-28 NOTE — PLAN OF CARE
Problem: Adult Inpatient Plan of Care  Goal: Plan of Care Review  4/28/2025 0434 by Marci Lu RN  Outcome: Progressing  4/28/2025 0432 by Marci Lu RN  Outcome: Progressing  4/28/2025 0400 by Marci Lu RN  Outcome: Progressing  Goal: Patient-Specific Goal (Individualized)  4/28/2025 0434 by Marci Lu RN  Outcome: Progressing  4/28/2025 0432 by Marci Lu RN  Outcome: Progressing  4/28/2025 0400 by Marci Lu RN  Outcome: Progressing  Goal: Absence of Hospital-Acquired Illness or Injury  4/28/2025 0434 by Marci Lu RN  Outcome: Progressing  4/28/2025 0432 by Marci Lu RN  Outcome: Progressing  Goal: Optimal Comfort and Wellbeing  4/28/2025 0434 by Marci Lu RN  Outcome: Progressing  4/28/2025 0432 by Marci Lu RN  Outcome: Progressing  Goal: Readiness for Transition of Care  4/28/2025 0434 by Marci Lu RN  Outcome: Progressing  4/28/2025 0432 by Marci Lu RN  Outcome: Progressing     Problem: Skin Injury Risk Increased  Goal: Skin Health and Integrity  4/28/2025 0434 by Marci Lu RN  Outcome: Progressing  4/28/2025 0432 by Marci Lu RN  Outcome: Progressing     Problem: Gas Exchange Impaired  Goal: Optimal Gas Exchange  4/28/2025 0434 by Marci Lu RN  Outcome: Progressing  4/28/2025 0432 by Marci Lu RN  Outcome: Progressing

## 2025-04-28 NOTE — PT/OT/SLP PROGRESS
Occupational Therapy   Treatment    Name: Frantz Sigala Jr.  MRN: [id number]  Admitting Diagnosis:  Asthma with acute exacerbation       Recommendations:     Discharge Recommendations: Moderate Intensity Therapy (vs Home with 24/7 assist and Home Health OT and PT.)  Discharge Equipment Recommendations:  bath bench, wheelchair  Barriers to discharge:   (Current functional level)    Assessment:     Frantz Sigala Jr. is a 60 y.o. male with a medical diagnosis of Asthma with acute exacerbation.  He presents with the following performance deficits affecting function: weakness, impaired endurance, impaired self care skills, impaired functional mobility, gait instability, impaired balance, decreased safety awareness, decreased lower extremity function, decreased upper extremity function, decreased coordination, impaired cognition, decreased ROM.     Rehab Prognosis:   Good to return to PLOF ; patient would benefit from acute skilled OT services to address these deficits and reach maximum level of function.       Plan:     Patient to be seen 4 x/week to address the above listed problems via self-care/home management, therapeutic activities, therapeutic exercises  Plan of Care Expires: 05/10/25  Plan of Care Reviewed with: patient, sibling    Subjective     Chief Complaint: Pt making motion that he wanted a breathing treatment and also put his hand on his chest.  HR and Sp O2 monitored throughout tx session.  Patient/Family Comments/goals: Pt's brother present to assist with communicating with pt.    Pain/Comfort:  Pain Rating 1:  (Pt indicating he wants a breathing treatment.  RRT came to room during tx session and gave pt a breathing treatment at end of OT session.)    Objective:     Communicated with: nurseLeandro, prior to session.  Patient found HOB elevated with oxygen, peripheral IV, pulse ox (continuous), telemetry (male external catether bag to suction, brother in room) upon OT entry to room.  Bed pads and gown  wet with urine    General Precautions: Standard, fall, blind, deaf (nonverbal)    Orthopedic Precautions:N/A  Braces: N/A  Respiratory Status: Room air, 2 L O2 via NC donned due to pt's Sp O2 dipping down into high 80's and pt indicating that he felt he needed a breathing treatment by indicating he wanted a mask on his face with hand movements/signals     Occupational Performance:     Bed Mobility:    Supine to sit: Min A     Functional Mobility/Transfers:  Sit to stand: CGA with RW, tactile cues to communicate for pt to stand  Transfers: Min A with RW x 2 persons due to small space and needing assist to navigate RW due to blindness/deafness  Functional Mobility: Min A with RW x 2 persons    Activities of Daily Living:  Sponge Bathing: Mod A   Toileting: Max A, external catheter to suction, pt had BM sitting on BSC, Total A for hygiene  UB Dressing: Mod A for changing gown    AMPAC 6 Click ADL: 14    Treatment & Education:  Role of OT, POC, ADL and ADL mobility training, discharge recs    Patient left up in chair with all lines intact, nurse notified, and brother and Grazyna, RRT present.  Pt getting breathing treatment.    GOALS:   Multidisciplinary Problems       Occupational Therapy Goals          Problem: Occupational Therapy    Goal Priority Disciplines Outcome Interventions   Occupational Therapy Goal     OT, PT/OT Progressing    Description: Goals to be met by: 5/10/2025     Patient will increase functional independence with ADLs by performing:    UE Dressing with Minimal Assistance.  LE Dressing with Moderate Assistance.  Grooming while seated with Stand-by Assistance.  Toileting from bedside commode with Moderate Assistance for hygiene and clothing management.   Toilet transfer to bedside commode with Contact Guard Assistance.                         DME Justifications:  No DME recommended requiring DME justifications    Time Tracking:     OT Date of Treatment: 04/28/25  OT Start Time: 1047  OT Stop Time:  1127  OT Total Time (min): 40 min  Overlap with PT for portions of session due to complex nature of pt and need for increased safety.  Two skilled therapists needed during functional mobility to decrease patient fall risk and decrease risk of caregiver injury.        Billable Minutes:Self Care/Home Management 40    OT/MATT: OT          4/28/2025

## 2025-04-28 NOTE — PLAN OF CARE
Problem: Adult Inpatient Plan of Care  Goal: Plan of Care Review  Outcome: Progressing     Problem: Adult Inpatient Plan of Care  Goal: Patient-Specific Goal (Individualized)  Outcome: Progressing     Problem: Adult Inpatient Plan of Care  Goal: Absence of Hospital-Acquired Illness or Injury  Outcome: Progressing     Problem: Adult Inpatient Plan of Care  Goal: Readiness for Transition of Care  Outcome: Progressing     Problem: Wound  Goal: Optimal Functional Ability  Outcome: Progressing     Problem: Wound  Goal: Improved Oral Intake  Outcome: Progressing     Problem: Wound  Goal: Optimal Pain Control and Function  Outcome: Progressing

## 2025-04-28 NOTE — PROGRESS NOTES
[facility] ([address])  Wound Care    Patient Name:  Frantz Sigala Jr.   MRN:  [id number]  Date: 4/28/2025  Diagnosis: Asthma with acute exacerbation    History:     Past Medical History:   Diagnosis Date    Anxiety     Asthma     Schizophrenia        Social History[1]    Precautions:     Allergies as of 04/25/2025 - Reviewed 04/25/2025   Allergen Reaction Noted    Penicillins Other (See Comments) 10/12/2015       WOC Assessment Details/Treatment     Patient known to wound care team from recent admission. Patient is a 60 year old male with PMH deafness, blindness, schizophrenia, CKDIII, chronic diastolic heart failure (Echo 04/08/25 EF 60-65%, grade I diastolic dysfunction), asthma who presented to [facility] 04/25 with worsening dyspnea and decreased PO intake for the past two days. ED workup was notable for leukocytosis with left shift, CXR with increased bilateral infiltrates concerning for pulmonary edema, elevated BNP to 600s, increased WOB and wheezing. He received methylprednisolone 125mg IV x1, albuterol nebulization, furosemide 80mg IV and hospital medicine was contacted for admission.     Upon assessment to posterior left thigh noted almost healed blister. Recommend applying triad paste to affected area BID and PRN. Nursing and MD team notified. Orders placed. Nursing to maintain pressure injury prevention interventions. Wound care will follow.         04/28/25 1205        Wound 04/21/25 0701 Blister(s) Left Thigh #1   Date First Assessed/Time First Assessed: 04/21/25 0701   Present on Original Admission: No  Primary Wound Type: Blister(s)  Side: Left  Location: Thigh  Wound Number: #1  Is this injury device related?: No   Wound Image    Dressing Appearance Open to air   Drainage Amount None   Appearance Pink;Moist   Tissue loss description Full thickness   Red (%), Wound Tissue Color 100 %   Periwound Area Scar tissue   Care Skin Barrier           04/28/2025         [1]   Social  History  Socioeconomic History    Marital status: Single   Tobacco Use    Smoking status: Never    Smokeless tobacco: Never   Substance and Sexual Activity    Alcohol use: No     Alcohol/week: 0.0 standard drinks of alcohol    Drug use: No    Sexual activity: Never   Social History Narrative    Pt is blind and deaf     Social Drivers of Health     Financial Resource Strain: Patient Unable To Answer (4/27/2025)    Overall Financial Resource Strain (CARDIA)     Difficulty of Paying Living Expenses: Patient unable to answer   Food Insecurity: Patient Unable To Answer (4/27/2025)    Hunger Vital Sign     Worried About Running Out of Food in the Last Year: Patient unable to answer     Ran Out of Food in the Last Year: Patient unable to answer   Transportation Needs: Patient Unable To Answer (4/26/2025)    PRAPARE - Transportation     Lack of Transportation (Medical): Patient unable to answer     Lack of Transportation (Non-Medical): Patient unable to answer   Physical Activity: Inactive (4/9/2025)    Exercise Vital Sign     Days of Exercise per Week: 0 days     Minutes of Exercise per Session: 0 min   Stress: Patient Unable To Answer (4/27/2025)    [employer] - Occupational Stress Questionnaire     Feeling of Stress : Patient unable to answer   Housing Stability: Patient Unable To Answer (4/27/2025)    Housing Stability Vital Sign     Unable to Pay for Housing in the Last Year: Patient unable to answer     Number of Times Moved in the Last Year: 0     Homeless in the Last Year: Patient unable to answer

## 2025-04-28 NOTE — PT/OT/SLP PROGRESS
Physical Therapy Treatment    Patient Name:  Frantz Sigala Jr.   MRN:  [id number]    Recommendations:     Discharge Recommendations: Moderate Intensity Therapy (vs home with cont'd 24/7 family assistance)  Discharge Equipment Recommendations: bath bench, wheelchair  Barriers to discharge: None    Assessment:     Frantz Sigala Jr. is a 60 y.o. male admitted with a medical diagnosis of Asthma with acute exacerbation.  He presents with the following impairments/functional limitations: weakness, gait instability, pain, edema, impaired balance, impaired endurance, impaired functional mobility, impaired self care skills, decreased coordination, decreased lower extremity function, decreased safety awareness, decreased upper extremity function.    Supine>sit with Jay  Sit>stand with RW and CGA, from EOB, BSC (x 3 trials)  Bed>BSC with RW and Jay x 2, stand/pivot  Static stand x 3 mins, 1 min with RW and SBA/CGA for paul hygiene  BSC>Chair with RW and Jay x 2, step t/f  Pt demos good mobility, if limited today 2/2 line mgt needs and pt's respiratory status  Rec Moderate Intensity  therapy (vs. Low Intensity Therapy with continued 24/7 family assistance)    Rehab Prognosis: Good; patient would benefit from acute skilled PT services to address these deficits and reach maximum level of function.    Recent Surgery: * No surgery found *      Plan:     During this hospitalization, patient to be seen 5 x/week to address the identified rehab impairments via gait training, therapeutic activities, therapeutic exercises, neuromuscular re-education and progress toward the following goals:    Plan of Care Expires:  05/26/25    Subjective     Chief Complaint: pt indicating need for mask (respiratory treatment)  Patient/Family Comments/goals: pt agreeable to therapy, compliant with all requests  Pain/Comfort:  Pain Rating 1: 0/10  Pain Rating Post-Intervention 1: 0/10      Objective:     Communicated with nurse Dickerson prior to  session.  Patient found HOB elevated with peripheral IV, pulse ox (continuous), telemetry (male external urinary catheter to suction) upon PT entry to room.     General Precautions: Standard, blind, deaf, fall, other (see comments) (non verbal)  Orthopedic Precautions: N/A  Braces: N/A  Respiratory Status: Room air     Functional Mobility:  Bed Mobility:     Supine to Sit: minimum assistance  Transfers:     Sit to Stand:  contact guard assistance with rolling walker  BSC to Chair: minimum assistance and of 2 persons with  rolling walker  using  Step Transfer  Toilet Transfer: minimum assistance and of 2 persons with  rolling walker  using  Stand Pivot      AM-PAC 6 CLICK MOBILITY  Turning over in bed (including adjusting bedclothes, sheets and blankets)?: 3  Sitting down on and standing up from a chair with arms (e.g., wheelchair, bedside commode, etc.): 3  Moving from lying on back to sitting on the side of the bed?: 3  Moving to and from a bed to a chair (including a wheelchair)?: 2  Need to walk in hospital room?: 2  Climbing 3-5 steps with a railing?: 2  Basic Mobility Total Score: 15       Treatment & Education:  Static stand x 3 mins, 1 min with RW and SBA/CGA for paul hygiene  Static sitting on BSC x 15 mins, some self-care performed with OT assist    Patient left up in chair with all lines intact, call button in reach, nurse Tuan notified, and respiratory therapist and brother present..    GOALS:   Multidisciplinary Problems       Physical Therapy Goals          Problem: Physical Therapy    Goal Priority Disciplines Outcome Interventions   Physical Therapy Goal     PT, PT/OT Progressing    Description: Goals to be met by: 2025    Patient will increase functional independence with mobility by performin. Sit<>stand with SBA with RW.  2. Gait x 40 feet with RW with Min A.  3. Supine<>sit with CGA.                           DME Justifications:  No DME recommended requiring DME justifications    Time  Tracking:     PT Received On: 04/28/25  PT Start Time: 1043     PT Stop Time: 1126  PT Total Time (min): 43 min     Billable Minutes: Therapeutic Activity 43  Co-treat with OT due to complex nature of patient, to insure patient safety, and to prevent injury to patient and caregivers, requiring intervention of two skilled therapists.      Treatment Type: Treatment  PT/PTA: PTA     Number of PTA visits since last PT visit: 1 04/28/2025

## 2025-04-28 NOTE — NURSING
0640--- Call from LAB stated that Patient H/H :8.0 / 25.3, and yesterday H/H was 6.3/ 19.3. LAB wanted to know if this is the right patient labs, and if I wanted the labs to be sent through. Informed LAB that I will contact the provider and call back. Notified Attending MD about the issue. Orders given to recollect all AM labs.    0740--- Call placed to LAB, and notified that all patient AM labs will be recollected per MD orders.    0804--- Lab notified by Charge Nurse, Yusra Mccormack. She spoke with Joann in LAB Client Services to get codes for lab recollects.

## 2025-04-28 NOTE — ASSESSMENT & PLAN NOTE
- At baseline.  - Continue risperidone 1mg PO qHS.   Health Maintenance Due   Topic Date Due   • Shingles Vaccine (1 of 2) Never done       Patient is due for topics as listed above but is not proceeding with Immunization(s) Shingles at this time.

## 2025-04-28 NOTE — PROGRESS NOTES
[facility] ([address] Medicine  Progress Note    Patient Name: Frantz Sigala Jr.  MRN: [id number]  Patient Class: IP- Inpatient   Admission Date: 4/25/2025  Length of Stay: 3 days  Attending Physician: LALO Quesada MD  Primary Care Provider: Zachary Ramos MD        Subjective     Principal Problem:Asthma with acute exacerbation        HPI:  Mr. Sigala is a 60/M with PMH deafness, blindness, schizophrenia, CKDIII, chronic diastolic heart failure (Echo 04/08/25 EF 60-65%, grade I diastolic dysfunction), asthma who presented to [facility] 04/25 with worsening dyspnea and decreased PO intake for the past two days. History obtained from the patient's family members as he is unable to communicate. He was admitted from 04/08-04/23 with syncope and colitis initially. During his hospitalization he had intermittent fevers, tachycardia and hypertension, had V/Q scan demonstrating intermediate probability for PE and was started on anticoagulation, and had hyponatremia prompting discontinuation of HCTZ and furosemide. Afterward, family noted he had decrease in appetite, increasing leg swelling, SOB and wheezing. He did not have fevers at home, but did have diaphoresis. Additionally he had loose stool in the setting of increased stool softener use after discharge. A family friend came to visit 04/25 and used a pulse oximeter while he felt SOB and found his oxygen level to be 77%. EMS was contacted and he received nebulization and was brought to ED for further evaluation. ED workup was notable for leukocytosis with left shift, CXR with increased bilateral infiltrates concerning for pulmonary edema, elevated BNP to 600s, increased WOB and wheezing. He received methylprednisolone 125mg IV x1, albuterol nebulization, furosemide 80mg IV and hospital medicine was contacted for admission.    Overview/Hospital Course:  Admitted with SOB, peripheral edema concerning for asthma exacerbation and  volume overload. Diuresis, steroids, nebulizations initiated. Hgb decreased and apixaban held, no overt signs of bleeding noted.    Interval History: No acute events overnight. Respiratory status improving gradually. Hgb better today, no overt evidence of bleeding. Discussed with family at bedside.    Review of Systems   Unable to perform ROS: Patient nonverbal     Objective:     Vital Signs (Most Recent):  Temp: 97.8 °F (36.6 °C) (04/28/25 2330)  Pulse: 93 (04/28/25 2330)  Resp: 17 (04/28/25 2330)  BP: (!) 147/72 (04/28/25 2330)  SpO2: (!) 94 % (04/28/25 2330) Vital Signs (24h Range):  Temp:  [97.6 °F (36.4 °C)-98 °F (36.7 °C)] 97.8 °F (36.6 °C)  Pulse:  [] 93  Resp:  [17-22] 17  SpO2:  [94 %-100 %] 94 %  BP: (147-199)/(68-93) 147/72     Weight: 100.7 kg (222 lb 0.1 oz)  Body mass index is 34.77 kg/m².    Intake/Output Summary (Last 24 hours) at 4/28/2025 2358  Last data filed at 4/28/2025 2000  Gross per 24 hour   Intake 780 ml   Output 1800 ml   Net -1020 ml         Physical Exam  Vitals and nursing note reviewed.   Constitutional:       General: He is not in acute distress.     Appearance: He is well-developed.   HENT:      Head: Normocephalic and atraumatic.   Eyes:      General:         Right eye: No discharge.         Left eye: No discharge.   Cardiovascular:      Rate and Rhythm: Normal rate.      Pulses: Normal pulses.   Pulmonary:      Comments: Bilateral expiratory wheezes. No noted crackles.  Abdominal:      Palpations: Abdomen is soft.      Tenderness: There is no abdominal tenderness.   Musculoskeletal:         General: Normal range of motion.      Right lower leg: Edema present.      Left lower leg: Edema present.   Skin:     General: Skin is warm and dry.   Neurological:      Mental Status: He is alert. Mental status is at baseline.           Significant Labs:   CBC:  Recent Labs   Lab 04/27/25  0655 04/27/25  1104 04/28/25  0837   WBC 9.97 13.00* 16.24*   HGB 6.5* 6.3* 8.4*   HCT 20.9* 19.3*  26.5*   * 601* 830*   LYMPH 0.68*  6.8*  --  1.38  8.5*   MONO 5.0  0.50  --  7.7  1.25*   EOS 0.0  0.00  --  0.0  0.00   CMP:  Recent Labs   Lab 04/27/25  0655 04/28/25  0523 04/28/25  0837    133* 135*   K 4.2 5.1 4.2    100 98   CO2 24 21* 27   BUN 65* 71* 69*   CREATININE 2.4* 2.3* 2.2*   CALCIUM 8.8 8.6* 9.3   MG 1.7 1.7 1.6   PHOS 2.7 3.1 2.9   ANIONGAP 12 12 10      Significant Imaging: I have reviewed and interpreted all pertinent imaging results/findings within the past 24 hours.        Assessment & Plan  Asthma with acute exacerbation  Leukocytosis  Acute on chronic diastolic heart failure  Essential hypertension  - Acute respiratory distress with reported SpO2 to 77% at home prior to EMS intervention.  - In ED, placed on NRB, diuresis initiated as well as steroids and nebulizations with significant improvement in saturations. Wean supplemental O2 as tolerated.  - Likely a combination of acute asthma exacerbation with acute volume overload with diastolic dysfunction and recent discontinuation of furosemide/HCTZ contributing to his worsening respiratory status.  - Appears to be approaching euvolemia with improvement in respiratory status though still with some peripheral edema. Holding further furosemide IV; continue strict intake/output, daily weights.  - Continue albuterol-ipratropium 2.5-0.5mg inhaled as q6hr wake, q4hr PRN. Continue steroids with prednisone 40mg PO daily.  - Continue PT/OT given debility.  CKD (chronic kidney disease) stage 3, GFR 30-59 ml/min  - Improving from recent elevation.  - Avoid nephrotoxins, renally dose medications.  - Monitor closely in setting of diuresis.  Acute pulmonary embolism  Anemia  - Anemia likely of chronic disease with contributions from CKD, small volume losses with blood draws over admissions.  - No evidence of acute losses; will hold apixaban 5mg PO BID for time being while evaluating further.  - Discussed with family; as they are  J[employer]es, they would not be interested in blood products.  - Check iron studies, reticulocyte count, ferritin; dependent on results likely consider initiation of erythropoetin, iron sucrose infusion.  Schizophrenia  Deaf, bilateral  Blind  - At baseline.  - Continue risperidone 1mg PO qHS.    VTE Risk Mitigation (From admission, onward)           Ordered     IP VTE HIGH RISK PATIENT  Once         04/25/25 1454                    Discharge Planning   JAQUAN: 5/1/2025     Code Status: Full Code   Medical Readiness for Discharge Date:   Discharge Plan A: Home Health                Please place Justification for DME        D Sunny Quesada MD  Department of Hospital Medicine   [facility] ([address])

## 2025-04-28 NOTE — ASSESSMENT & PLAN NOTE
- Acute respiratory distress with reported SpO2 to 77% at home prior to EMS intervention.  - In ED, placed on NRB, diuresis initiated as well as steroids and nebulizations with significant improvement in saturations. Wean supplemental O2 as tolerated.  - Likely a combination of acute asthma exacerbation with acute volume overload with diastolic dysfunction and recent discontinuation of furosemide/HCTZ contributing to his worsening respiratory status.  - Appears to be approaching euvolemia with improvement in respiratory status though still with some peripheral edema. Holding further furosemide IV; continue strict intake/output, daily weights.  - Continue albuterol-ipratropium 2.5-0.5mg inhaled as q6hr wake, q4hr PRN. Continue steroids with prednisone 40mg PO daily.  - Continue PT/OT given debility.

## 2025-04-29 PROCEDURE — 25000003 PHARM REV CODE 250: Performed by: INTERNAL MEDICINE

## 2025-04-29 PROCEDURE — 94640 AIRWAY INHALATION TREATMENT: CPT

## 2025-04-29 PROCEDURE — 97535 SELF CARE MNGMENT TRAINING: CPT

## 2025-04-29 PROCEDURE — 63600175 PHARM REV CODE 636 W HCPCS: Performed by: INTERNAL MEDICINE

## 2025-04-29 PROCEDURE — 94761 N-INVAS EAR/PLS OXIMETRY MLT: CPT

## 2025-04-29 PROCEDURE — 21400001 HC TELEMETRY ROOM

## 2025-04-29 PROCEDURE — 97530 THERAPEUTIC ACTIVITIES: CPT | Mod: CQ

## 2025-04-29 PROCEDURE — 25000242 PHARM REV CODE 250 ALT 637 W/ HCPCS: Performed by: INTERNAL MEDICINE

## 2025-04-29 PROCEDURE — 97116 GAIT TRAINING THERAPY: CPT | Mod: CQ

## 2025-04-29 RX ADMIN — IPRATROPIUM BROMIDE AND ALBUTEROL SULFATE 3 ML: 2.5; .5 SOLUTION RESPIRATORY (INHALATION) at 08:04

## 2025-04-29 RX ADMIN — CARVEDILOL 25 MG: 12.5 TABLET, FILM COATED ORAL at 05:04

## 2025-04-29 RX ADMIN — IPRATROPIUM BROMIDE AND ALBUTEROL SULFATE 3 ML: 2.5; .5 SOLUTION RESPIRATORY (INHALATION) at 03:04

## 2025-04-29 RX ADMIN — RISPERIDONE 1 MG: 1 TABLET, FILM COATED ORAL at 09:04

## 2025-04-29 RX ADMIN — NIFEDIPINE 60 MG: 30 TABLET, FILM COATED, EXTENDED RELEASE ORAL at 08:04

## 2025-04-29 RX ADMIN — CARVEDILOL 25 MG: 12.5 TABLET, FILM COATED ORAL at 08:04

## 2025-04-29 RX ADMIN — IPRATROPIUM BROMIDE AND ALBUTEROL SULFATE 3 ML: 2.5; .5 SOLUTION RESPIRATORY (INHALATION) at 07:04

## 2025-04-29 RX ADMIN — PREDNISONE 40 MG: 20 TABLET ORAL at 08:04

## 2025-04-29 RX ADMIN — LISINOPRIL 40 MG: 20 TABLET ORAL at 08:04

## 2025-04-29 RX ADMIN — TAMSULOSIN HYDROCHLORIDE 0.4 MG: 0.4 CAPSULE ORAL at 08:04

## 2025-04-29 NOTE — HOSPITAL COURSE
Admitted with SOB, and peripheral edema. Admitted with combination of asthma exacerbation and volume overload. Diuresis, steroids, nebulizations initiated. Hgb decreased and apixaban held, no overt signs of bleeding noted. Gave dose of IV Fe and Epo (patient is a Jehovah's Witnesses). Respiratory status improved to baseline with volume status corrected and wheezing resolved. Patient discharged on PO lasix to be used PRN. NEBs as needed. Arranged to discharge to SNF.

## 2025-04-29 NOTE — ASSESSMENT & PLAN NOTE
- Acute respiratory distress with reported SpO2 to 77% at home prior to EMS intervention.  - In ED, placed on NRB, diuresis initiated as well as steroids and nebulizations with significant improvement in saturations. Wean supplemental O2 as tolerated.  - Likely a combination of acute asthma exacerbation with acute volume overload with diastolic dysfunction and recent discontinuation of furosemide/HCTZ contributing to his worsening respiratory status.  - Appears to be approaching euvolemia with improvement in respiratory status though still with some peripheral edema. Holding further furosemide IV; continue strict intake/output, daily weights.  - Continue albuterol-ipratropium 2.5-0.5mg inhaled as q6hr wake, q4hr PRN. Continue steroids with prednisone 40mg PO daily.  - Continue PT/OT given debility.  - Family agreeable for SNF

## 2025-04-29 NOTE — PLAN OF CARE
Problem: Adult Inpatient Plan of Care  Goal: Patient-Specific Goal (Individualized)  Outcome: Progressing     Problem: Adult Inpatient Plan of Care  Goal: Absence of Hospital-Acquired Illness or Injury  Outcome: Progressing     Problem: Adult Inpatient Plan of Care  Goal: Optimal Comfort and Wellbeing  Outcome: Progressing     Problem: Adult Inpatient Plan of Care  Goal: Readiness for Transition of Care  Outcome: Progressing     Problem: Wound  Goal: Optimal Coping  Outcome: Progressing     Problem: Wound  Goal: Optimal Functional Ability  Outcome: Progressing     Problem: Wound  Goal: Absence of Infection Signs and Symptoms  Outcome: Progressing

## 2025-04-29 NOTE — PT/OT/SLP PROGRESS
Physical Therapy Treatment    Patient Name:  Frantz Sigala Jr.   MRN:  [id number]    Recommendations:     Discharge Recommendations: Moderate Intensity Therapy  Discharge Equipment Recommendations: bath bench, wheelchair  Barriers to discharge: None    Assessment:     Frantz Sigala Jr. is a 60 y.o. male admitted with a medical diagnosis of Asthma with acute exacerbation.  He presents with the following impairments/functional limitations: weakness, gait instability, impaired balance, impaired cognition, impaired endurance, impaired functional mobility, impaired self care skills, decreased coordination, decreased lower extremity function, decreased ROM, decreased safety awareness, decreased upper extremity function.    Supine>sit with Jay  Sit>stand with RW and CGA, from EOB, BSC  Bed>BSC with RW and Jay, stand/pivot  Amb 15' with RW and Jay 2/2 visual impairment  Static stand x 3 mins with RW and CGA  SpO2 98% at entry, HR 85; unable to monitor VSS during activity (monitor not operating)  Pt performing gait and transfers with CGA/Jay 2/2 unfamiliar environment and visual impairment; no respiratory distress  Rec Moderate Intensity Therapy vs. Low Intensity therapy with cont'd 24/7 family assistance    Rehab Prognosis: Good; patient would benefit from acute skilled PT services to address these deficits and reach maximum level of function.    Recent Surgery: * No surgery found *      Plan:     During this hospitalization, patient to be seen 5 x/week to address the identified rehab impairments via gait training, therapeutic activities, therapeutic exercises, neuromuscular re-education and progress toward the following goals:    Plan of Care Expires:  05/26/25    Subjective     Chief Complaint: none indicated  Patient/Family Comments/goals: sister present and assisting  Pain/Comfort:  Pain Rating 1: 0/10  Pain Rating Post-Intervention 1: 0/10      Objective:     Communicated with nurse Dickerson prior to session.   Patient found HOB elevated with peripheral IV, pulse ox (continuous), telemetry, PureWick upon PT entry to room.     General Precautions: Standard, blind, deaf, fall, other (see comments) (non verbal)  Orthopedic Precautions: N/A  Braces: N/A  Respiratory Status: Room air     Functional Mobility:  Bed Mobility:     Supine to Sit: minimum assistance  Transfers:     Sit to Stand:  contact guard assistance with rolling walker  Toilet Transfer: minimum assistance with  rolling walker  using  Stand Pivot  Gait: 15' with RW and Jay 2/2 visual impairment      AM-PAC 6 CLICK MOBILITY  Turning over in bed (including adjusting bedclothes, sheets and blankets)?: 3  Sitting down on and standing up from a chair with arms (e.g., wheelchair, bedside commode, etc.): 3  Moving from lying on back to sitting on the side of the bed?: 3  Moving to and from a bed to a chair (including a wheelchair)?: 3  Need to walk in hospital room?: 3  Climbing 3-5 steps with a railing?: 2  Basic Mobility Total Score: 17       Treatment & Education:  Static stand x 3 mins with RW and CGA for paul hygiene  Gait/transfer training as noted    Patient left up in chair with all lines intact, call button in reach, nurse Tuan notified, and sister present..    GOALS:   Multidisciplinary Problems       Physical Therapy Goals          Problem: Physical Therapy    Goal Priority Disciplines Outcome Interventions   Physical Therapy Goal     PT, PT/OT Progressing    Description: Goals to be met by: 2025    Patient will increase functional independence with mobility by performin. Sit<>stand with SBA with RW.  2. Gait x 40 feet with RW with Min A.  3. Supine<>sit with CGA.                           DME Justifications:  No DME recommended requiring DME justifications    Time Tracking:     PT Received On: 25  PT Start Time: 0832     PT Stop Time: 0855  PT Total Time (min): 23 min     Billable Minutes: Gait Training 11 and Therapeutic Activity  12    Treatment Type: Treatment  PT/PTA: PTA     Number of PTA visits since last PT visit: 2     04/29/2025

## 2025-04-29 NOTE — SUBJECTIVE & OBJECTIVE
Interval History: No acute events overnight. Respiratory status improving gradually. Hgb better today, no overt evidence of bleeding. Discussed with family at bedside.    Review of Systems   Unable to perform ROS: Patient nonverbal     Objective:     Vital Signs (Most Recent):  Temp: 97.8 °F (36.6 °C) (04/28/25 2330)  Pulse: 93 (04/28/25 2330)  Resp: 17 (04/28/25 2330)  BP: (!) 147/72 (04/28/25 2330)  SpO2: (!) 94 % (04/28/25 2330) Vital Signs (24h Range):  Temp:  [97.6 °F (36.4 °C)-98 °F (36.7 °C)] 97.8 °F (36.6 °C)  Pulse:  [] 93  Resp:  [17-22] 17  SpO2:  [94 %-100 %] 94 %  BP: (147-199)/(68-93) 147/72     Weight: 100.7 kg (222 lb 0.1 oz)  Body mass index is 34.77 kg/m².    Intake/Output Summary (Last 24 hours) at 4/28/2025 2358  Last data filed at 4/28/2025 2000  Gross per 24 hour   Intake 780 ml   Output 1800 ml   Net -1020 ml         Physical Exam  Vitals and nursing note reviewed.   Constitutional:       General: He is not in acute distress.     Appearance: He is well-developed.   HENT:      Head: Normocephalic and atraumatic.   Eyes:      General:         Right eye: No discharge.         Left eye: No discharge.   Cardiovascular:      Rate and Rhythm: Normal rate.      Pulses: Normal pulses.   Pulmonary:      Comments: Bilateral expiratory wheezes. No noted crackles.  Abdominal:      Palpations: Abdomen is soft.      Tenderness: There is no abdominal tenderness.   Musculoskeletal:         General: Normal range of motion.      Right lower leg: Edema present.      Left lower leg: Edema present.   Skin:     General: Skin is warm and dry.   Neurological:      Mental Status: He is alert. Mental status is at baseline.           Significant Labs:   CBC:  Recent Labs   Lab 04/27/25  0655 04/27/25  1104 04/28/25  0837   WBC 9.97 13.00* 16.24*   HGB 6.5* 6.3* 8.4*   HCT 20.9* 19.3* 26.5*   * 601* 830*   LYMPH 0.68*  6.8*  --  1.38  8.5*   MONO 5.0  0.50  --  7.7  1.25*   EOS 0.0  0.00  --  0.0  0.00    CMP:  Recent Labs   Lab 04/27/25  0655 04/28/25  0523 04/28/25  0837    133* 135*   K 4.2 5.1 4.2    100 98   CO2 24 21* 27   BUN 65* 71* 69*   CREATININE 2.4* 2.3* 2.2*   CALCIUM 8.8 8.6* 9.3   MG 1.7 1.7 1.6   PHOS 2.7 3.1 2.9   ANIONGAP 12 12 10      Significant Imaging: I have reviewed and interpreted all pertinent imaging results/findings within the past 24 hours.

## 2025-04-29 NOTE — PLAN OF CARE
CM met at bedside with sister Wendy to discuss SNF placement. CM provided with list of accepting facilities. Wendy to review with siblings and provide choices.    04/29/25 0932   Discharge Reassessment   Assessment Type Discharge Planning Reassessment   Did the patient's condition or plan change since previous assessment? No   Discharge Plan discussed with: Sibling   Name(s) and Number(s) Wendy Shipman (Sister)  [phone] (Home Phone)   Communicated JAQUAN with patient/caregiver Date not available/Unable to determine   Discharge Plan A Skilled Nursing Facility   Discharge Plan B Home Health   DME Needed Upon Discharge  none   Transition of Care Barriers None   Why the patient remains in the hospital Requires continued medical care   Post-Acute Status   Discharge Delays (!) Post-Acute Set-up

## 2025-04-29 NOTE — PROGRESS NOTES
[facility] ([address] Medicine  Progress Note    Patient Name: Frantz Sigala Jr.  MRN: [id number]  Patient Class: IP- Inpatient   Admission Date: 4/25/2025  Length of Stay: 4 days  Attending Physician: Brooklyn Wellington MD  Primary Care Provider: Zachary Ramos MD        Subjective     Principal Problem:Asthma with acute exacerbation        HPI:  Mr. Sigala is a 60/M with PMH deafness, blindness, schizophrenia, CKDIII, chronic diastolic heart failure (Echo 04/08/25 EF 60-65%, grade I diastolic dysfunction), asthma who presented to [facility] 04/25 with worsening dyspnea and decreased PO intake for the past two days. History obtained from the patient's family members as he is unable to communicate. He was admitted from 04/08-04/23 with syncope and colitis initially. During his hospitalization he had intermittent fevers, tachycardia and hypertension, had V/Q scan demonstrating intermediate probability for PE and was started on anticoagulation, and had hyponatremia prompting discontinuation of HCTZ and furosemide. Afterward, family noted he had decrease in appetite, increasing leg swelling, SOB and wheezing. He did not have fevers at home, but did have diaphoresis. Additionally he had loose stool in the setting of increased stool softener use after discharge. A family friend came to visit 04/25 and used a pulse oximeter while he felt SOB and found his oxygen level to be 77%. EMS was contacted and he received nebulization and was brought to ED for further evaluation. ED workup was notable for leukocytosis with left shift, CXR with increased bilateral infiltrates concerning for pulmonary edema, elevated BNP to 600s, increased WOB and wheezing. He received methylprednisolone 125mg IV x1, albuterol nebulization, furosemide 80mg IV and hospital medicine was contacted for admission.    Overview/Hospital Course:  Admitted with SOB, peripheral edema concerning for asthma exacerbation and volume  overload. Diuresis, steroids, nebulizations initiated. Hgb decreased and apixaban held, no overt signs of bleeding noted.    Interval History: No acute events overnight. Respiratory status improving and almost back to baseline. Hgb better today, no overt evidence of bleeding. Discussed with family at bedside and via phone.    Review of Systems   Unable to perform ROS: Patient nonverbal     Objective:     Vital Signs (Most Recent):  Temp: 97.5 °F (36.4 °C) (04/29/25 1213)  Pulse: 78 (04/29/25 1213)  Resp: 14 (04/29/25 1213)  BP: (Abnormal) 168/81 (04/29/25 1213)  SpO2: 98 % (04/29/25 1213) Vital Signs (24h Range):  Temp:  [97.4 °F (36.3 °C)-98.7 °F (37.1 °C)] 97.5 °F (36.4 °C)  Pulse:  [67-95] 78  Resp:  [14-18] 14  SpO2:  [94 %-100 %] 98 %  BP: (147-199)/(70-93) 168/81     Weight: 100.7 kg (222 lb 0.1 oz)  Body mass index is 34.77 kg/m².    Intake/Output Summary (Last 24 hours) at 4/29/2025 1444  Last data filed at 4/29/2025 1200  Gross per 24 hour   Intake 360 ml   Output 2300 ml   Net -1940 ml         Physical Exam  Vitals and nursing note reviewed.   Constitutional:       General: He is not in acute distress.     Appearance: He is well-developed. Ill appearance: Chronically.   HENT:      Head: Normocephalic and atraumatic.      Ears:      Comments: Anacusis bilaterally  Eyes:      General:         Right eye: No discharge.         Left eye: No discharge.      Conjunctiva/sclera: Conjunctivae normal.      Comments: Corneal opacity bilaterally, blind   Cardiovascular:      Rate and Rhythm: Normal rate and regular rhythm.      Pulses: Normal pulses.   Pulmonary:      Effort: Pulmonary effort is normal. No respiratory distress.      Comments: Bilateral expiratory wheezes. No noted crackles.  Abdominal:      Palpations: Abdomen is soft.      Tenderness: There is no abdominal tenderness. There is no guarding or rebound.   Genitourinary:     Comments: External catheter in place with clear urine.  Musculoskeletal:          General: Normal range of motion.      Right lower leg: Edema present.      Left lower leg: Edema present.   Skin:     General: Skin is warm and dry.   Neurological:      Mental Status: He is alert and easily aroused. Mental status is at baseline.           Significant Labs:   CBC:  Recent Labs   Lab 04/27/25  0655 04/27/25  1104 04/28/25  0837   WBC 9.97 13.00* 16.24*   HGB 6.5* 6.3* 8.4*   HCT 20.9* 19.3* 26.5*   * 601* 830*   LYMPH 0.68*  6.8*  --  1.38  8.5*   MONO 5.0  0.50  --  7.7  1.25*   EOS 0.0  0.00  --  0.0  0.00   CMP:  Recent Labs   Lab 04/27/25  0655 04/28/25  0523 04/28/25  0837    133* 135*   K 4.2 5.1 4.2    100 98   CO2 24 21* 27   BUN 65* 71* 69*   CREATININE 2.4* 2.3* 2.2*   * 86 88   CALCIUM 8.8 8.6* 9.3   MG 1.7 1.7 1.6   PHOS 2.7 3.1 2.9   ANIONGAP 12 12 10      Significant Imaging: I have reviewed and interpreted all pertinent imaging results/findings within the past 24 hours.      Assessment & Plan  Asthma with acute exacerbation  Leukocytosis  Acute on chronic diastolic heart failure  Essential hypertension  - Acute respiratory distress with reported SpO2 to 77% at home prior to EMS intervention.  - In ED, placed on NRB, diuresis initiated as well as steroids and nebulizations with significant improvement in saturations. Wean supplemental O2 as tolerated.  - Likely a combination of acute asthma exacerbation with acute volume overload with diastolic dysfunction and recent discontinuation of furosemide/HCTZ contributing to his worsening respiratory status.  - Appears to be approaching euvolemia with improvement in respiratory status though still with some peripheral edema. Holding further furosemide IV; continue strict intake/output, daily weights.  - Continue albuterol-ipratropium 2.5-0.5mg inhaled as q6hr wake, q4hr PRN. Continue steroids with prednisone 40mg PO daily.  - Continue PT/OT given debility.  - Family agreeable for SNF  CKD (chronic kidney disease)  stage 3, GFR 30-59 ml/min  - Improving from recent elevation.  - Avoid nephrotoxins, renally dose medications.  - Monitor closely in setting of diuresis.  Acute pulmonary embolism  Anemia  - Anemia likely of chronic disease with contributions from CKD, small volume losses with blood draws over admissions.  - No evidence of acute losses; will hold apixaban 5mg PO BID for time being while evaluating further.  - Discussed with family; as they are Jehov[employer]'s Witnesses, they would not be interested in blood products.  - Check iron studies, reticulocyte count, ferritin; dependent on results likely consider initiation of erythropoetin, iron sucrose infusion.  Schizophrenia  Deaf, bilateral  Blind  - At baseline.  - Continue risperidone 1mg PO qHS.  VTE Risk Mitigation (From admission, onward)           Ordered     IP VTE HIGH RISK PATIENT  Once         04/25/25 [id number]                      Brooklyn Wellington MD  Department of Hospital Medicine   [facility] - Med S[facility] ([address])

## 2025-04-29 NOTE — PLAN OF CARE
Problem: Adult Inpatient Plan of Care  Goal: Plan of Care Review  Outcome: Progressing  Goal: Patient-Specific Goal (Individualized)  Outcome: Progressing  Goal: Absence of Hospital-Acquired Illness or Injury  Outcome: Progressing  Goal: Optimal Comfort and Wellbeing  Outcome: Progressing     Problem: Gas Exchange Impaired  Goal: Optimal Gas Exchange  Outcome: Progressing     POC reviewed with patient and family. All questions and concerns addressed with family. Fall/safety precautions implemented and maintained. Condom catheter care performed. Continuous O2 monitoring in progress. 1.5L FR continued. No acute events noted this shift. Please see flowsheet for full assessment and vitals. Bed locked in lowest position. Side rails up x2. Call bell within reach.

## 2025-04-29 NOTE — PT/OT/SLP PROGRESS
"Occupational Therapy   Treatment    Name: Frantz Sigala Jr.  MRN: [id number]  Admitting Diagnosis:  Asthma with acute exacerbation       Recommendations:     Discharge Recommendations: Moderate Intensity Therapy or 24/7 assist at home with Low Intensity Therapy  Discharge Equipment Recommendations:  bath bench, wheelchair  Barriers to discharge:   (Current functional level)    Assessment:     Frantz Sigala Jr. is a 60 y.o. male with a medical diagnosis of Asthma with acute exacerbation.  He presents with the following performance deficits affecting function: weakness, impaired endurance, impaired self care skills, gait instability, impaired functional mobility, impaired sensation, impaired balance, impaired cognition, decreased coordination, decreased lower extremity function, decreased upper extremity function, decreased safety awareness, impaired fine motor, impaired skin, edema, impaired cardiopulmonary response to activity, decreased ROM.     Rehab Prognosis:  Fair; patient would benefit from acute skilled OT services to address these deficits and reach maximum level of function.       Plan:     Patient to be seen 4 x/week to address the above listed problems via self-care/home management, therapeutic activities, therapeutic exercises  Plan of Care Expires: 05/10/25  Plan of Care Reviewed with: patient, sibling    Subjective     Chief Complaint: Pt making some grunting type noises indicating some frustration/impatience, it seemed when wanting to get off BSC, indicating he was finished with toileting,  which sister agreed.  Sister stating that pt gets impatient at times.   Patient/Family Comments/goals: Pt's sister stating that pt's sisters/brothers have their own "sign" they use with pt to indicate what person is with him.    Pain/Comfort:  Pain Rating 1:  (No objective signs/symptoms of pain.  Sister in room and not reporting that she sees pt in pain.)    Objective:     Communicated with: nurseLeandro " to session.  Leandro asking OT to transfer pt back into bed and reporting that pt has been sitting up in chair since PT session this morning.  Patient found up in chair with peripheral IV, PureWick, telemetry, pulse ox (continuous) (Sister in room) upon OT entry to room.    General Precautions: Standard, blind, deaf, fall (nonverbal)    Orthopedic Precautions:N/A  Braces: N/A  Respiratory Status: Room air     Occupational Performance:     Bed Mobility:    Sit to supine: Mod A      Functional Mobility/Transfers: Pt is deaf and blind, use of tactile cues for directional cues during mobility.   Sit to stand: Min A with RW  Chair to BSC transfer: Min A with RW  BSC to Bed transfer: Min A with RW  Functional Mobility: Min A with RW    Activities of Daily Living:  Toileting: Use of external catheter to suction, use of BSC for having BM, Total A for toilet hygiene      AMPAC 6 Click ADL: 14    Treatment & Education:  Role of OT, POC, ADL and ADL mobility, safety, caregiver education, discharge recs    Patient left right sidelying with all lines intact and PCT Lisa and pt's sister present.  Lisa, PCT applying and male external catheter to suction and agrees to set bed alarm and elevated FOB and HOB.    GOALS:   Multidisciplinary Problems       Occupational Therapy Goals          Problem: Occupational Therapy    Goal Priority Disciplines Outcome Interventions   Occupational Therapy Goal     OT, PT/OT Progressing    Description: Goals to be met by: 5/10/2025     Patient will increase functional independence with ADLs by performing:    UE Dressing with Minimal Assistance.  LE Dressing with Moderate Assistance.  Grooming while seated with Stand-by Assistance.  Toileting from bedside commode with Moderate Assistance for hygiene and clothing management.   Toilet transfer to bedside commode with Contact Guard Assistance.                         DME Justifications:  No DME recommended requiring DME justifications    Time  Tracking:     OT Date of Treatment: 04/29/25  OT Start Time: 1632  OT Stop Time: 1653  OT Total Time (min): 21 min    Billable Minutes:Self Care/Home Management 21    OT/MATT: OT          4/29/2025

## 2025-04-29 NOTE — SUBJECTIVE & OBJECTIVE
Interval History: No acute events overnight. Respiratory status improving and almost back to baseline. Hgb better today, no overt evidence of bleeding. Discussed with family at bedside and via phone.    Review of Systems   Unable to perform ROS: Patient nonverbal     Objective:     Vital Signs (Most Recent):  Temp: 97.5 °F (36.4 °C) (04/29/25 1213)  Pulse: 78 (04/29/25 1213)  Resp: 14 (04/29/25 1213)  BP: (Abnormal) 168/81 (04/29/25 1213)  SpO2: 98 % (04/29/25 1213) Vital Signs (24h Range):  Temp:  [97.4 °F (36.3 °C)-98.7 °F (37.1 °C)] 97.5 °F (36.4 °C)  Pulse:  [67-95] 78  Resp:  [14-18] 14  SpO2:  [94 %-100 %] 98 %  BP: (147-199)/(70-93) 168/81     Weight: 100.7 kg (222 lb 0.1 oz)  Body mass index is 34.77 kg/m².    Intake/Output Summary (Last 24 hours) at 4/29/2025 1444  Last data filed at 4/29/2025 1200  Gross per 24 hour   Intake 360 ml   Output 2300 ml   Net -1940 ml         Physical Exam  Vitals and nursing note reviewed.   Constitutional:       General: He is not in acute distress.     Appearance: He is well-developed. Ill appearance: Chronically.   HENT:      Head: Normocephalic and atraumatic.      Ears:      Comments: Anacusis bilaterally  Eyes:      General:         Right eye: No discharge.         Left eye: No discharge.      Conjunctiva/sclera: Conjunctivae normal.      Comments: Corneal opacity bilaterally, blind   Cardiovascular:      Rate and Rhythm: Normal rate and regular rhythm.      Pulses: Normal pulses.   Pulmonary:      Effort: Pulmonary effort is normal. No respiratory distress.      Comments: Bilateral expiratory wheezes. No noted crackles.  Abdominal:      Palpations: Abdomen is soft.      Tenderness: There is no abdominal tenderness. There is no guarding or rebound.   Genitourinary:     Comments: External catheter in place with clear urine.  Musculoskeletal:         General: Normal range of motion.      Right lower leg: Edema present.      Left lower leg: Edema present.   Skin:      General: Skin is warm and dry.   Neurological:      Mental Status: He is alert and easily aroused. Mental status is at baseline.           Significant Labs:   CBC:  Recent Labs   Lab 04/27/25  0655 04/27/25  1104 04/28/25  0837   WBC 9.97 13.00* 16.24*   HGB 6.5* 6.3* 8.4*   HCT 20.9* 19.3* 26.5*   * 601* 830*   LYMPH 0.68*  6.8*  --  1.38  8.5*   MONO 5.0  0.50  --  7.7  1.25*   EOS 0.0  0.00  --  0.0  0.00   CMP:  Recent Labs   Lab 04/27/25  0655 04/28/25  0523 04/28/25  0837    133* 135*   K 4.2 5.1 4.2    100 98   CO2 24 21* 27   BUN 65* 71* 69*   CREATININE 2.4* 2.3* 2.2*   * 86 88   CALCIUM 8.8 8.6* 9.3   MG 1.7 1.7 1.6   PHOS 2.7 3.1 2.9   ANIONGAP 12 12 10      Significant Imaging: I have reviewed and interpreted all pertinent imaging results/findings within the past 24 hours.

## 2025-04-30 ENCOUNTER — TELEPHONE (OUTPATIENT)
Dept: ORTHOPEDICS | Facility: CLINIC | Age: 60
End: 2025-04-30
Payer: MEDICARE

## 2025-04-30 LAB
ABSOLUTE EOSINOPHIL (OHS): 0.01 K/UL
ABSOLUTE MONOCYTE (OHS): 0.9 K/UL (ref 0.3–1)
ABSOLUTE NEUTROPHIL COUNT (OHS): 10.41 K/UL (ref 1.8–7.7)
ANION GAP (OHS): 11 MMOL/L (ref 8–16)
ANION GAP (OHS): NORMAL
BASOPHILS # BLD AUTO: 0.01 K/UL
BASOPHILS NFR BLD AUTO: 0.1 %
BUN SERPL-MCNC: 72 MG/DL (ref 6–20)
BUN SERPL-MCNC: NORMAL MG/DL
CALCIUM SERPL-MCNC: 8.3 MG/DL (ref 8.7–10.5)
CALCIUM SERPL-MCNC: NORMAL MG/DL
CHLORIDE SERPL-SCNC: 101 MMOL/L (ref 95–110)
CHLORIDE SERPL-SCNC: NORMAL MMOL/L
CO2 SERPL-SCNC: 22 MMOL/L (ref 23–29)
CO2 SERPL-SCNC: NORMAL MMOL/L
CREAT SERPL-MCNC: 1.9 MG/DL (ref 0.5–1.4)
CREAT SERPL-MCNC: NORMAL MG/DL
ERYTHROCYTE [DISTWIDTH] IN BLOOD BY AUTOMATED COUNT: 20 % (ref 11.5–14.5)
GFR SERPLBLD CREATININE-BSD FMLA CKD-EPI: 40 ML/MIN/1.73/M2
GFR SERPLBLD CREATININE-BSD FMLA CKD-EPI: NORMAL ML/MIN/{1.73_M2}
GLUCOSE SERPL-MCNC: 90 MG/DL (ref 70–110)
GLUCOSE SERPL-MCNC: NORMAL MG/DL
HCT VFR BLD AUTO: 27.4 % (ref 40–54)
HGB BLD-MCNC: 8 GM/DL (ref 14–18)
IMM GRANULOCYTES # BLD AUTO: 0.06 K/UL (ref 0–0.04)
IMM GRANULOCYTES NFR BLD AUTO: 0.5 % (ref 0–0.5)
LYMPHOCYTES # BLD AUTO: 1.15 K/UL (ref 1–4.8)
MAGNESIUM SERPL-MCNC: 1.7 MG/DL (ref 1.6–2.6)
MAGNESIUM SERPL-MCNC: NORMAL MG/DL
MCH RBC QN AUTO: 24.8 PG (ref 27–31)
MCHC RBC AUTO-ENTMCNC: 29.2 G/DL (ref 32–36)
MCV RBC AUTO: 85 FL (ref 82–98)
NUCLEATED RBC (/100WBC) (OHS): 0 /100 WBC
PHOSPHATE SERPL-MCNC: 3.8 MG/DL (ref 2.7–4.5)
PHOSPHATE SERPL-MCNC: NORMAL MG/DL
PLATELET # BLD AUTO: 531 K/UL (ref 150–450)
PLATELET BLD QL SMEAR: ABNORMAL
PMV BLD AUTO: 8.7 FL (ref 9.2–12.9)
POTASSIUM SERPL-SCNC: 5.1 MMOL/L (ref 3.5–5.1)
POTASSIUM SERPL-SCNC: NORMAL MMOL/L
RBC # BLD AUTO: 3.22 M/UL (ref 4.6–6.2)
RELATIVE EOSINOPHIL (OHS): 0.1 %
RELATIVE LYMPHOCYTE (OHS): 9.2 % (ref 18–48)
RELATIVE MONOCYTE (OHS): 7.2 % (ref 4–15)
RELATIVE NEUTROPHIL (OHS): 82.9 % (ref 38–73)
SODIUM SERPL-SCNC: 134 MMOL/L (ref 136–145)
SODIUM SERPL-SCNC: NORMAL MMOL/L
WBC # BLD AUTO: 12.54 K/UL (ref 3.9–12.7)

## 2025-04-30 PROCEDURE — 63600175 PHARM REV CODE 636 W HCPCS: Performed by: INTERNAL MEDICINE

## 2025-04-30 PROCEDURE — 94761 N-INVAS EAR/PLS OXIMETRY MLT: CPT

## 2025-04-30 PROCEDURE — 25000242 PHARM REV CODE 250 ALT 637 W/ HCPCS: Performed by: INTERNAL MEDICINE

## 2025-04-30 PROCEDURE — 63600175 PHARM REV CODE 636 W HCPCS: Performed by: HOSPITALIST

## 2025-04-30 PROCEDURE — 25000003 PHARM REV CODE 250: Performed by: INTERNAL MEDICINE

## 2025-04-30 PROCEDURE — 84100 ASSAY OF PHOSPHORUS: CPT | Performed by: INTERNAL MEDICINE

## 2025-04-30 PROCEDURE — 85025 COMPLETE CBC W/AUTO DIFF WBC: CPT | Performed by: INTERNAL MEDICINE

## 2025-04-30 PROCEDURE — 21400001 HC TELEMETRY ROOM

## 2025-04-30 PROCEDURE — 94640 AIRWAY INHALATION TREATMENT: CPT

## 2025-04-30 PROCEDURE — 80048 BASIC METABOLIC PNL TOTAL CA: CPT | Performed by: INTERNAL MEDICINE

## 2025-04-30 PROCEDURE — 83735 ASSAY OF MAGNESIUM: CPT | Performed by: INTERNAL MEDICINE

## 2025-04-30 PROCEDURE — 36415 COLL VENOUS BLD VENIPUNCTURE: CPT | Performed by: INTERNAL MEDICINE

## 2025-04-30 RX ADMIN — NIFEDIPINE 60 MG: 30 TABLET, FILM COATED, EXTENDED RELEASE ORAL at 08:04

## 2025-04-30 RX ADMIN — PREDNISONE 40 MG: 20 TABLET ORAL at 08:04

## 2025-04-30 RX ADMIN — IPRATROPIUM BROMIDE AND ALBUTEROL SULFATE 3 ML: 2.5; .5 SOLUTION RESPIRATORY (INHALATION) at 07:04

## 2025-04-30 RX ADMIN — IPRATROPIUM BROMIDE AND ALBUTEROL SULFATE 3 ML: 2.5; .5 SOLUTION RESPIRATORY (INHALATION) at 08:04

## 2025-04-30 RX ADMIN — IRON SUCROSE 200 MG: 20 INJECTION, SOLUTION INTRAVENOUS at 05:04

## 2025-04-30 RX ADMIN — CARVEDILOL 25 MG: 12.5 TABLET, FILM COATED ORAL at 08:04

## 2025-04-30 RX ADMIN — EPOETIN ALFA-EPBX 40000 UNITS: 40000 INJECTION, SOLUTION INTRAVENOUS; SUBCUTANEOUS at 06:04

## 2025-04-30 RX ADMIN — TAMSULOSIN HYDROCHLORIDE 0.4 MG: 0.4 CAPSULE ORAL at 08:04

## 2025-04-30 RX ADMIN — IPRATROPIUM BROMIDE AND ALBUTEROL SULFATE 3 ML: 2.5; .5 SOLUTION RESPIRATORY (INHALATION) at 02:04

## 2025-04-30 RX ADMIN — LISINOPRIL 40 MG: 20 TABLET ORAL at 08:04

## 2025-04-30 RX ADMIN — CARVEDILOL 25 MG: 12.5 TABLET, FILM COATED ORAL at 05:04

## 2025-04-30 RX ADMIN — RISPERIDONE 1 MG: 1 TABLET, FILM COATED ORAL at 08:04

## 2025-04-30 NOTE — PLAN OF CARE
Problem: Adult Inpatient Plan of Care  Goal: Plan of Care Review  Outcome: Progressing     Problem: Adult Inpatient Plan of Care  Goal: Absence of Hospital-Acquired Illness or Injury  Outcome: Progressing     Problem: Adult Inpatient Plan of Care  Goal: Readiness for Transition of Care  Outcome: Progressing     Problem: Wound  Goal: Optimal Functional Ability  Outcome: Progressing     Problem: Wound  Goal: Improved Oral Intake  Outcome: Progressing     Problem: Wound  Goal: Optimal Pain Control and Function  Outcome: Progressing

## 2025-04-30 NOTE — PROGRESS NOTES
[facility] Surg ([address] Medicine  Progress Note    Patient Name: Frantz Sigala Jr.  MRN: [id number]  Patient Class: IP- Inpatient   Admission Date: 4/25/2025  Length of Stay: 5 days  Attending Physician: Brooklyn Wellington MD  Primary Care Provider: Zachary Ramos MD        Subjective     Principal Problem:Asthma with acute exacerbation        HPI:  Mr. Sigala is a 60/M with PMH deafness, blindness, schizophrenia, CKDIII, chronic diastolic heart failure (Echo 04/08/25 EF 60-65%, grade I diastolic dysfunction), asthma who presented to [facility] 04/25 with worsening dyspnea and decreased PO intake for the past two days. History obtained from the patient's family members as he is unable to communicate. He was admitted from 04/08-04/23 with syncope and colitis initially. During his hospitalization he had intermittent fevers, tachycardia and hypertension, had V/Q scan demonstrating intermediate probability for PE and was started on anticoagulation, and had hyponatremia prompting discontinuation of HCTZ and furosemide. Afterward, family noted he had decrease in appetite, increasing leg swelling, SOB and wheezing. He did not have fevers at home, but did have diaphoresis. Additionally he had loose stool in the setting of increased stool softener use after discharge. A family friend came to visit 04/25 and used a pulse oximeter while he felt SOB and found his oxygen level to be 77%. EMS was contacted and he received nebulization and was brought to ED for further evaluation. ED workup was notable for leukocytosis with left shift, CXR with increased bilateral infiltrates concerning for pulmonary edema, elevated BNP to 600s, increased WOB and wheezing. He received methylprednisolone 125mg IV x1, albuterol nebulization, furosemide 80mg IV and hospital medicine was contacted for admission.    Overview/Hospital Course:  Admitted with SOB, peripheral edema concerning for asthma exacerbation and volume  overload. Diuresis, steroids, nebulizations initiated. Hgb decreased and apixaban held, no overt signs of bleeding noted.    Interval History: No acute events overnight. Respiratory status stable on RA and back to baseline. Hgb stable; no overt evidence of bleeding.     Review of Systems   Unable to perform ROS: Patient nonverbal     Objective:     Vital Signs (Most Recent):  Temp: 97.6 °F (36.4 °C) (04/30/25 1153)  Pulse: 79 (04/30/25 1509)  Resp: 16 (04/30/25 1431)  BP: (Abnormal) 165/82 (04/30/25 1230)  SpO2: 100 % (04/30/25 1300) Vital Signs (24h Range):  Temp:  [97.4 °F (36.3 °C)-98.8 °F (37.1 °C)] 97.6 °F (36.4 °C)  Pulse:  [67-96] 79  Resp:  [16-17] 16  SpO2:  [91 %-100 %] 100 %  BP: (147-180)/(67-96) 165/82     Weight: 100.7 kg (222 lb 0.1 oz)  Body mass index is 34.77 kg/m².    Intake/Output Summary (Last 24 hours) at 4/30/2025 1513  Last data filed at 4/30/2025 1232  Gross per 24 hour   Intake 1400 ml   Output 1500 ml   Net -100 ml         Physical Exam  Vitals and nursing note reviewed.   Constitutional:       General: He is not in acute distress.     Appearance: He is well-developed. Ill appearance: Chronically.   HENT:      Head: Normocephalic and atraumatic.      Ears:      Comments: Anacusis bilaterally  Eyes:      General:         Right eye: No discharge.         Left eye: No discharge.      Conjunctiva/sclera: Conjunctivae normal.      Comments: Corneal opacity bilaterally, blind   Cardiovascular:      Rate and Rhythm: Normal rate and regular rhythm.      Pulses: Normal pulses.   Pulmonary:      Effort: Pulmonary effort is normal. No respiratory distress.      Comments: Bilateral expiratory wheezes. No noted crackles.  Abdominal:      Palpations: Abdomen is soft.      Tenderness: There is no abdominal tenderness. There is no guarding or rebound.   Genitourinary:     Comments: External catheter in place with clear urine.  Musculoskeletal:         General: Normal range of motion.      Right lower leg:  Edema present.      Left lower leg: Edema present.   Skin:     General: Skin is warm and dry.   Neurological:      Mental Status: He is alert and easily aroused. Mental status is at baseline.           Significant Labs:   CBC:  Recent Labs   Lab 04/28/25  0837 04/30/25  0420   WBC 16.24* 12.54   HGB 8.4* 8.0*   HCT 26.5* 27.4*   * 531*   LYMPH 1.38  8.5* 1.15  9.2*   MONO 7.7  1.25* 7.2  0.90   EOS 0.0  0.00 0.1  0.01   CMP:  Recent Labs   Lab 04/28/25  0837 04/30/25  0420   * 134*   K 4.2 5.1   CL 98 101   CO2 27 22*   BUN 69* 72*   CREATININE 2.2* 1.9*   GLU 88 90   CALCIUM 9.3 8.3*   MG 1.6 1.7   PHOS 2.9 3.8   ANIONGAP 10 11      Significant Imaging: I have reviewed and interpreted all pertinent imaging results/findings within the past 24 hours.      Assessment & Plan  Asthma with acute exacerbation  Leukocytosis  Acute on chronic diastolic heart failure  Essential hypertension  - Acute respiratory distress with reported SpO2 to 77% at home prior to EMS intervention.  - In ED, placed on NRB, diuresis initiated as well as steroids and nebulizations with significant improvement in saturations. Wean supplemental O2 as tolerated.  - Likely a combination of acute asthma exacerbation with acute volume overload with diastolic dysfunction and recent discontinuation of furosemide/HCTZ contributing to his worsening respiratory status.  - Appears to be approaching euvolemia with improvement in respiratory status though still with some peripheral edema. Holding further furosemide IV; continue strict intake/output, daily weights.  - Continue albuterol-ipratropium 2.5-0.5mg inhaled as q6hr wake, q4hr PRN. Continue steroids with prednisone 40mg PO daily.  - Continue PT/OT given debility.  - Family agreeable for SNF  CKD (chronic kidney disease) stage 3, GFR 30-59 ml/min  - Improving from recent elevation.  - Avoid nephrotoxins, renally dose medications.  - Monitor closely in setting of diuresis.  Acute  pulmonary embolism  Anemia  - Anemia likely of chronic disease with contributions from CKD, small volume losses with blood draws over admissions.  - No evidence of acute losses; will hold apixaban 5mg PO BID for time being while evaluating further.  - Discussed with family; as they are Jehov[employer]'s Witnesses, they would not be interested in blood products.  - Checked iron studies, reticulocyte count, ferritin; will give erythropoetin and iron sucrose infusion.  Schizophrenia  Deaf, bilateral  Blind  - At baseline.  - Continue risperidone 1mg PO qHS.  VTE Risk Mitigation (From admission, onward)           Ordered     IP VTE HIGH RISK PATIENT  Once         04/25/25 3290                      Brooklyn Wellington MD  Department of Hospital Medicine   [facility] ([address])

## 2025-04-30 NOTE — PLAN OF CARE
Problem: Adult Inpatient Plan of Care  Goal: Plan of Care Review  Outcome: Progressing  Goal: Patient-Specific Goal (Individualized)  Outcome: Progressing  Goal: Absence of Hospital-Acquired Illness or Injury  Outcome: Progressing  Goal: Optimal Comfort and Wellbeing  Outcome: Progressing     Problem: Wound  Goal: Absence of Infection Signs and Symptoms  Outcome: Progressing     Problem: Gas Exchange Impaired  Goal: Optimal Gas Exchange  Outcome: Progressing     POC reviewed with patient and family. All questions and concerns addressed with family. Fall/safety precautions implemented and maintained. Purewick/incontinent care performed. 1.5L FR continued. No acute events noted this shift. Please see flowsheet for full assessment and vitals. Bed locked in lowest position. Side rails up x2. Call bell within reach.

## 2025-04-30 NOTE — TELEPHONE ENCOUNTER
Spoke to patient's sister abt rescheduling patient's appt with Sunny. She said her brother is still admitted and that he will hopefully me discharged to SNF in the upcoming week. She said she was unsure how long her would be in SNF, but would like to get something scheduled for after. She elected to book an appt with Sunny for the first week of June, and I told her if he gets discharged sooner and wants to come in to call us and we can reschedule.    Patient's sister agreed to appt date and time and will call if they need to reschedule.     ----- Message from Zee sent at 4/30/2025  1:43 PM CDT -----  Name of Caller: Nature of Call: returning a missed call Best Call Back Number: Additional Information:CALVIN MELISSA sister Wendy calling regarding Patient Advice for aa missed call from the staff about the missed appt, she stated the is admitted into [facility] and is wanting to wait until the PT is discharged to get rescheduled. Wendy will back to reschedule

## 2025-04-30 NOTE — ASSESSMENT & PLAN NOTE
- Anemia likely of chronic disease with contributions from CKD, small volume losses with blood draws over admissions.  - No evidence of acute losses; will hold apixaban 5mg PO BID for time being while evaluating further.  - Discussed with family; as they are Jehov[employer]'s Witnesses, they would not be interested in blood products.  - Checked iron studies, reticulocyte count, ferritin; will give erythropoetin and iron sucrose infusion.

## 2025-04-30 NOTE — SUBJECTIVE & OBJECTIVE
Interval History: No acute events overnight. Respiratory status stable on RA and back to baseline. Hgb stable; no overt evidence of bleeding.     Review of Systems   Unable to perform ROS: Patient nonverbal     Objective:     Vital Signs (Most Recent):  Temp: 97.6 °F (36.4 °C) (04/30/25 1153)  Pulse: 79 (04/30/25 1509)  Resp: 16 (04/30/25 1431)  BP: (Abnormal) 165/82 (04/30/25 1230)  SpO2: 100 % (04/30/25 1300) Vital Signs (24h Range):  Temp:  [97.4 °F (36.3 °C)-98.8 °F (37.1 °C)] 97.6 °F (36.4 °C)  Pulse:  [67-96] 79  Resp:  [16-17] 16  SpO2:  [91 %-100 %] 100 %  BP: (147-180)/(67-96) 165/82     Weight: 100.7 kg (222 lb 0.1 oz)  Body mass index is 34.77 kg/m².    Intake/Output Summary (Last 24 hours) at 4/30/2025 1513  Last data filed at 4/30/2025 1232  Gross per 24 hour   Intake 1400 ml   Output 1500 ml   Net -100 ml         Physical Exam  Vitals and nursing note reviewed.   Constitutional:       General: He is not in acute distress.     Appearance: He is well-developed. Ill appearance: Chronically.   HENT:      Head: Normocephalic and atraumatic.      Ears:      Comments: Anacusis bilaterally  Eyes:      General:         Right eye: No discharge.         Left eye: No discharge.      Conjunctiva/sclera: Conjunctivae normal.      Comments: Corneal opacity bilaterally, blind   Cardiovascular:      Rate and Rhythm: Normal rate and regular rhythm.      Pulses: Normal pulses.   Pulmonary:      Effort: Pulmonary effort is normal. No respiratory distress.      Comments: Bilateral expiratory wheezes. No noted crackles.  Abdominal:      Palpations: Abdomen is soft.      Tenderness: There is no abdominal tenderness. There is no guarding or rebound.   Genitourinary:     Comments: External catheter in place with clear urine.  Musculoskeletal:         General: Normal range of motion.      Right lower leg: Edema present.      Left lower leg: Edema present.   Skin:     General: Skin is warm and dry.   Neurological:      Mental  Status: He is alert and easily aroused. Mental status is at baseline.           Significant Labs:   CBC:  Recent Labs   Lab 04/28/25  0837 04/30/25  0420   WBC 16.24* 12.54   HGB 8.4* 8.0*   HCT 26.5* 27.4*   * 531*   LYMPH 1.38  8.5* 1.15  9.2*   MONO 7.7  1.25* 7.2  0.90   EOS 0.0  0.00 0.1  0.01   CMP:  Recent Labs   Lab 04/28/25  0837 04/30/25  0420   * 134*   K 4.2 5.1   CL 98 101   CO2 27 22*   BUN 69* 72*   CREATININE 2.2* 1.9*   GLU 88 90   CALCIUM 9.3 8.3*   MG 1.6 1.7   PHOS 2.9 3.8   ANIONGAP 10 11      Significant Imaging: I have reviewed and interpreted all pertinent imaging results/findings within the past 24 hours.

## 2025-05-01 VITALS
HEIGHT: 67 IN | SYSTOLIC BLOOD PRESSURE: 164 MMHG | HEART RATE: 88 BPM | WEIGHT: 222 LBS | RESPIRATION RATE: 20 BRPM | BODY MASS INDEX: 34.84 KG/M2 | TEMPERATURE: 98 F | DIASTOLIC BLOOD PRESSURE: 72 MMHG | OXYGEN SATURATION: 100 %

## 2025-05-01 LAB
BACTERIA BLD CULT: NORMAL
BACTERIA BLD CULT: NORMAL
SARS-COV-2 RDRP RESP QL NAA+PROBE: NEGATIVE

## 2025-05-01 PROCEDURE — 25000242 PHARM REV CODE 250 ALT 637 W/ HCPCS: Performed by: INTERNAL MEDICINE

## 2025-05-01 PROCEDURE — U0002 COVID-19 LAB TEST NON-CDC: HCPCS | Performed by: HOSPITALIST

## 2025-05-01 PROCEDURE — 86580 TB INTRADERMAL TEST: CPT | Performed by: HOSPITALIST

## 2025-05-01 PROCEDURE — 25000003 PHARM REV CODE 250: Performed by: INTERNAL MEDICINE

## 2025-05-01 PROCEDURE — 30200315 PPD INTRADERMAL TEST REV CODE 302: Performed by: HOSPITALIST

## 2025-05-01 PROCEDURE — 94640 AIRWAY INHALATION TREATMENT: CPT

## 2025-05-01 PROCEDURE — 25000003 PHARM REV CODE 250: Performed by: HOSPITALIST

## 2025-05-01 PROCEDURE — 63600175 PHARM REV CODE 636 W HCPCS: Performed by: HOSPITALIST

## 2025-05-01 PROCEDURE — 94761 N-INVAS EAR/PLS OXIMETRY MLT: CPT

## 2025-05-01 PROCEDURE — 97116 GAIT TRAINING THERAPY: CPT | Mod: CQ

## 2025-05-01 RX ORDER — RISPERIDONE 1 MG/1
1 TABLET ORAL NIGHTLY
Start: 2025-05-01

## 2025-05-01 RX ORDER — AMMONIUM LACTATE 12 G/100G
LOTION TOPICAL 2 TIMES DAILY
Status: DISCONTINUED | OUTPATIENT
Start: 2025-05-01 | End: 2025-05-01 | Stop reason: HOSPADM

## 2025-05-01 RX ORDER — PREDNISONE 20 MG/1
20 TABLET ORAL DAILY
Status: DISCONTINUED | OUTPATIENT
Start: 2025-05-01 | End: 2025-05-01 | Stop reason: HOSPADM

## 2025-05-01 RX ORDER — NIFEDIPINE 60 MG/1
60 TABLET, EXTENDED RELEASE ORAL DAILY
Status: ON HOLD
Start: 2025-05-02 | End: 2025-05-09 | Stop reason: HOSPADM

## 2025-05-01 RX ORDER — TALC
6 POWDER (GRAM) TOPICAL NIGHTLY PRN
Status: ON HOLD
Start: 2025-05-01 | End: 2025-05-09 | Stop reason: HOSPADM

## 2025-05-01 RX ORDER — FUROSEMIDE 20 MG/1
40 TABLET ORAL DAILY PRN
Status: ON HOLD
Start: 2025-05-01 | End: 2025-05-09 | Stop reason: HOSPADM

## 2025-05-01 RX ORDER — AMMONIUM LACTATE 12 G/100G
LOTION TOPICAL 2 TIMES DAILY
Status: ON HOLD
Start: 2025-05-01 | End: 2025-05-09 | Stop reason: HOSPADM

## 2025-05-01 RX ORDER — ACETAMINOPHEN 325 MG/1
650 TABLET ORAL EVERY 6 HOURS PRN
Status: ON HOLD
Start: 2025-05-01 | End: 2025-05-09 | Stop reason: HOSPADM

## 2025-05-01 RX ORDER — IPRATROPIUM BROMIDE AND ALBUTEROL SULFATE 2.5; .5 MG/3ML; MG/3ML
3 SOLUTION RESPIRATORY (INHALATION)
Status: ON HOLD
Start: 2025-05-01 | End: 2025-05-09

## 2025-05-01 RX ADMIN — TAMSULOSIN HYDROCHLORIDE 0.4 MG: 0.4 CAPSULE ORAL at 09:05

## 2025-05-01 RX ADMIN — PREDNISONE 20 MG: 20 TABLET ORAL at 12:05

## 2025-05-01 RX ADMIN — LISINOPRIL 40 MG: 20 TABLET ORAL at 09:05

## 2025-05-01 RX ADMIN — NIFEDIPINE 60 MG: 30 TABLET, FILM COATED, EXTENDED RELEASE ORAL at 09:05

## 2025-05-01 RX ADMIN — Medication: at 12:05

## 2025-05-01 RX ADMIN — TUBERCULIN PURIFIED PROTEIN DERIVATIVE 5 UNITS: 5 INJECTION, SOLUTION INTRADERMAL at 12:05

## 2025-05-01 RX ADMIN — CARVEDILOL 25 MG: 12.5 TABLET, FILM COATED ORAL at 09:05

## 2025-05-01 RX ADMIN — CARVEDILOL 25 MG: 12.5 TABLET, FILM COATED ORAL at 04:05

## 2025-05-01 RX ADMIN — IPRATROPIUM BROMIDE AND ALBUTEROL SULFATE 3 ML: 2.5; .5 SOLUTION RESPIRATORY (INHALATION) at 07:05

## 2025-05-01 RX ADMIN — IPRATROPIUM BROMIDE AND ALBUTEROL SULFATE 3 ML: 2.5; .5 SOLUTION RESPIRATORY (INHALATION) at 01:05

## 2025-05-01 NOTE — PLAN OF CARE
Problem: Adult Inpatient Plan of Care  Goal: Plan of Care Review  Outcome: Met  Goal: Patient-Specific Goal (Individualized)  Outcome: Met  Goal: Absence of Hospital-Acquired Illness or Injury  Outcome: Met  Goal: Optimal Comfort and Wellbeing  Outcome: Met  Goal: Readiness for Transition of Care  Outcome: Met     Problem: Wound  Goal: Optimal Coping  Outcome: Met  Goal: Optimal Functional Ability  Outcome: Met  Goal: Absence of Infection Signs and Symptoms  Outcome: Met  Goal: Improved Oral Intake  Outcome: Met  Goal: Optimal Pain Control and Function  Outcome: Met  Goal: Skin Health and Integrity  Outcome: Met  Goal: Optimal Wound Healing  Outcome: Met     Problem: Skin Injury Risk Increased  Goal: Skin Health and Integrity  Outcome: Met     Problem: Gas Exchange Impaired  Goal: Optimal Gas Exchange  Outcome: Met

## 2025-05-01 NOTE — NURSING
Patient is discharged. Telemetry removed. IV removed, tip intact. Report given to Dilan at [facility]

## 2025-05-01 NOTE — PLAN OF CARE
Case Management Final Discharge Note    Discharge Disposition: [facility]    New DME ordered / company name: None    Relevant SDOH / Transition of Care Barriers:  None    Person available to provide assistance at home when needed and their contact information:     Scheduled followup appointment: None    Referrals placed: SNF    Transportation: Ambulance         Family educated on discharge services and updated on DC plan. Bedside RN notified, patient clear to discharge from Case Management Perspective.      05/01/25 1437   Final Note   Assessment Type Final Discharge Note   Anticipated Discharge Disposition SNF   Hospital Resources/Appts/Education Provided Provided patient/caregiver with written discharge plan information;Appointments scheduled and added to AVS   Post-Acute Status   Post-Acute Authorization Placement   Post-Acute Placement Status Set-up Complete/Auth obtained   Discharge Delays None known at this time     [facility] ([address])  Discharge Final Note    Primary Care Provider: Zachary Ramos MD    Expected Discharge Date: 5/1/2025    Final Discharge Note (most recent)       Final Note - 05/01/25 1437          Final Note    Assessment Type Final Discharge Note     Anticipated Discharge Disposition Skilled Nursing Facility     Hospital Resources/Appts/Education Provided Provided patient/caregiver with written discharge plan information;Appointments scheduled and added to AVS (P)         Post-Acute Status    Post-Acute Authorization Placement (P)      Post-Acute Placement Status Set-up Complete/Auth obtained (P)      Discharge Delays None known at this time (P)                      Important Message from Medicare             Contact Info       [facility]   Specialty: Nursing Home Agency, SNF Agency    [facility] [id number]   Phone: [phone]       Next Steps: Follow up

## 2025-05-01 NOTE — PLAN OF CARE
NURSING HOME ORDERS    05/01/2025  [facility] ([facility]  [facility] ([address])  [address]  [place] [address]  Dept: [phone]  Loc: [phone]     Admit to Nursing Home:  Skilled Nursing Facility    Diagnoses:  Active Hospital Problems    Diagnosis  POA    *Asthma with acute exacerbation [J45.901]  Yes    Anemia [D64.9]  Yes    Acute pulmonary embolism [I26.99]  Yes     Chronic    Leukocytosis [D72.829]  Yes    Acute on chronic diastolic heart failure [I50.33]  Yes    CKD (chronic kidney disease) stage 3, GFR 30-59 ml/min [N18.30]  Yes     Chronic    Essential hypertension [I10]  Yes     Chronic    Deaf, bilateral [H91.93]  Yes     Chronic    Blind [H54.7]  Yes     Chronic    Schizophrenia [F20.9]  Yes     Chronic      Resolved Hospital Problems   No resolved problems to display.       Patient is homebound due to:  Asthma with acute exacerbation    Allergies:  Review of patient's allergies indicates:   Allergen Reactions    Penicillins Other (See Comments)       Vitals:  Routine    Diet: cardiac diet    Activities:   Activity as tolerated    Goals of Care Treatment Preferences:  Code Status: Full Code      Labs:  None    Nursing Precautions:  Aspiration , Fall, and Pressure ulcer prevention    Consults:   PT to evaluate and treat- 5 times a week and OT to evaluate and treat- 5 times a week     Miscellaneous Care: Routine Skin for Bedridden Patients:  Apply moisture barrier cream to all    CHF Care: Daily Weight with notification of MD/NP of 2lb or > increase in 24 hours    v/s and O2 sat every shift    Oxygen as needed for sats <90%      Medications: Discontinue all previous medication orders, if any. See new list below.     Medication List        Start taking these medications      acetaminophen 325 MG tablet  Commonly known as: TYLENOL  Take 2 tablets (650 mg total) by mouth every 6 (six) hours as needed for Pain or Temperature greater than (Fever 101).      albuterol-ipratropium 2.5 mg-0.5 mg/3 mL nebulizer solution  Commonly known as: DUO-NEB  Take 3 mLs by nebulization every 6 (six) hours while awake. Rescue     ammonium lactate 12 % lotion  Commonly known as: LAC-HYDRIN  Apply topically 2 (two) times daily. To lower legs     furosemide 20 MG tablet  Commonly known as: LASIX  Take 2 tablets (40 mg total) by mouth daily as needed (Worsening swelling, SOB or water weight gain of >2 lbs overnight).     melatonin 3 mg tablet  Commonly known as: MELATIN  Take 2 tablets (6 mg total) by mouth nightly as needed for Insomnia.            Change how you take these medications      apixaban 5 mg Tab  Commonly known as: ELIQUIS  Take 1 tablet (5 mg total) by mouth 2 (two) times daily.  What changed: See the new instructions.     NIFEdipine 60 MG (OSM) 24 hr tablet  Commonly known as: PROCARDIA-XL  Take 1 tablet (60 mg total) by mouth once daily.  Start taking on: May 2, 2025  What changed:   medication strength  how much to take     risperiDONE 1 MG tablet  Commonly known as: RISPERDAL  Take 1 tablet (1 mg total) by mouth nightly.  What changed: when to take this            Continue taking these medications      albuterol 90 mcg/actuation inhaler  Commonly known as: PROVENTIL/VENTOLIN HFA  Inhale 2 puffs into the lungs every 6 (six) hours as needed for Wheezing. Rescue     carvediloL 25 MG tablet  Commonly known as: COREG  Take 1 tablet (25 mg total) by mouth 2 (two) times daily with meals.     fluticasone propionate 50 mcg/actuation nasal spray  Commonly known as: FLONASE  1 spray (50 mcg total) by Each Nostril route once daily.     lisinopriL 40 MG tablet  Commonly known as: PRINIVIL,ZESTRIL  Take 1 tablet (40 mg total) by mouth once daily.     ondansetron 4 MG Tbdl  Commonly known as: ZOFRAN-ODT  Take 1 tablet (4 mg total) by mouth every 6 (six) hours as needed (nausea/vomiting).     polyethylene glycol 17 gram Pwpk  Commonly known as: GLYCOLAX  Take 17 g by mouth 3 (three)  times daily as needed for Constipation.     tamsulosin 0.4 mg Cap  Commonly known as: FLOMAX  Take 1 capsule (0.4 mg total) by mouth once daily.                _________________________________  Brooklyn Wellington MD  05/01/2025

## 2025-05-01 NOTE — PT/OT/SLP PROGRESS
Physical Therapy Treatment    Patient Name:  Frantz Sigala Jr.   MRN:  [id number]    Recommendations:     Discharge Recommendations: Moderate Intensity Therapy  Discharge Equipment Recommendations: bath bench, wheelchair  Barriers to discharge: None    Assessment:     Frantz Sigala Jr. is a 60 y.o. male admitted with a medical diagnosis of Asthma with acute exacerbation.  He presents with the following impairments/functional limitations: weakness, gait instability, edema, impaired balance, impaired cardiopulmonary response to activity, impaired cognition, impaired endurance, impaired fine motor, impaired functional mobility, impaired self care skills, impaired sensation, impaired skin, decreased coordination, decreased lower extremity function, decreased ROM, decreased safety awareness, decreased upper extremity function, visual deficits.    Supine>sit with CGA  Sit>stand with RW and CGA  Amb 25' with RW and CGA, chair follow, decreased gait speed, julieta and B step length  Toilet transfer with RW and CGA, stand/pivot  Pt with good participation, able to increase ambulation distance this visit  Rec Moderate Intensity Therapy    Rehab Prognosis: Good; patient would benefit from acute skilled PT services to address these deficits and reach maximum level of function.    Recent Surgery: * No surgery found *      Plan:     During this hospitalization, patient to be seen 5 x/week to address the identified rehab impairments via gait training, therapeutic activities, therapeutic exercises, neuromuscular re-education and progress toward the following goals:    Plan of Care Expires:  05/26/25    Subjective     Chief Complaint: none stated, pt agreeable to participate  Patient/Family Comments/goals: sister present and hoping pt gets lots of good therapy where he's going  Pain/Comfort:  Pain Rating 1: 0/10  Pain Rating Post-Intervention 1: 0/10      Objective:     Communicated with nurse Kendrick prior to session.  Patient  found HOB elevated with peripheral IV, PureWick, pulse ox (continuous), bed alarm upon PT entry to room.     General Precautions: Standard, blind, deaf, fall, other (see comments) (non verbal)  Orthopedic Precautions: N/A  Braces: N/A  Respiratory Status: Room air     Functional Mobility:  Bed Mobility:     Supine to Sit: contact guard assistance  Transfers:     Sit to Stand:  contact guard assistance with rolling walker  Toilet Transfer: contact guard assistance with  rolling walker  using  Stand Pivot  Gait: 25' with RW and CGA, chair follow, decreased gait speed, julieta and B step length      AM-PAC 6 CLICK MOBILITY  Turning over in bed (including adjusting bedclothes, sheets and blankets)?: 3  Sitting down on and standing up from a chair with arms (e.g., wheelchair, bedside commode, etc.): 3  Moving from lying on back to sitting on the side of the bed?: 3  Moving to and from a bed to a chair (including a wheelchair)?: 3  Need to walk in hospital room?: 3  Climbing 3-5 steps with a railing?: 2  Basic Mobility Total Score: 17       Treatment & Education:  Seated therex BLE: LAQ x 5  Gait and transfer training as noted    Patient left up in chair with all lines intact, call button in reach, and sister present..    GOALS:   Multidisciplinary Problems       Physical Therapy Goals          Problem: Physical Therapy    Goal Priority Disciplines Outcome Interventions   Physical Therapy Goal     PT, PT/OT Progressing    Description: Goals to be met by: 2025    Patient will increase functional independence with mobility by performin. Sit<>stand with SBA with RW.  2. Gait x 40 feet with RW with Min A.  3. Supine<>sit with CGA.                           DME Justifications:  No DME recommended requiring DME justifications    Time Tracking:     PT Received On: 25  PT Start Time: 1055     PT Stop Time: 1113  PT Total Time (min): 18 min     Billable Minutes: Gait Training 18    Treatment Type:  Treatment  PT/PTA: PTA     Number of PTA visits since last PT visit: 3     05/01/2025

## 2025-05-01 NOTE — PLAN OF CARE
Met with patient to review discharge recommendation of SNF and is agreeable to plan    Patient/family provided list of facilities in-network with patient's payor plan. Providers that are owned, operated, or affiliated with [facility] are included on the list.     Notified that referral sent to below listed facilities from in-network list based on proximity to home/family support:     [facility]    Patient/family instructed to identify preference.    Preferred Facility: (if more than 1, listed in order of descending preference)    [facility]      If an additional preferred facility not listed above is identified, additional referral to be sent. If above facilities unable to accept, will send additional referrals to in-network providers.     05/01/25 1016   Post-Acute Status   Post-Acute Authorization Placement   Post-Acute Placement Status Referrals Sent   Patient choice form signed by patient/caregiver List with quality metrics by geographic area provided;List from CMS Compare;List from System Post-Acute Care   Discharge Delays (!) Post-Acute Set-up   Discharge Plan   Discharge Plan A Skilled Nursing Facility   Discharge Plan B Home Health

## 2025-05-02 ENCOUNTER — OUTPATIENT CASE MANAGEMENT (OUTPATIENT)
Dept: ADMINISTRATIVE | Facility: OTHER | Age: 60
End: 2025-05-02
Payer: MEDICARE

## 2025-05-02 NOTE — PROGRESS NOTES
Patient was inpatient from 4/25/25-5/1/25 and then transferred to [facility].  CM will continue to follow.

## 2025-05-05 ENCOUNTER — HOSPITAL ENCOUNTER (INPATIENT)
Facility: OTHER | Age: 60
LOS: 4 days | Discharge: HOSPICE | DRG: 682 | End: 2025-05-09
Attending: EMERGENCY MEDICINE | Admitting: HOSPITALIST
Payer: MEDICARE

## 2025-05-05 ENCOUNTER — TELEPHONE (OUTPATIENT)
Dept: ADMINISTRATIVE | Facility: CLINIC | Age: 60
End: 2025-05-05
Payer: MEDICARE

## 2025-05-05 DIAGNOSIS — D64.9 ACUTE ON CHRONIC ANEMIA: ICD-10-CM

## 2025-05-05 DIAGNOSIS — N17.9 ACUTE RENAL FAILURE SUPERIMPOSED ON STAGE 3 CHRONIC KIDNEY DISEASE, UNSPECIFIED ACUTE RENAL FAILURE TYPE, UNSPECIFIED WHETHER STAGE 3A OR 3B CKD: ICD-10-CM

## 2025-05-05 DIAGNOSIS — N18.9 ACUTE ON CHRONIC RENAL INSUFFICIENCY: Primary | ICD-10-CM

## 2025-05-05 DIAGNOSIS — E87.5 HYPERKALEMIA: ICD-10-CM

## 2025-05-05 DIAGNOSIS — Z87.898 HISTORY OF FEVER: ICD-10-CM

## 2025-05-05 DIAGNOSIS — E86.0 DEHYDRATION: ICD-10-CM

## 2025-05-05 DIAGNOSIS — N18.30 ACUTE RENAL FAILURE SUPERIMPOSED ON STAGE 3 CHRONIC KIDNEY DISEASE, UNSPECIFIED ACUTE RENAL FAILURE TYPE, UNSPECIFIED WHETHER STAGE 3A OR 3B CKD: ICD-10-CM

## 2025-05-05 DIAGNOSIS — N28.9 ACUTE ON CHRONIC RENAL INSUFFICIENCY: Primary | ICD-10-CM

## 2025-05-05 DIAGNOSIS — R33.9 URINARY RETENTION: ICD-10-CM

## 2025-05-05 DIAGNOSIS — D64.9 ANEMIA, UNSPECIFIED TYPE: ICD-10-CM

## 2025-05-05 DIAGNOSIS — R07.9 CHEST PAIN: ICD-10-CM

## 2025-05-05 LAB
ABSOLUTE EOSINOPHIL (OHS): 0.03 K/UL
ABSOLUTE MONOCYTE (OHS): 1.18 K/UL (ref 0.3–1)
ABSOLUTE NEUTROPHIL COUNT (OHS): 14.3 K/UL (ref 1.8–7.7)
ALBUMIN SERPL BCP-MCNC: 2.2 G/DL (ref 3.5–5.2)
ALP SERPL-CCNC: 68 UNIT/L (ref 40–150)
ALT SERPL W/O P-5'-P-CCNC: 32 UNIT/L (ref 10–44)
ANION GAP (OHS): 14 MMOL/L (ref 8–16)
AST SERPL-CCNC: 37 UNIT/L (ref 11–45)
BASOPHILS # BLD AUTO: 0.02 K/UL
BASOPHILS NFR BLD AUTO: 0.1 %
BILIRUB SERPL-MCNC: 0.3 MG/DL (ref 0.1–1)
BUN SERPL-MCNC: 92 MG/DL (ref 6–20)
CALCIUM SERPL-MCNC: 8.6 MG/DL (ref 8.7–10.5)
CHLORIDE SERPL-SCNC: 97 MMOL/L (ref 95–110)
CO2 SERPL-SCNC: 21 MMOL/L (ref 23–29)
CREAT SERPL-MCNC: 4 MG/DL (ref 0.5–1.4)
CTP QC/QA: YES
CTP QC/QA: YES
ERYTHROCYTE [DISTWIDTH] IN BLOOD BY AUTOMATED COUNT: 20.2 % (ref 11.5–14.5)
GFR SERPLBLD CREATININE-BSD FMLA CKD-EPI: 16 ML/MIN/1.73/M2
GLUCOSE SERPL-MCNC: 123 MG/DL (ref 70–110)
HCT VFR BLD AUTO: 23.3 % (ref 40–54)
HGB BLD-MCNC: 7.4 GM/DL (ref 14–18)
IMM GRANULOCYTES # BLD AUTO: 0.1 K/UL (ref 0–0.04)
IMM GRANULOCYTES NFR BLD AUTO: 0.6 % (ref 0–0.5)
LYMPHOCYTES # BLD AUTO: 0.72 K/UL (ref 1–4.8)
MCH RBC QN AUTO: 25.3 PG (ref 27–31)
MCHC RBC AUTO-ENTMCNC: 31.8 G/DL (ref 32–36)
MCV RBC AUTO: 80 FL (ref 82–98)
NUCLEATED RBC (/100WBC) (OHS): 0 /100 WBC
PLATELET # BLD AUTO: 370 K/UL (ref 150–450)
PMV BLD AUTO: 9.2 FL (ref 9.2–12.9)
POC MOLECULAR INFLUENZA A AGN: NEGATIVE
POC MOLECULAR INFLUENZA B AGN: NEGATIVE
POTASSIUM SERPL-SCNC: 6.1 MMOL/L (ref 3.5–5.1)
PROT SERPL-MCNC: 8.3 GM/DL (ref 6–8.4)
RBC # BLD AUTO: 2.92 M/UL (ref 4.6–6.2)
RELATIVE EOSINOPHIL (OHS): 0.2 %
RELATIVE LYMPHOCYTE (OHS): 4.4 % (ref 18–48)
RELATIVE MONOCYTE (OHS): 7.2 % (ref 4–15)
RELATIVE NEUTROPHIL (OHS): 87.5 % (ref 38–73)
SARS-COV-2 RDRP RESP QL NAA+PROBE: NEGATIVE
SODIUM SERPL-SCNC: 132 MMOL/L (ref 136–145)
WBC # BLD AUTO: 16.35 K/UL (ref 3.9–12.7)

## 2025-05-05 PROCEDURE — 12000002 HC ACUTE/MED SURGE SEMI-PRIVATE ROOM

## 2025-05-05 PROCEDURE — 82040 ASSAY OF SERUM ALBUMIN: CPT | Performed by: EMERGENCY MEDICINE

## 2025-05-05 PROCEDURE — 86901 BLOOD TYPING SEROLOGIC RH(D): CPT | Performed by: EMERGENCY MEDICINE

## 2025-05-05 PROCEDURE — 99285 EMERGENCY DEPT VISIT HI MDM: CPT | Mod: 25

## 2025-05-05 PROCEDURE — 25000003 PHARM REV CODE 250: Performed by: EMERGENCY MEDICINE

## 2025-05-05 PROCEDURE — 85025 COMPLETE CBC W/AUTO DIFF WBC: CPT | Performed by: EMERGENCY MEDICINE

## 2025-05-05 PROCEDURE — 87635 SARS-COV-2 COVID-19 AMP PRB: CPT | Performed by: EMERGENCY MEDICINE

## 2025-05-05 PROCEDURE — 51798 US URINE CAPACITY MEASURE: CPT

## 2025-05-05 RX ORDER — SODIUM CHLORIDE 9 MG/ML
1000 INJECTION, SOLUTION INTRAVENOUS
Status: COMPLETED | OUTPATIENT
Start: 2025-05-05 | End: 2025-05-06

## 2025-05-05 RX ADMIN — SODIUM CHLORIDE 1000 ML: 9 INJECTION, SOLUTION INTRAVENOUS at 11:05

## 2025-05-05 NOTE — ASSESSMENT & PLAN NOTE
- Acute respiratory distress with reported SpO2 to 77% at home prior to EMS intervention.  - In ED, placed on NRB, diuresis initiated as well as steroids and nebulizations with significant improvement in saturations. Wean supplemental O2 as tolerated.  - Likely a combination of acute asthma exacerbation with acute volume overload with diastolic dysfunction and recent discontinuation of furosemide/HCTZ contributing to his worsening respiratory status.  - Approached euvolemia with improvement in respiratory status though still with some peripheral edema that was chronic. Held further furosemide; continuef strict intake/output, daily weights.  - Continuef albuterol-ipratropium 2.5-0.5mg inhaled as q6hr wake, q4hr PRN. Patient completed full course of prednisone 40mg PO daily.during hospitalization  - Respiratory status was stable on RA and patient discharged on PRN PO lasix  - Continued PT/OT given debility.  - Family agreeable for SNF placement

## 2025-05-05 NOTE — ASSESSMENT & PLAN NOTE
- At baseline, patient is deaf, blind and cannot verbalize except some utterances that family understands but is not true words/speech  - Continued risperidone 1mg PO qHS.

## 2025-05-05 NOTE — ASSESSMENT & PLAN NOTE
- Anemia likely of chronic disease with contributions from CKD, small volume losses with blood draws over admissions.  - No evidence of acute losses; held apixaban 5mg PO BID for time being while evaluating further.  - Discussed with family; as they are [employer]es, they are not interested in blood products.  - Checked iron studies, reticulocyte count, ferritin; and gave erythropoetin and iron sucrose infusion x 1  - Resumed apixaban

## 2025-05-05 NOTE — DISCHARGE SUMMARY
[facility] ([address] Medicine  Discharge Summary      Patient Name: Frantz Sigala Jr.  MRN: [id number]  [place]: [id number]  Patient Class: IP- Inpatient  Admission Date: 4/25/2025  Hospital Length of Stay: 6 days  Discharge Date and Time: 5/1/2025  6:10 PM  Attending Physician: Brooklyn Wellington MD  Discharging Provider: Brooklyn Wellington MD  Primary Care Provider: Zachary Ramos MD    Primary Care Team: Networked reference to record PCT     HPI:   Mr. Sigala is a 60/M with PMH deafness, blindness, schizophrenia, CKDIII, chronic diastolic heart failure (Echo 04/08/25 EF 60-65%, grade I diastolic dysfunction), asthma who presented to [facility] 04/25 with worsening dyspnea and decreased PO intake for the past two days. History obtained from the patient's family members as he is unable to communicate. He was admitted from 04/08-04/23 with syncope and colitis initially. During his hospitalization he had intermittent fevers, tachycardia and hypertension, had V/Q scan demonstrating intermediate probability for PE and was started on anticoagulation, and had hyponatremia prompting discontinuation of HCTZ and furosemide. Afterward, family noted he had decrease in appetite, increasing leg swelling, SOB and wheezing. He did not have fevers at home, but did have diaphoresis. Additionally he had loose stool in the setting of increased stool softener use after discharge. A family friend came to visit 04/25 and used a pulse oximeter while he felt SOB and found his oxygen level to be 77%. EMS was contacted and he received nebulization and was brought to ED for further evaluation. ED workup was notable for leukocytosis with left shift, CXR with increased bilateral infiltrates concerning for pulmonary edema, elevated BNP to 600s, increased WOB and wheezing. He received methylprednisolone 125mg IV x1, albuterol nebulization, furosemide 80mg IV and hospital medicine was contacted for admission.    * No surgery  found *      Hospital Course:   Admitted with SOB, and peripheral edema. Admitted with combination of asthma exacerbation and volume overload. Diuresis, steroids, nebulizations initiated. Hgb decreased and apixaban held, no overt signs of bleeding noted. Gave dose of IV Fe and Epo (patient is a Jehovah's Witnesses). Respiratory status improved to baseline with volume status corrected and wheezing resolved. Patient discharged on PO lasix to be used PRN. NEBs as needed. Arranged to discharge to SNF.     Goals of Care Treatment Preferences:  Code Status: Full Code    Assessment & Plan  Asthma with acute exacerbation  Leukocytosis  Acute on chronic diastolic heart failure  Essential hypertension  - Acute respiratory distress with reported SpO2 to 77% at home prior to EMS intervention.  - In ED, placed on NRB, diuresis initiated as well as steroids and nebulizations with significant improvement in saturations. Wean supplemental O2 as tolerated.  - Likely a combination of acute asthma exacerbation with acute volume overload with diastolic dysfunction and recent discontinuation of furosemide/HCTZ contributing to his worsening respiratory status.  - Approached euvolemia with improvement in respiratory status though still with some peripheral edema that was chronic. Held further furosemide; continuef strict intake/output, daily weights.  - Continuef albuterol-ipratropium 2.5-0.5mg inhaled as q6hr wake, q4hr PRN. Patient completed full course of prednisone 40mg PO daily.during hospitalization  - Respiratory status was stable on RA and patient discharged on PRN PO lasix  - Continued PT/OT given debility.  - Family agreeable for SNF placement  CKD (chronic kidney disease) stage 3, GFR 30-59 ml/min  - Improved from recent elevation.  - Remained stable  - Monitored closely in setting of diuresis.  Acute pulmonary embolism  Anemia  - Anemia likely of chronic disease with contributions from CKD, small volume losses with blood draws  over admissions.  - No evidence of acute losses; held apixaban 5mg PO BID for time being while evaluating further.  - Discussed with family; as they are Jehov[employer]'s Witnesses, they are not interested in blood products.  - Checked iron studies, reticulocyte count, ferritin; and gave erythropoetin and iron sucrose infusion x 1  - Resumed apixaban  Schizophrenia  Deaf, bilateral  Blind  - At baseline, patient is deaf, blind and cannot verbalize except some utterances that family understands but is not true words/speech  - Continued risperidone 1mg PO qHS.    Final Active Diagnoses:    Diagnosis Date Noted POA    PRINCIPAL PROBLEM:  Asthma with acute exacerbation [J45.901] 04/25/2025 Yes    Anemia [D64.9] 04/27/2025 Yes    Acute pulmonary embolism [I26.99] 04/18/2025 Yes     Chronic    Leukocytosis [D72.829] 04/16/2025 Yes    Acute on chronic diastolic heart failure [I50.33] 03/01/2023 Yes    CKD (chronic kidney disease) stage 3, GFR 30-59 ml/min [N18.30] 05/09/2019 Yes     Chronic    Essential hypertension [I10] 05/09/2019 Yes     Chronic    Deaf, bilateral [H91.93] 09/02/2014 Yes     Chronic    Blind [H54.7] 09/02/2014 Yes     Chronic    Schizophrenia [F20.9] 09/02/2014 Yes     Chronic      Problems Resolved During this Admission:       Discharged Condition: good    Disposition: Skilled Nursing Facility    Follow Up:   Follow-up Information       [facility] Follow up.    Specialties: Nursing Home Agency, SNF Agency  Contact information:  [facility] [address] [phone]                           Patient Instructions:      ACCEPT - Ambulatory referral/consult to Cardiology   Standing Status: Future   Referral Priority: Routine Referral Type: Consultation   Referral Reason: Specialty Services Required   Requested Specialty: Cardiology   Number of Visits Requested: 1     Diet Cardiac     Activity as tolerated       Significant Diagnostic Studies: N/A    Pending Diagnostic Studies:        None           Medications:  Reconciled Home Medications:      Medication List        Start taking these medications      acetaminophen 325 MG tablet  Commonly known as: TYLENOL  Take 2 tablets (650 mg total) by mouth every 6 (six) hours as needed for Pain or Temperature greater than (Fever 101).     albuterol-ipratropium 2.5 mg-0.5 mg/3 mL nebulizer solution  Commonly known as: DUO-NEB  Take 3 mLs by nebulization every 6 (six) hours while awake. Rescue     ammonium lactate 12 % lotion  Commonly known as: LAC-HYDRIN  Apply topically 2 (two) times daily. To lower legs     furosemide 20 MG tablet  Commonly known as: LASIX  Take 2 tablets (40 mg total) by mouth daily as needed (Worsening swelling, SOB or water weight gain of >2 lbs overnight).     melatonin 3 mg tablet  Commonly known as: MELATIN  Take 2 tablets (6 mg total) by mouth nightly as needed for Insomnia.            Change how you take these medications      apixaban 5 mg Tab  Commonly known as: ELIQUIS  Take 1 tablet (5 mg total) by mouth 2 (two) times daily.  What changed: See the new instructions.     NIFEdipine 60 MG (OSM) 24 hr tablet  Commonly known as: PROCARDIA-XL  Take 1 tablet (60 mg total) by mouth once daily.  What changed:   medication strength  how much to take     risperiDONE 1 MG tablet  Commonly known as: RISPERDAL  Take 1 tablet (1 mg total) by mouth nightly.  What changed: when to take this            Continue taking these medications      albuterol 90 mcg/actuation inhaler  Commonly known as: PROVENTIL/VENTOLIN HFA  Inhale 2 puffs into the lungs every 6 (six) hours as needed for Wheezing. Rescue     carvediloL 25 MG tablet  Commonly known as: COREG  Take 1 tablet (25 mg total) by mouth 2 (two) times daily with meals.     fluticasone propionate 50 mcg/actuation nasal spray  Commonly known as: FLONASE  1 spray (50 mcg total) by Each Nostril route once daily.     lisinopriL 40 MG tablet  Commonly known as: PRINIVIL,ZESTRIL  Take 1 tablet (40  mg total) by mouth once daily.     ondansetron 4 MG Tbdl  Commonly known as: ZOFRAN-ODT  Take 1 tablet (4 mg total) by mouth every 6 (six) hours as needed (nausea/vomiting).     polyethylene glycol 17 gram Pwpk  Commonly known as: GLYCOLAX  Take 17 g by mouth 3 (three) times daily as needed for Constipation.     tamsulosin 0.4 mg Cap  Commonly known as: FLOMAX  Take 1 capsule (0.4 mg total) by mouth once daily.              Indwelling Lines/Drains at time of discharge:   Lines/Drains/Airways       None                   Time spent on the discharge of patient: 35 minutes         Brooklyn Wellington MD  Department of Hospital Medicine  [facility] ([address])

## 2025-05-06 ENCOUNTER — TELEPHONE (OUTPATIENT)
Dept: ADMINISTRATIVE | Facility: CLINIC | Age: 60
End: 2025-05-06
Payer: MEDICARE

## 2025-05-06 PROBLEM — E87.5 HYPERKALEMIA: Status: ACTIVE | Noted: 2025-05-06

## 2025-05-06 PROBLEM — I50.32 CHRONIC DIASTOLIC HEART FAILURE: Status: ACTIVE | Noted: 2023-03-01

## 2025-05-06 PROBLEM — N17.9 ACUTE RENAL FAILURE SUPERIMPOSED ON STAGE 3 CHRONIC KIDNEY DISEASE: Status: ACTIVE | Noted: 2025-05-06

## 2025-05-06 PROBLEM — N18.30 ACUTE RENAL FAILURE SUPERIMPOSED ON STAGE 3 CHRONIC KIDNEY DISEASE: Status: ACTIVE | Noted: 2025-01-01

## 2025-05-06 LAB
ABSOLUTE EOSINOPHIL (OHS): 0.04 K/UL
ABSOLUTE MONOCYTE (OHS): 0.97 K/UL (ref 0.3–1)
ABSOLUTE NEUTROPHIL COUNT (OHS): 12.12 K/UL (ref 1.8–7.7)
ANION GAP (OHS): 14 MMOL/L (ref 8–16)
BACTERIA #/AREA URNS AUTO: NORMAL /HPF
BASOPHILS # BLD AUTO: 0.02 K/UL
BASOPHILS NFR BLD AUTO: 0.1 %
BILIRUB UR QL STRIP.AUTO: NEGATIVE
BUN SERPL-MCNC: 93 MG/DL (ref 6–20)
CALCIUM SERPL-MCNC: 8.6 MG/DL (ref 8.7–10.5)
CHLORIDE SERPL-SCNC: 101 MMOL/L (ref 95–110)
CLARITY UR: CLEAR
CO2 SERPL-SCNC: 19 MMOL/L (ref 23–29)
COLOR UR AUTO: YELLOW
CREAT SERPL-MCNC: 4.2 MG/DL (ref 0.5–1.4)
ERYTHROCYTE [DISTWIDTH] IN BLOOD BY AUTOMATED COUNT: 19.9 % (ref 11.5–14.5)
GFR SERPLBLD CREATININE-BSD FMLA CKD-EPI: 15 ML/MIN/1.73/M2
GLUCOSE SERPL-MCNC: 121 MG/DL (ref 70–110)
GLUCOSE UR QL STRIP: NEGATIVE
HCO3 UR-SCNC: 23.1 MMOL/L (ref 24–28)
HCT VFR BLD AUTO: 22.8 % (ref 40–54)
HCT VFR BLD CALC: 23 %PCV (ref 36–54)
HGB BLD-MCNC: 7.3 GM/DL (ref 14–18)
HGB BLD-MCNC: 8 G/DL
HGB UR QL STRIP: ABNORMAL
HOLD SPECIMEN: NORMAL
HYALINE CASTS UR QL AUTO: 1 /LPF (ref 0–1)
IMM GRANULOCYTES # BLD AUTO: 0.06 K/UL (ref 0–0.04)
IMM GRANULOCYTES NFR BLD AUTO: 0.4 % (ref 0–0.5)
INDIRECT COOMBS: NORMAL
KETONES UR QL STRIP: NEGATIVE
LDH SERPL-CCNC: 221 U/L (ref 110–260)
LEUKOCYTE ESTERASE UR QL STRIP: ABNORMAL
LYMPHOCYTES # BLD AUTO: 0.6 K/UL (ref 1–4.8)
MAGNESIUM SERPL-MCNC: 1.7 MG/DL (ref 1.6–2.6)
MCH RBC QN AUTO: 25.3 PG (ref 27–31)
MCHC RBC AUTO-ENTMCNC: 32 G/DL (ref 32–36)
MCV RBC AUTO: 79 FL (ref 82–98)
MICROSCOPIC COMMENT: NORMAL
NITRITE UR QL STRIP: NEGATIVE
NUCLEATED RBC (/100WBC) (OHS): 0 /100 WBC
OHS QRS DURATION: 74 MS
OHS QTC CALCULATION: 387 MS
PCO2 BLDA: 45.3 MMHG (ref 35–45)
PH SMN: 7.32 [PH] (ref 7.35–7.45)
PH UR STRIP: 6 [PH]
PLATELET # BLD AUTO: 309 K/UL (ref 150–450)
PMV BLD AUTO: 8.9 FL (ref 9.2–12.9)
PO2 BLDA: 64 MMHG (ref 80–100)
POC BE: -3 MMOL/L (ref -2–2)
POC SATURATED O2: 90 % (ref 95–100)
POC TCO2: 24 MMOL/L (ref 23–27)
POTASSIUM SERPL-SCNC: 5.4 MMOL/L (ref 3.5–5.1)
PROT UR QL STRIP: ABNORMAL
RBC # BLD AUTO: 2.88 M/UL (ref 4.6–6.2)
RBC #/AREA URNS AUTO: 2 /HPF (ref 0–4)
RELATIVE EOSINOPHIL (OHS): 0.3 %
RELATIVE LYMPHOCYTE (OHS): 4.3 % (ref 18–48)
RELATIVE MONOCYTE (OHS): 7 % (ref 4–15)
RELATIVE NEUTROPHIL (OHS): 87.9 % (ref 38–73)
RH BLD: NORMAL
SAMPLE: ABNORMAL
SODIUM SERPL-SCNC: 134 MMOL/L (ref 136–145)
SP GR UR STRIP: 1.01
SPECIMEN OUTDATE: NORMAL
SQUAMOUS #/AREA URNS AUTO: 5 /HPF
TROPONIN I SERPL DL<=0.01 NG/ML-MCNC: 0.04 NG/ML
UROBILINOGEN UR STRIP-ACNC: NEGATIVE EU/DL
WBC # BLD AUTO: 13.81 K/UL (ref 3.9–12.7)
WBC #/AREA URNS AUTO: 5 /HPF (ref 0–5)

## 2025-05-06 PROCEDURE — 84484 ASSAY OF TROPONIN QUANT: CPT | Performed by: INTERNAL MEDICINE

## 2025-05-06 PROCEDURE — 93005 ELECTROCARDIOGRAM TRACING: CPT

## 2025-05-06 PROCEDURE — 25000242 PHARM REV CODE 250 ALT 637 W/ HCPCS: Performed by: INTERNAL MEDICINE

## 2025-05-06 PROCEDURE — 99900035 HC TECH TIME PER 15 MIN (STAT)

## 2025-05-06 PROCEDURE — 80048 BASIC METABOLIC PNL TOTAL CA: CPT | Performed by: PHYSICIAN ASSISTANT

## 2025-05-06 PROCEDURE — 94799 UNLISTED PULMONARY SVC/PX: CPT

## 2025-05-06 PROCEDURE — 25000003 PHARM REV CODE 250: Performed by: INTERNAL MEDICINE

## 2025-05-06 PROCEDURE — 63600175 PHARM REV CODE 636 W HCPCS: Performed by: NURSE PRACTITIONER

## 2025-05-06 PROCEDURE — 21400001 HC TELEMETRY ROOM

## 2025-05-06 PROCEDURE — 82803 BLOOD GASES ANY COMBINATION: CPT

## 2025-05-06 PROCEDURE — 36415 COLL VENOUS BLD VENIPUNCTURE: CPT | Performed by: PHYSICIAN ASSISTANT

## 2025-05-06 PROCEDURE — 83735 ASSAY OF MAGNESIUM: CPT | Performed by: PHYSICIAN ASSISTANT

## 2025-05-06 PROCEDURE — 27000221 HC OXYGEN, UP TO 24 HOURS

## 2025-05-06 PROCEDURE — 81001 URINALYSIS AUTO W/SCOPE: CPT | Performed by: EMERGENCY MEDICINE

## 2025-05-06 PROCEDURE — 85025 COMPLETE CBC W/AUTO DIFF WBC: CPT | Performed by: PHYSICIAN ASSISTANT

## 2025-05-06 PROCEDURE — 94761 N-INVAS EAR/PLS OXIMETRY MLT: CPT | Mod: XB

## 2025-05-06 PROCEDURE — 94640 AIRWAY INHALATION TREATMENT: CPT

## 2025-05-06 PROCEDURE — 25000003 PHARM REV CODE 250: Performed by: NURSE PRACTITIONER

## 2025-05-06 PROCEDURE — 83615 LACTATE (LD) (LDH) ENZYME: CPT | Performed by: INTERNAL MEDICINE

## 2025-05-06 PROCEDURE — 36600 WITHDRAWAL OF ARTERIAL BLOOD: CPT

## 2025-05-06 PROCEDURE — 25000242 PHARM REV CODE 250 ALT 637 W/ HCPCS: Performed by: NURSE PRACTITIONER

## 2025-05-06 PROCEDURE — 93010 ELECTROCARDIOGRAM REPORT: CPT | Mod: ,,, | Performed by: INTERNAL MEDICINE

## 2025-05-06 PROCEDURE — 63600175 PHARM REV CODE 636 W HCPCS: Performed by: INTERNAL MEDICINE

## 2025-05-06 PROCEDURE — 36415 COLL VENOUS BLD VENIPUNCTURE: CPT | Performed by: INTERNAL MEDICINE

## 2025-05-06 PROCEDURE — 25000003 PHARM REV CODE 250: Performed by: PHYSICIAN ASSISTANT

## 2025-05-06 RX ORDER — FLUTICASONE PROPIONATE 50 MCG
1 SPRAY, SUSPENSION (ML) NASAL DAILY
Status: DISCONTINUED | OUTPATIENT
Start: 2025-05-06 | End: 2025-05-08

## 2025-05-06 RX ORDER — AMMONIUM LACTATE 12 G/100G
LOTION TOPICAL 2 TIMES DAILY
Status: DISCONTINUED | OUTPATIENT
Start: 2025-05-06 | End: 2025-05-08

## 2025-05-06 RX ORDER — IPRATROPIUM BROMIDE AND ALBUTEROL SULFATE 2.5; .5 MG/3ML; MG/3ML
3 SOLUTION RESPIRATORY (INHALATION) EVERY 4 HOURS PRN
Status: DISCONTINUED | OUTPATIENT
Start: 2025-05-06 | End: 2025-05-09 | Stop reason: HOSPADM

## 2025-05-06 RX ORDER — IBUPROFEN 200 MG
16 TABLET ORAL
Status: DISCONTINUED | OUTPATIENT
Start: 2025-05-06 | End: 2025-05-06

## 2025-05-06 RX ORDER — TALC
6 POWDER (GRAM) TOPICAL NIGHTLY PRN
Status: DISCONTINUED | OUTPATIENT
Start: 2025-05-06 | End: 2025-05-08

## 2025-05-06 RX ORDER — IBUPROFEN 200 MG
24 TABLET ORAL
Status: DISCONTINUED | OUTPATIENT
Start: 2025-05-06 | End: 2025-05-06

## 2025-05-06 RX ORDER — HYDROCODONE BITARTRATE AND ACETAMINOPHEN 5; 325 MG/1; MG/1
1 TABLET ORAL EVERY 4 HOURS PRN
Refills: 0 | Status: DISCONTINUED | OUTPATIENT
Start: 2025-05-06 | End: 2025-05-08

## 2025-05-06 RX ORDER — ACETAMINOPHEN 325 MG/1
650 TABLET ORAL EVERY 6 HOURS PRN
Status: DISCONTINUED | OUTPATIENT
Start: 2025-05-06 | End: 2025-05-09 | Stop reason: HOSPADM

## 2025-05-06 RX ORDER — TAMSULOSIN HYDROCHLORIDE 0.4 MG/1
0.4 CAPSULE ORAL DAILY
Status: DISCONTINUED | OUTPATIENT
Start: 2025-05-06 | End: 2025-05-08

## 2025-05-06 RX ORDER — IPRATROPIUM BROMIDE AND ALBUTEROL SULFATE 2.5; .5 MG/3ML; MG/3ML
3 SOLUTION RESPIRATORY (INHALATION) ONCE
Status: COMPLETED | OUTPATIENT
Start: 2025-05-06 | End: 2025-05-06

## 2025-05-06 RX ORDER — GLUCAGON 1 MG
1 KIT INJECTION
Status: DISCONTINUED | OUTPATIENT
Start: 2025-05-06 | End: 2025-05-06

## 2025-05-06 RX ORDER — AMOXICILLIN 250 MG
1 CAPSULE ORAL 2 TIMES DAILY PRN
Status: DISCONTINUED | OUTPATIENT
Start: 2025-05-06 | End: 2025-05-08

## 2025-05-06 RX ORDER — FAMOTIDINE 10 MG/ML
20 INJECTION, SOLUTION INTRAVENOUS DAILY
Status: DISCONTINUED | OUTPATIENT
Start: 2025-05-07 | End: 2025-05-09

## 2025-05-06 RX ORDER — SODIUM BICARBONATE 650 MG/1
650 TABLET ORAL 2 TIMES DAILY
Status: DISCONTINUED | OUTPATIENT
Start: 2025-05-06 | End: 2025-05-08

## 2025-05-06 RX ORDER — MUPIROCIN 20 MG/G
OINTMENT TOPICAL 2 TIMES DAILY
Status: DISCONTINUED | OUTPATIENT
Start: 2025-05-06 | End: 2025-05-09 | Stop reason: HOSPADM

## 2025-05-06 RX ORDER — SODIUM CHLORIDE 0.9 % (FLUSH) 0.9 %
10 SYRINGE (ML) INJECTION
Status: DISCONTINUED | OUTPATIENT
Start: 2025-05-06 | End: 2025-05-08

## 2025-05-06 RX ORDER — LUBIPROSTONE 24 UG/1
24 CAPSULE ORAL
Status: DISCONTINUED | OUTPATIENT
Start: 2025-05-06 | End: 2025-05-09 | Stop reason: HOSPADM

## 2025-05-06 RX ORDER — LANOLIN ALCOHOL/MO/W.PET/CERES
100 CREAM (GRAM) TOPICAL DAILY
Status: DISCONTINUED | OUTPATIENT
Start: 2025-05-07 | End: 2025-05-08

## 2025-05-06 RX ORDER — SODIUM CHLORIDE 0.9 % (FLUSH) 0.9 %
10 SYRINGE (ML) INJECTION EVERY 8 HOURS
Status: DISCONTINUED | OUTPATIENT
Start: 2025-05-06 | End: 2025-05-06

## 2025-05-06 RX ORDER — RISPERIDONE 0.25 MG/1
1 TABLET ORAL NIGHTLY
Status: DISCONTINUED | OUTPATIENT
Start: 2025-05-06 | End: 2025-05-09 | Stop reason: HOSPADM

## 2025-05-06 RX ORDER — NIFEDIPINE 30 MG/1
60 TABLET, EXTENDED RELEASE ORAL DAILY
Status: DISCONTINUED | OUTPATIENT
Start: 2025-05-06 | End: 2025-05-06

## 2025-05-06 RX ORDER — ONDANSETRON HYDROCHLORIDE 2 MG/ML
4 INJECTION, SOLUTION INTRAVENOUS EVERY 6 HOURS PRN
Status: DISCONTINUED | OUTPATIENT
Start: 2025-05-06 | End: 2025-05-09 | Stop reason: HOSPADM

## 2025-05-06 RX ORDER — NIFEDIPINE 30 MG/1
60 TABLET, EXTENDED RELEASE ORAL 2 TIMES DAILY
Status: DISCONTINUED | OUTPATIENT
Start: 2025-05-06 | End: 2025-05-07

## 2025-05-06 RX ORDER — FUROSEMIDE 10 MG/ML
40 INJECTION INTRAMUSCULAR; INTRAVENOUS ONCE
Status: COMPLETED | OUTPATIENT
Start: 2025-05-06 | End: 2025-05-06

## 2025-05-06 RX ORDER — ONDANSETRON 4 MG/1
4 TABLET, ORALLY DISINTEGRATING ORAL EVERY 6 HOURS PRN
Status: DISCONTINUED | OUTPATIENT
Start: 2025-05-06 | End: 2025-05-08

## 2025-05-06 RX ORDER — FOLIC ACID 1 MG/1
1 TABLET ORAL DAILY
Status: DISCONTINUED | OUTPATIENT
Start: 2025-05-07 | End: 2025-05-08

## 2025-05-06 RX ORDER — NALOXONE HCL 0.4 MG/ML
0.02 VIAL (ML) INJECTION
Status: DISCONTINUED | OUTPATIENT
Start: 2025-05-06 | End: 2025-05-06

## 2025-05-06 RX ORDER — METOCLOPRAMIDE HYDROCHLORIDE 5 MG/ML
10 INJECTION INTRAMUSCULAR; INTRAVENOUS 2 TIMES DAILY
Status: COMPLETED | OUTPATIENT
Start: 2025-05-06 | End: 2025-05-07

## 2025-05-06 RX ORDER — CARVEDILOL 12.5 MG/1
25 TABLET ORAL 2 TIMES DAILY WITH MEALS
Status: DISCONTINUED | OUTPATIENT
Start: 2025-05-06 | End: 2025-05-08

## 2025-05-06 RX ADMIN — TAMSULOSIN HYDROCHLORIDE 0.4 MG: 0.4 CAPSULE ORAL at 09:05

## 2025-05-06 RX ADMIN — SODIUM CHLORIDE 500 ML: 9 INJECTION, SOLUTION INTRAVENOUS at 09:05

## 2025-05-06 RX ADMIN — HYDROCODONE BITARTRATE AND ACETAMINOPHEN 1 TABLET: 5; 325 TABLET ORAL at 07:05

## 2025-05-06 RX ADMIN — CARVEDILOL 25 MG: 12.5 TABLET, FILM COATED ORAL at 07:05

## 2025-05-06 RX ADMIN — MUPIROCIN: 20 OINTMENT TOPICAL at 09:05

## 2025-05-06 RX ADMIN — SODIUM BICARBONATE 650 MG: 650 TABLET ORAL at 09:05

## 2025-05-06 RX ADMIN — FLUTICASONE PROPIONATE 50 MCG: 50 SPRAY, METERED NASAL at 09:05

## 2025-05-06 RX ADMIN — LUBIPROSTONE 24 MCG: 24 CAPSULE, GELATIN COATED ORAL at 10:05

## 2025-05-06 RX ADMIN — SENNOSIDES AND DOCUSATE SODIUM 1 TABLET: 50; 8.6 TABLET ORAL at 09:05

## 2025-05-06 RX ADMIN — METOCLOPRAMIDE 10 MG: 5 INJECTION, SOLUTION INTRAMUSCULAR; INTRAVENOUS at 09:05

## 2025-05-06 RX ADMIN — ONDANSETRON 4 MG: 2 INJECTION INTRAMUSCULAR; INTRAVENOUS at 09:05

## 2025-05-06 RX ADMIN — IPRATROPIUM BROMIDE AND ALBUTEROL SULFATE 3 ML: 2.5; .5 SOLUTION RESPIRATORY (INHALATION) at 03:05

## 2025-05-06 RX ADMIN — FUROSEMIDE 40 MG: 10 INJECTION, SOLUTION INTRAMUSCULAR; INTRAVENOUS at 03:05

## 2025-05-06 RX ADMIN — NIFEDIPINE 60 MG: 30 TABLET, FILM COATED, EXTENDED RELEASE ORAL at 09:05

## 2025-05-06 RX ADMIN — Medication: at 09:05

## 2025-05-06 RX ADMIN — SODIUM CHLORIDE 125 MG: 9 INJECTION, SOLUTION INTRAVENOUS at 10:05

## 2025-05-06 RX ADMIN — RISPERIDONE 1 MG: 1 TABLET, FILM COATED ORAL at 09:05

## 2025-05-06 NOTE — H&P
[facility] Medicine  History & Physical    Patient Name: Frantz Sigala Jr.  MRN: [id number]  Patient Class: IP- Inpatient  Admission Date: 5/5/2025  Attending Physician: LALO Quesada MD   Primary Care Provider: Zachary Ramos MD         Patient information was obtained from patient, past medical records, and ER records.     Subjective:     Principal Problem:Acute renal failure superimposed on stage 3 chronic kidney disease    Chief Complaint:   Chief Complaint   Patient presents with    Abnormal Lab     EMS reports pt came from [facility] for critical lab values. Low HGB 6.3 and HCT 20.6. EMS reports vomiting and fever yesterday, but no vomiting or fever today. EMS report patient is deaf and blind. Patient takes eliquis daily.        HPI: Mr. Frantz Sigala Jr. Is a 60 y.o. male, with PMH of CHF, urinary retention, on chronic anticoagulation, HTN, CKD-3, asthma, schizophrenia, blindness, deafness, who presented to [facility] ED on 5/5/25 due to low hemoglobin on outpatient labs. His family reports that he is staying at [facility] and his H&H were reported to be 6.3/20.6. EMS reported that they were informed he had vomiting and a fever on the day prior to arrival. His sibling reported that he had decreased PO intake over the past two days. He was evaluated in the ED with labs showing H&H of 7.4/23.3. He was recently admitted and treated for acute on chronic CHF, and acute anemia. 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 His HGB increased to 8.4 after treatments. Today he also had leukocytosis of 16k, with left shift. A metabolic panel showed potassium of 6.1 (with hemolysis; no peaked T waves on EKG), with BUN 92, Cr of 4.0, and was retaining urine. He was unable to provide a urine sample initially as he is retaining urine. A bladder scan showed 800 cc urine in his bladder, and mackay cath was  placed. A cath UA was obtained and showed no UTI. He was negative for Influenza A/B, and Covid-19. He was treated with 1L of IV NS over 2 hours in the ED. He was admitted to inpatient status.     Past Medical History:   Diagnosis Date    Anxiety     Asthma     Schizophrenia        Past Surgical History:   Procedure Laterality Date    CORNEAL TRANSPLANT         Review of patient's allergies indicates:   Allergen Reactions    Penicillins Other (See Comments)       No current facility-administered medications on file prior to encounter.     Current Outpatient Medications on File Prior to Encounter   Medication Sig    acetaminophen (TYLENOL) 325 MG tablet Take 2 tablets (650 mg total) by mouth every 6 (six) hours as needed for Pain or Temperature greater than (Fever 101).    albuterol (PROVENTIL/VENTOLIN HFA) 90 mcg/actuation inhaler Inhale 2 puffs into the lungs every 6 (six) hours as needed for Wheezing. Rescue    albuterol-ipratropium (DUO-NEB) 2.5 mg-0.5 mg/3 mL nebulizer solution Take 3 mLs by nebulization every 6 (six) hours while awake. Rescue    ammonium lactate (LAC-HYDRIN) 12 % lotion Apply topically 2 (two) times daily. To lower legs    apixaban (ELIQUIS) 5 mg Tab Take 1 tablet (5 mg total) by mouth 2 (two) times daily.    carvediloL (COREG) 25 MG tablet Take 1 tablet (25 mg total) by mouth 2 (two) times daily with meals.    fluticasone propionate (FLONASE) 50 mcg/actuation nasal spray 1 spray (50 mcg total) by Each Nostril route once daily.    furosemide (LASIX) 20 MG tablet Take 2 tablets (40 mg total) by mouth daily as needed (Worsening swelling, SOB or water weight gain of >2 lbs overnight).    lisinopriL (PRINIVIL,ZESTRIL) 40 MG tablet Take 1 tablet (40 mg total) by mouth once daily.    melatonin (MELATIN) 3 mg tablet Take 2 tablets (6 mg total) by mouth nightly as needed for Insomnia.    NIFEdipine (PROCARDIA-XL) 60 MG (OSM) 24 hr tablet Take 1 tablet (60 mg total) by mouth once daily.    ondansetron  (ZOFRAN-ODT) 4 MG TbDL Take 1 tablet (4 mg total) by mouth every 6 (six) hours as needed (nausea/vomiting).    polyethylene glycol (GLYCOLAX) 17 gram PwPk Take 17 g by mouth 3 (three) times daily as needed for Constipation.    risperiDONE (RISPERDAL) 1 MG tablet Take 1 tablet (1 mg total) by mouth nightly.    tamsulosin (FLOMAX) 0.4 mg Cap Take 1 capsule (0.4 mg total) by mouth once daily.     Family History    None       Tobacco Use    Smoking status: Never    Smokeless tobacco: Never   Substance and Sexual Activity    Alcohol use: No     Alcohol/week: 0.0 standard drinks of alcohol    Drug use: No    Sexual activity: Never     Review of Systems   Unable to perform ROS: Other     Objective:     Vital Signs (Most Recent):  Temp: 97.7 °F (36.5 °C) (05/05/25 2021)  Pulse: 89 (05/06/25 0500)  Resp: 20 (05/06/25 0016)  BP: 138/63 (05/06/25 0331)  SpO2: 100 % (05/06/25 0500) Vital Signs (24h Range):  Temp:  [97.7 °F (36.5 °C)] 97.7 °F (36.5 °C)  Pulse:  [80-93] 89  Resp:  [18-20] 20  SpO2:  [92 %-100 %] 100 %  BP: ()/(56-69) 138/63     Weight: 90.7 kg (200 lb)  Body mass index is 29.53 kg/m².     Physical Exam  Vitals and nursing note reviewed.   Constitutional:       General: He is not in acute distress.     Appearance: Normal appearance. He is well-developed and normal weight. He is not ill-appearing, toxic-appearing or diaphoretic.   HENT:      Head: Normocephalic and atraumatic.      Right Ear: External ear normal.      Left Ear: External ear normal.      Mouth/Throat:      Comments: Tacky mucous membranes.  Eyes:      General: No scleral icterus.        Right eye: No discharge.         Left eye: No discharge.   Neck:      Vascular: No JVD.      Trachea: No tracheal deviation.   Cardiovascular:      Rate and Rhythm: Normal rate and regular rhythm.      Heart sounds: Normal heart sounds. No murmur heard.     No gallop.      Comments: 1+ b/l ankle edema.  Pulmonary:      Effort: Pulmonary effort is normal. No  "respiratory distress.      Breath sounds: Normal breath sounds. No stridor. No wheezing or rales.   Abdominal:      General: Bowel sounds are normal. There is no distension.      Palpations: Abdomen is soft. There is no mass.      Tenderness: There is no abdominal tenderness. There is no guarding.   Musculoskeletal:         General: No deformity. Normal range of motion.      Cervical back: Normal range of motion and neck supple.   Skin:     General: Skin is warm and dry.   Neurological:      General: No focal deficit present.      Mental Status: He is alert and oriented to person, place, and time.      Cranial Nerves: No cranial nerve deficit.      Motor: No abnormal muscle tone.      Coordination: Coordination normal.   Psychiatric:         Mood and Affect: Mood normal.         Behavior: Behavior normal.         Thought Content: Thought content normal.         Judgment: Judgment normal.                Significant Labs: All pertinent labs within the past 24 hours have been reviewed.  BMP:   Recent Labs   Lab 05/06/25 0426   *   *   K 5.4*      CO2 19*   BUN 93*   CREATININE 4.2*   CALCIUM 8.6*   MG 1.7     CBC:   Recent Labs   Lab 05/05/25 2259 05/06/25 0426   WBC 16.35* 13.81*   HGB 7.4* 7.3*   HCT 23.3* 22.8*    309     CMP:   Recent Labs   Lab 05/05/25 2259 05/06/25 0426   * 134*   K 6.1* 5.4*   CL 97 101   CO2 21* 19*   * 121*   BUN 92* 93*   CREATININE 4.0* 4.2*   CALCIUM 8.6* 8.6*   PROT 8.3  --    ALBUMIN 2.2*  --    BILITOT 0.3  --    ALKPHOS 68  --    AST 37  --    ALT 32  --    ANIONGAP 14 14     Urine Culture: No results for input(s): "LABURIN" in the last 48 hours.  Urine Studies:   Recent Labs   Lab 05/06/25  0047   COLORU Yellow   APPEARANCEUA Clear   PHUR 6.0   SPECGRAV 1.015   PROTEINUA 1+*   GLUCUA Negative   BILIRUBINUA Negative   OCCULTUA 1+*   NITRITE Negative   UROBILINOGEN Negative   LEUKOCYTESUR Trace*   RBCUA 2   WBCUA 5   BACTERIA Occasional "   HYALINECASTS 1       Significant Imaging: I have reviewed all pertinent imaging results/findings within the past 24 hours.  Imaging Results              X-Ray Chest AP Portable (Final result)  Result time 05/05/25 22:37:07      Final result by Erica Milton MD (05/05/25 22:37:07)                   Impression:      Small left pleural effusion and/or left basilar atelectasis or consolidation      Electronically signed by: Erica Milton MD  Date:    05/05/2025  Time:    22:37               Narrative:    EXAMINATION:  XR CHEST AP PORTABLE    CLINICAL HISTORY:  Personal history of other specified conditions    TECHNIQUE:  Single frontal view of the chest was performed.    COMPARISON:  05/01/2025.    FINDINGS:  Cardiac silhouette is normal in size.  Right lung is expanded and relatively clear with no significant pleural effusion.  There is left basilar opacification/consolidation with obscured left costophrenic angle.  Findings may reflect small left pleural effusion and/or mild left basilar atelectasis or consolidation.  No pneumothorax.  No acute osseous abnormality identified.                                       Assessment/Plan:     Assessment & Plan  Acute renal failure superimposed on stage 3 chronic kidney disease  - Mr. Frantz Sigala . Presents 2/2 low H&H   - labs obtained in the ED showed ROBBY is likely due to pre-renal azotemia due to dehydration. Baseline creatinine is 2.3. Most recent creatinine and eGFR are listed below.  Recent Labs     05/05/25  0979 05/06/25  0426   CREATININE 4.0* 4.2*   EGFRNORACEVR 16* 15*      Plan  - ROBBY is stable  - Avoid nephrotoxins and renally dose meds for GFR listed above  - Monitor urine output, serial BMP, and adjust therapy as needed  Urinary retention  - bladder scan in ED showed 800cc urine in bladder   - mackay cath placed   - cath UA without UTI     Hyperkalemia  - Hyperkalemia is likely due to ROBBY.The patients most recent potassium results are listed  below.  Recent Labs     05/05/25 2259 05/06/25 0426   K 6.1* 5.4*     Plan  - Monitor for arrhythmias with EKG and/or continuous telemetry.   - Treat the hyperkalemia with IV fluids   - after fluid administration in ED repeat labs   - Monitor potassium: Daily  - The patient's hyperkalemia is stable          Chronic diastolic heart failure  - no apparent volume overload   - monitor with administration of IV fluids    - monitor I&Os and daily weights     Leukocytosis  - unclear sources   - UA and CXR without evidence of infection   - monitor     Essential hypertension  Patient's blood pressure range in the last 24 hours was: BP  Min: 99/69  Max: 141/63.The patient's inpatient anti-hypertensive regimen is listed below:  Current Antihypertensives       Plan  - BP is controlled, no changes needed to their regimen    Schizophrenia  - continue home meds    Anemia  - no reports of active blood loss   - recently had EPO / iron infusion   - Anemia is likely due to chronic disease due to Chronic Kidney Disease. Most recent hemoglobin and hematocrit are listed below.  Recent Labs     05/05/25 2259 05/06/25  0426   HGB 7.4* 7.3*   HCT 23.3* 22.8*     Plan  - Monitor serial CBC: Daily  - Transfuse PRBC if patient becomes hemodynamically unstable, symptomatic or H/H drops below 7/21.  - Patient has not received any PRBC transfusions to date  - Patient's anemia is currently stable  - consider repeat EPO/iron infusion if H&H lower in AM    VTE Risk Mitigation (From admission, onward)           Ordered     IP VTE HIGH RISK PATIENT  Once         05/06/25 0123     Place sequential compression device  Until discontinued         05/06/25 0123                                    Miriam Clemente PA-C  Department of Hospital Medicine  [facility]

## 2025-05-06 NOTE — PLAN OF CARE
Case Management Assessment     PCP: Dr Zachary Ramos    Patient Arrived From: [facility]  Existing Help at Home: Siblings 24/7 care    Barriers to Discharge: None    Discharge Plan:    A. SNF   B. Home Health      Assessment completed via chart review as this CM cared to patient recent hospital stay. Patient lives with siblings who provide 24/7 care. Home DME-RW. PCP correct on facesheet. Plan to transfer back to [facility] on discharge.      05/06/25 1023   Discharge Assessment   Assessment Type Discharge Planning Assessment   Confirmed/corrected address, phone number and insurance Yes   Confirmed Demographics Correct on Facesheet   Source of Information health record   Communicated JAQUAN with patient/caregiver Date not available/Unable to determine   People in Home sibling(s)   Do you expect to return to your current living situation? Yes   Do you have help at home or someone to help you manage your care at home? Yes   Who are your caregiver(s) and their phone number(s)? Wendy Shipman (Sister)  [phone] (Home Phone)   Prior to hospitilization cognitive status: Unable to Assess   Current cognitive status: Unable to Assess   Walking or Climbing Stairs Difficulty yes   Walking or Climbing Stairs ambulation difficulty, assistance 1 person   Dressing/Bathing Difficulty yes   Dressing/Bathing bathing difficulty, assistance 1 person   Equipment Currently Used at Home walker, rolling   Readmission within 30 days? Yes   Do you take prescription medications? Yes   Do you have prescription coverage? Yes   Do you have any problems affording any of your prescribed medications? No   Is the patient taking medications as prescribed? yes   Who is going to help you get home at discharge? Wendy Shipman (Sister)  [phone] (Home Phone)   How do you get to doctors appointments? family or friend will provide   Are you on dialysis? No   Do you take coumadin? No   Discharge Plan A Skilled Nursing Facility    Discharge Plan B Home Health   DME Needed Upon Discharge  none   Transition of Care Barriers None     [facility] - Emergency Dept  Initial Discharge Assessment       Primary Care Provider: Zachary Ramos MD    Admission Diagnosis: Acute on chronic renal insufficiency [N28.9, N18.9]    Admission Date: 5/5/2025  Expected Discharge Date:     Transition of Care Barriers: (P) None    Payor: MEDICARE / Plan: MEDICARE PART A & B / Product Type: Government /     Extended Emergency Contact Information  Primary Emergency Contact: Wendy Shipman  Address: [address] [place] [address] [facility]  Home Phone: [phone]  Relation: Sister  Secondary Emergency Contact: Eliana Sigala   [facility]  Home Phone: [phone]  Relation: Sister    Discharge Plan A: (P) Skilled Nursing Facility  Discharge Plan B: (P) Home Health      [id number] [place], [place] - [address]  [address] [facility] [address]  Phone: [phone] Fax: [phone]      Initial Assessment (most recent)       Adult Discharge Assessment - 05/06/25 1023          Discharge Assessment    Assessment Type Discharge Planning Assessment (P)      Confirmed/corrected address, phone number and insurance Yes (P)      Confirmed Demographics Correct on Facesheet (P)      Source of Information health record (P)      Communicated JAQUAN with patient/caregiver Date not available/Unable to determine (P)      People in Home sibling(s) (P)      Do you expect to return to your current living situation? Yes (P)      Do you have help at home or someone to help you manage your care at home? Yes (P)      Who are your caregiver(s) and their phone number(s)? Wendy Shipman (Sister)  [phone] (Home Phone) (P)      Prior to hospitilization cognitive status: Unable to Assess (P)      Current cognitive status: Unable to Assess (P)      Walking or Climbing Stairs Difficulty  yes (P)      Walking or Climbing Stairs ambulation difficulty, assistance 1 person (P)      Dressing/Bathing Difficulty yes (P)      Dressing/Bathing bathing difficulty, assistance 1 person (P)      Equipment Currently Used at Home walker, rolling (P)      Readmission within 30 days? Yes (P)      Do you take prescription medications? Yes (P)      Do you have prescription coverage? Yes (P)      Do you have any problems affording any of your prescribed medications? No (P)      Is the patient taking medications as prescribed? yes (P)      Who is going to help you get home at discharge? RamonitaWendy (Sister)  [phone] (Home Phone) (P)      How do you get to doctors appointments? family or friend will provide (P)      Are you on dialysis? No (P)      Do you take coumadin? No (P)      Discharge Plan A Skilled Nursing Facility (P)      Discharge Plan B Home Health (P)      DME Needed Upon Discharge  none (P)      Transition of Care Barriers None (P)

## 2025-05-06 NOTE — SUBJECTIVE & OBJECTIVE
Past Medical History:   Diagnosis Date    Anxiety     Asthma     Schizophrenia        Past Surgical History:   Procedure Laterality Date    CORNEAL TRANSPLANT         Review of patient's allergies indicates:   Allergen Reactions    Penicillins Other (See Comments)       No current facility-administered medications on file prior to encounter.     Current Outpatient Medications on File Prior to Encounter   Medication Sig    acetaminophen (TYLENOL) 325 MG tablet Take 2 tablets (650 mg total) by mouth every 6 (six) hours as needed for Pain or Temperature greater than (Fever 101).    albuterol (PROVENTIL/VENTOLIN HFA) 90 mcg/actuation inhaler Inhale 2 puffs into the lungs every 6 (six) hours as needed for Wheezing. Rescue    albuterol-ipratropium (DUO-NEB) 2.5 mg-0.5 mg/3 mL nebulizer solution Take 3 mLs by nebulization every 6 (six) hours while awake. Rescue    ammonium lactate (LAC-HYDRIN) 12 % lotion Apply topically 2 (two) times daily. To lower legs    apixaban (ELIQUIS) 5 mg Tab Take 1 tablet (5 mg total) by mouth 2 (two) times daily.    carvediloL (COREG) 25 MG tablet Take 1 tablet (25 mg total) by mouth 2 (two) times daily with meals.    fluticasone propionate (FLONASE) 50 mcg/actuation nasal spray 1 spray (50 mcg total) by Each Nostril route once daily.    furosemide (LASIX) 20 MG tablet Take 2 tablets (40 mg total) by mouth daily as needed (Worsening swelling, SOB or water weight gain of >2 lbs overnight).    lisinopriL (PRINIVIL,ZESTRIL) 40 MG tablet Take 1 tablet (40 mg total) by mouth once daily.    melatonin (MELATIN) 3 mg tablet Take 2 tablets (6 mg total) by mouth nightly as needed for Insomnia.    NIFEdipine (PROCARDIA-XL) 60 MG (OSM) 24 hr tablet Take 1 tablet (60 mg total) by mouth once daily.    ondansetron (ZOFRAN-ODT) 4 MG TbDL Take 1 tablet (4 mg total) by mouth every 6 (six) hours as needed (nausea/vomiting).    polyethylene glycol (GLYCOLAX) 17 gram PwPk Take 17 g by mouth 3 (three) times daily as  needed for Constipation.    risperiDONE (RISPERDAL) 1 MG tablet Take 1 tablet (1 mg total) by mouth nightly.    tamsulosin (FLOMAX) 0.4 mg Cap Take 1 capsule (0.4 mg total) by mouth once daily.     Family History    None       Tobacco Use    Smoking status: Never    Smokeless tobacco: Never   Substance and Sexual Activity    Alcohol use: No     Alcohol/week: 0.0 standard drinks of alcohol    Drug use: No    Sexual activity: Never     Review of Systems   Unable to perform ROS: Other     Objective:     Vital Signs (Most Recent):  Temp: 97.7 °F (36.5 °C) (05/05/25 2021)  Pulse: 89 (05/06/25 0500)  Resp: 20 (05/06/25 0016)  BP: 138/63 (05/06/25 0331)  SpO2: 100 % (05/06/25 0500) Vital Signs (24h Range):  Temp:  [97.7 °F (36.5 °C)] 97.7 °F (36.5 °C)  Pulse:  [80-93] 89  Resp:  [18-20] 20  SpO2:  [92 %-100 %] 100 %  BP: ()/(56-69) 138/63     Weight: 90.7 kg (200 lb)  Body mass index is 29.53 kg/m².     Physical Exam  Vitals and nursing note reviewed.   Constitutional:       General: He is not in acute distress.     Appearance: Normal appearance. He is well-developed and normal weight. He is not ill-appearing, toxic-appearing or diaphoretic.   HENT:      Head: Normocephalic and atraumatic.      Right Ear: External ear normal.      Left Ear: External ear normal.      Mouth/Throat:      Comments: Tacky mucous membranes.  Eyes:      General: No scleral icterus.        Right eye: No discharge.         Left eye: No discharge.   Neck:      Vascular: No JVD.      Trachea: No tracheal deviation.   Cardiovascular:      Rate and Rhythm: Normal rate and regular rhythm.      Heart sounds: Normal heart sounds. No murmur heard.     No gallop.      Comments: 1+ b/l ankle edema.  Pulmonary:      Effort: Pulmonary effort is normal. No respiratory distress.      Breath sounds: Normal breath sounds. No stridor. No wheezing or rales.   Abdominal:      General: Bowel sounds are normal. There is no distension.      Palpations: Abdomen is  "soft. There is no mass.      Tenderness: There is no abdominal tenderness. There is no guarding.   Musculoskeletal:         General: No deformity. Normal range of motion.      Cervical back: Normal range of motion and neck supple.   Skin:     General: Skin is warm and dry.   Neurological:      General: No focal deficit present.      Mental Status: He is alert and oriented to person, place, and time.      Cranial Nerves: No cranial nerve deficit.      Motor: No abnormal muscle tone.      Coordination: Coordination normal.   Psychiatric:         Mood and Affect: Mood normal.         Behavior: Behavior normal.         Thought Content: Thought content normal.         Judgment: Judgment normal.                Significant Labs: All pertinent labs within the past 24 hours have been reviewed.  BMP:   Recent Labs   Lab 05/06/25 0426   *   *   K 5.4*      CO2 19*   BUN 93*   CREATININE 4.2*   CALCIUM 8.6*   MG 1.7     CBC:   Recent Labs   Lab 05/05/25 2259 05/06/25 0426   WBC 16.35* 13.81*   HGB 7.4* 7.3*   HCT 23.3* 22.8*    309     CMP:   Recent Labs   Lab 05/05/25 2259 05/06/25 0426   * 134*   K 6.1* 5.4*   CL 97 101   CO2 21* 19*   * 121*   BUN 92* 93*   CREATININE 4.0* 4.2*   CALCIUM 8.6* 8.6*   PROT 8.3  --    ALBUMIN 2.2*  --    BILITOT 0.3  --    ALKPHOS 68  --    AST 37  --    ALT 32  --    ANIONGAP 14 14     Urine Culture: No results for input(s): "LABURIN" in the last 48 hours.  Urine Studies:   Recent Labs   Lab 05/06/25  0047   COLORU Yellow   APPEARANCEUA Clear   PHUR 6.0   SPECGRAV 1.015   PROTEINUA 1+*   GLUCUA Negative   BILIRUBINUA Negative   OCCULTUA 1+*   NITRITE Negative   UROBILINOGEN Negative   LEUKOCYTESUR Trace*   RBCUA 2   WBCUA 5   BACTERIA Occasional   HYALINECASTS 1       Significant Imaging: I have reviewed all pertinent imaging results/findings within the past 24 hours.  Imaging Results              X-Ray Chest AP Portable (Final result)  Result time " 05/05/25 22:37:07      Final result by Erica Milton MD (05/05/25 22:37:07)                   Impression:      Small left pleural effusion and/or left basilar atelectasis or consolidation      Electronically signed by: Erica Milton MD  Date:    05/05/2025  Time:    22:37               Narrative:    EXAMINATION:  XR CHEST AP PORTABLE    CLINICAL HISTORY:  Personal history of other specified conditions    TECHNIQUE:  Single frontal view of the chest was performed.    COMPARISON:  05/01/2025.    FINDINGS:  Cardiac silhouette is normal in size.  Right lung is expanded and relatively clear with no significant pleural effusion.  There is left basilar opacification/consolidation with obscured left costophrenic angle.  Findings may reflect small left pleural effusion and/or mild left basilar atelectasis or consolidation.  No pneumothorax.  No acute osseous abnormality identified.

## 2025-05-06 NOTE — AI DETERIORATION ALERT
Artificial Intelligence Notification  [facility]  [address] [place], [place] [id number]  Phone: [phone]    This documentation was triggered by an Artificial Intelligence Notification:    Admit Date: 2025   LOS: 1  Code Status: Full Code  : 1965  Age: 60 y.o.  Weight:   Wt Readings from Last 1 Encounters:   25 90.7 kg (200 lb)        Sex: male  Bed: [facility]  MRN: [id number]  Attending Physician: LALO Quesada MD     Date of Alert: 2025  Time AI Alert Received: 1531      Presented to bedside. Rapid response called shortly after AI alert trigger; increased WOB and desaturation while transferring. EDU Smith already at bedside. Additional nebulization treatment ordered and receiving furosemide 40mg IV x1. Repeat CXR to re-evaluate volume status.       Vitals:    25 1546   BP:    Pulse: 91   Resp: 18   Temp:      SpO2: 100 %    Patient Condition: Fair

## 2025-05-06 NOTE — ASSESSMENT & PLAN NOTE
- Hyperkalemia is likely due to ROBBY.The patients most recent potassium results are listed below.  Recent Labs     05/05/25  2259 05/06/25  0426   K 6.1* 5.4*     Plan  - Monitor for arrhythmias with EKG and/or continuous telemetry.   - Treat the hyperkalemia with IV fluids   - after fluid administration in ED repeat labs   - Monitor potassium: Daily  - The patient's hyperkalemia is stable

## 2025-05-06 NOTE — ASSESSMENT & PLAN NOTE
- Mr. Frantz Richard Jovon Reyes. Presents 2/2 low H&H   - labs obtained in the ED showed ROBBY is likely due to pre-renal azotemia due to dehydration. Baseline creatinine is 2.3. Most recent creatinine and eGFR are listed below.  Recent Labs     05/05/25  7042 05/06/25  7936   CREATININE 4.0* 4.2*   EGFRNORACEVR 16* 15*      Plan  - ROBBY is stable  - Avoid nephrotoxins and renally dose meds for GFR listed above  - Monitor urine output, serial BMP, and adjust therapy as needed

## 2025-05-06 NOTE — ASSESSMENT & PLAN NOTE
- no reports of active blood loss   - recently had EPO / iron infusion   - Anemia is likely due to chronic disease due to Chronic Kidney Disease. Most recent hemoglobin and hematocrit are listed below.  Recent Labs     05/05/25  9244 05/06/25  0426   HGB 7.4* 7.3*   HCT 23.3* 22.8*     Plan  - Monitor serial CBC: Daily  - Transfuse PRBC if patient becomes hemodynamically unstable, symptomatic or H/H drops below 7/21.  - Patient has not received any PRBC transfusions to date  - Patient's anemia is currently stable  - consider repeat EPO/iron infusion if H&H lower in AM

## 2025-05-06 NOTE — ED TRIAGE NOTES
Pt arrived by EMS from with complains low hemoglobin, patient currently on Eloquis. Family states pt was vomiting yesterday. Pt has a history of blindness and deafness bilat. Family is concerned of the need for blood products due to patients Spiritism being Jehovahs Witness.

## 2025-05-06 NOTE — ASSESSMENT & PLAN NOTE
- no apparent volume overload   - monitor with administration of IV fluids    - monitor I&Os and daily weights

## 2025-05-06 NOTE — HPI
Mr. Frantz Sigala Jr. Is a 60 y.o. male, with PMH of CHF, urinary retention, on chronic anticoagulation, HTN, CKD-3, asthma, schizophrenia, blindness, deafness, who presented to [facility] ED on 5/5/25 due to low hemoglobin on outpatient labs. His family reports that he is staying at [facility] and his H&H were reported to be 6.3/20.6. EMS reported that they were informed he had vomiting and a fever on the day prior to arrival. His sibling reported that he had decreased PO intake over the past two days. He was evaluated in the ED with labs showing H&H of 7.4/23.3. He was recently admitted and treated for acute on chronic CHF, and acute anemia. During that admission he was treated with EPO injection and iron infusion as he is a Quaker. His HGB increased to 8.4 after treatments. Today he also had leukocytosis of 16k, with left shift. A metabolic panel showed potassium of 6.1 (with hemolysis; no peaked T waves on EKG), with BUN 92, Cr of 4.0, and was retaining urine. He was unable to provide a urine sample initially as he is retaining urine. A bladder scan showed 800 cc urine in his bladder, and mackay cath was placed. A cath UA was obtained and showed no UTI. He was negative for Influenza A/B, and Covid-19. He was treated with 1L of IV NS over 2 hours in the ED. He was admitted to inpatient status.

## 2025-05-06 NOTE — ASSESSMENT & PLAN NOTE
Patient's blood pressure range in the last 24 hours was: BP  Min: 99/69  Max: 141/63.The patient's inpatient anti-hypertensive regimen is listed below:  Current Antihypertensives       Plan  - BP is controlled, no changes needed to their regimen

## 2025-05-06 NOTE — ED PROVIDER NOTES
Chief complaint:  Abnormal Lab (EMS reports pt came from [facility] for critical lab values. Low HGB 6.3 and HCT 20.6. EMS reports vomiting and fever yesterday, but no vomiting or fever today. EMS report patient is deaf and blind. Patient takes eliquis daily.)      Source of information:  Patient, siblings, EMS, old chart    HPI:  Frantz Sigala Jr. is a 60 y.o. male presenting with EMS from nursing home after laboratory studies drawn earlier today were concerning for hemoglobin of 6.3.  The patient is nonverbal, deaf, and can not provide any clarifying history.  Nursing home reported that he had a fever yesterday but not today.  Vital signs stable per EMS.  No other concerns were relayed by nursing home staff to EMS.  His sister and brother subsequently showed up in the emergency department and they relate that he has had decreased p.o. intake for the past day or 2.    ROS: As per HPI    Review of patient's allergies indicates:   Allergen Reactions    Penicillins Other (See Comments)       Medications Ordered Prior to Encounter[1]    PMH:  As per HPI and below:  Past Medical History:   Diagnosis Date    Anxiety     Asthma     Schizophrenia      Past Surgical History:   Procedure Laterality Date    CORNEAL TRANSPLANT           Physical Exam:    Vitals:    05/05/25 2300   BP:    Pulse: 80   Resp:    Temp:        General: No acute distress.  Chronically ill-appearing.  Eyes:  Corneas opacified bilaterally.  Blind.    Conjunctivae - pallor but no icterus.   ENT: HEAD: Normal - atraumatic. Normal external ears. Normal nose.  No facial asymmetry. Mucous membranes - dry.  Neck: Neck supple. no meningismus.  No JVD.  Cardiovascular: Regular rate and rhythm. Normal S1 and S2. No murmur. No gallop. No rub.  2+ peripheral pulses  Respiratory: Normal breath sounds. No rales. No rhonchi. No wheezes. No tachypnea with exertion.  GI: Soft. Nontender. Nondistended. No guarding. No rebound. Normal  BS.  Musculoskeletal:  No deformities.  Moves extremities equally.  No musculoskeletal injuries.  Integument: No acute skin rashes. No clubbing or cyanosis.  1+ edema bilateral ankles  Neurologic: No gross neurological deficits.   Psychiatric: Awake, alert.  Nonverbal. Deaf        Labs Reviewed   COMPREHENSIVE METABOLIC PANEL - Abnormal       Result Value    Sodium 132 (*)     Potassium 6.1 (*)     Chloride 97      CO2 21 (*)     Glucose 123 (*)     BUN 92 (*)     Creatinine 4.0 (*)     Calcium 8.6 (*)     Protein Total 8.3      Albumin 2.2 (*)     Bilirubin Total 0.3      ALP 68      AST 37      ALT 32      Anion Gap 14      eGFR 16 (*)    CBC WITH DIFFERENTIAL - Abnormal    WBC 16.35 (*)     RBC 2.92 (*)     HGB 7.4 (*)     HCT 23.3 (*)     MCV 80 (*)     MCH 25.3 (*)     MCHC 31.8 (*)     RDW 20.2 (*)     Platelet Count 370      MPV 9.2      Nucleated RBC 0      Neut % 87.5 (*)     Lymph % 4.4 (*)     Mono % 7.2      Eos % 0.2      Basophil % 0.1      Imm Grans % 0.6 (*)     Neut # 14.30 (*)     Lymph # 0.72 (*)     Mono # 1.18 (*)     Eos # 0.03      Baso # 0.02      Imm Grans # 0.10 (*)    CBC W/ AUTO DIFFERENTIAL    Narrative:     The following orders were created for panel order CBC auto differential.                  Procedure                               Abnormality         Status                                     ---------                               -----------         ------                                     CBC with Differential[[id number]]       Abnormal            Final result                                                 Please view results for these tests on the individual orders.   URINALYSIS, REFLEX TO URINE CULTURE   SARS-COV-2 RDRP GENE   POCT INFLUENZA A/B MOLECULAR   TYPE & SCREEN       Medications   0.9% NaCl infusion (has no administration in time range)     Medical Decision Making  Differential diagnosis includes acute anemia, chronic anemia, dehydration, electrolyte derangement,  UTI    Amount and/or Complexity of Data Reviewed  Independent Historian: caregiver and EMS  External Data Reviewed: labs and notes.  Labs: ordered. Decision-making details documented in ED Course.  Radiology: ordered and independent interpretation performed. Decision-making details documented in ED Course.  ECG/medicine tests: ordered and independent interpretation performed. Decision-making details documented in ED Course.    Risk  Prescription drug management.  Decision regarding hospitalization.  Diagnosis or treatment significantly limited by social determinants of health.      Critical Care    Date/Time: 5/6/2025 12:00 AM    Performed by: Harpreet La II, MD  Authorized by: Harpreet La II, MD  Direct patient critical care time: 20 minutes  Additional history critical care time: 8 minutes  Ordering / reviewing critical care time: 10 minutes  Documentation critical care time: 10 minutes  Consulting other physicians critical care time: 5 minutes  Total critical care time (exclusive of procedural time) : 53 minutes  Critical care was necessary to treat or prevent imminent or life-threatening deterioration of the following conditions: dehydration, renal failure and metabolic crisis.            Independently interpreted x-ray and/or EKG:  Twelve lead EKG shows sinus rhythm, rate of 84.  No ST or T-wave changes.  No peaked T-waves or widened QRS.  No acute ischemia arrhythmia or pericarditis  Portable chest x-ray: Left costophrenic angle obscured, possible small effusion.  No focal infiltrate.  No pneumothorax.    MDM:    60 y.o. male with history of anemia, recent admission for acute on chronic anemia at that time felt to be due to combination of anemia of chronic illness and renal insufficiency.  At that time given EPO and iron as he is Confucianism and hemoglobin increased 8.4, creatinine was 1.9.  Hemoglobin at draw earlier this morning showed 6.4.  In the emergency department it is 7.4 however  he has an ROBBY with creatinine now 4.0 has had decreased p.o. intake and I suspect he is dehydrated and somewhat hemoconcentrated thus artificially inflating the hemoglobin.  He was not able to provide a urine sample and subsequently bladder scan showed greater than 800 cc consistent with acute urinary retention.  This may also be contributing to his acute on chronic kidney injury.  Will place a Clark catheter.  Electrolytes showed potassium of 6.1 however there was mild hemolysis.  His EKG does not show peaked T-waves or signs of hyperkalemia.  In order to help correct both the ROBBY and hyperkalemia we will initiate IV fluids.  Chart review shows mild diastolic dysfunction.  We therefore will give the 1st L bolus over 2 hours.  Will need admission for further hydration, trending of electrolytes and hemoglobin in likely infusions of iron/EPO if hemoglobin drops further.  Case discussed with TELLO Clemente of the hospitalist service, will admit to Dr. Wellington.  Sister and brother updated with findings and plan of care    Medications   0.9% NaCl infusion (has no administration in time range)       ASSESSMENT:   1. Acute on chronic renal insufficiency    2. History of fever    3. Urinary retention    4. Acute on chronic anemia    5. Dehydration               [1]   No current facility-administered medications on file prior to encounter.     Current Outpatient Medications on File Prior to Encounter   Medication Sig Dispense Refill    acetaminophen (TYLENOL) 325 MG tablet Take 2 tablets (650 mg total) by mouth every 6 (six) hours as needed for Pain or Temperature greater than (Fever 101).      albuterol (PROVENTIL/VENTOLIN HFA) 90 mcg/actuation inhaler Inhale 2 puffs into the lungs every 6 (six) hours as needed for Wheezing. Rescue 18 g 0    albuterol-ipratropium (DUO-NEB) 2.5 mg-0.5 mg/3 mL nebulizer solution Take 3 mLs by nebulization every 6 (six) hours while awake. Rescue      ammonium lactate (LAC-HYDRIN) 12 % lotion Apply  topically 2 (two) times daily. To lower legs      apixaban (ELIQUIS) 5 mg Tab Take 1 tablet (5 mg total) by mouth 2 (two) times daily.      carvediloL (COREG) 25 MG tablet Take 1 tablet (25 mg total) by mouth 2 (two) times daily with meals. 60 tablet 0    fluticasone propionate (FLONASE) 50 mcg/actuation nasal spray 1 spray (50 mcg total) by Each Nostril route once daily. 9.9 mL 0    furosemide (LASIX) 20 MG tablet Take 2 tablets (40 mg total) by mouth daily as needed (Worsening swelling, SOB or water weight gain of >2 lbs overnight).      lisinopriL (PRINIVIL,ZESTRIL) 40 MG tablet Take 1 tablet (40 mg total) by mouth once daily. 30 tablet 0    melatonin (MELATIN) 3 mg tablet Take 2 tablets (6 mg total) by mouth nightly as needed for Insomnia.      NIFEdipine (PROCARDIA-XL) 60 MG (OSM) 24 hr tablet Take 1 tablet (60 mg total) by mouth once daily.      ondansetron (ZOFRAN-ODT) 4 MG TbDL Take 1 tablet (4 mg total) by mouth every 6 (six) hours as needed (nausea/vomiting). 30 tablet 0    polyethylene glycol (GLYCOLAX) 17 gram PwPk Take 17 g by mouth 3 (three) times daily as needed for Constipation. 72 each 0    risperiDONE (RISPERDAL) 1 MG tablet Take 1 tablet (1 mg total) by mouth nightly.      tamsulosin (FLOMAX) 0.4 mg Cap Take 1 capsule (0.4 mg total) by mouth once daily. 30 capsule 0        Harpreet La II, MD  05/06/25 0003

## 2025-05-06 NOTE — CARE UPDATE
"Virtual Sepsis Screening Note        Chart Reviewed: 2025, 10:10 AM    MRN: [id number]  : 1965    Bed: ED       The sepsis screening tool has been completed on this patient by this virtual RN.       Was the Sepsis Protocol initiated for this patient?: Yes; by this RN.  If "Yes", was an initial Lactate ordered?: Yes; by this RN.    Was a secure chat sent to the patient's current provider?: Yes  If "Yes", to whom was the secure chat sent?: Matias            Please, contact Lindsay Tatum RN via secure chat with any questions or concerns.      "

## 2025-05-06 NOTE — CONSULTS
Consult Note  Nephrology    Consult Requested By: LALO Quesada MD  Reason for Consult: ROBBY    SUBJECTIVE:     History of Present Illness:  Patient is a 60 y.o. male presents with abnormal labs from SNF with anemia/ROBBY. Pt with extensive medical history as outlined below but has had frequent admissions over the past few months due to multiple medical issues including anemia, shortness of breath, congestive heart failure, constipation, asthma, urinary retention,  and questionable pulmonary embolism.  Patient is legally blind and deaf has excellent care by his family.  Has underlying worsening CKD stage 4 with baseline creatinine 2.0-2.8 and not followed by Nephrology as an outpatient.  Has been on 2 diuretics including Lasix and hydrochlorothiazide.  Initial laboratory data revealing hemoglobin 7.4, hemolyzed potassium of 6.1, creatinine 4.0, BUN 92.  Patient is Adventism and does not want any blood products.    Consulted for evaluation.  Patient seen and examined in the emergency room with daughter at bedside.  As mentioned above patient is blind and deaf but able to communicate with family.  Looks very uncomfortable.  Has had issues with constipation over the past few months including dark stools.  Currently on Eliquis for suspected pulmonary embolism based on recent indeterminate V/Q scan.  Refused transfusion due to Adventism.  Discussed findings and highly advised against any heroics including renal replacement therapy.  Family agrees and will ask palliative Care to get involved.    Epic reviewed    Past Medical History:   Diagnosis Date    Anxiety     Asthma     CKD (chronic kidney disease), stage IV     Schizophrenia      Past Surgical History:   Procedure Laterality Date    CORNEAL TRANSPLANT       No family history on file.  Social History[1]    Review of patient's allergies indicates:   Allergen Reactions    Penicillins Other (See Comments)        Review of Systems:    Unable to fully  "established    OBJECTIVE:     Vital Signs (Most Recent)  Temp: 97.7 °F (36.5 °C) (05/05/25 2021)  Pulse: 90 (05/06/25 0916)  Resp: (!) 29 (05/06/25 0916)  BP: 139/70 (05/06/25 0900)  SpO2: 100 % (05/06/25 0916)    Vital Signs Range (Last 24H):  Temp:  [97.7 °F (36.5 °C)]   Pulse:  [80-93]   Resp:  [18-29]   BP: ()/(56-70)   SpO2:  [92 %-100 %]       Intake/Output Summary (Last 24 hours) at 5/6/2025 0955  Last data filed at 5/6/2025 0107  Gross per 24 hour   Intake 999 ml   Output --   Net 999 ml       Physical Exam:  General appearance:  Appears older than stated age.  Blind and deaf.  Eyes:  Blind  Lungs:  Erratic breathing pattern but clear to auscultation   Heart:  Tachy Regular rate and rhythm, S1, S2 normal, no murmur, rub or rabia.  Abdomen:  Semi firm, distended, hypoactive  Extremities: No cyanosis or clubbing. No edema.    Skin:  Dry and ashen  Neurologic:  No focal numbness or weakness   Clark with brisk yellow urine      Laboratory:  Recent Labs   Lab 05/06/25 0426 05/06/25 0916   WBC 13.81*  --    RBC 2.88*  --    HGB 7.3*  --    HCT 22.8* 23*     --    MCV 79*  --    MCH 25.3*  --    MCHC 32.0  --      BMP:   Recent Labs   Lab 04/30/25  0420 05/05/25 2259 05/06/25 0426   GLU 90   < > 121*   *   < > 134*   K 5.1   < > 5.4*      < > 101   CO2 22*   < > 19*   BUN 72*   < > 93*   CREATININE 1.9*   < > 4.2*   CALCIUM 8.3*   < > 8.6*   MG 1.7  --  1.7   PHOS 3.8  --   --     < > = values in this interval not displayed.     Lab Results   Component Value Date    CALCIUM 8.6 (L) 05/06/2025    PHOS 3.8 04/30/2025     BNP  No results for input(s): "BNP", "BNPTRIAGEBLO" in the last 168 hours.No results found for: "URICACID"  Lab Results   Component Value Date    IRON 23 (L) 04/28/2025    TIBC 339 04/28/2025    FERRITIN 87.0 04/28/2025     Lab Results   Component Value Date    CALCIUM 8.6 (L) 05/06/2025    PHOS 3.8 04/30/2025       Diagnostic Results:  X-Ray Chest AP Portable   Final " Result      Small left pleural effusion and/or left basilar atelectasis or consolidation         Electronically signed by: Erica Milton MD   Date:    05/05/2025   Time:    22:37      X-Ray Abdomen AP 1 View    (Results Pending)       ASSESSMENT/PLAN:     Multifactorial ROBBY on worsening CKD (baseline 2.0-2.8) due to anemia, poor intake, over-diuresis, suspected urinary retention, hypotension, etc.:  -trends noted and recently discharged with creatinine 1.9 and BUN of 72  -sent home on Lasix 40 mg after aggressive diuresis  -now with creatinine 4.0 and BUN 93 and highly suspect of GI bleed with dark stools and elevated BUN while on Eliquis  -decrease Eliquis dose and discussed with team  -not an ideal candidate for dialysis and daughter acknowledges no aggressive heroics  -will give small bolus of normal saline as blood pressures running in the 90s.  Hold parameters placed also.   -hold ACE inhibitor and diuretics.    -patient is Congregational and received Epogen at last encounter.  -see below  -follow trends  -agree with Amber for history of retention  -Renally dose meds, avoid nephrotoxins, and monitor I/O's closely.    Anemia of CKD/SEVEN and suspected GI bleed:  -consider holding Eliquis  -history of dark stool  -BUN suggestive of GI loss  -will load with IV iron as IVETTE doesn't work with low iron stores/sats.     Asthma:  -defer    Mild non-anion gap metabolic acidosis with pseudo hyperkalemia:  -low-potassium diet  -bicarb tabs  -ABG stable    Deaf/blind/schizophrenia/anxiety:  -defer    Abd pain/distention/constipation/dark stools:  -check KUB  -may need GI.   -defer.    DISPO:    Highly recommend DNR and palliative care eval  Not a dialysis candidate.       Thanks for consult  See above  Will follow along.       High MDM complexity necessary to treat or prevent imminent or life-threatening deterioration of the following conditions: ROBBY,anemia, jehovas  Time spent personally by me on the following activities  60 min: development of treatment plan with patient or surrogate, discussions with consultants, interpretation of cardiac output measurements, examination of patient, ordering and performing treatments and interventions, evaluation of patient's response to treatment, obtaining history from patient or surrogate, ordering and review of laboratory studies, ordering and review of radiographic studies, re-evaluation of patient's condition, pulse oximetry and blood draw for specimens         [1]   Social History  Tobacco Use    Smoking status: Never    Smokeless tobacco: Never   Substance Use Topics    Alcohol use: No     Alcohol/week: 0.0 standard drinks of alcohol    Drug use: No

## 2025-05-06 NOTE — PLAN OF CARE
05/06/25 1045   Readmission   Was this a planned readmission? No   Why were you hospitalized in the last 30 days? acute on chronic CHF, and acute anemia   Why were you readmitted? Alarmed about signs/symptoms;Related to previous admission   When you left the hospital how did you feel? Ok   When you left the hospital where did you go? SNF   Did patient/caregiver refused recommended DC plan? No   Tell me about what happened between when you left the hospital and the day you returned. Labs drawn at nursing home resulting in low H&H   When did you start not feeling well? Ok   Did you try to manage your symptoms your self? No   Did you call anyone? Yes   Who did you call? Emergency Room  (Send from SNF to ED for abnormal labs)   Did you try to see or did see a doctor or nurse before you came? Yes   Were you seen? Yes   Did you have  a follow-up appointment on discharge? Yes

## 2025-05-06 NOTE — CHAPLAIN
05/06/25 1625   Clinical Encounter Type   Visit Type Initial Visit   Visit Category Spiritual Assessment;Family Meeting;Other (Comment)  (rapid response)   Visited With Family;Health care provider   Number of Family Visited 1   Length of Visit 30 Minutes   Patient Spiritual Encounters   Care Provided Compassionate presence   Family Spiritual Encounters   Care Provided Compassionate presence;Reflective listening;Prayer support;Explored pt/family concerns;Assessed marshall resources   Family Coping Fearful;Sadness   Comments - Family this patient's ADRIANO was present at his bedside. She reports that he has had round the clock family support in the hospital and that he has been in and out of inpatient for the last several weeks. She was tearful and visibly stressed at the time of my visit as she attempted to relay information from the NP and MD to her family by phone/text. The rapid response was cancelled after the pt received a breathing treatment and ADRIANO was engaging with nursing staff to discuss the patient's case and care for his physical needs

## 2025-05-06 NOTE — ED NOTES
Family requesting to be notified when pt is roomed. Adonay Sigala [phone]. [facility] [phone].

## 2025-05-06 NOTE — ASSESSMENT & PLAN NOTE
- bladder scan in ED showed 800cc urine in bladder   - mackay cath placed   - cath UA without UTI

## 2025-05-06 NOTE — CLINICAL REVIEW
IP Sepsis Screen (most recent)       Sepsis Screen (IP) - 05/06/25 0475       Is the patient's history or complaint suggestive of a possible infection? Yes  -    Are there at least two of the following signs and symptoms present? Yes   WBCs down -    Sepsis signs/symptoms - Tachycardia Tachycardia     >90  -    Sepsis signs/symptoms - WBC WBC < 4,000 or WBC > 12,000  -    Are any of the following organ dysfunction criteria present and not considered to be due to a chronic condition? Yes   ROBBY on CKD -    Organ Dysfunction Criteria Creatinine > 2.0  -    Initiate Sepsis Protocol No   AF, HDS-not currently on abx -    Reason sepsis not considered Pt. receiving appropriate management   bc-ngtd, lac dehyrogenase wnl-protocol initiated by RN earlier -              User Key  (r) = Recorded By, (t) = Taken By, (c) = Cosigned By      Initials Name    Jeannine Swain RN

## 2025-05-07 PROBLEM — Z71.89 GOALS OF CARE, COUNSELING/DISCUSSION: Status: ACTIVE | Noted: 2025-01-01

## 2025-05-07 PROBLEM — J96.01 ACUTE HYPOXEMIC RESPIRATORY FAILURE: Status: ACTIVE | Noted: 2025-01-01

## 2025-05-07 PROBLEM — R47.01 NONVERBAL: Status: ACTIVE | Noted: 2025-01-01

## 2025-05-07 LAB
ABSOLUTE EOSINOPHIL (OHS): 0.02 K/UL
ABSOLUTE EOSINOPHIL (OHS): 0.04 K/UL
ABSOLUTE MONOCYTE (OHS): 0.43 K/UL (ref 0.3–1)
ABSOLUTE MONOCYTE (OHS): 0.82 K/UL (ref 0.3–1)
ABSOLUTE NEUTROPHIL COUNT (OHS): 8.3 K/UL (ref 1.8–7.7)
ABSOLUTE NEUTROPHIL COUNT (OHS): 9.28 K/UL (ref 1.8–7.7)
ALBUMIN SERPL BCP-MCNC: 1.8 G/DL (ref 3.5–5.2)
ALBUMIN SERPL BCP-MCNC: 1.9 G/DL (ref 3.5–5.2)
ALLENS TEST: ABNORMAL
ALP SERPL-CCNC: 58 UNIT/L (ref 40–150)
ALT SERPL W/O P-5'-P-CCNC: 25 UNIT/L (ref 10–44)
ANION GAP (OHS): 13 MMOL/L (ref 8–16)
ANION GAP (OHS): 13 MMOL/L (ref 8–16)
AST SERPL-CCNC: 20 UNIT/L (ref 11–45)
BASOPHILS # BLD AUTO: 0.01 K/UL
BASOPHILS # BLD AUTO: 0.02 K/UL
BASOPHILS NFR BLD AUTO: 0.1 %
BASOPHILS NFR BLD AUTO: 0.2 %
BILIRUB SERPL-MCNC: 0.2 MG/DL (ref 0.1–1)
BUN SERPL-MCNC: 111 MG/DL (ref 6–20)
BUN SERPL-MCNC: 111 MG/DL (ref 6–20)
CALCIUM SERPL-MCNC: 8.2 MG/DL (ref 8.7–10.5)
CALCIUM SERPL-MCNC: 8.5 MG/DL (ref 8.7–10.5)
CHLORIDE SERPL-SCNC: 101 MMOL/L (ref 95–110)
CHLORIDE SERPL-SCNC: 103 MMOL/L (ref 95–110)
CO2 SERPL-SCNC: 19 MMOL/L (ref 23–29)
CO2 SERPL-SCNC: 20 MMOL/L (ref 23–29)
CREAT SERPL-MCNC: 3.8 MG/DL (ref 0.5–1.4)
CREAT SERPL-MCNC: 4 MG/DL (ref 0.5–1.4)
DELSYS: ABNORMAL
ERYTHROCYTE [DISTWIDTH] IN BLOOD BY AUTOMATED COUNT: 19.7 % (ref 11.5–14.5)
ERYTHROCYTE [DISTWIDTH] IN BLOOD BY AUTOMATED COUNT: 19.8 % (ref 11.5–14.5)
GFR SERPLBLD CREATININE-BSD FMLA CKD-EPI: 16 ML/MIN/1.73/M2
GFR SERPLBLD CREATININE-BSD FMLA CKD-EPI: 17 ML/MIN/1.73/M2
GLUCOSE SERPL-MCNC: 119 MG/DL (ref 70–110)
GLUCOSE SERPL-MCNC: 158 MG/DL (ref 70–110)
HCO3 UR-SCNC: 25.2 MMOL/L (ref 24–28)
HCT VFR BLD AUTO: 20.3 % (ref 40–54)
HCT VFR BLD AUTO: 20.5 % (ref 40–54)
HGB BLD-MCNC: 6.2 GM/DL (ref 14–18)
HGB BLD-MCNC: 6.4 GM/DL (ref 14–18)
IMM GRANULOCYTES # BLD AUTO: 0.04 K/UL (ref 0–0.04)
IMM GRANULOCYTES # BLD AUTO: 0.04 K/UL (ref 0–0.04)
IMM GRANULOCYTES NFR BLD AUTO: 0.4 % (ref 0–0.5)
IMM GRANULOCYTES NFR BLD AUTO: 0.4 % (ref 0–0.5)
LYMPHOCYTES # BLD AUTO: 0.43 K/UL (ref 1–4.8)
LYMPHOCYTES # BLD AUTO: 0.54 K/UL (ref 1–4.8)
MAGNESIUM SERPL-MCNC: 1.6 MG/DL (ref 1.6–2.6)
MAGNESIUM SERPL-MCNC: 1.6 MG/DL (ref 1.6–2.6)
MCH RBC QN AUTO: 24.4 PG (ref 27–31)
MCH RBC QN AUTO: 25.3 PG (ref 27–31)
MCHC RBC AUTO-ENTMCNC: 30.2 G/DL (ref 32–36)
MCHC RBC AUTO-ENTMCNC: 31.5 G/DL (ref 32–36)
MCV RBC AUTO: 80 FL (ref 82–98)
MCV RBC AUTO: 81 FL (ref 82–98)
NUCLEATED RBC (/100WBC) (OHS): 1 /100 WBC
NUCLEATED RBC (/100WBC) (OHS): 1 /100 WBC
PCO2 BLDA: 50.5 MMHG (ref 35–45)
PH SMN: 7.3 [PH] (ref 7.35–7.45)
PHOSPHATE SERPL-MCNC: 5.3 MG/DL (ref 2.7–4.5)
PLATELET # BLD AUTO: 250 K/UL (ref 150–450)
PLATELET # BLD AUTO: 271 K/UL (ref 150–450)
PMV BLD AUTO: 8.8 FL (ref 9.2–12.9)
PMV BLD AUTO: 9.2 FL (ref 9.2–12.9)
PO2 BLDA: 117 MMHG (ref 80–100)
POC BE: -1 MMOL/L (ref -2–2)
POC SATURATED O2: 98 % (ref 95–100)
POC TCO2: 27 MMOL/L (ref 23–27)
POCT GLUCOSE: 115 MG/DL (ref 70–110)
POCT GLUCOSE: 158 MG/DL (ref 70–110)
POTASSIUM SERPL-SCNC: 5.4 MMOL/L (ref 3.5–5.1)
POTASSIUM SERPL-SCNC: 6.2 MMOL/L (ref 3.5–5.1)
PROT SERPL-MCNC: 6.8 GM/DL (ref 6–8.4)
RBC # BLD AUTO: 2.53 M/UL (ref 4.6–6.2)
RBC # BLD AUTO: 2.54 M/UL (ref 4.6–6.2)
RELATIVE EOSINOPHIL (OHS): 0.2 %
RELATIVE EOSINOPHIL (OHS): 0.4 %
RELATIVE LYMPHOCYTE (OHS): 4.6 % (ref 18–48)
RELATIVE LYMPHOCYTE (OHS): 5 % (ref 18–48)
RELATIVE MONOCYTE (OHS): 4.6 % (ref 4–15)
RELATIVE MONOCYTE (OHS): 7.7 % (ref 4–15)
RELATIVE NEUTROPHIL (OHS): 86.6 % (ref 38–73)
RELATIVE NEUTROPHIL (OHS): 89.8 % (ref 38–73)
SAMPLE: ABNORMAL
SITE: ABNORMAL
SODIUM SERPL-SCNC: 134 MMOL/L (ref 136–145)
SODIUM SERPL-SCNC: 135 MMOL/L (ref 136–145)
WBC # BLD AUTO: 10.71 K/UL (ref 3.9–12.7)
WBC # BLD AUTO: 9.26 K/UL (ref 3.9–12.7)

## 2025-05-07 PROCEDURE — 80069 RENAL FUNCTION PANEL: CPT | Performed by: INTERNAL MEDICINE

## 2025-05-07 PROCEDURE — 83735 ASSAY OF MAGNESIUM: CPT | Performed by: PHYSICIAN ASSISTANT

## 2025-05-07 PROCEDURE — 20000000 HC ICU ROOM

## 2025-05-07 PROCEDURE — 93005 ELECTROCARDIOGRAM TRACING: CPT

## 2025-05-07 PROCEDURE — 25000242 PHARM REV CODE 250 ALT 637 W/ HCPCS: Performed by: INTERNAL MEDICINE

## 2025-05-07 PROCEDURE — 99497 ADVNCD CARE PLAN 30 MIN: CPT | Mod: 25,,, | Performed by: STUDENT IN AN ORGANIZED HEALTH CARE EDUCATION/TRAINING PROGRAM

## 2025-05-07 PROCEDURE — 27100171 HC OXYGEN HIGH FLOW UP TO 24 HOURS

## 2025-05-07 PROCEDURE — 63600175 PHARM REV CODE 636 W HCPCS: Performed by: INTERNAL MEDICINE

## 2025-05-07 PROCEDURE — 27000249 HC VAPOTHERM CIRCUIT

## 2025-05-07 PROCEDURE — 93010 ELECTROCARDIOGRAM REPORT: CPT | Mod: ,,, | Performed by: INTERNAL MEDICINE

## 2025-05-07 PROCEDURE — 99291 CRITICAL CARE FIRST HOUR: CPT | Mod: GC,,, | Performed by: INTERNAL MEDICINE

## 2025-05-07 PROCEDURE — 63600175 PHARM REV CODE 636 W HCPCS: Performed by: NURSE PRACTITIONER

## 2025-05-07 PROCEDURE — 94640 AIRWAY INHALATION TREATMENT: CPT

## 2025-05-07 PROCEDURE — 83735 ASSAY OF MAGNESIUM: CPT | Performed by: INTERNAL MEDICINE

## 2025-05-07 PROCEDURE — 25000003 PHARM REV CODE 250: Performed by: INTERNAL MEDICINE

## 2025-05-07 PROCEDURE — 80053 COMPREHEN METABOLIC PANEL: CPT | Performed by: PHYSICIAN ASSISTANT

## 2025-05-07 PROCEDURE — 82803 BLOOD GASES ANY COMBINATION: CPT

## 2025-05-07 PROCEDURE — 63600175 PHARM REV CODE 636 W HCPCS: Performed by: PHYSICIAN ASSISTANT

## 2025-05-07 PROCEDURE — 36415 COLL VENOUS BLD VENIPUNCTURE: CPT | Performed by: PHYSICIAN ASSISTANT

## 2025-05-07 PROCEDURE — 99900035 HC TECH TIME PER 15 MIN (STAT)

## 2025-05-07 PROCEDURE — 27100092 HC HIGH FLOW DELIVERY CANNULA

## 2025-05-07 PROCEDURE — 36600 WITHDRAWAL OF ARTERIAL BLOOD: CPT

## 2025-05-07 PROCEDURE — 94761 N-INVAS EAR/PLS OXIMETRY MLT: CPT

## 2025-05-07 PROCEDURE — 85025 COMPLETE CBC W/AUTO DIFF WBC: CPT | Performed by: PHYSICIAN ASSISTANT

## 2025-05-07 PROCEDURE — 85025 COMPLETE CBC W/AUTO DIFF WBC: CPT | Performed by: INTERNAL MEDICINE

## 2025-05-07 PROCEDURE — 36415 COLL VENOUS BLD VENIPUNCTURE: CPT | Performed by: INTERNAL MEDICINE

## 2025-05-07 PROCEDURE — 94799 UNLISTED PULMONARY SVC/PX: CPT

## 2025-05-07 PROCEDURE — 25000003 PHARM REV CODE 250: Performed by: PHYSICIAN ASSISTANT

## 2025-05-07 PROCEDURE — 99223 1ST HOSP IP/OBS HIGH 75: CPT | Mod: 25,,, | Performed by: STUDENT IN AN ORGANIZED HEALTH CARE EDUCATION/TRAINING PROGRAM

## 2025-05-07 RX ORDER — DEXTROSE 50 % IN WATER (D50W) INTRAVENOUS SYRINGE
25 ONCE
Status: COMPLETED | OUTPATIENT
Start: 2025-05-07 | End: 2025-05-07

## 2025-05-07 RX ORDER — CALCIUM GLUCONATE 20 MG/ML
1 INJECTION, SOLUTION INTRAVENOUS ONCE
Status: COMPLETED | OUTPATIENT
Start: 2025-05-07 | End: 2025-05-07

## 2025-05-07 RX ORDER — NIFEDIPINE 30 MG/1
60 TABLET, EXTENDED RELEASE ORAL DAILY
Status: DISCONTINUED | OUTPATIENT
Start: 2025-05-08 | End: 2025-05-08

## 2025-05-07 RX ADMIN — FAMOTIDINE 20 MG: 10 INJECTION, SOLUTION INTRAVENOUS at 09:05

## 2025-05-07 RX ADMIN — METOCLOPRAMIDE 10 MG: 5 INJECTION, SOLUTION INTRAMUSCULAR; INTRAVENOUS at 09:05

## 2025-05-07 RX ADMIN — MUPIROCIN: 20 OINTMENT TOPICAL at 09:05

## 2025-05-07 RX ADMIN — INSULIN HUMAN 10 UNITS: 100 INJECTION, SOLUTION PARENTERAL at 05:05

## 2025-05-07 RX ADMIN — DEXTROSE MONOHYDRATE 25 G: 25 INJECTION, SOLUTION INTRAVENOUS at 05:05

## 2025-05-07 RX ADMIN — IPRATROPIUM BROMIDE AND ALBUTEROL SULFATE 3 ML: 2.5; .5 SOLUTION RESPIRATORY (INHALATION) at 12:05

## 2025-05-07 RX ADMIN — ONDANSETRON 4 MG: 2 INJECTION INTRAMUSCULAR; INTRAVENOUS at 02:05

## 2025-05-07 RX ADMIN — CALCIUM GLUCONATE 1 G: 20 INJECTION, SOLUTION INTRAVENOUS at 03:05

## 2025-05-07 RX ADMIN — Medication: at 09:05

## 2025-05-07 RX ADMIN — FLUTICASONE PROPIONATE 50 MCG: 50 SPRAY, METERED NASAL at 09:05

## 2025-05-07 NOTE — ASSESSMENT & PLAN NOTE
- Mr. Frantz Richard Jovon Reyes. Presents 2/2 low H&H   - labs obtained in the ED showed ROBBY is likely due to pre-renal azotemia due to dehydration. Baseline creatinine is 2.3. Most recent creatinine and eGFR are listed below.  Recent Labs     05/05/25  2259 05/06/25  0426 05/07/25  0109   CREATININE 4.0* 4.2* 3.8*   EGFRNORACEVR 16* 15* 17*      Plan  - ROBBY is stable  - Avoid nephrotoxins and renally dose meds for GFR listed above  - Monitor urine output, serial BMP, and adjust therapy as needed

## 2025-05-07 NOTE — SUBJECTIVE & OBJECTIVE
Interval History: Overnight events noted; transferring to ICU with persistent increased WOB/O2 requirements. Discussed with pulm/crit, nephrology, palliative and updated sister/brother at bedside.     Review of Systems   Unable to perform ROS: Patient nonverbal     Objective:     Vital Signs (Most Recent):  Temp: 98.4 °F (36.9 °C) (05/07/25 1100)  Pulse: 85 (05/07/25 1500)  Resp: (!) 23 (05/07/25 1500)  BP: (!) 108/57 (05/07/25 1500)  SpO2: 97 % (05/07/25 1500) Vital Signs (24h Range):  Temp:  [97.7 °F (36.5 °C)-98.8 °F (37.1 °C)] 98.4 °F (36.9 °C)  Pulse:  [76-91] 85  Resp:  [14-27] 23  SpO2:  [82 %-100 %] 97 %  BP: ()/(50-72) 108/57     Weight: 90.7 kg (200 lb)  Body mass index is 29.53 kg/m².    Intake/Output Summary (Last 24 hours) at 5/7/2025 1634  Last data filed at 5/7/2025 1500  Gross per 24 hour   Intake --   Output 1285 ml   Net -1285 ml         Physical Exam  Vitals and nursing note reviewed.   Constitutional:       General: He is not in acute distress.  HENT:      Head: Normocephalic and atraumatic.   Eyes:      General:         Right eye: No discharge.         Left eye: No discharge.   Cardiovascular:      Rate and Rhythm: Normal rate.      Pulses: Normal pulses.   Pulmonary:      Effort: No respiratory distress.      Comments: On HFNC. Faint expiratory wheezing.  Abdominal:      General: There is distension.      Palpations: Abdomen is soft.   Musculoskeletal:      Right lower leg: No edema.      Left lower leg: No edema.   Skin:     General: Skin is warm and dry.   Neurological:      Mental Status: He is alert.      Comments: Lethargic. Arouses briefly to touch. Nonverbal at baseline.           Significant Labs:   CBC:  Recent Labs   Lab 05/06/25  0426 05/06/25  0916 05/07/25  0109 05/07/25  0606   WBC 13.81*  --  10.71 9.26   HGB 7.3*  --  6.4* 6.2*   HCT 22.8* 23* 20.3* 20.5*     --  271 250   LYMPH 0.60*  4.3*  --  0.54*  5.0* 0.43*  4.6*   MONO 7.0  0.97  --  7.7  0.82 4.6  0.43    EOS 0.3  0.04  --  0.2  0.02 0.4  0.04   BMP:  Recent Labs   Lab 05/06/25  0426 05/07/25  0109 05/07/25  0606   * 134* 135*   K 5.4* 6.2* 5.4*    101 103   CO2 19* 20* 19*   BUN 93* 111* 111*   CREATININE 4.2* 3.8* 4.0*   * 119* 158*   CALCIUM 8.6* 8.2* 8.5*   MG 1.7 1.6 1.6   PHOS  --   --  5.3*     Significant Imaging: I have reviewed and interpreted all pertinent imaging results/findings within the past 24 hours.

## 2025-05-07 NOTE — ASSESSMENT & PLAN NOTE
- noted continued decline with rising BUN and CR steady at 4  - nephrology discussed with family regarding him not being a HD candidate. Family in agreement.   - contributes to concerns surrounding prognosis   - optimization as possible per primary team/nephrology

## 2025-05-07 NOTE — ASSESSMENT & PLAN NOTE
- Hyperkalemia is likely due to ROBBY.The patients most recent potassium results are listed below.  Recent Labs     05/05/25  2259 05/06/25  0426 05/07/25  0109   K 6.1* 5.4* 6.2*     Plan  - Monitor for arrhythmias with EKG and/or continuous telemetry.   - Treat the hyperkalemia with IV fluids   - after fluid administration in ED repeat labs   - Monitor potassium: Daily  - The patient's hyperkalemia is stable

## 2025-05-07 NOTE — ASSESSMENT & PLAN NOTE
Patient's blood pressure range in the last 24 hours was: BP  Min: 93/70  Max: 156/72.The patient's inpatient anti-hypertensive regimen is listed below:  Current Antihypertensives  carvediloL tablet 25 mg, 2 times daily with meals, Oral  NIFEdipine 24 hr tablet 60 mg, 2 times daily, Oral    Plan  - BP is controlled, no changes needed to their regimen

## 2025-05-07 NOTE — SUBJECTIVE & OBJECTIVE
Past Medical History:   Diagnosis Date    Anxiety     Asthma     CKD (chronic kidney disease), stage IV     Schizophrenia        Past Surgical History:   Procedure Laterality Date    CORNEAL TRANSPLANT         Review of patient's allergies indicates:   Allergen Reactions    Penicillins Other (See Comments)       Medications:  Continuous Infusions:  Scheduled Meds:   ammonium lactate   Topical (Top) BID    carvediloL  25 mg Oral BID WM    famotidine (PF)  20 mg Intravenous Daily    ferric gluconate (Ferrlecit) IVPB  125 mg Intravenous Every Other Day    fluticasone propionate  1 spray Each Nostril Daily    folic acid  1 mg Oral Daily    lubiprostone  24 mcg Oral Daily with breakfast    metoclopramide  10 mg Intravenous BID    multivitamin  1 tablet Oral Daily    mupirocin   Nasal BID    [START ON 5/8/2025] NIFEdipine  60 mg Oral Daily    risperiDONE  1 mg Oral Nightly    sodium bicarbonate  650 mg Oral BID    tamsulosin  0.4 mg Oral Daily    thiamine  100 mg Oral Daily     PRN Meds:  Current Facility-Administered Medications:     acetaminophen, 650 mg, Oral, Q6H PRN    albuterol-ipratropium, 3 mL, Nebulization, Q4H PRN    HYDROcodone-acetaminophen, 1 tablet, Oral, Q4H PRN    melatonin, 6 mg, Oral, Nightly PRN    ondansetron, 4 mg, Oral, Q6H PRN    ondansetron, 4 mg, Intravenous, Q6H PRN    senna-docusate, 1 tablet, Oral, BID PRN    sodium chloride 0.9%, 10 mL, Intravenous, PRN    Family History    None       Tobacco Use    Smoking status: Never    Smokeless tobacco: Never   Substance and Sexual Activity    Alcohol use: No     Alcohol/week: 0.0 standard drinks of alcohol    Drug use: No    Sexual activity: Never       Objective:     Vital Signs (Most Recent):  Temp: 98.4 °F (36.9 °C) (05/07/25 1100)  Pulse: 78 (05/07/25 1200)  Resp: 19 (05/07/25 1200)  BP: (!) 108/50 (05/07/25 1200)  SpO2: 96 % (05/07/25 1200) Vital Signs (24h Range):  Temp:  [97.5 °F (36.4 °C)-98.8 °F (37.1 °C)] 98.4 °F (36.9 °C)  Pulse:  [76-91]  78  Resp:  [16-27] 19  SpO2:  [82 %-100 %] 96 %  BP: ()/(50-77) 108/50     Weight: 90.7 kg (200 lb)  Body mass index is 29.53 kg/m².       Physical Exam  Vitals and nursing note reviewed.   Constitutional:       Comments: Sleeping. Doesn't arouse to gentle stimuli at this time, but comfortable appearing otherwise.    Eyes:      Comments: Eyes closed   Pulmonary:      Comments: HFNC in place  Neurological:      Comments: Sleeping. 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 Unable to participate meaningfully in conversation.        Review of Symptoms        Living Arrangements:  Lives with family    Psychosocial/Cultural:   See Palliative Psychosocial Note: Yes  - lives at home with his mother and sister Eliana   - has 8 other siblings who are well involved in his care  - increasingly bedbound over the past month   - family is able to interpret his needs at times based on his physical cues   **Primary  to Follow**  Palliative Care  Consult: No        Advance Care Planning   Advance Directives:   Do Not Resuscitate Status: Yes    Medical Power of : His mother Ms. Diamond Sigala is next of kin medical decision maker and has deferred to her 3 children Eliana, Adonay and Wendy to be the lead medical decision makers of the siblings.      Decision Making:  Patient unable to communicate due to disease severity/cognitive impairment and Family answered questions  Goals of Care: The family endorses that what is most important right now is to focus on improvement in condition but with limits to invasive therapies.     Accordingly, we have decided that the best plan to meet the patient's goals includes continuing with treatment and assess possible optimization in coming day to guide next step decision making.          Significant Labs: reviewed  CBC:   Recent Labs   Lab 05/07/25  0606   WBC 9.26   HGB 6.2*   HCT 20.5*   MCV 81*        BMP:  Recent Labs   Lab 05/07/25  0606   *    *   K 5.4*      CO2 19*   *   CREATININE 4.0*   CALCIUM 8.5*   MG 1.6     LFT:  Lab Results   Component Value Date    AST 20 05/07/2025    ALKPHOS 58 05/07/2025    BILITOT 0.2 05/07/2025     Albumin:   Albumin   Date Value Ref Range Status   05/07/2025 1.8 (L) 3.5 - 5.2 g/dL Final     Comment:     Pediatric tubes, pleasePediatric tubes, please   12/06/2022 3.5 3.5 - 5.2 g/dL Final     Protein:   Protein Total   Date Value Ref Range Status   05/07/2025 6.8 6.0 - 8.4 gm/dL Final     Total Protein   Date Value Ref Range Status   12/06/2022 8.9 (H) 6.0 - 8.4 g/dL Final     Lactic acid:   Lab Results   Component Value Date    LACTATE 1.1 04/27/2025    LACTATE 0.9 04/14/2025       Significant Imaging: reviewed

## 2025-05-07 NOTE — PROGRESS NOTES
Nephrology  Progress Note    Admit Date: 5/5/2025   LOS: 2 days     SUBJECTIVE:     Follow-up For:  Acute renal failure superimposed on stage 3 chronic kidney disease    History of Present Illness:  Patient is a 60 y.o. male presents with abnormal labs from SNF with anemia/ROBBY. Pt with extensive medical history as outlined below but has had frequent admissions over the past few months due to multiple medical issues including anemia, shortness of breath, congestive heart failure, constipation, asthma, urinary retention,  and questionable pulmonary embolism.  Patient is legally blind and deaf has excellent care by his family.  Has underlying worsening CKD stage 4 with baseline creatinine 2.0-2.8 and not followed by Nephrology as an outpatient.  Has been on 2 diuretics including Lasix and hydrochlorothiazide.  Initial laboratory data revealing hemoglobin 7.4, hemolyzed potassium of 6.1, creatinine 4.0, BUN 92.  Patient is Congregation and does not want any blood products.     Consulted for evaluation.  Patient seen and examined in the emergency room with daughter at bedside.  As mentioned above patient is blind and deaf but able to communicate with family.  Looks very uncomfortable.  Has had issues with constipation over the past few months including dark stools.  Currently on Eliquis for suspected pulmonary embolism based on recent indeterminate V/Q scan.  Refused transfusion due to Congregation.  Discussed findings and highly advised against any heroics including renal replacement therapy.  Family agrees and will ask palliative Care to get involved.     Epic reviewed       Interval History:     Events noted.  Rapid response this am.  Transferred to ICU for closer monitoring.  Ext discussion with team/family at bedside.  Awaiting palliative care eval today.  All in agreement with no heroics including intubation/RRT.  Labs noted.     Review of Systems:    Unable.     OBJECTIVE:     Vital Signs Range (Last  "24H):  /64 (BP Location: Right arm, Patient Position: Sitting)   Pulse 80   Temp 98.2 °F (36.8 °C) (Axillary)   Resp 16   Ht 5' 9" (1.753 m)   Wt 90.7 kg (200 lb)   SpO2 96%   BMI 29.53 kg/m²     Temp:  [97.5 °F (36.4 °C)-98.8 °F (37.1 °C)]   Pulse:  [76-91]   Resp:  [16-29]   BP: ()/(55-80)   SpO2:  [82 %-100 %]     I & O (Last 24H):  Intake/Output Summary (Last 24 hours) at 5/7/2025 0817  Last data filed at 5/7/2025 0400  Gross per 24 hour   Intake 610 ml   Output 2150 ml   Net -1540 ml       Physical Exam:  General appearance:  Appears older than stated age.  Blind and deaf.  Eyes:  Blind  Lungs:  Erratic breathing pattern but clear to auscultation   Heart:  Tachy Regular rate and rhythm, S1, S2 normal, no murmur, rub or rabia.  Abdomen: firm, distended, hypoactive  Extremities: No cyanosis or clubbing. No edema.    Skin:  Dry and ashen  Neurologic:  No focal numbness or weakness   Clark with brisk yellow urine    Laboratory Data:  Recent Labs   Lab 05/07/25  0606   WBC 9.26   RBC 2.54*   HGB 6.2*   HCT 20.5*      MCV 81*   MCH 24.4*   MCHC 30.2*       BMP:   Recent Labs   Lab 05/07/25  0606   *   *   K 5.4*      CO2 19*   *   CREATININE 4.0*   CALCIUM 8.5*   MG 1.6   PHOS 5.3*     Lab Results   Component Value Date    CALCIUM 8.5 (L) 05/07/2025    PHOS 5.3 (H) 05/07/2025       Lab Results   Component Value Date    CALCIUM 8.5 (L) 05/07/2025    PHOS 5.3 (H) 05/07/2025       No results found for: "URICACID"    BNP  No results for input(s): "BNP", "BNPTRIAGEBLO" in the last 168 hours.    Medications:  Medication list was reviewed and changes noted under Assessment/Plan.    Diagnostic Results:    X-Ray Chest AP Portable   Final Result      Mild cardiomegaly.      Hiatal hernia.         Electronically signed by: Gloria Rosa   Date:    05/07/2025   Time:    01:54      X-Ray Chest AP Portable   Final Result      No acute intrathoracic abnormality detected.  " Cardiomegaly.      Narrowed appearance the tracheal air column.         Electronically signed by: Gloria Rosa   Date:    05/06/2025   Time:    18:29      X-Ray Abdomen AP 1 View   Final Result      Diffuse gaseous dilatation of small and large bowel loops which can be seen with ileus.  Moderate stool burden.  Continued follow-up recommended.         Electronically signed by: Mayte Wiseman   Date:    05/06/2025   Time:    10:13      X-Ray Chest AP Portable   Final Result      Small left pleural effusion and/or left basilar atelectasis or consolidation         Electronically signed by: Erica Milton MD   Date:    05/05/2025   Time:    22:37          ASSESSMENT/PLAN:     Multifactorial ROBBY on worsening CKD (baseline 2.0-2.8) due to anemia, poor intake, over-diuresis, suspected urinary retention, hypotension, etc.:  -trends noted and recently discharged with creatinine 1.9 and BUN of 72  -sent home on Lasix 40 mg after aggressive diuresis  -now with creatinine 4.0 and  and highly suspect of GI bleed with dark stools and elevated BUN while on Eliquis  -decrease Eliquis dose and discussed with team  -not an ideal candidate for dialysis and daughter acknowledges no aggressive heroics  -no more ACE inhibitor and diuretics.    -patient is [facility] and received Epogen at last encounter.  -see below  -follow trends  -agree with Clark for history of retention  -5/7:  trends noted.  No RRT.   -Renally dose meds, avoid nephrotoxins, and monitor I/O's closely.     Anemia of CKD/SEVEN and suspected GI bleed:  -consider holding Eliquis  -history of dark stool  -BUN suggestive of GI loss  -loading with IV iron as IVETTE doesn't work with low iron stores/sats.      Asthma:  -defer     Mild non-anion gap metabolic acidosis with pseudo hyperkalemia:  -low-potassium diet  -bicarb tabs when able  -ABG mixed but PH stable.   -given insulin/D50 this am     Deaf/blind/schizophrenia/anxiety:  -defer     Abd  pain/distention/constipation/dark stools:  -checked KUB  -defer.  -amitiza/miralax but may need soap suds enema.      DISPO:     Highly recommend DNR and palliative care eval  Not a dialysis candidate.         Thanks for consult  See above  Will follow along.      Total critical care time (exclusive of procedural time) : 45 minutes  Critical care was necessary to treat or prevent imminent or life-threatening deterioration of the following conditions: ROBBY/Jehovas/Gi bleed.     Critical care was time spent personally by me on the following activities: development of treatment plan with patient or surrogate, discussions with consultants, interpretation of cardiac output measurements, examination of patient, ordering and performing treatments and interventions, evaluation of patient's response to treatment, obtaining history from patient or surrogate, ordering and review of laboratory studies, ordering and review of radiographic studies, re-evaluation of patient's condition, pulse oximetry and blood draw for specimens

## 2025-05-07 NOTE — ASSESSMENT & PLAN NOTE
- ROBBY on CKD secondary to combination of poor intake, diuresis, retention and intermittent lower BPs from baseline with concurrent hyperkalemia.  - Renally dose medications, avoid nephrotoxins, strict intake/output.  - Labs comparable; avoiding ACE, diuretics as feasible.  - Mentation may be worsening in setting of uptrending BUN.  - Appreciate nephrology assistance. Palliative consulted in addition for goals of care discussion assistance.

## 2025-05-07 NOTE — ASSESSMENT & PLAN NOTE
- Patient / family are Jehovah's Witnesses; not interested in transfusion.  - Diagnosed several weeks ago with pulmonary embolism in setting of V/Q demonstrating intermediate probability in L lung; started apixaban at that time.  - Has had gradual decline in hemoglobin; iron studies collected during last stay most consistent with anemia of chronic disease with contributions from underlying CKD and small volume losses with blood draws during hospitalizations.  - Apixaban on hold for time being. GI consult for evaluation for occult bleeding. Continue famotidine 20mg IV daily (renally dosed).  - Recent admission / treatment for asthma exacerbation; scant wheezing noted on evaluation 05/06. Increased WOB and desaturation noted with transfer from stretcher to bed 05/06; increased to [facility] overnight 05/06-05/07.  - Continue albuterol-ipratropium 2.5-0.5mg inhaled q4hr PRN.  - Repeat CXR without significant infiltrate. Family reports had episode of emesis overnight; potentially aspiration?  - With increased O2 requirements, transferring to ICU for closer monitoring and will consult pulm/crit for assistance.

## 2025-05-07 NOTE — CHAPLAIN
05/07/25 1437   Clinical Encounter Type   Visit Type Follow-up   Visit Category Critical Care   Visited With Patient   Number of Family Visited 1   Length of Visit 15 Minutes   Patient Spiritual Encounters   Care Provided Compassionate presence   Comments - Patient I attempted to visit this patient but was unable to rouse him. I reviewed his EMR. Please call if there are signs of spiritual distress from him or his family

## 2025-05-07 NOTE — ASSESSMENT & PLAN NOTE
- noted recent hx   - was started on Eliquis at that time, but now held given concern for anemia and ?GIB  - management per primary team

## 2025-05-07 NOTE — CONSULTS
.[facility] ([place])  Gastroenterology  Consult Note    Patient Name: Frantz Sigala Jr.  MRN: [id number]  Admission Date: 5/5/2025  Hospital Length of Stay: 2 days  Code Status: DNR   Primary Care Physician: Zachary Ramos MD  Principal Problem:Acute renal failure superimposed on stage 3 chronic kidney disease    Inpatient consult to Gastroenterology  Consult performed by: Franco Oakley MD  Consult ordered by: LALO Quesada MD        Subjective:     Chief complaint:  Anemia    HPI:  60-year-old man with multiple medical problems currently in the ICU with renal failure and anemia.  He is blind and death so unable to communicate with him.  According to the chart, he had dark stools recently.  No recent bowel movement in the hospital.  It has been noted that his hemoglobin has been trending down.  He is Confucianism so it does not want blood products.    He has a history of CHF, asthma, urinary retention, and a questionable pulmonary embolus.    Past medical history:  Past Medical History:   Diagnosis Date    Anxiety     Asthma     CKD (chronic kidney disease), stage IV     Schizophrenia        Past surgical history:  Past Surgical History:   Procedure Laterality Date    CORNEAL TRANSPLANT         Social history:  Social History     Socioeconomic History    Marital status: Single   Tobacco Use    Smoking status: Never    Smokeless tobacco: Never   Substance and Sexual Activity    Alcohol use: No     Alcohol/week: 0.0 standard drinks of alcohol    Drug use: No    Sexual activity: Never   Social History Narrative    Pt is blind and deaf     Social Drivers of Health     Financial Resource Strain: Low Risk  (5/6/2025)    Overall Financial Resource Strain (CARDIA)     Difficulty of Paying Living Expenses: Not very hard   Food Insecurity: No Food Insecurity (5/6/2025)    Hunger Vital Sign     Worried About Running Out of Food in the Last Year: Never true     Ran Out of Food in the Last  Year: Never true   Transportation Needs: No Transportation Needs (5/6/2025)    PRAPARE - Transportation     Lack of Transportation (Medical): No     Lack of Transportation (Non-Medical): No   Physical Activity: Inactive (4/9/2025)    Exercise Vital Sign     Days of Exercise per Week: 0 days     Minutes of Exercise per Session: 0 min   Stress: No Stress Concern Present (5/6/2025)    [employer] - Occupational Stress Questionnaire     Feeling of Stress : Not at all   Housing Stability: Low Risk  (5/6/2025)    Housing Stability Vital Sign     Unable to Pay for Housing in the Last Year: No     Number of Times Moved in the Last Year: 0     Homeless in the Last Year: No       Family history:  No family history on file.    Medications:  Prescriptions Prior to Admission[1]    Allergies:  Review of patient's allergies indicates:   Allergen Reactions    Penicillins Other (See Comments)       Review of systems:  Unable to communicate with patient    Objective:     Vital Signs (Most Recent):  Temp: 98.2 °F (36.8 °C) (05/07/25 0710)  Pulse: 80 (05/07/25 0710)  Resp: 16 (05/07/25 0710)  BP: 123/64 (05/07/25 0710)  SpO2: 96 % (05/07/25 0710) Vital Signs (24h Range):  Temp:  [97.5 °F (36.4 °C)-98.8 °F (37.1 °C)] 98.2 °F (36.8 °C)  Pulse:  [76-91] 80  Resp:  [16-24] 16  SpO2:  [82 %-100 %] 96 %  BP: ()/(55-77) 123/64     Physical examination:  General: well developed, chronically ill-appearing, in no apparent distress  Cardiovascular: Regular rate and rhythm.   Lungs: Non-labored respirations. Breath sounds equal.   Abdomen: soft, NTND, normoactive BS  Psych:  Unable to assess      Labs:  CBC:   Recent Labs   Lab 05/06/25  0426 05/06/25  0916 05/07/25  0109 05/07/25  0606   WBC 13.81*  --  10.71 9.26   HGB 7.3*  --  6.4* 6.2*   HCT 22.8* 23* 20.3* 20.5*     --  271 250     CMP:   Recent Labs   Lab 05/07/25  0109 05/07/25  0606   * 158*   CALCIUM 8.2* 8.5*   ALBUMIN 1.9* 1.8*   PROT  6.8  --    * 135*   K 6.2* 5.4*   CO2 20* 19*    103   * 111*   CREATININE 3.8* 4.0*   ALKPHOS 58  --    ALT 25  --    AST 20  --    BILITOT 0.2  --        Imaging:    Imaging reviewed    Assessment:   60-year-old man with blindness and deafness who has renal failure and a decreasing hemoglobin.  There were reports of dark stools previous to admission, but he has not been passing stool here.  His hemoglobin is 6.2 which is complicated by the fact that the patient is Mu-ism so can not receive a blood transfusion.    Given his overall condition, the patient has been made DNR.  While an EGD can be considered, it would not necessarily be recommended at this point.  Anesthesia with such a low hemoglobin carries increased risks.  Recommend treating empirically for PUD with a PPI.     Plan:   1. Monitor hemoglobin, but minimize blood draws, use pediatric blood tubes, etc.  2. Consider adding pantoprazole 40 mg daily  3. Monitor for gross bleeding.    4. EGD can be considered if there is a point where it seems that benefits outweigh risks.      Discussed with Dr. Quesada        Thank you for your consult.     Franco Oakley MD  Gastroenterology  [facility] ([place])        [1]   Medications Prior to Admission   Medication Sig Dispense Refill Last Dose/Taking    acetaminophen (TYLENOL) 325 MG tablet Take 2 tablets (650 mg total) by mouth every 6 (six) hours as needed for Pain or Temperature greater than (Fever 101).       albuterol (PROVENTIL/VENTOLIN HFA) 90 mcg/actuation inhaler Inhale 2 puffs into the lungs every 6 (six) hours as needed for Wheezing. Rescue 18 g 0     albuterol-ipratropium (DUO-NEB) 2.5 mg-0.5 mg/3 mL nebulizer solution Take 3 mLs by nebulization every 6 (six) hours while awake. Rescue       ammonium lactate (LAC-HYDRIN) 12 % lotion Apply topically 2 (two) times daily. To lower legs       apixaban (ELIQUIS) 5 mg Tab Take 1 tablet (5 mg total) by mouth 2 (two)  times daily.       carvediloL (COREG) 25 MG tablet Take 1 tablet (25 mg total) by mouth 2 (two) times daily with meals. 60 tablet 0     fluticasone propionate (FLONASE) 50 mcg/actuation nasal spray 1 spray (50 mcg total) by Each Nostril route once daily. 9.9 mL 0     furosemide (LASIX) 20 MG tablet Take 2 tablets (40 mg total) by mouth daily as needed (Worsening swelling, SOB or water weight gain of >2 lbs overnight).       lisinopriL (PRINIVIL,ZESTRIL) 40 MG tablet Take 1 tablet (40 mg total) by mouth once daily. 30 tablet 0     melatonin (MELATIN) 3 mg tablet Take 2 tablets (6 mg total) by mouth nightly as needed for Insomnia.       NIFEdipine (PROCARDIA-XL) 60 MG (OSM) 24 hr tablet Take 1 tablet (60 mg total) by mouth once daily.       ondansetron (ZOFRAN-ODT) 4 MG TbDL Take 1 tablet (4 mg total) by mouth every 6 (six) hours as needed (nausea/vomiting). 30 tablet 0     polyethylene glycol (GLYCOLAX) 17 gram PwPk Take 17 g by mouth 3 (three) times daily as needed for Constipation. 72 each 0     risperiDONE (RISPERDAL) 1 MG tablet Take 1 tablet (1 mg total) by mouth nightly.       tamsulosin (FLOMAX) 0.4 mg Cap Take 1 capsule (0.4 mg total) by mouth once daily. 30 capsule 0

## 2025-05-07 NOTE — EICU
"Virtual ICU Admission    Admit Date: 2025  LOS: 2  Code Status: Full Code   : 1965  Bed: BICU 06/BICU 06A:     Diagnosis: Acute renal failure superimposed on stage 3 chronic kidney disease    Patient  has a past medical history of Anxiety, Asthma, CKD (chronic kidney disease), stage IV, and Schizophrenia.    Last VS: /64 (BP Location: Right arm, Patient Position: Sitting)   Pulse 80   Temp 98.2 °F (36.8 °C) (Axillary)   Resp 16   Ht 5' 9" (1.753 m)   Wt 90.7 kg (200 lb)   SpO2 96%   BMI 29.53 kg/m²       VICU Review    VICU nurse assessment :  [place] completed, LDA documentation reconciliation completed, Skin care/wounds LDA reconciliation, Stress ulcer prophylaxis for ventilated patients review, and VTE prophylaxis review                  "

## 2025-05-07 NOTE — PROGRESS NOTES
PULMONARY & CRITICAL CARE MEDICINE  Consult Note     Subjective  Mr. Frantz Sigala Jr. Is a 60 y.o. male, with PMH of CHF, urinary retention, on chronic anticoagulation, HTN, CKD-3, asthma, schizophrenia, blindness, deafness, who [place] [facility] [place] due to low hemoglobin on outpatient labs. His family reports that he is staying at [facility] and his H&H were reported to be 6.3/20.6. EMS reported that they were informed he had vomiting and a fever on the day prior to arrival. His sibling reported that he had decreased PO intake over the past two days. He was evaluated in the ED with labs showing H&H of 7.4/23.3. He was recently admitted and treated for acute on chronic CHF, and acute anemia. During that admission he was treated with EPO injection and iron infusion as he is a Pentecostalism. His HGB increased to 8.4 after treatments. Today he also had leukocytosis of 16k, with left shift. A metabolic panel showed potassium of 6.1 (with hemolysis; no peaked T waves on EKG), with BUN 92, Cr of 4.0, and was retaining urine. He was unable to provide a urine sample initially as he is retaining urine. A bladder scan showed 800 cc urine in his bladder, and mackay cath was placed. A cath UA was obtained and showed no UTI. He was negative for Influenza A/B, and Covid-19. He was treated with 1L of IV NS over 2 hours in the ED. He was admitted to inpatient status. Developed respiratory failure overnight and transferred to ICU.     PMHx:   Past Medical History:   Diagnosis Date    Anxiety     Asthma     CKD (chronic kidney disease), stage IV     Schizophrenia      PSHx:   Past Surgical History:   Procedure Laterality Date    CORNEAL TRANSPLANT       Social History:  Lives with his siblings who take excellent care of him    Current Medications[1]    Review of patient's allergies indicates:   Allergen Reactions    Penicillins Other (See Comments)       Objective:   BP (!) 108/50   Pulse 78   Temp  "98.4 °F (36.9 °C) (Axillary)   Resp 19   Ht 5' 9" (1.753 m)   Wt 90.7 kg (200 lb)   SpO2 96%   BMI 29.53 kg/m²    CONSTITUTIONAL: Chronically ill-appearing male.   HEENT: Normocephalic. External ears normal. Extraocular movements intact.   CV: Regular rate and rhythm. Symmetric radial pulses. Warm extremities. Normal capillary refill.    PULM: Symmetric chest wall excursion. No accessory muscle use.   GI: Abdomen is soft and nondistended. No tenderness.   MSK: No gross deformity.    NEURO: Deaf and blind. Responds to painful stimuli and moves all extremities spontaneously. No facial asymmetry or dysarthria. Moves all extremities.   DERM: No rash or ecchymoses.   PSYCH:     Labs:   Microcytic anemia, worse (7.4 g/dl > 6.2 g/dL). Resolved leukocytosis with neutrophil predominance. Lymphopenia  Mild hyponatremia. NAGMA. Hyperkalemic. Cr 4 with significant azotemia. Hypophosphatemic. Hypoalbuminemic       Imaging:  Cardiomegaly. Maybe some airspace disease lateral to right heart border & retrocardiac    Assessment & Plan  60 y.o. man with acute hypoxemic respiratory failure.     NEURO  # Blindness and deafness with acute encephalopathy, likely toxic-metabolic   - Per family, normally rouses with noxious stimuli but was obtunded overnight. He's much closer to baseline per his sister at bedside    CV  # No acute issues  # HFpEF, not acutely decompensated  # HTN  - Euvolemic. On CCB & carvedilol. Normotensive    PULM  # Acute hypoxemic respiratory failure, favor aspiration pneumonitis  # Possible PE in recent history on Xai  # Reported asthma, not acutely decompensated  - Sudden respiratory failure overnight requiring Vapotherm. Requirements slowly improving. Clinical improvement and history of coughing with eating is most suspicion that he had an aspiration event that wasn't radiographically apparent on initial film. No infectious features  - May be difficult to get him to use MDI, can use SUZANNA-SUMAN PRN    RENAL  # " ROBBY on CKD with hyperkalemia & mild NAGMA  - Obstructed in ED. May have been hypovolemic at presentation. Looks euvolemic now.   - Agree that his azotemia is slightly out of proportion than what I would expect with his renal failure alone  - Nephrology following. Agree with temporizing as needed    ID  # No acute issues    GI  # Abdominal pain with constipation & possible melena in the community  - GI following. Possibly endoscopy depending on how goals of care evolve    HEME/ONC  # Anemia of CKD in a patient with possible acute blood loss   # Jainism  - EPO & Fe per nephrology    ENDO  # No acute issues    Routine MICU Care Items  Feeding:  NPO  Analgesia: PRN pain meds for CPOT score  Sedation: Not Intubated  Thromboembolism Prophylaxis:  Held  Head of Bed at 30 degrees: Yes  Ulcer Prophylaxis: H2 Blocker  Glycemic Control: Goal glucose < 180mg/dL  Spontaneous Awakening and Breathing Trial:  Not Intubated  Bowel Regimen: Laxatives    Invasive Device Removal:   [place]  []Not Present  []Present, Remove Today and begin Nursing Monitoring for Retention  []Present, Indicated   Indication:   []Recent Pelvic Surgery   []Stage IV Sacral Ulcer and cannot be managed with non-invasive urine collection   []Hourly Titration of Continuous Infusions according to Urine Output   []Hospice or end-of-life care   []Requested by Urology    Central Line  []Not Present  []Present, Remove Today  [x]Present, Indicated    Arterial Line  [x]Not Present  []Present, Remove Today  []Present, Indicated    Drug de-escalation: Antibiotics: None Currently     Therapy  []PT  []OT  []SLP    Vasopressors/Inotropes  []None  []Norepinephrine  []Vasopressin  []Phenylephrine  []Dobutamine  []Milrinone  []Other      Edd Malone DO  12:07 PM 05/07/2025          [1]   Current Facility-Administered Medications:     acetaminophen tablet 650 mg, 650 mg, Oral, Q6H PRN, Miriam Clemente PA-C    albuterol-ipratropium 2.5 mg-0.5 mg/3 mL  nebulizer solution 3 mL, 3 mL, Nebulization, Q4H PRN, LALO Quesada MD, 3 mL at 05/07/25 0018    ammonium lactate 12 % lotion, , Topical (Top), BID, LALO Quesada MD, Given at 05/07/25 0937    carvediloL tablet 25 mg, 25 mg, Oral, BID WM, LALO Quesada MD, 25 mg at 05/06/25 1925    famotidine (PF) injection 20 mg, 20 mg, Intravenous, Daily, LALO Quesada MD, 20 mg at 05/07/25 0935    ferric gluconate (Ferrlecit) 125 mg in 0.9% NaCl 110 mL IVPB, 125 mg, Intravenous, Every Other Day, Leeroy Cook NP, Stopped at 05/06/25 1245    fluticasone propionate 50 mcg/actuation nasal spray 50 mcg, 1 spray, Each Nostril, Daily, LALO Quesada MD, 50 mcg at 05/07/25 0937    folic acid tablet 1 mg, 1 mg, Oral, Daily, LALO Quesada MD    HYDROcodone-acetaminophen 5-325 mg per tablet 1 tablet, 1 tablet, Oral, Q4H PRN, LALO Quesada MD, 1 tablet at 05/06/25 1925    lubiprostone capsule 24 mcg, 24 mcg, Oral, Daily with breakfast, Leeroy Cook NP, 24 mcg at 05/06/25 1052    melatonin tablet 6 mg, 6 mg, Oral, Nightly PRN, Miriam Clemente, PA-C    metoclopramide injection 10 mg, 10 mg, Intravenous, BID, Leeroy Cook NP, 10 mg at 05/07/25 0935    multivitamin tablet, 1 tablet, Oral, Daily, LALO Quesada MD    mupirocin 2 % ointment, , Nasal, BID, LALO Quesada MD, Given at 05/07/25 0935    [START ON 5/8/2025] NIFEdipine 24 hr tablet 60 mg, 60 mg, Oral, Daily, LALO Quesada MD    ondansetron disintegrating tablet 4 mg, 4 mg, Oral, Q6H PRN, LALO Quesada MD    ondansetron injection 4 mg, 4 mg, Intravenous, Q6H PRN, LALO Quesada MD, 4 mg at 05/07/25 0227    risperiDONE tablet 1 mg, 1 mg, Oral, Nightly, LALO Quesada MD, 1 mg at 05/06/25 2129    senna-docusate 8.6-50 mg per tablet 1 tablet, 1 tablet, Oral, BID PRN, Miriam Clemente, PALandryC, 1 tablet at 05/06/25 2130    sodium bicarbonate tablet 650 mg, 650 mg, Oral, BID, Leeroy Cook NP, 650 mg at 05/06/25 2130     sodium chloride 0.9% flush 10 mL, 10 mL, Intravenous, PRN, LALO Quesada MD    tamsulosin 24 hr capsule 0.4 mg, 0.4 mg, Oral, Daily, LALO Quesada MD, 0.4 mg at 05/06/25 0923    thiamine tablet 100 mg, 100 mg, Oral, Daily, LALO Quesada MD

## 2025-05-07 NOTE — ASSESSMENT & PLAN NOTE
- noted need for HFNC  - ?volume overload thereby noted diuresis effort  - management per pccm/primary team/nephrology for optimizaton

## 2025-05-07 NOTE — ASSESSMENT & PLAN NOTE
- Patient / family are Jehovah's Witnesses; not interested in transfusion.  - Diagnosed several weeks ago with pulmonary embolism in setting of V/Q demonstrating intermediate probability in L lung; started apixaban at that time.  - Has had gradual decline in hemoglobin; iron studies collected during last stay most consistent with anemia of chronic disease with contributions from underlying CKD and small volume losses with blood draws during hospitalizations.  - Apixaban on hold for time being. GI consult for evaluation for occult bleeding. Continue famotidine 20mg IV daily (renally dosed).  - Recent admission / treatment for asthma exacerbation; scant wheezing noted on evaluation 05/06. Increased WOB and desaturation noted with transfer from stretcher to bed 05/06; increased to [facility] overnight 05/06-05/07.  - Continue albuterol-ipratropium 2.5-0.5mg inhaled q4hr PRN.  - Repeat CXR without significant infiltrate. Family reports had episode of emesis overnight; potentially aspiration?  - With increased O2 requirements, transferring to ICU for closer monitoring and will consult pulm/crit for assistance.

## 2025-05-07 NOTE — CONSULTS
[facility] - Intensive Care ([place])  Palliative Medicine  Consult Note    Patient Name: Frantz Sigala Jr.  MRN: [id number]  Admission Date: 5/5/2025  Hospital Length of Stay: 2 days  Code Status: DNR   Attending Provider: LALO Quesada MD  Consulting Provider: Jimmy Pham MD  Primary Care Physician: Zachary Ramos MD  Principal Problem:Acute renal failure superimposed on stage 3 chronic kidney disease    Patient information was obtained from relative(s), past medical records, and primary team.      Inpatient consult to Palliative Care  Consult performed by: Jimmy Pham MD  Consult ordered by: LALO Quesada MD  Reason for consult: goals of care        Assessment/Plan:     Neuro  Nonverbal  - noted hx    Ophtho  Blind  - noted    ENT  Deaf, bilateral  - noted     Pulmonary  Acute hypoxemic respiratory failure  - noted need for HFNC  - ?volume overload thereby noted diuresis effort  - management per pccm/primary team/nephrology for optimizaton    Cardiac/Vascular  Chronic diastolic heart failure  - noted hx    Renal/  * Acute renal failure superimposed on stage 3 chronic kidney disease  - noted continued decline with rising BUN and CR steady at 4  - nephrology discussed with family regarding him not being a HD candidate. Family in agreement.   - contributes to concerns surrounding prognosis   - optimization as possible per primary team/nephrology    Hematology  Acute pulmonary embolism  - noted recent hx   - was started on Eliquis at that time, but now held given concern for anemia and ?GIB  - management per primary team      Oncology  Anemia  - noted hgb <7  - of note a Orthodox  - noted plan for IV iron and eliquis held  - GI following with plan for IV PPI  - optimization as possible per primary team/GI    Palliative Care  Goals of care, counseling/discussion  5/7/2025:  - chart reviewed in depth  - discussed with primary team and bedside nurse   - patient seen at bedside. No  family present at this time  - patient sleeping comfortably. Of note deaf, blind, nonverbal and unable to meaningfully participate in conversation. Allowed him to continue resting  - proceeded shortly after to call and talk with his sister Eliana whose number on file  - she shared next of kin as their mother Diamond Sigala, whom patient lives with along with Eliana. Thereby she involved Diamond on the call too  - Diamond shared her deferment of medical decision making to her children Eliana, Adonay and Wendy as the lead medical decision makers as appointed by her and the other siblings. They will update the others. Diamond did not wish to be a part of the conversation further, trusting her 3 children to do so.   - thereby Eliana got her 2 other siblings Adonay and Wendy on the phone too  - introduced self and role of palliative medicine  - they were familiar per discussion with other medical teams about the severity of his condition. They have strong insight into his medical on jono.   - they shared more about their brother, and their love for him  - they shared the decline they have noticed in him over the past month with his 3 hospitalizations and SNF stint  - he has had a significant functional/nutritional decline, now mainly bed bound compared to before  - they have also noted increased suffering which has been difficult for them to see  - they shared more about his life and the love/support he receives from the family day in and day out  - commended them on their efforts in his care   - they welcomed an open/honest conversation  - thereby shared the concerns surrounding continued decline and thinking about what matters most to him if he could share it, with the time he has left  - this led to them discussing the conversations they had thus far about code status, in which they stated desire for allowing him to pass peacefully/naturally when his time comes, aligning with DNR/DNI. Shared this would be the recommendation of  "the medical teams too given all he has going on medically and not wanting him to suffer further given the severity of his medical on jono. They stated their agreement and that of their family. They welcomed it being updated in the chart. Seems they had shared these wishes with nephrology too.   - shared role of transition to comfort focused care in coming days if no further improvement, in hope of getting him home with hospice and what that entails.   - they stated understanding  - stated importance of taking things one day and one step at a time   - provided significant emotional support and listening ear   [] hope for optimization of condition as possible, but with limits to invasive interventions. They will assess day to day for improvement and are aware of possibility of transition to comfort focused care if continues to decline or lacks adequate improvement in coming days.   [] his mother (next of kin) deferred medical conversation/decision making to her children (patients siblings) AdonayEliana wilson and Wendy, who are medical decision making leads as appointed by family/siblings.   [] DNR/DNI per family clear wishes. Updated in chart         Thank you for your consult. I will follow-up with patient. Please contact us if you have any additional questions.    Subjective:     HPI:   Per HPI: "Mr. Frantz Sigala Jr. Is a 60 y.o. male, with PMH of CHF, urinary retention, on chronic anticoagulation, HTN, CKD-3, asthma, schizophrenia, blindness, deafness, who presented to [facility] ED on 5/5/25 due to low hemoglobin on outpatient labs. His family reports that he is staying at [facility] and his H&H were reported to be 6.3/20.6. EMS reported that they were informed he had vomiting and a fever on the day prior to arrival. His sibling reported that he had decreased PO intake over the past two days. He was evaluated in the ED with labs showing H&H of 7.4/23.3. He was recently admitted and treated for acute on " "chronic CHF, and acute anemia. During that admission he was treated with EPO injection and iron infusion as he is a [employer]. His HGB increased to 8.4 after treatments. Today he also had leukocytosis of 16k, with left shift. A metabolic panel showed potassium of 6.1 (with hemolysis; no peaked T waves on EKG), with BUN 92, Cr of 4.0, and was retaining urine. He was unable to provide a urine sample initially as he is retaining urine. A bladder scan showed 800 cc urine in his bladder, and mackay cath was placed. A cath UA was obtained and showed no UTI. He was negative for Influenza A/B, and Covid-19. He was treated with 1L of IV NS over 2 hours in the ED. He was admitted to inpatient status."    Palliative medicine consulted for assistance with [facility]    Hospital Course:  No notes on file      Past Medical History:   Diagnosis Date    Anxiety     Asthma     CKD (chronic kidney disease), stage IV     Schizophrenia        Past Surgical History:   Procedure Laterality Date    CORNEAL TRANSPLANT         Review of patient's allergies indicates:   Allergen Reactions    Penicillins Other (See Comments)       Medications:  Continuous Infusions:  Scheduled Meds:   ammonium lactate   Topical (Top) BID    carvediloL  25 mg Oral BID WM    famotidine (PF)  20 mg Intravenous Daily    ferric gluconate (Ferrlecit) IVPB  125 mg Intravenous Every Other Day    fluticasone propionate  1 spray Each Nostril Daily    folic acid  1 mg Oral Daily    lubiprostone  24 mcg Oral Daily with breakfast    metoclopramide  10 mg Intravenous BID    multivitamin  1 tablet Oral Daily    mupirocin   Nasal BID    [START ON 5/8/2025] NIFEdipine  60 mg Oral Daily    risperiDONE  1 mg Oral Nightly    sodium bicarbonate  650 mg Oral BID    tamsulosin  0.4 mg Oral Daily    thiamine  100 mg Oral Daily     PRN Meds:  Current Facility-Administered Medications:     acetaminophen, 650 mg, Oral, Q6H PRN    albuterol-ipratropium, 3 mL, Nebulization, Q4H PRN    " HYDROcodone-acetaminophen, 1 tablet, Oral, Q4H PRN    melatonin, 6 mg, Oral, Nightly PRN    ondansetron, 4 mg, Oral, Q6H PRN    ondansetron, 4 mg, Intravenous, Q6H PRN    senna-docusate, 1 tablet, Oral, BID PRN    sodium chloride 0.9%, 10 mL, Intravenous, PRN    Family History    None       Tobacco Use    Smoking status: Never    Smokeless tobacco: Never   Substance and Sexual Activity    Alcohol use: No     Alcohol/week: 0.0 standard drinks of alcohol    Drug use: No    Sexual activity: Never       Objective:     Vital Signs (Most Recent):  Temp: 98.4 °F (36.9 °C) (05/07/25 1100)  Pulse: 78 (05/07/25 1200)  Resp: 19 (05/07/25 1200)  BP: (!) 108/50 (05/07/25 1200)  SpO2: 96 % (05/07/25 1200) Vital Signs (24h Range):  Temp:  [97.5 °F (36.4 °C)-98.8 °F (37.1 °C)] 98.4 °F (36.9 °C)  Pulse:  [76-91] 78  Resp:  [16-27] 19  SpO2:  [82 %-100 %] 96 %  BP: ()/(50-77) 108/50     Weight: 90.7 kg (200 lb)  Body mass index is 29.53 kg/m².       Physical Exam  Vitals and nursing note reviewed.   Constitutional:       Comments: Sleeping. Doesn't arouse to gentle stimuli at this time, but comfortable appearing otherwise.    Eyes:      Comments: Eyes closed   Pulmonary:      Comments: HFNC in place  Neurological:      Comments: Sleeping. 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 Unable to participate meaningfully in conversation.        Review of Symptoms        Living Arrangements:  Lives with family    Psychosocial/Cultural:   See Palliative Psychosocial Note: Yes  - lives at home with his mother and sister Eliana   - has 8 other siblings who are well involved in his care  - increasingly bedbound over the past month   - family is able to interpret his needs at times based on his physical cues   **Primary  to Follow**  Palliative Care  Consult: No        Advance Care Planning  Advance Directives:   Do Not Resuscitate Status: Yes    Medical Power of : His mother Ms. Diamond Sigala is next of  kin medical decision maker and has deferred to her 3 children Eliana, Adonay and Wendy to be the lead medical decision makers of the siblings.      Decision Making:  Patient unable to communicate due to disease severity/cognitive impairment and Family answered questions  Goals of Care: The family endorses that what is most important right now is to focus on improvement in condition but with limits to invasive therapies.     Accordingly, we have decided that the best plan to meet the patient's goals includes continuing with treatment and assess possible optimization in coming day to guide next step decision making.          Significant Labs: reviewed  CBC:   Recent Labs   Lab 05/07/25 0606   WBC 9.26   HGB 6.2*   HCT 20.5*   MCV 81*        BMP:  Recent Labs   Lab 05/07/25 0606   *   *   K 5.4*      CO2 19*   *   CREATININE 4.0*   CALCIUM 8.5*   MG 1.6     LFT:  Lab Results   Component Value Date    AST 20 05/07/2025    ALKPHOS 58 05/07/2025    BILITOT 0.2 05/07/2025     Albumin:   Albumin   Date Value Ref Range Status   05/07/2025 1.8 (L) 3.5 - 5.2 g/dL Final     Comment:     Pediatric tubes, pleasePediatric tubes, please   12/06/2022 3.5 3.5 - 5.2 g/dL Final     Protein:   Protein Total   Date Value Ref Range Status   05/07/2025 6.8 6.0 - 8.4 gm/dL Final     Total Protein   Date Value Ref Range Status   12/06/2022 8.9 (H) 6.0 - 8.4 g/dL Final     Lactic acid:   Lab Results   Component Value Date    LACTATE 1.1 04/27/2025    LACTATE 0.9 04/14/2025       Significant Imaging: reviewed     70 minutes face to face time in discussion of symptom assessment, coordination of care, exploring burden/ benefit of various approaches of treatment and discharge planning.  This also includes non-face to face time preparing to see the patient (eg, review of tests/imaging), obtaining and/or reviewing separately obtained history, documenting clinical information in the electronic or other health  record, independently interpreting results and communicating results to the patient/family/caregiver, or care coordinator.     16 minutes ACP time spent: goals of care, emotional support, formulating goals and communication of prognosis      Jimmy Pham MD  Palliative Medicine  [facility]ensive [facility] ([place])

## 2025-05-07 NOTE — CONSULTS
[facility] Intensive Care ([place])  Wound Care    Patient Name:  Frantz Sigala Jr.   MRN:  [id number]  Date: 5/7/2025  Diagnosis: Acute renal failure superimposed on stage 3 chronic kidney disease    History:     Past Medical History:   Diagnosis Date    Anxiety     Asthma     CKD (chronic kidney disease), stage IV     Schizophrenia        Social History[1]    Precautions:     Allergies as of 05/05/2025 - Reviewed 05/05/2025   Allergen Reaction Noted    Penicillins Other (See Comments) 10/12/2015       [facility] Assessment Details/Treatment     Wound care consult received for assessment of sacrum and left lower leg. Patient is a 60 year old male known to wound care team from previous admissions. PMH of CHF, urinary retention, on chronic anticoagulation, HTN, CKD-3, asthma, schizophrenia, blindness, deafness, who presented to [facility] ED on 5/5/25 due to low hemoglobin on outpatient labs. His family reports that he is staying at [facility] and his H&H were reported to be 6.3/20.6. EMS reported that they were informed he had vomiting and a fever on the day prior to arrival. His sibling reported that he had decreased PO intake over the past two days. He was admitted to inpatient status.     Upon assessment noted present on admit purple discoloration and blistering to gluteal cleft/ buttocks- deep tissue pressure injury. Patient also has a chronic patch of hyperkeratosis to left lower leg that is smaller and smoother than previous assessments.     Recommendations:  - Buttocks/gluteal cleft: cleanse with wound cleanser, then apply a thin layer of Triad ointment to affected areas BID/PRN after cleaning. Leave open to air. No cover dressing needed.  - Left lower leg:  apply lac hydrin lotion BID to affected area to exfoliate and lift the scales.     Nursing and MD notified. Orders placed. Nursing to maintain pressure injury prevention interventions. Wound care will follow.      05/07/25 1013        Wound 05/06/25  Pressure Injury Buttocks #1   Date First Assessed: 05/06/25   Present on Original Admission: Yes  Primary Wound Type: Pressure Injury  Location: Buttocks  Wound Number: #1  Is this injury device related?: No   Wound Image     Pressure Injury Stage DTPI   Dressing Appearance Open to air   Drainage Amount None   Appearance Maroon;Purple;Blistered   Tissue loss description Not applicable   Periwound Area Purple;Maroon   Care Cleansed with:;Wound cleanser;Applied:;Skin Barrier                     05/07/2025         [1]   Social History  Socioeconomic History    Marital status: Single   Tobacco Use    Smoking status: Never    Smokeless tobacco: Never   Substance and Sexual Activity    Alcohol use: No     Alcohol/week: 0.0 standard drinks of alcohol    Drug use: No    Sexual activity: Never   Social History Narrative    Pt is blind and deaf     Social Drivers of Health     Financial Resource Strain: Low Risk  (5/6/2025)    Overall Financial Resource Strain (CARDIA)     Difficulty of Paying Living Expenses: Not very hard   Food Insecurity: No Food Insecurity (5/6/2025)    Hunger Vital Sign     Worried About Running Out of Food in the Last Year: Never true     Ran Out of Food in the Last Year: Never true   Transportation Needs: No Transportation Needs (5/6/2025)    PRAPARE - Transportation     Lack of Transportation (Medical): No     Lack of Transportation (Non-Medical): No   Physical Activity: Inactive (4/9/2025)    Exercise Vital Sign     Days of Exercise per Week: 0 days     Minutes of Exercise per Session: 0 min   Stress: No Stress Concern Present (5/6/2025)    [employer] - Occupational Stress Questionnaire     Feeling of Stress : Not at all   Housing Stability: Low Risk  (5/6/2025)    Housing Stability Vital Sign     Unable to Pay for Housing in the Last Year: No     Number of Times Moved in the Last Year: 0     Homeless in the Last Year: No

## 2025-05-07 NOTE — HPI
"Per HPI: "Mr. Frantz Sigala Jr. Is a 60 y.o. male, with PMH of CHF, urinary retention, on chronic anticoagulation, HTN, CKD-3, asthma, schizophrenia, blindness, deafness, who presented to [facility] ED on 5/5/25 due to low hemoglobin on outpatient labs. His family reports that he is staying at [facility] and his H&H were reported to be 6.3/20.6. EMS reported that they were informed he had vomiting and a fever on the day prior to arrival. His sibling reported that he had decreased PO intake over the past two days. He was evaluated in the ED with labs showing H&H of 7.4/23.3. He was recently admitted and treated for acute on chronic CHF, and acute anemia. During that admission he was treated with EPO injection and iron infusion as he is a Jehovah's witness. His HGB increased to 8.4 after treatments. Today he also had leukocytosis of 16k, with left shift. A metabolic panel showed potassium of 6.1 (with hemolysis; no peaked T waves on EKG), with BUN 92, Cr of 4.0, and was retaining urine. He was unable to provide a urine sample initially as he is retaining urine. A bladder scan showed 800 cc urine in his bladder, and mackay cath was placed. A cath UA was obtained and showed no UTI. He was negative for Influenza A/B, and Covid-19. He was treated with 1L of IV NS over 2 hours in the ED. He was admitted to inpatient status."    Palliative medicine consulted for assistance with [facility]  "

## 2025-05-07 NOTE — ASSESSMENT & PLAN NOTE
- At baseline, patient is deaf, blind and cannot verbalize except some utterances that family has been able to understand over time.  - Continue risperidone 1mg PO qHS.

## 2025-05-07 NOTE — PROGRESS NOTES
[facility] Surg [address] Medicine  Progress Note    Patient Name: Frantz Sigala Jr.  MRN: [id number]  Patient Class: IP- Inpatient   Admission Date: 5/5/2025  Length of Stay: 2 days  Attending Physician: LALO Quesada MD  Primary Care Provider: Zachary Ramos MD        Subjective     Principal Problem:Acute renal failure superimposed on stage 3 chronic kidney disease        HPI:  Mr. Frantz Sigala Jr. Is a 60 y.o. male, with PMH of CHF, urinary retention, on chronic anticoagulation, HTN, CKD-3, asthma, schizophrenia, blindness, deafness, who [place] [facility] [place]5/25 due to low hemoglobin on outpatient labs. His family reports that he is staying at [facility] and his H&H were reported to be 6.3/20.6. EMS reported that they were informed he had vomiting and a fever on the day prior to arrival. His sibling reported that he had decreased PO intake over the past two days. He was evaluated in the ED with labs showing H&H of 7.4/23.3. He was recently admitted and treated for acute on chronic CHF, and acute anemia. During that admission he was treated with EPO injection and iron infusion as he is a Restorationism. His HGB increased to 8.4 after treatments. Today he also had leukocytosis of 16k, with left shift. A metabolic panel showed potassium of 6.1 (with hemolysis; no peaked T waves on EKG), with BUN 92, Cr of 4.0, and was retaining urine. He was unable to provide a urine sample initially as he is retaining urine. A bladder scan showed 800 cc urine in his bladder, and mackay cath was placed. A cath UA was obtained and showed no UTI. He was negative for Influenza A/B, and Covid-19. He was treated with 1L of IV NS over 2 hours in the ED. He was admitted to inpatient status.     Overview/Hospital Course:  No notes on file    Interval History: Overnight events noted; transferring to ICU with persistent increased WOB/O2 requirements. Discussed with pulm/crit,  nephrology, palliative and updated sister/brother at bedside.     Review of Systems   Unable to perform ROS: Patient nonverbal     Objective:     Vital Signs (Most Recent):  Temp: 98.4 °F (36.9 °C) (05/07/25 1100)  Pulse: 85 (05/07/25 1500)  Resp: (!) 23 (05/07/25 1500)  BP: (!) 108/57 (05/07/25 1500)  SpO2: 97 % (05/07/25 1500) Vital Signs (24h Range):  Temp:  [97.7 °F (36.5 °C)-98.8 °F (37.1 °C)] 98.4 °F (36.9 °C)  Pulse:  [76-91] 85  Resp:  [14-27] 23  SpO2:  [82 %-100 %] 97 %  BP: ()/(50-72) 108/57     Weight: 90.7 kg (200 lb)  Body mass index is 29.53 kg/m².    Intake/Output Summary (Last 24 hours) at 5/7/2025 1634  Last data filed at 5/7/2025 1500  Gross per 24 hour   Intake --   Output 1285 ml   Net -1285 ml         Physical Exam  Vitals and nursing note reviewed.   Constitutional:       General: He is not in acute distress.  HENT:      Head: Normocephalic and atraumatic.   Eyes:      General:         Right eye: No discharge.         Left eye: No discharge.   Cardiovascular:      Rate and Rhythm: Normal rate.      Pulses: Normal pulses.   Pulmonary:      Effort: No respiratory distress.      Comments: On HFNC. Faint expiratory wheezing.  Abdominal:      General: There is distension.      Palpations: Abdomen is soft.   Musculoskeletal:      Right lower leg: No edema.      Left lower leg: No edema.   Skin:     General: Skin is warm and dry.   Neurological:      Mental Status: He is alert.      Comments: Lethargic. Arouses briefly to touch. Nonverbal at baseline.           Significant Labs:   CBC:  Recent Labs   Lab 05/06/25  0426 05/06/25  0916 05/07/25  0109 05/07/25  0606   WBC 13.81*  --  10.71 9.26   HGB 7.3*  --  6.4* 6.2*   HCT 22.8* 23* 20.3* 20.5*     --  271 250   LYMPH 0.60*  4.3*  --  0.54*  5.0* 0.43*  4.6*   MONO 7.0  0.97  --  7.7  0.82 4.6  0.43   EOS 0.3  0.04  --  0.2  0.02 0.4  0.04   BMP:  Recent Labs   Lab 05/06/25  0426 05/07/25  0109 05/07/25  0606   * 134* 135*    K 5.4* 6.2* 5.4*    101 103   CO2 19* 20* 19*   BUN 93* 111* 111*   CREATININE 4.2* 3.8* 4.0*   * 119* 158*   CALCIUM 8.6* 8.2* 8.5*   MG 1.7 1.6 1.6   PHOS  --   --  5.3*     Significant Imaging: I have reviewed and interpreted all pertinent imaging results/findings within the past 24 hours.        Assessment & Plan  Acute renal failure superimposed on stage 3 chronic kidney disease  Urinary retention  Leukocytosis  Chronic diastolic heart failure  Essential hypertension  Hyperkalemia  CKD (chronic kidney disease) stage 3, GFR 30-59 ml/min  Goals of care, counseling/discussion  - ROBBY on CKD secondary to combination of poor intake, diuresis, retention and intermittent lower BPs from baseline with concurrent hyperkalemia.  - Renally dose medications, avoid nephrotoxins, strict intake/output.  - Labs comparable; avoiding ACE, diuretics as feasible.  - Mentation may be worsening in setting of uptrending BUN.  - Appreciate nephrology assistance. Palliative consulted in addition for goals of care discussion assistance.  Anemia  Acute pulmonary embolism  Asthma  Acute hypoxemic respiratory failure  - Patient / family are Jehovah's Witnesses; not interested in transfusion.  - Diagnosed several weeks ago with pulmonary embolism in setting of V/Q demonstrating intermediate probability in L lung; started apixaban at that time.  - Has had gradual decline in hemoglobin; iron studies collected during last stay most consistent with anemia of chronic disease with contributions from underlying CKD and small volume losses with blood draws during hospitalizations.  - Apixaban on hold for time being. GI consult for evaluation for occult bleeding. Continue famotidine 20mg IV daily (renally dosed).  - Recent admission / treatment for asthma exacerbation; scant wheezing noted on evaluation 05/06. Increased WOB and desaturation noted with transfer from stretcher to bed 05/06; increased to HFNC overnight 05/06-05/07.  -  Continue albuterol-ipratropium 2.5-0.5mg inhaled q4hr PRN.  - Repeat CXR without significant infiltrate. Family reports had episode of emesis overnight; potentially aspiration?  - With increased O2 requirements, transferring to ICU for closer monitoring and will consult pulm/crit for assistance.  Schizophrenia  Blind  Deaf, bilateral  Nonverbal  - At baseline, patient is deaf, blind and cannot verbalize except some utterances that family has been able to understand over time.  - Continue risperidone 1mg PO qHS.    VTE Risk Mitigation (From admission, onward)           Ordered     IP VTE HIGH RISK PATIENT  Once         05/06/25 0123     Place sequential compression device  Until discontinued         05/06/25 0123                    Discharge Planning   JAQUAN: 5/8/2025     Code Status: DNR   Medical Readiness for Discharge Date:   Discharge Plan A: Skilled Nursing Facility                        D Sunny Quesada MD  Department of Hospital Medicine   [facility] - Med Surg ([address])

## 2025-05-07 NOTE — ASSESSMENT & PLAN NOTE
- Patient / family are Jehovah's Witnesses; not interested in transfusion.  - Diagnosed several weeks ago with pulmonary embolism in setting of V/Q demonstrating intermediate probability in L lung; started apixaban at that time.  - Has had gradual decline in hemoglobin; iron studies collected during last stay most consistent with anemia of chronic disease with contributions from underlying CKD and small volume losses with blood draws during hospitalizations.  - Apixaban on hold for time being. GI consult for evaluation for occult bleeding. Continue famotidine 20mg IV daily (renally dosed).  - Recent admission / treatment for asthma exacerbation; scant wheezing noted on evaluation 05/06. Increased WOB and desaturation noted with transfer from stretcher to bed 05/06; increased to [facility] overnight 05/06-05/07.  - Continue albuterol-ipratropium 2.5-0.5mg inhaled q4hr PRN.  - Repeat CXR without significant infiltrate. Family reports had episode of emesis overnight; potentially aspiration?  - With increased O2 requirements, transferring to ICU for closer monitoring and will consult pulm/crit for assistance.

## 2025-05-07 NOTE — ASSESSMENT & PLAN NOTE
5/7/2025:  - chart reviewed in depth  - discussed with primary team and bedside nurse   - patient seen at bedside. No family present at this time  - patient sleeping comfortably. Of note deaf, blind, nonverbal and unable to meaningfully participate in conversation. Allowed him to continue resting  - proceeded shortly after to call and talk with his sister Eliana whose number on file  - she shared next of kin as their mother Diamond Sigala, whom patient lives with along with Eliana. Thereby she involved Diamond on the call too  - Diamond shared her deferment of medical decision making to her children Eliana, Adonay and Wendy as the lead medical decision makers as appointed by her and the other siblings. They will update the others. Diamond did not wish to be a part of the conversation further, trusting her 3 children to do so.   - thereby Eliana got her 2 other siblings Adonay and Wendy on the phone too  - introduced self and role of palliative medicine  - they were familiar per discussion with other medical teams about the severity of his condition. They have strong insight into his medical on jono.   - they shared more about their brother, and their love for him  - they shared the decline they have noticed in him over the past month with his 3 hospitalizations and SNF stint  - he has had a significant functional/nutritional decline, now mainly bed bound compared to before  - they have also noted increased suffering which has been difficult for them to see  - they shared more about his life and the love/support he receives from the family day in and day out  - commended them on their efforts in his care   - they welcomed an open/honest conversation  - thereby shared the concerns surrounding continued decline and thinking about what matters most to him if he could share it, with the time he has left  - this led to them discussing the conversations they had thus far about code status, in which they stated desire for allowing  him to pass peacefully/naturally when his time comes, aligning with DNR/DNI. Shared this would be the recommendation of the medical teams too given all he has going on medically and not wanting him to suffer further given the severity of his medical on jono. They stated their agreement and that of their family. They welcomed it being updated in the chart. Seems they had shared these wishes with nephrology too.   - shared role of transition to comfort focused care in coming days if no further improvement, in hope of getting him home with hospice and what that entails.   - they stated understanding  - stated importance of taking things one day and one step at a time   - provided significant emotional support and listening ear   [] hope for optimization of condition as possible, but with limits to invasive interventions. They will assess day to day for improvement and are aware of possibility of transition to comfort focused care if continues to decline or lacks adequate improvement in coming days.   [] his mother (next of kin) deferred medical conversation/decision making to her children (patients siblings) Eliana Urias and Wendy, who are medical decision making leads as appointed by family/siblings.   [] DNR/DNI per family clear wishes. Updated in chart

## 2025-05-07 NOTE — NURSING
Midnight vital signs with low O2 sats. Patient O2 rate increased by RN to 3L NC with O2 sats improving. See flowsheets.  Noted pt with SOB and RN called respiratory to assess and give breathing treatment. Rapid nurse came to bedside to assess. Pt minimally responsive to stimulation compared to previous assessment during shift. Sudden change. PA on call aware and orders placed.

## 2025-05-07 NOTE — CODE/ RAPID DOCUMENTATION
"RAPID RESPONSE NURSE NOTE     Admit Date: 2025  LOS: 2  Code Status: Full Code   Date of Consult: 2025  : 1965  Age: 60 y.o.  Weight:   Wt Readings from Last 1 Encounters:   25 90.7 kg (200 lb)     Sex: male  Race: Black or    Bed: [place] [address] A:   MRN: [id number]  Time Rapid Response Team page Received: 0020   Time Rapid Response Team at Bedside: 0023  Time Rapid Response Team left Bedside: 0050  Was the patient discharged from an ICU this admission?   No  Was the patient discharged from a PACU within last 24 hours?  No  Did the patient receive conscious sedation/general anesthesia within last 24 hours?  No  Was the patient in the ED within the past 24 hours?  Yes  Was the patient started on NIPPV within the past 24 hours?  No  Did this progress into an ARC or CPA:  No  Attending Physician: LALO Quesada MD  Primary Service: Networked reference to record PCT   Consult Requested By: ALLO Quesada MD     SITUATION     Reason for Call: Called by RT, pt nonresponsive, O2 change  Called to evaluate the patient for Respiratory    BACKGROUND     Why is the patient in the hospital?: Acute renal failure superimposed on stage 3 chronic kidney disease    Patient has a past medical history of Anxiety, Asthma, CKD (chronic kidney disease), stage IV, and Schizophrenia.    ASSESSMENT/INTERVENTIONS     BP (!) 129/59   Pulse 82   Temp 97.7 °F (36.5 °C) (Axillary)   Resp 18   Ht 5' 9" (1.753 m)   Wt 90.7 kg (200 lb)   SpO2 98%   BMI 29.53 kg/m²     What did you find: Pt unresponsive, does have pulse.     RT at bedside and pt on nonrebreather    With excessive sternal rubbing, able to get a response from patient.     Sister at bedside stating that his mentation has changed within her shift of sitting with the pt. The family plans on making a discussion about palliative/hospice care in the morning     RECOMMENDATIONS     We recommend: Change pt to airvo  CXR  ABG  Labs    Close " monitoring of tele  Requested follow-up vitals within the next two hours.     FOLLOW-UP/CONTINGENCY PLAN     Patient needs a second visit at : 0300    Call the Rapid Response Nurse, Kalin Pittman RN at x [phone] for additional questions or concerns.    PHYSICIAN ESCALATION     Orders received and case discussed with GARTH Clemente PA.    Disposition: Remain in room 387.

## 2025-05-07 NOTE — ASSESSMENT & PLAN NOTE
- noted hgb <7  - of note a Rastafari  - noted plan for IV iron and eliquis held  - GI following with plan for IV PPI  - optimization as possible per primary team/GI

## 2025-05-07 NOTE — ASSESSMENT & PLAN NOTE
- Patient / family are Jehovah's Witnesses; not interested in transfusion.  - Diagnosed several weeks ago with pulmonary embolism in setting of V/Q demonstrating intermediate probability in L lung; started apixaban at that time.  - Has had gradual decline in hemoglobin; iron studies collected during last stay most consistent with anemia of chronic disease with contributions from underlying CKD and small volume losses with blood draws during hospitalizations.  - Apixaban on hold for time being. GI consult for evaluation for occult bleeding. Continue famotidine 20mg IV daily (renally dosed).  - Recent admission / treatment for asthma exacerbation; scant wheezing noted on evaluation 05/06. Increased WOB and desaturation noted with transfer from stretcher to bed 05/06; increased to [facility] overnight 05/06-05/07.  - Continue albuterol-ipratropium 2.5-0.5mg inhaled q4hr PRN.  - Repeat CXR without significant infiltrate. Family reports had episode of emesis overnight; potentially aspiration?  - With increased O2 requirements, transferring to ICU for closer monitoring and will consult pulm/crit for assistance.

## 2025-05-08 LAB
ABSOLUTE EOSINOPHIL (OHS): 0.12 K/UL
ABSOLUTE MONOCYTE (OHS): 0.59 K/UL (ref 0.3–1)
ABSOLUTE NEUTROPHIL COUNT (OHS): 8.01 K/UL (ref 1.8–7.7)
ALBUMIN SERPL BCP-MCNC: 1.9 G/DL (ref 3.5–5.2)
ANION GAP (OHS): 15 MMOL/L (ref 8–16)
ANISOCYTOSIS BLD QL SMEAR: SLIGHT
BASOPHILS # BLD AUTO: 0.03 K/UL
BASOPHILS NFR BLD AUTO: 0.3 %
BUN SERPL-MCNC: 111 MG/DL (ref 6–20)
CALCIUM SERPL-MCNC: 8.6 MG/DL (ref 8.7–10.5)
CHLORIDE SERPL-SCNC: 105 MMOL/L (ref 95–110)
CO2 SERPL-SCNC: 17 MMOL/L (ref 23–29)
CREAT SERPL-MCNC: 3.9 MG/DL (ref 0.5–1.4)
ERYTHROCYTE [DISTWIDTH] IN BLOOD BY AUTOMATED COUNT: 19.6 % (ref 11.5–14.5)
GFR SERPLBLD CREATININE-BSD FMLA CKD-EPI: 17 ML/MIN/1.73/M2
GLUCOSE SERPL-MCNC: 88 MG/DL (ref 70–110)
HCT VFR BLD AUTO: 18.1 % (ref 40–54)
HGB BLD-MCNC: 5.8 GM/DL (ref 14–18)
HYPOCHROMIA BLD QL SMEAR: NORMAL
IMM GRANULOCYTES # BLD AUTO: 0.03 K/UL (ref 0–0.04)
IMM GRANULOCYTES NFR BLD AUTO: 0.3 % (ref 0–0.5)
LYMPHOCYTES # BLD AUTO: 0.58 K/UL (ref 1–4.8)
MAGNESIUM SERPL-MCNC: 1.8 MG/DL (ref 1.6–2.6)
MCH RBC QN AUTO: 25.3 PG (ref 27–31)
MCHC RBC AUTO-ENTMCNC: 32 G/DL (ref 32–36)
MCV RBC AUTO: 79 FL (ref 82–98)
NUCLEATED RBC (/100WBC) (OHS): 1 /100 WBC
OHS QRS DURATION: 64 MS
OHS QRS DURATION: 76 MS
OHS QTC CALCULATION: 378 MS
OHS QTC CALCULATION: 386 MS
PHOSPHATE SERPL-MCNC: 5.4 MG/DL (ref 2.7–4.5)
PLATELET # BLD AUTO: 260 K/UL (ref 150–450)
PLATELET BLD QL SMEAR: NORMAL
PMV BLD AUTO: 8.2 FL (ref 9.2–12.9)
POIKILOCYTOSIS BLD QL SMEAR: SLIGHT
POTASSIUM SERPL-SCNC: 5.9 MMOL/L (ref 3.5–5.1)
RBC # BLD AUTO: 2.29 M/UL (ref 4.6–6.2)
RELATIVE EOSINOPHIL (OHS): 1.3 %
RELATIVE LYMPHOCYTE (OHS): 6.2 % (ref 18–48)
RELATIVE MONOCYTE (OHS): 6.3 % (ref 4–15)
RELATIVE NEUTROPHIL (OHS): 85.6 % (ref 38–73)
SCHISTOCYTES BLD QL SMEAR: NORMAL
SCHISTOCYTES BLD QL SMEAR: PRESENT
SODIUM SERPL-SCNC: 137 MMOL/L (ref 136–145)
WBC # BLD AUTO: 9.36 K/UL (ref 3.9–12.7)

## 2025-05-08 PROCEDURE — 25000242 PHARM REV CODE 250 ALT 637 W/ HCPCS: Performed by: INTERNAL MEDICINE

## 2025-05-08 PROCEDURE — 99233 SBSQ HOSP IP/OBS HIGH 50: CPT | Mod: 25,,, | Performed by: STUDENT IN AN ORGANIZED HEALTH CARE EDUCATION/TRAINING PROGRAM

## 2025-05-08 PROCEDURE — 27100171 HC OXYGEN HIGH FLOW UP TO 24 HOURS

## 2025-05-08 PROCEDURE — 94640 AIRWAY INHALATION TREATMENT: CPT

## 2025-05-08 PROCEDURE — 63600175 PHARM REV CODE 636 W HCPCS: Performed by: INTERNAL MEDICINE

## 2025-05-08 PROCEDURE — 63600175 PHARM REV CODE 636 W HCPCS: Mod: JZ,EC,TB | Performed by: NURSE PRACTITIONER

## 2025-05-08 PROCEDURE — 25000003 PHARM REV CODE 250: Performed by: NURSE PRACTITIONER

## 2025-05-08 PROCEDURE — 20000000 HC ICU ROOM

## 2025-05-08 PROCEDURE — 94761 N-INVAS EAR/PLS OXIMETRY MLT: CPT

## 2025-05-08 PROCEDURE — 83735 ASSAY OF MAGNESIUM: CPT | Performed by: INTERNAL MEDICINE

## 2025-05-08 PROCEDURE — 99497 ADVNCD CARE PLAN 30 MIN: CPT | Mod: 25,,, | Performed by: STUDENT IN AN ORGANIZED HEALTH CARE EDUCATION/TRAINING PROGRAM

## 2025-05-08 PROCEDURE — 36415 COLL VENOUS BLD VENIPUNCTURE: CPT | Performed by: INTERNAL MEDICINE

## 2025-05-08 PROCEDURE — 99900035 HC TECH TIME PER 15 MIN (STAT)

## 2025-05-08 PROCEDURE — 80069 RENAL FUNCTION PANEL: CPT | Performed by: INTERNAL MEDICINE

## 2025-05-08 PROCEDURE — 85025 COMPLETE CBC W/AUTO DIFF WBC: CPT | Performed by: INTERNAL MEDICINE

## 2025-05-08 PROCEDURE — 25000003 PHARM REV CODE 250: Performed by: INTERNAL MEDICINE

## 2025-05-08 RX ORDER — HYDROMORPHONE HYDROCHLORIDE 1 MG/ML
0.2 INJECTION, SOLUTION INTRAMUSCULAR; INTRAVENOUS; SUBCUTANEOUS
Status: DISCONTINUED | OUTPATIENT
Start: 2025-05-08 | End: 2025-05-09

## 2025-05-08 RX ADMIN — FAMOTIDINE 20 MG: 10 INJECTION, SOLUTION INTRAVENOUS at 10:05

## 2025-05-08 RX ADMIN — HYDROMORPHONE HYDROCHLORIDE 0.2 MG: 1 INJECTION, SOLUTION INTRAMUSCULAR; INTRAVENOUS; SUBCUTANEOUS at 11:05

## 2025-05-08 RX ADMIN — IPRATROPIUM BROMIDE AND ALBUTEROL SULFATE 3 ML: 2.5; .5 SOLUTION RESPIRATORY (INHALATION) at 10:05

## 2025-05-08 RX ADMIN — HYDROMORPHONE HYDROCHLORIDE 0.2 MG: 1 INJECTION, SOLUTION INTRAMUSCULAR; INTRAVENOUS; SUBCUTANEOUS at 06:05

## 2025-05-08 RX ADMIN — SODIUM CHLORIDE 125 MG: 9 INJECTION, SOLUTION INTRAVENOUS at 10:05

## 2025-05-08 RX ADMIN — MUPIROCIN: 20 OINTMENT TOPICAL at 09:05

## 2025-05-08 RX ADMIN — FLUTICASONE PROPIONATE 50 MCG: 50 SPRAY, METERED NASAL at 09:05

## 2025-05-08 RX ADMIN — Medication: at 09:05

## 2025-05-08 RX ADMIN — MUPIROCIN: 20 OINTMENT TOPICAL at 10:05

## 2025-05-08 RX ADMIN — HYDROMORPHONE HYDROCHLORIDE 0.2 MG: 1 INJECTION, SOLUTION INTRAMUSCULAR; INTRAVENOUS; SUBCUTANEOUS at 10:05

## 2025-05-08 RX ADMIN — EPOETIN ALFA-EPBX 40000 UNITS: 40000 INJECTION, SOLUTION INTRAVENOUS; SUBCUTANEOUS at 09:05

## 2025-05-08 NOTE — SUBJECTIVE & OBJECTIVE
Medications:  Continuous Infusions:  Scheduled Meds:   famotidine (PF)  20 mg Intravenous Daily    ferric gluconate (Ferrlecit) IVPB  125 mg Intravenous Every Other Day    lubiprostone  24 mcg Oral Daily with breakfast    mupirocin   Nasal BID    risperiDONE  1 mg Oral Nightly     PRN Meds:  Current Facility-Administered Medications:     acetaminophen, 650 mg, Oral, Q6H PRN    albuterol-ipratropium, 3 mL, Nebulization, Q4H PRN    HYDROmorphone, 0.2 mg, Intravenous, Q1H PRN    ondansetron, 4 mg, Intravenous, Q6H PRN    Objective:     Vital Signs (Most Recent):  Temp: 98.6 °F (37 °C) (05/08/25 0700)  Pulse: 96 (05/08/25 1047)  Resp: 18 (05/08/25 1047)  BP: (!) 148/80 (05/08/25 1000)  SpO2: 100 % (05/08/25 1047) Vital Signs (24h Range):  Temp:  [98.2 °F (36.8 °C)-98.7 °F (37.1 °C)] 98.6 °F (37 °C)  Pulse:  [76-98] 96  Resp:  [15-23] 18  SpO2:  [92 %-100 %] 100 %  BP: (105-154)/(54-80) 148/80     Weight: 90.7 kg (200 lb)  Body mass index is 29.53 kg/m².       Physical Exam  Vitals and nursing note reviewed.   Constitutional:       General: He is not in acute distress.     Comments: Sleeping. Doesn't arouse to gentle stimuli at this time. Eyes closed   Eyes:      Comments: Eyes closed   Pulmonary:      Comments: Breathing room air. Occasional increased work of breathing/accessory muscle usage.   Neurological:      Mental Status: He is alert.      Comments: Sleeping. 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 Unable to participate meaningfully in conversation.        Review of Symptoms        Living Arrangements:  Lives with family    Psychosocial/Cultural:   See Palliative Psychosocial Note: Yes  - lives at home with his mother and sister Eliana   - has 8 other siblings who are well involved in his care  - increasingly bedbound over the past month   - family is able to interpret his needs at times based on his physical cues   **Primary  to Follow**  Palliative Care  Consult: No        Advance  Care Planning   Advance Directives:   Do Not Resuscitate Status: Yes    Medical Power of : His mother Ms. Diamond Sigala is next of kin medical decision maker and has deferred to her 3 children Eliana, Adonay and Wendy to be the lead medical decision makers of the siblings.      Decision Making:  Patient unable to communicate due to disease severity/cognitive impairment and Family answered questions  Goals of Care: What is most important right now is to focus on improvement in condition but with limits to invasive therapies. Accordingly, we have decided that the best plan to meet the patient's goals includes consideration/informational sessions for transition home with hospice and comfort focused care.     Significant Labs: reviewed  CBC:   Recent Labs   Lab 05/08/25  0337   WBC 9.36   HGB 5.8*   HCT 18.1*   MCV 79*        BMP:  Recent Labs   Lab 05/08/25  0242   GLU 88      K 5.9*      CO2 17*   *   CREATININE 3.9*   CALCIUM 8.6*   MG 1.8     LFT:  Lab Results   Component Value Date    AST 20 05/07/2025    ALKPHOS 58 05/07/2025    BILITOT 0.2 05/07/2025     Albumin:   Albumin   Date Value Ref Range Status   05/08/2025 1.9 (L) 3.5 - 5.2 g/dL Final     Comment:     Pediatric tubes, pleasePediatric tubes, please   12/06/2022 3.5 3.5 - 5.2 g/dL Final     Protein:   Protein Total   Date Value Ref Range Status   05/07/2025 6.8 6.0 - 8.4 gm/dL Final     Total Protein   Date Value Ref Range Status   12/06/2022 8.9 (H) 6.0 - 8.4 g/dL Final     Lactic acid:   Lab Results   Component Value Date    LACTATE 1.1 04/27/2025    LACTATE 0.9 04/14/2025     Significant Imaging: reviewed

## 2025-05-08 NOTE — PLAN OF CARE
Problem: Adult Inpatient Plan of Care  Goal: Plan of Care Review  Outcome: Progressing  Goal: Patient-Specific Goal (Individualized)  Outcome: Progressing  Goal: Absence of Hospital-Acquired Illness or Injury  Outcome: Progressing  Goal: Optimal Comfort and Wellbeing  Outcome: Progressing  Goal: Readiness for Transition of Care  Outcome: Progressing     Problem: Infection  Goal: Absence of Infection Signs and Symptoms  Outcome: Progressing     Problem: Skin Injury Risk Increased  Goal: Skin Health and Integrity  Outcome: Progressing     Problem: Acute Kidney Injury/Impairment  Goal: Fluid and Electrolyte Balance  Outcome: Progressing     Problem: Coping Ineffective  Goal: Effective Coping  Outcome: Progressing     Problem: Wound  Goal: Optimal Coping  Outcome: Progressing  Goal: Absence of Infection Signs and Symptoms  Outcome: Progressing  Goal: Optimal Pain Control and Function  Outcome: Progressing  Goal: Skin Health and Integrity  Outcome: Progressing  Goal: Optimal Wound Healing  Outcome: Progressing

## 2025-05-08 NOTE — ASSESSMENT & PLAN NOTE
- noted hgb trend down to 5.8 now  - of note a Hinduism  - noted s/p IV iron   - noted eliquis held  - blood draws being stopped per primary team discussion with family  - optimization as possible per primary team

## 2025-05-08 NOTE — ASSESSMENT & PLAN NOTE
5/8/2025:  - chart reviewed in depth   - discussed with primary team, bedside nurse and pccm. Noted nephrology note.  - patient seen at bedside. No family present at the time  - patient unable to participate in conversation. Sleeping. Noted on RA. O2 Sat 100%. Noted some increased work of breathing intermittently, with accessory muscle usage.   - proceeded to call and talk with his sister Eliana. She planned to be at bedside in hour or so  - along with palliative RN Nora, met with Eliana at bedside once she arrived. Primary team joined us shortly afterwards. She got her siblings Adonay and Wendy on the phone. They together are the lead medical decision makers of the family as discussed with patients mother yesterday.   - they welcomed an open/honest conversation, as they did yesterday   - this led to us discussing the severity of medical on jono and the concern that his time may be on the shorter end, with unlikelihood of adequate improvement despite efforts by the medical teams. This is in context of his renal failure, anemia, deteriorating mental status and poor nutritional/functional state. Primary team shared further.   - this led to us discussing importance of thinking about what matters most going forward, with family having shared a desire for ensuring he doesn't suffer with the time he has left.   - thereby shared role of home hospice and what it entails, especially now that he is off the HFNC and can be taken home. Shared all the support it offers.   - answered all their questions to best of ability  - they stated understanding with this next step and do not want him to suffer further. They are just unsure of where to have hospice arranged given their concern about their elderly mother and her difficulty with seeing him in this state at home.   - they are going to discuss location of hospice further as a family and with their mother further. Seems they are leaning to having it be at home. They have a lot  of support together.   - they welcomed next steps of hospice informational sessions with hospice agency or two, as arranged by case management.   - primary team discussed further regarding cessation of further lab work to avoid that discomfort and of addition of comfort meds vs non verbal signs of pain/dyspnea  - shared the concerns with some accessory muscle usage and work of breathing at times and ensuring they take advantage of this window period of stability off HFNC to decide on next step transition/decisions.   - provided emotional support and listening ear  - they were appreciative of the care provided and had a wonderful moment of appreciation with primary team.   - updated case management/nursing  [] next step of hospice informational sessions with agency or two as arranged by case management. Family to discuss further together and with their mother about location for home hospice.   [] his mother (next of kin) deferred medical conversation/decision making to her children (patients siblings) Eliana Urias and Wendy, who are medical decision making leads as appointed by family/siblings.   [] DNR/DNI per family clear wishes

## 2025-05-08 NOTE — ASSESSMENT & PLAN NOTE
- noted continued decline with rising BUN and CR steady at 4 without improvement (baseline around 2)  - nephrology discussed with family regarding him not being a HD candidate. Family in agreement.   - contributes to concerns surrounding prognosis   - family aware of concerns surrounding severity of condition on this front

## 2025-05-08 NOTE — ASSESSMENT & PLAN NOTE
- Patient / family are Jehovah's Witnesses; not interested in transfusion.  - Diagnosed several weeks ago with pulmonary embolism in setting of V/Q demonstrating intermediate probability in L lung; started apixaban at that time.  - Has had gradual decline in hemoglobin; iron studies collected during last stay most consistent with anemia of chronic disease with contributions from underlying CKD and small volume losses with blood draws during hospitalizations.  - Apixaban on hold.  - Despite treatment, renal function has worsened and mentation as well with rising BUN. Respiratory status doing better, however and weaned to RA.  - Goals discussions held with family at bedside with palliative. Given his clinical status, they are interested in hospice care at this point. Will discontinue lab draws, focus medications on symptom management.  - Arrange hospice company information sessions; likely transition to hospice setting in near future.

## 2025-05-08 NOTE — PROGRESS NOTES
[facility] Intensive Care [facility] Medicine  Progress Note    Patient Name: Frantz Sigala Jr.  MRN: [id number]  Patient Class: IP- Inpatient   Admission Date: 5/5/2025  Length of Stay: 3 days  Attending Physician: LALO Quesada MD  Primary Care Provider: Zachary Ramos MD        Subjective     Principal Problem:Acute renal failure superimposed on stage 3 chronic kidney disease        HPI:  Mr. Frantz Sigala Jr. Is a 60 y.o. male, with PMH of CHF, urinary retention, on chronic anticoagulation, HTN, CKD-3, asthma, schizophrenia, blindness, deafness, who presented to [facility] ED on 5/5/25 due to low hemoglobin on outpatient labs. His family reports that he is staying at [facility] and his H&H were reported to be 6.3/20.6. EMS reported that they were informed he had vomiting and a fever on the day prior to arrival. His sibling reported that he had decreased PO intake over the past two days. He was evaluated in the ED with labs showing H&H of 7.4/23.3. He was recently admitted and treated for acute on chronic CHF, and acute anemia. During that admission he was treated with EPO injection and iron infusion as he is a Oriental orthodox. His HGB increased to 8.4 after treatments. Today he also had leukocytosis of 16k, with left shift. A metabolic panel showed potassium of 6.1 (with hemolysis; no peaked T waves on EKG), with BUN 92, Cr of 4.0, and was retaining urine. He was unable to provide a urine sample initially as he is retaining urine. A bladder scan showed 800 cc urine in his bladder, and mackay cath was placed. A cath UA was obtained and showed no UTI. He was negative for Influenza A/B, and Covid-19. He was treated with 1L of IV NS over 2 hours in the ED. He was admitted to inpatient status.     Overview/Hospital Course:  No notes on file    Interval History: No acute events overnight. Goals of care discussions held at bedside with Eliana with sister Wendy and brother Adonay on  the phone along with palliative team.    Review of Systems   Unable to perform ROS: Patient nonverbal     Objective:     Vital Signs (Most Recent):  Temp: 98.6 °F (37 °C) (05/08/25 0700)  Pulse: 102 (05/08/25 1700)  Resp: (!) 21 (05/08/25 1700)  BP: (!) 150/65 (05/08/25 1700)  SpO2: 98 % (05/08/25 1700) Vital Signs (24h Range):  Temp:  [98.5 °F (36.9 °C)-98.7 °F (37.1 °C)] 98.6 °F (37 °C)  Pulse:  [] 102  Resp:  [17-21] 21  SpO2:  [92 %-100 %] 98 %  BP: (117-157)/(55-80) 150/65     Weight: 90.7 kg (200 lb)  Body mass index is 29.53 kg/m².    Intake/Output Summary (Last 24 hours) at 5/8/2025 1747  Last data filed at 5/8/2025 1501  Gross per 24 hour   Intake --   Output 1320 ml   Net -1320 ml         Physical Exam  Vitals and nursing note reviewed.   Constitutional:       General: He is not in acute distress.  HENT:      Head: Normocephalic and atraumatic.   Eyes:      General:         Right eye: No discharge.         Left eye: No discharge.   Cardiovascular:      Rate and Rhythm: Normal rate.      Pulses: Normal pulses.   Pulmonary:      Effort: No respiratory distress.      Comments: Diminished bilaterally; on RA with adequate saturations.  Abdominal:      General: There is distension.      Palpations: Abdomen is soft.   Musculoskeletal:      Right lower leg: Edema present.      Left lower leg: Edema present.   Skin:     General: Skin is warm and dry.   Neurological:      Comments: Lethargic. Does not arouse to light touch.           Significant Labs:   CBC:  Recent Labs   Lab 05/07/25  0109 05/07/25  0606 05/08/25  0337   WBC 10.71 9.26 9.36   HGB 6.4* 6.2* 5.8*   HCT 20.3* 20.5* 18.1*    250 260   LYMPH 0.54*  5.0* 0.43*  4.6* 0.58*  6.2*   MONO 7.7  0.82 4.6  0.43 6.3  0.59   EOS 0.2  0.02 0.4  0.04 1.3  0.12   BMP:  Recent Labs   Lab 05/07/25  0109 05/07/25  0606 05/08/25  0242   * 135* 137   K 6.2* 5.4* 5.9*    103 105   CO2 20* 19* 17*   * 111* 111*   CREATININE 3.8*  4.0* 3.9*   * 158* 88   CALCIUM 8.2* 8.5* 8.6*   MG 1.6 1.6 1.8   PHOS  --  5.3* 5.4*     Significant Imaging: I have reviewed and interpreted all pertinent imaging results/findings within the past 24 hours.        Assessment & Plan  Acute renal failure superimposed on stage 3 chronic kidney disease  Urinary retention  Leukocytosis  Chronic diastolic heart failure  Essential hypertension  Hyperkalemia  CKD (chronic kidney disease) stage 3, GFR 30-59 ml/min  Goals of care, counseling/discussion  - ROBBY on CKD secondary to combination of poor intake, diuresis, retention and intermittent lower BPs from baseline with concurrent hyperkalemia.  - Renally dose medications, avoid nephrotoxins, strict intake/output.  - Labs comparable; avoiding ACE, diuretics as feasible.  - Mentation may be worsening in setting of uptrending BUN.  - Appreciate nephrology assistance. Palliative consulted in addition for goals of care discussion assistance.  Anemia  Acute pulmonary embolism  Asthma  Acute hypoxemic respiratory failure  - Patient / family are Jehovah's Witnesses; not interested in transfusion.  - Diagnosed several weeks ago with pulmonary embolism in setting of V/Q demonstrating intermediate probability in L lung; started apixaban at that time.  - Has had gradual decline in hemoglobin; iron studies collected during last stay most consistent with anemia of chronic disease with contributions from underlying CKD and small volume losses with blood draws during hospitalizations.  - Apixaban on hold.  - Despite treatment, renal function has worsened and mentation as well with rising BUN. Respiratory status doing better, however and weaned to RA.  - Goals discussions held with family at bedside with palliative. Given his clinical status, they are interested in hospice care at this point. Will discontinue lab draws, focus medications on symptom management.  - Arrange hospice company information sessions; likely transition to  hospice setting in near future.  Schizophrenia  Blind  Deaf, bilateral  Nonverbal  - At baseline, patient is deaf, blind and cannot verbalize except some utterances that family has been able to understand over time.  - Continue risperidone 1mg PO qHS.    VTE Risk Mitigation (From admission, onward)           Ordered     IP VTE HIGH RISK PATIENT  Once         05/06/25 0123                    Discharge Planning   JAQUAN: 5/9/2025     Code Status: DNR   Medical Readiness for Discharge Date:   Discharge Plan A: Skilled Nursing Facility          D Sunny Quesada MD  Department of Hospital Medicine   [facility] Intensive [facility])

## 2025-05-08 NOTE — PROGRESS NOTES
"Nephrology  Progress Note    Admit Date: 5/5/2025   LOS: 3 days     SUBJECTIVE:     Follow-up For:  Acute renal failure superimposed on stage 3 chronic kidney disease    History of Present Illness:  Patient is a 60 y.o. male presents with abnormal labs from SNF with anemia/ROBBY. Pt with extensive medical history as outlined below but has had frequent admissions over the past few months due to multiple medical issues including anemia, shortness of breath, congestive heart failure, constipation, asthma, urinary retention,  and questionable pulmonary embolism.  Patient is legally blind and deaf has excellent care by his family.  Has underlying worsening CKD stage 4 with baseline creatinine 2.0-2.8 and not followed by Nephrology as an outpatient.  Has been on 2 diuretics including Lasix and hydrochlorothiazide.  Initial laboratory data revealing hemoglobin 7.4, hemolyzed potassium of 6.1, creatinine 4.0, BUN 92.  Patient is Taoism and does not want any blood products.     Consulted for evaluation.  Patient seen and examined in the emergency room with daughter at bedside.  As mentioned above patient is blind and deaf but able to communicate with family.  Looks very uncomfortable.  Has had issues with constipation over the past few months including dark stools.  Currently on Eliquis for suspected pulmonary embolism based on recent indeterminate V/Q scan.  Refused transfusion due to Taoism.  Discussed findings and highly advised against any heroics including renal replacement therapy.  Family agrees and will ask palliative Care to get involved.     Epic reviewed       Interval History:     trends/events noted.  Appreciate palliative care involvement.  DNR and anticipating hospice soon.  Discussed with team.  Labs noted.       Review of Systems:    Unable.     OBJECTIVE:     Vital Signs Range (Last 24H):  BP (!) 154/65   Pulse 95   Temp 98.7 °F (37.1 °C) (Axillary)   Resp 20   Ht 5' 9" (1.753 m)   " "Wt 90.7 kg (200 lb)   SpO2 100%   BMI 29.53 kg/m²     Temp:  [98.2 °F (36.8 °C)-98.7 °F (37.1 °C)]   Pulse:  [76-97]   Resp:  [14-25]   BP: (104-154)/(50-72)   SpO2:  [92 %-100 %]     I & O (Last 24H):  Intake/Output Summary (Last 24 hours) at 5/8/2025 0834  Last data filed at 5/8/2025 0600  Gross per 24 hour   Intake --   Output 1215 ml   Net -1215 ml       Physical Exam:  General appearance:  Appears older than stated age.  Blind and deaf.  Eyes:  Blind  Lungs:  Erratic breathing pattern but clear to auscultation   Heart:  Tachy Regular rate and rhythm, S1, S2 normal, no murmur, rub or rabia.  Abdomen: firm, distended, hypoactive  Extremities: No cyanosis or clubbing. No edema.    Skin:  Dry and ashen  Neurologic:  No focal numbness or weakness   Clark with brisk yellow urine    Laboratory Data:  Recent Labs   Lab 05/08/25  0337   WBC 9.36   RBC 2.29*   HGB 5.8*   HCT 18.1*      MCV 79*   MCH 25.3*   MCHC 32.0       BMP:   Recent Labs   Lab 05/08/25  0242   GLU 88      K 5.9*      CO2 17*   *   CREATININE 3.9*   CALCIUM 8.6*   MG 1.8   PHOS 5.4*     Lab Results   Component Value Date    CALCIUM 8.6 (L) 05/08/2025    PHOS 5.4 (H) 05/08/2025       Lab Results   Component Value Date    CALCIUM 8.6 (L) 05/08/2025    PHOS 5.4 (H) 05/08/2025       No results found for: "URICACID"    BNP  No results for input(s): "BNP", "BNPTRIAGEBLO" in the last 168 hours.    Medications:  Medication list was reviewed and changes noted under Assessment/Plan.    Diagnostic Results:    X-Ray Chest AP Portable   Final Result      Mild cardiomegaly.      Hiatal hernia.         Electronically signed by: Gloria Rosa   Date:    05/07/2025   Time:    01:54      X-Ray Chest AP Portable   Final Result      No acute intrathoracic abnormality detected.  Cardiomegaly.      Narrowed appearance the tracheal air column.         Electronically signed by: Gloria Rosa   Date:    05/06/2025   Time:    18:29      X-Ray " Abdomen AP 1 View   Final Result      Diffuse gaseous dilatation of small and large bowel loops which can be seen with ileus.  Moderate stool burden.  Continued follow-up recommended.         Electronically signed by: Mayte Wiseman   Date:    05/06/2025   Time:    10:13      X-Ray Chest AP Portable   Final Result      Small left pleural effusion and/or left basilar atelectasis or consolidation         Electronically signed by: Erica Milton MD   Date:    05/05/2025   Time:    22:37          ASSESSMENT/PLAN:     Multifactorial ROBBY on worsening CKD (baseline 2.0-2.8) due to anemia, poor intake, over-diuresis, suspected urinary retention, hypotension, etc.:  -trends noted and recently discharged with creatinine 1.9 and BUN of 72  -sent home on Lasix 40 mg after aggressive diuresis  -now with creatinine 4.0 and  and highly suspect of GI bleed with dark stools and elevated BUN while on Eliquis  -decrease Eliquis dose and discussed with team  -not an ideal candidate for dialysis and daughter acknowledges no aggressive heroics  -no more ACE inhibitor and diuretics.    -patient is Tenriism and received Epogen at last encounter.  -see below  -follow trends  -agree with Clark for history of retention  -5/7:  trends noted.  No RRT.   -5/8:  about the same.  Nothing else to offer.    -Renally dose meds, avoid nephrotoxins, and monitor I/O's closely.     Anemia of CKD/SEVEN and suspected GI bleed:  -consider holding Eliquis  -history of dark stool  -BUN suggestive of GI loss  -loading with IV iron as IVETTE doesn't work with low iron stores/sats.   -dose IVETTE today since received 2 doses of ferrlecit so far.      Asthma:  -defer     Mild non-anion gap metabolic acidosis with pseudo hyperkalemia:  -low-potassium diet  -bicarb tabs when able  -ABG mixed but PH stable.   -K will be high in pedi tubes.       Deaf/blind/schizophrenia/anxiety:  -defer     Abd pain/distention/constipation/dark stools:  -checked  KUB  -defer.  -amitiza/miralax but may need soap suds enema.      DISPO:     Highly recommend DNR and palliative care eval  Not a dialysis candidate.   Nothing else to offer from renal  Call with questions         Total critical care time (exclusive of procedural time) : 45 minutes  Critical care was necessary to treat or prevent imminent or life-threatening deterioration of the following conditions: ROBBY/Jehovas/Gi bleed.     Critical care was time spent personally by me on the following activities: development of treatment plan with patient or surrogate, discussions with consultants, interpretation of cardiac output measurements, examination of patient, ordering and performing treatments and interventions, evaluation of patient's response to treatment, obtaining history from patient or surrogate, ordering and review of laboratory studies, ordering and review of radiographic studies, re-evaluation of patient's condition, pulse oximetry and blood draw for specimens

## 2025-05-08 NOTE — PLAN OF CARE
Gastroenterology     Chart reviewed.  Plans for comfort measures/ hospice noted.    Will not follow closely.  Please call if needed.

## 2025-05-08 NOTE — PT/OT/SLP PROGRESS
Physical Therapy      Patient Name:  Frantz Sigala Jr.   MRN:  [id number]    Patient not seen today secondary to Therapist assessment (low GCS, no family at bedside to facilitate communication). Will follow-up as schedule and pt condition allows.    Addendum 1300: MD CEDILLO PT orders after family meeting. Please reconsult if situation changes.

## 2025-05-08 NOTE — ASSESSMENT & PLAN NOTE
- transitioned off HFNC to RA per pccm   - ? Aspiration event that had happened  - ?volume overload thereby noted diuresis effort  - noted some increased work of breathing/accessory muscle usage at times. Family made aware and comfortable with medications being added for dyspnea/discomfort. Primary team present for conversation too.

## 2025-05-08 NOTE — CONSULTS
[facility] Intensive Care ([place])  Wound Care    Patient Name:  Frantz Sigala Jr.   MRN:  [id number]  Date: 5/8/2025  Diagnosis: Acute renal failure superimposed on stage 3 chronic kidney disease    History:     Past Medical History:   Diagnosis Date    Anxiety     Asthma     CKD (chronic kidney disease), stage IV     Schizophrenia        Social History[1]    Precautions:     Allergies as of 05/05/2025 - Reviewed 05/05/2025   Allergen Reaction Noted    Penicillins Other (See Comments) 10/12/2015       WO Assessment Details/Treatment     Repeat consult received for deep tissue injury to buttocks. Wound care assessed patient on yesterday. Please see consult noted dated 5/7. Treatment initiated and wound care following.     05/08/2025         [1]   Social History  Socioeconomic History    Marital status: Single   Tobacco Use    Smoking status: Never    Smokeless tobacco: Never   Substance and Sexual Activity    Alcohol use: No     Alcohol/week: 0.0 standard drinks of alcohol    Drug use: No    Sexual activity: Never   Social History Narrative    Pt is blind and deaf     Social Drivers of Health     Financial Resource Strain: Low Risk  (5/6/2025)    Overall Financial Resource Strain (CARDIA)     Difficulty of Paying Living Expenses: Not very hard   Food Insecurity: No Food Insecurity (5/6/2025)    Hunger Vital Sign     Worried About Running Out of Food in the Last Year: Never true     Ran Out of Food in the Last Year: Never true   Transportation Needs: No Transportation Needs (5/6/2025)    PRAPARE - Transportation     Lack of Transportation (Medical): No     Lack of Transportation (Non-Medical): No   Physical Activity: Inactive (4/9/2025)    Exercise Vital Sign     Days of Exercise per Week: 0 days     Minutes of Exercise per Session: 0 min   Stress: No Stress Concern Present (5/6/2025)    [employer] [place] [employer] - Occupational Stress Questionnaire     Feeling of Stress : Not at all   Housing  Stability: Low Risk  (5/6/2025)    Housing Stability Vital Sign     Unable to Pay for Housing in the Last Year: No     Number of Times Moved in the Last Year: 0     Homeless in the Last Year: No

## 2025-05-08 NOTE — PLAN OF CARE
Problem: Adult Inpatient Plan of Care  Goal: Plan of Care Review  Outcome: Progressing  Goal: Patient-Specific Goal (Individualized)  Outcome: Progressing  Goal: Absence of Hospital-Acquired Illness or Injury  Outcome: Progressing  Goal: Optimal Comfort and Wellbeing  Outcome: Progressing  Goal: Readiness for Transition of Care  Outcome: Progressing     Problem: Acute Kidney Injury/Impairment  Goal: Fluid and Electrolyte Balance  Outcome: Progressing

## 2025-05-08 NOTE — PLAN OF CARE
PULMONARY & CRITICAL CARE MEDICINE   PLAN OF CARE UPDATE    Respiratory failure markedly improved. Favor that he aspirated night before last and that was the cause of his decompensation. Goals of care discussions ongoing. ROBBY management per nephrology. We'll signoff. Please reach out if any new clinical questions or concerns.     Edd Malone, DO

## 2025-05-08 NOTE — NURSING
Report called to ICU RN to resume care of pt. Pt transferred by RN, respiratory, and charge RN to ICU. Emergency equipment at bedside. Belongings gathered. Family following. No acute distress at this time.

## 2025-05-08 NOTE — EICU
Virtual ICU Quality Rounds    Admit Date: 5/5/2025  Hospital Day: 3    ICU Day: 1d 3h    24H Vital Sign Range:  Temp:  [98.2 °F (36.8 °C)-98.7 °F (37.1 °C)]   Pulse:  [76-98]   Resp:  [14-23]   BP: (105-154)/(50-80)   SpO2:  [92 %-100 %]     VICU Surveillance Screening    Daily review of  line necessity(optional): Urinary catheter in place            Clark Indications : Urinary retention    LDA reconciliation : Yes

## 2025-05-08 NOTE — PT/OT/SLP PROGRESS
Occupational Therapy      Patient Name:  Frantz Sigala Jr.   MRN:  [id number]    Patient not seen today secondary to Therapist assessment (low GCS, no family at bedside to facilitate communication). Will follow-up as schedule and pt condition allows.     Addendum: OT orders discontinued by CORY Mclean  5/8/2025

## 2025-05-08 NOTE — PROGRESS NOTES
[facility] - Intensive Care [facility]  Palliative Medicine  Progress Note    Patient Name: Frantz Sigala Jr.  MRN: [id number]  Admission Date: 5/5/2025  Hospital Length of Stay: 3 days  Code Status: DNR   Attending Provider: LALO Quesada MD  Consulting Provider: Jimmy Pham MD  Primary Care Physician: Zachary Ramos MD  Principal Problem:Acute renal failure superimposed on stage 3 chronic kidney disease    Patient information was obtained from relative(s), caregiver / friend, past medical records, and primary team.      Assessment/Plan:     Neuro  Nonverbal  - noted hx    Ophtho  Blind  - noted    ENT  Deaf, bilateral  - noted     Pulmonary  Acute hypoxemic respiratory failure  - transitioned off HFNC to RA per pccm   - ? Aspiration event that had happened  - ?volume overload thereby noted diuresis effort  - noted some increased work of breathing/accessory muscle usage at times. Family made aware and comfortable with medications being added for dyspnea/discomfort. Primary team present for conversation too.     Cardiac/Vascular  Chronic diastolic heart failure  - noted hx    Renal/  * Acute renal failure superimposed on stage 3 chronic kidney disease  - noted continued decline with rising BUN and CR steady at 4 without improvement (baseline around 2)  - nephrology discussed with family regarding him not being a HD candidate. Family in agreement.   - contributes to concerns surrounding prognosis   - family aware of concerns surrounding severity of condition on this front    Hematology  Acute pulmonary embolism  - noted recent hx   - was started on Eliquis at that time, but now held given concern for anemia and ?GIB  - management per primary team      Oncology  Anemia  - noted hgb trend down to 5.8 now  - of note a Restorationism  - noted s/p IV iron   - noted eliquis held  - blood draws being stopped per primary team discussion with family  - optimization as possible per primary  team    Palliative Care  Goals of care, counseling/discussion  5/8/2025:  - chart reviewed in depth   - discussed with primary team, bedside nurse and pccm. Noted nephrology note.  - patient seen at bedside. No family present at the time  - patient unable to participate in conversation. Sleeping. Noted on RA. O2 Sat 100%. Noted some increased work of breathing intermittently, with accessory muscle usage.   - proceeded to call and talk with his sister Eliana. She planned to be at bedside in hour or so  - along with palliative RN Nora, met with Eliana at bedside once she arrived. Primary team joined us shortly afterwards. She got her siblings Adonay and Wendy on the phone. They together are the lead medical decision makers of the family as discussed with patients mother yesterday.   - they welcomed an open/honest conversation, as they did yesterday   - this led to us discussing the severity of medical on jono and the concern that his time may be on the shorter end, with unlikelihood of adequate improvement despite efforts by the medical teams. This is in context of his renal failure, anemia, deteriorating mental status and poor nutritional/functional state. Primary team shared further.   - this led to us discussing importance of thinking about what matters most going forward, with family having shared a desire for ensuring he doesn't suffer with the time he has left.   - thereby shared role of home hospice and what it entails, especially now that he is off the HFNC and can be taken home. Shared all the support it offers.   - answered all their questions to best of ability  - they stated understanding with this next step and do not want him to suffer further. They are just unsure of where to have hospice arranged given their concern about their elderly mother and her difficulty with seeing him in this state at home.   - they are going to discuss location of hospice further as a family and with their mother further.  "Seems they are leaning to having it be at home. They have a lot of support together.   - they welcomed next steps of hospice informational sessions with hospice agency or two, as arranged by case management.   - primary team discussed further regarding cessation of further lab work to avoid that discomfort and of addition of comfort meds vs non verbal signs of pain/dyspnea  - shared the concerns with some accessory muscle usage and work of breathing at times and ensuring they take advantage of this window period of stability off HFNC to decide on next step transition/decisions.   - provided emotional support and listening ear  - they were appreciative of the care provided and had a wonderful moment of appreciation with primary team.   - updated case management/nursing  [] next step of hospice informational sessions with agency or two as arranged by case management. Family to discuss further together and with their mother about location for home hospice.   [] his mother (next of kin) deferred medical conversation/decision making to her children (patients siblings) Eliana Urias and Wendy, who are medical decision making leads as appointed by family/siblings.   [] DNR/DNI per family clear wishes        I will follow-up with patient. Please contact us if you have any additional questions.    Subjective:     Chief Complaint:   Chief Complaint   Patient presents with    Abnormal Lab     EMS reports pt came from [facility] for critical lab values. Low HGB 6.3 and HCT 20.6. EMS reports vomiting and fever yesterday, but no vomiting or fever today. EMS report patient is deaf and blind. Patient takes eliquis daily.       HPI:   Per HPI: "Mr. Frantz Sigala . Is a 60 y.o. male, with PMH of CHF, urinary retention, on chronic anticoagulation, HTN, CKD-3, asthma, schizophrenia, blindness, deafness, who presented to [facility] ED on 5/5/25 due to low hemoglobin on outpatient labs. His family reports that he is staying at " "[facility] and his H&H were reported to be 6.3/20.6. EMS reported that they were informed he had vomiting and a fever on the day prior to arrival. His sibling reported that he had decreased PO intake over the past two days. He was evaluated in the ED with labs showing H&H of 7.4/23.3. He was recently admitted and treated for acute on chronic CHF, and acute anemia. During that admission he was treated with EPO injection and iron infusion as he is a Confucianism. His HGB increased to 8.4 after treatments. Today he also had leukocytosis of 16k, with left shift. A metabolic panel showed potassium of 6.1 (with hemolysis; no peaked T waves on EKG), with BUN 92, Cr of 4.0, and was retaining urine. He was unable to provide a urine sample initially as he is retaining urine. A bladder scan showed 800 cc urine in his bladder, and mackay cath was placed. A cath UA was obtained and showed no UTI. He was negative for Influenza A/B, and Covid-19. He was treated with 1L of IV NS over 2 hours in the ED. He was admitted to inpatient status."    Palliative medicine consulted for assistance with [facility]    Hospital Course:  No notes on file    Medications:  Continuous Infusions:  Scheduled Meds:   famotidine (PF)  20 mg Intravenous Daily    ferric gluconate (Ferrlecit) IVPB  125 mg Intravenous Every Other Day    lubiprostone  24 mcg Oral Daily with breakfast    mupirocin   Nasal BID    risperiDONE  1 mg Oral Nightly     PRN Meds:  Current Facility-Administered Medications:     acetaminophen, 650 mg, Oral, Q6H PRN    albuterol-ipratropium, 3 mL, Nebulization, Q4H PRN    HYDROmorphone, 0.2 mg, Intravenous, Q1H PRN    ondansetron, 4 mg, Intravenous, Q6H PRN    Objective:     Vital Signs (Most Recent):  Temp: 98.6 °F (37 °C) (05/08/25 0700)  Pulse: 96 (05/08/25 1047)  Resp: 18 (05/08/25 1047)  BP: (!) 148/80 (05/08/25 1000)  SpO2: 100 % (05/08/25 1047) Vital Signs (24h Range):  Temp:  [98.2 °F (36.8 °C)-98.7 °F (37.1 °C)] " 98.6 °F (37 °C)  Pulse:  [76-98] 96  Resp:  [15-23] 18  SpO2:  [92 %-100 %] 100 %  BP: (105-154)/(54-80) 148/80     Weight: 90.7 kg (200 lb)  Body mass index is 29.53 kg/m².       Physical Exam  Vitals and nursing note reviewed.   Constitutional:       General: He is not in acute distress.     Comments: Sleeping. Doesn't arouse to gentle stimuli at this time. Eyes closed   Eyes:      Comments: Eyes closed   Pulmonary:      Comments: Breathing room air. Occasional increased work of breathing/accessory muscle usage.   Neurological:      Mental Status: He is alert.      Comments: Sleeping. 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 Unable to participate meaningfully in conversation.        Review of Symptoms        Living Arrangements:  Lives with family    Psychosocial/Cultural:   See Palliative Psychosocial Note: Yes  - lives at home with his mother and sister Eliana   - has 8 other siblings who are well involved in his care  - increasingly bedbound over the past month   - family is able to interpret his needs at times based on his physical cues   **Primary  to Follow**  Palliative Care  Consult: No        Advance Care Planning  Advance Directives:   Do Not Resuscitate Status: Yes    Medical Power of : His mother Ms. Diamond Sigala is next of kin medical decision maker and has deferred to her 3 children Eliana, Adonay and Wendy to be the lead medical decision makers of the siblings.      Decision Making:  Patient unable to communicate due to disease severity/cognitive impairment and Family answered questions  Goals of Care: What is most important right now is to focus on improvement in condition but with limits to invasive therapies. Accordingly, we have decided that the best plan to meet the patient's goals includes consideration/informational sessions for transition home with hospice and comfort focused care.     Significant Labs: reviewed  CBC:   Recent Labs   Lab 05/08/25  8131    WBC 9.36   HGB 5.8*   HCT 18.1*   MCV 79*        BMP:  Recent Labs   Lab 05/08/25  0242   GLU 88      K 5.9*      CO2 17*   *   CREATININE 3.9*   CALCIUM 8.6*   MG 1.8     LFT:  Lab Results   Component Value Date    AST 20 05/07/2025    ALKPHOS 58 05/07/2025    BILITOT 0.2 05/07/2025     Albumin:   Albumin   Date Value Ref Range Status   05/08/2025 1.9 (L) 3.5 - 5.2 g/dL Final     Comment:     Pediatric tubes, pleasePediatric tubes, please   12/06/2022 3.5 3.5 - 5.2 g/dL Final     Protein:   Protein Total   Date Value Ref Range Status   05/07/2025 6.8 6.0 - 8.4 gm/dL Final     Total Protein   Date Value Ref Range Status   12/06/2022 8.9 (H) 6.0 - 8.4 g/dL Final     Lactic acid:   Lab Results   Component Value Date    LACTATE 1.1 04/27/2025    LACTATE 0.9 04/14/2025     Significant Imaging: reviewed    35 minutes face to face time in discussion of symptom assessment, coordination of care, exploring burden/ benefit of various approaches of treatment and discharge planning.  This also includes non-face to face time preparing to see the patient (eg, review of tests/imaging), obtaining and/or reviewing separately obtained history, documenting clinical information in the electronic or other health record, independently interpreting results and communicating results to the patient/family/caregiver, or care coordinator.     16 minutes ACP time spent: goals of care, emotional support, formulating goals and communication of prognosis      Jimmy Pham MD  Palliative Medicine  [facility] - Intensive Care ([place])

## 2025-05-08 NOTE — PLAN OF CARE
CM received phone call from patient sister Wendy requesting informational session with hospice companies. Referral sent to [facility].    1600 CM informed by sister Wendy family would like to move forward with inpatient hospice services with [facility]. CM spoke with [facility] and have confirmed acceptance. Consents to be completed this pm with plans to transition tomorrow to inpatient hospice care.

## 2025-05-08 NOTE — SUBJECTIVE & OBJECTIVE
Interval History: No acute events overnight. Goals of care discussions held at bedside with Eliana with sister Wedny and brother Adonay on the phone along with palliative team.    Review of Systems   Unable to perform ROS: Patient nonverbal     Objective:     Vital Signs (Most Recent):  Temp: 98.6 °F (37 °C) (05/08/25 0700)  Pulse: 102 (05/08/25 1700)  Resp: (!) 21 (05/08/25 1700)  BP: (!) 150/65 (05/08/25 1700)  SpO2: 98 % (05/08/25 1700) Vital Signs (24h Range):  Temp:  [98.5 °F (36.9 °C)-98.7 °F (37.1 °C)] 98.6 °F (37 °C)  Pulse:  [] 102  Resp:  [17-21] 21  SpO2:  [92 %-100 %] 98 %  BP: (117-157)/(55-80) 150/65     Weight: 90.7 kg (200 lb)  Body mass index is 29.53 kg/m².    Intake/Output Summary (Last 24 hours) at 5/8/2025 1748  Last data filed at 5/8/2025 1501  Gross per 24 hour   Intake --   Output 1320 ml   Net -1320 ml         Physical Exam  Vitals and nursing note reviewed.   Constitutional:       General: He is not in acute distress.  HENT:      Head: Normocephalic and atraumatic.   Eyes:      General:         Right eye: No discharge.         Left eye: No discharge.   Cardiovascular:      Rate and Rhythm: Normal rate.      Pulses: Normal pulses.   Pulmonary:      Effort: No respiratory distress.      Comments: Diminished bilaterally; on RA with adequate saturations.  Abdominal:      General: There is distension.      Palpations: Abdomen is soft.   Musculoskeletal:      Right lower leg: Edema present.      Left lower leg: Edema present.   Skin:     General: Skin is warm and dry.   Neurological:      Comments: Lethargic. Does not arouse to light touch.           Significant Labs:   CBC:  Recent Labs   Lab 05/07/25  0109 05/07/25  0606 05/08/25  0337   WBC 10.71 9.26 9.36   HGB 6.4* 6.2* 5.8*   HCT 20.3* 20.5* 18.1*    250 260   LYMPH 0.54*  5.0* 0.43*  4.6* 0.58*  6.2*   MONO 7.7  0.82 4.6  0.43 6.3  0.59   EOS 0.2  0.02 0.4  0.04 1.3  0.12   BMP:  Recent Labs   Lab 05/07/25  0109  05/07/25  0606 05/08/25  0242   * 135* 137   K 6.2* 5.4* 5.9*    103 105   CO2 20* 19* 17*   * 111* 111*   CREATININE 3.8* 4.0* 3.9*   * 158* 88   CALCIUM 8.2* 8.5* 8.6*   MG 1.6 1.6 1.8   PHOS  --  5.3* 5.4*     Significant Imaging: I have reviewed and interpreted all pertinent imaging results/findings within the past 24 hours.

## 2025-05-08 NOTE — EICU
Intervention Initiated From:  [facility] / EICU    [place] intervened regarding:  Rounding (Video assessment)    VICU Night Rounds Checklist  24H Vital Sign Range:  Temp:  [97.7 °F (36.5 °C)-98.5 °F (36.9 °C)]   Pulse:  [76-88]   Resp:  [14-27]   BP: ()/(50-67)   SpO2:  [82 %-100 %]     Video rounds and LDA reconciliation        Nursing orders placed : IP MINNA Peripheral IV Access

## 2025-05-09 VITALS
WEIGHT: 200 LBS | RESPIRATION RATE: 18 BRPM | HEART RATE: 96 BPM | HEIGHT: 69 IN | BODY MASS INDEX: 29.62 KG/M2 | OXYGEN SATURATION: 94 % | TEMPERATURE: 98 F | DIASTOLIC BLOOD PRESSURE: 69 MMHG | SYSTOLIC BLOOD PRESSURE: 150 MMHG

## 2025-05-09 PROBLEM — Z51.5 COMFORT MEASURES ONLY STATUS: Status: ACTIVE | Noted: 2025-05-09

## 2025-05-09 PROCEDURE — 25000003 PHARM REV CODE 250: Performed by: NURSE PRACTITIONER

## 2025-05-09 PROCEDURE — 99233 SBSQ HOSP IP/OBS HIGH 50: CPT | Mod: 25,,, | Performed by: STUDENT IN AN ORGANIZED HEALTH CARE EDUCATION/TRAINING PROGRAM

## 2025-05-09 PROCEDURE — 63600175 PHARM REV CODE 636 W HCPCS: Performed by: INTERNAL MEDICINE

## 2025-05-09 RX ORDER — LORAZEPAM 2 MG/ML
1 CONCENTRATE ORAL
Start: 2025-05-09

## 2025-05-09 RX ORDER — FAMOTIDINE 10 MG/ML
20 INJECTION, SOLUTION INTRAVENOUS DAILY
Status: DISCONTINUED | OUTPATIENT
Start: 2025-05-10 | End: 2025-05-09 | Stop reason: HOSPADM

## 2025-05-09 RX ORDER — HYDROMORPHONE HYDROCHLORIDE 1 MG/ML
0.5 INJECTION, SOLUTION INTRAMUSCULAR; INTRAVENOUS; SUBCUTANEOUS
Status: DISCONTINUED | OUTPATIENT
Start: 2025-05-09 | End: 2025-05-09

## 2025-05-09 RX ORDER — LORAZEPAM 2 MG/ML
1 INJECTION INTRAMUSCULAR EVERY 4 HOURS PRN
Status: DISCONTINUED | OUTPATIENT
Start: 2025-05-09 | End: 2025-05-09 | Stop reason: HOSPADM

## 2025-05-09 RX ORDER — IPRATROPIUM BROMIDE AND ALBUTEROL SULFATE 2.5; .5 MG/3ML; MG/3ML
3 SOLUTION RESPIRATORY (INHALATION) EVERY 4 HOURS PRN
Start: 2025-05-09

## 2025-05-09 RX ORDER — HYDROMORPHONE HYDROCHLORIDE 1 MG/ML
1 INJECTION, SOLUTION INTRAMUSCULAR; INTRAVENOUS; SUBCUTANEOUS
Status: DISCONTINUED | OUTPATIENT
Start: 2025-05-09 | End: 2025-05-09 | Stop reason: HOSPADM

## 2025-05-09 RX ORDER — HYDROMORPHONE HYDROCHLORIDE 5 MG/5ML
0.5 SOLUTION ORAL
Start: 2025-05-09

## 2025-05-09 RX ADMIN — MUPIROCIN: 20 OINTMENT TOPICAL at 08:05

## 2025-05-09 RX ADMIN — HYDROMORPHONE HYDROCHLORIDE 0.5 MG: 1 INJECTION, SOLUTION INTRAMUSCULAR; INTRAVENOUS; SUBCUTANEOUS at 09:05

## 2025-05-09 RX ADMIN — FAMOTIDINE 20 MG: 10 INJECTION, SOLUTION INTRAVENOUS at 08:05

## 2025-05-09 RX ADMIN — HYDROMORPHONE HYDROCHLORIDE 0.2 MG: 1 INJECTION, SOLUTION INTRAMUSCULAR; INTRAVENOUS; SUBCUTANEOUS at 07:05

## 2025-05-09 RX ADMIN — HYDROMORPHONE HYDROCHLORIDE 0.5 MG: 1 INJECTION, SOLUTION INTRAMUSCULAR; INTRAVENOUS; SUBCUTANEOUS at 08:05

## 2025-05-09 RX ADMIN — HYDROMORPHONE HYDROCHLORIDE 1 MG: 1 INJECTION, SOLUTION INTRAMUSCULAR; INTRAVENOUS; SUBCUTANEOUS at 11:05

## 2025-05-09 NOTE — DISCHARGE SUMMARY
[facility] Intensive [facility] Medicine  Discharge Summary      Patient Name: Frantz Sigala Jr.  MRN: [id number]  [facility]: [id number]  Patient Class: IP- Inpatient  Admission Date: 5/5/2025  Hospital Length of Stay: 4 days  Discharge Date and Time: 5/9/2025  1:45 PM  Attending Physician: Analilia att. providers found   Discharging Provider: DAVID Quesada MD  Primary Care Provider: Zachary Ramos MD    Primary Care Team: Networked reference to record PCT     HPI:   Mr. Frantz Sigala Jr. Is a 60 y.o. male, with PMH of CHF, urinary retention, on chronic anticoagulation, HTN, CKD-3, asthma, schizophrenia, blindness, deafness, who presented to [facility] ED on 5/5/25 due to low hemoglobin on outpatient labs. His family reports that he is staying at [facility] and his H&H were reported to be 6.3/20.6. EMS reported that they were informed he had vomiting and a fever on the day prior to arrival. His sibling reported that he had decreased PO intake over the past two days. He was evaluated in the ED with labs showing H&H of 7.4/23.3. He was recently admitted and treated for acute on chronic CHF, and acute anemia. During that admission he was treated with EPO injection and iron infusion as he is a Christianity. His HGB increased to 8.4 after treatments. Today he also had leukocytosis of 16k, with left shift. A metabolic panel showed potassium of 6.1 (with hemolysis; no peaked T waves on EKG), with BUN 92, Cr of 4.0, and was retaining urine. He was unable to provide a urine sample initially as he is retaining urine. A bladder scan showed 800 cc urine in his bladder, and mackay cath was placed. A cath UA was obtained and showed no UTI. He was negative for Influenza A/B, and Covid-19. He was treated with 1L of IV NS over 2 hours in the ED. He was admitted to inpatient status.     * No surgery found *      Hospital Course:   Admitted with ROBBY on CKD, worsening anemia. Nephrology consulted as well  as palliative team. Had decompensation of respiratory status and transferred to ICU; pulm/crit consulted. Renal function failed to improve. Goals of care discussions held and most in line with hospice care. Hospice contacted and amenable for inpatient hospice placement. With clinical stability and hospice arrangements in place, he was prepared for discharge to inpatient hospice.    Goals of Care Treatment Preferences:  Code Status: DNR          What is most important right now is to focus on improvement in condition but with limits to invasive therapies.  Accordingly, we have decided that the best plan to meet the patient's goals includes continuing with treatment.      SDOH Screening:  The patient was screened for utility difficulties, food insecurity, transport difficulties, housing insecurity, and interpersonal safety and there were no concerns identified this admission.     Consults:   Consults (From admission, onward)          Status Ordering Provider     Inpatient consult to Palliative Care  Once        Provider:  Jimmy Pham MD    Completed LALO SELLERS     Inpatient consult to Gastroenterology  Once        Provider:  Alejandra Lockhart MD    Completed LALO SELLERS     [facility]  [facility]        Provider:  Leeroy Cook NP    Completed LALO SELLERS            Assessment & Plan  Acute renal failure superimposed on stage 3 chronic kidney disease  Urinary retention  Leukocytosis  Chronic diastolic heart failure  Essential hypertension  Hyperkalemia  CKD (chronic kidney disease) stage 3, GFR 30-59 ml/min  Goals of care, counseling/discussion  - ROBBY on CKD secondary to combination of poor intake, diuresis, retention and intermittent lower BPs from baseline with concurrent hyperkalemia.  - Renally dose medications, avoid nephrotoxins, strict intake/output.  - Labs comparable; avoiding ACE, diuretics as feasible.  - Mentation may be worsening in setting of uptrending  BUN.  - Appreciate nephrology assistance. Palliative consulted in addition for goals of care discussion assistance.  Anemia  Acute pulmonary embolism  Asthma  Acute hypoxemic respiratory failure  - Patient / family are Jehovah's Witnesses; not interested in transfusion.  - Diagnosed several weeks ago with pulmonary embolism in setting of V/Q demonstrating intermediate probability in L lung; started apixaban at that time.  - Has had gradual decline in hemoglobin; iron studies collected during last stay most consistent with anemia of chronic disease with contributions from underlying CKD and small volume losses with blood draws during hospitalizations.  - Apixaban on hold.  - Despite treatment, renal function has worsened and mentation as well with rising BUN. Respiratory status doing better, however and weaned to RA.  - Goals discussions held with family at bedside with palliative. Given his clinical status, they are interested in hospice care at this point. Will discontinue lab draws, focus medications on symptom management.  - Arrange hospice company information sessions; likely transition to hospice setting in near future.  Schizophrenia  Blind  Deaf, bilateral  Nonverbal  - At baseline, patient is deaf, blind and cannot verbalize except some utterances that family has been able to understand over time.  - Continue risperidone 1mg PO qHS.  Comfort measures only status        Final Active Diagnoses:    Diagnosis Date Noted POA    PRINCIPAL PROBLEM:  Acute renal failure superimposed on stage 3 chronic kidney disease [N17.9, N18.30] 05/06/2025 Yes    Comfort measures only status [Z51.5] 05/09/2025 Not Applicable    Acute hypoxemic respiratory failure [J96.01] 05/07/2025 Yes    Nonverbal [R47.01] 05/07/2025 Yes    Goals of care, counseling/discussion [Z71.89] 05/07/2025 Not Applicable    Hyperkalemia [E87.5] 05/06/2025 Yes    Anemia [D64.9] 04/27/2025 Yes    Acute pulmonary embolism [I26.99] 04/18/2025 Yes     Chronic     Leukocytosis [D72.829] 04/16/2025 Yes    Urinary retention [R33.9] 04/11/2025 Yes    Chronic diastolic heart failure [I50.32] 03/01/2023 Yes    Essential hypertension [I10] 05/09/2019 Yes     Chronic    CKD (chronic kidney disease) stage 3, GFR 30-59 ml/min [N18.30] 05/09/2019 Yes     Chronic    Asthma [J45.909] 10/12/2015 Yes     Chronic    Schizophrenia [F20.9] 09/02/2014 Yes     Chronic    Blind [H54.7] 09/02/2014 Yes     Chronic    Deaf, bilateral [H91.93] 09/02/2014 Yes     Chronic      Problems Resolved During this Admission:       Discharged Condition: stable    Disposition: Hospice/Medical Facility    Follow Up:    Patient Instructions:   No discharge procedures on file.    Significant Diagnostic Studies:   CBC:  Recent Labs   Lab 05/07/25  0109 05/07/25  0606 05/08/25  0337   WBC 10.71 9.26 9.36   HGB 6.4* 6.2* 5.8*   HCT 20.3* 20.5* 18.1*    250 260   LYMPH 0.54*  5.0* 0.43*  4.6* 0.58*  6.2*   MONO 7.7  0.82 4.6  0.43 6.3  0.59   EOS 0.2  0.02 0.4  0.04 1.3  0.12     BMP:  Recent Labs   Lab 04/30/25  0420 05/05/25  2259 05/07/25  0109 05/07/25  0606 05/08/25  0242   *   < > 134* 135* 137   K 5.1   < > 6.2* 5.4* 5.9*      < > 101 103 105   CO2 22*   < > 20* 19* 17*   BUN 72*   < > 111* 111* 111*   CREATININE 1.9*   < > 3.8* 4.0* 3.9*   GLU 90   < > 119* 158* 88   CALCIUM 8.3*   < > 8.2* 8.5* 8.6*   MG 1.7   < > 1.6 1.6 1.8   PHOS 3.8  --   --  5.3* 5.4*    < > = values in this interval not displayed.     CMP:  Recent Labs   Lab 04/25/25  1212 04/26/25  0641 04/30/25  0420 05/05/25  2259 05/06/25  0426 05/07/25  0109 05/07/25  0606 05/08/25  0242      < > 134* 132*   < > 134* 135* 137   K 5.5*   < > 5.1 6.1*   < > 6.2* 5.4* 5.9*      < > 101 97   < > 101 103 105   CO2 25   < > 22* 21*   < > 20* 19* 17*   BUN 48*   < > 72* 92*   < > 111* 111* 111*   CREATININE 2.0*   < > 1.9* 4.0*   < > 3.8* 4.0* 3.9*   GLU 98   < > 90 123*   < > 119* 158* 88   CALCIUM 9.1   < >  8.3* 8.6*   < > 8.2* 8.5* 8.6*   MG  --    < > 1.7  --    < > 1.6 1.6 1.8   PHOS  --    < > 3.8  --   --   --  5.3* 5.4*   ALKPHOS 87  --   --  68  --  58  --   --    AST 24  --   --  37  --  20  --   --    ALT 18  --   --  32  --  25  --   --    BILITOT 0.2  --   --  0.3  --  0.2  --   --    PROT 8.8*  --   --  8.3  --  6.8  --   --    ALBUMIN 2.5*  --   --  2.2*  --  1.9* 1.8* 1.9*   ANIONGAP 10   < > 11 14   < > 13 13 15    < > = values in this interval not displayed.     Imaging Results              X-Ray Abdomen AP 1 View (Final result)  Result time 05/06/25 10:13:30      Final result by Mayte Wiseman MD (05/06/25 10:13:30)                   Impression:      Diffuse gaseous dilatation of small and large bowel loops which can be seen with ileus.  Moderate stool burden.  Continued follow-up recommended.      Electronically signed by: Mayte Wiseman  Date:    05/06/2025  Time:    10:13               Narrative:    EXAMINATION:  XR ABDOMEN AP 1 VIEW    CLINICAL HISTORY:  pain;    TECHNIQUE:  AP View(s) of the abdomen was performed.    COMPARISON:  04/22/2025    FINDINGS:  Diffuse gaseous dilatation of the stomach and small and large bowel loops.  Air identified in the rectum.  Moderate colonic stool burden.    Lung bases are clear.    Regional skeleton shows degenerative change.                                       X-Ray Chest AP Portable (Final result)  Result time 05/05/25 22:37:07      Final result by Erica Milton MD (05/05/25 22:37:07)                   Impression:      Small left pleural effusion and/or left basilar atelectasis or consolidation      Electronically signed by: Erica Milton MD  Date:    05/05/2025  Time:    22:37               Narrative:    EXAMINATION:  XR CHEST AP PORTABLE    CLINICAL HISTORY:  Personal history of other specified conditions    TECHNIQUE:  Single frontal view of the chest was performed.    COMPARISON:  05/01/2025.    FINDINGS:  Cardiac silhouette is normal in size.   Right lung is expanded and relatively clear with no significant pleural effusion.  There is left basilar opacification/consolidation with obscured left costophrenic angle.  Findings may reflect small left pleural effusion and/or mild left basilar atelectasis or consolidation.  No pneumothorax.  No acute osseous abnormality identified.                                      Pending Diagnostic Studies:       None           Medications:  Reconciled Home Medications:      Medication List        START taking these medications      HYDROmorphone 1 mg/mL Liqd  Take 0.5 mLs (0.5 mg total) by mouth every hour as needed (Pain, Shortness of Breath).     LORazepam 2 mg/mL Conc  Commonly known as: ATIVAN  Take 0.5 mLs (1 mg total) by mouth every hour as needed (Anxiety).            CHANGE how you take these medications      albuterol-ipratropium 2.5 mg-0.5 mg/3 mL nebulizer solution  Commonly known as: DUO-NEB  Take 3 mLs by nebulization every 4 (four) hours as needed for Shortness of Breath or Wheezing. Rescue  What changed:   when to take this  reasons to take this            CONTINUE taking these medications      ondansetron 4 MG Tbdl  Commonly known as: ZOFRAN-ODT  Take 1 tablet (4 mg total) by mouth every 6 (six) hours as needed (nausea/vomiting).     risperiDONE 1 MG tablet  Commonly known as: RISPERDAL  Take 1 tablet (1 mg total) by mouth nightly.            STOP taking these medications      acetaminophen 325 MG tablet  Commonly known as: TYLENOL     albuterol 90 mcg/actuation inhaler  Commonly known as: PROVENTIL/VENTOLIN HFA     ammonium lactate 12 % lotion  Commonly known as: LAC-HYDRIN     apixaban 5 mg Tab  Commonly known as: ELIQUIS     carvediloL 25 MG tablet  Commonly known as: COREG     fluticasone propionate 50 mcg/actuation nasal spray  Commonly known as: FLONASE     furosemide 20 MG tablet  Commonly known as: LASIX     lisinopriL 40 MG tablet  Commonly known as: PRINIVIL,ZESTRIL     melatonin 3 mg  tablet  Commonly known as: MELATIN     NIFEdipine 60 MG (OSM) 24 hr tablet  Commonly known as: PROCARDIA-XL     polyethylene glycol 17 gram Pwpk  Commonly known as: GLYCOLAX     tamsulosin 0.4 mg Cap  Commonly known as: FLOMAX              Indwelling Lines/Drains at time of discharge:   Lines/Drains/Airways       Drain  Duration                  Urethral Catheter 05/06/25 0047 Straight-tip 16 Fr. 3 days                    Time spent on the discharge of patient: 35 minutes         DAVID Quesada MD  Department of Hospital Medicine  [facility] - Intensive Care ([place])

## 2025-05-09 NOTE — PLAN OF CARE
SW called and left a VM with Lara [phone] with [facility] requesting a return call. Cm will continue to follow pt through transitions of care and assist with any discharge needs.    Douglas Johnson, MSPEARL  [phone]    Future Appointments   Date Time Provider Department Center   6/5/2025 10:00 AM Sunny Kwon PA-C [facility] ORTHO [facility]   6/12/2025  3:20 PM Adolfo Woods MD [id number]        05/09/25 0810   Post-Acute Status   Post-Acute Authorization Hospice   Discharge Plan   Discharge Plan A Inpatient Hospice

## 2025-05-09 NOTE — PLAN OF CARE
[facility]  Department of Hospital Medicine  [address] [place] [address]  [phone] after hours  [phone] fax    HOSPICE  ORDERS    05/09/2025    Admit to Hospice:  Inpatient Service     Diagnoses:   Active Hospital Problems    Diagnosis  POA    *Acute renal failure superimposed on stage 3 chronic kidney disease [N17.9, N18.30]  Yes    Comfort measures only status [Z51.5]  Not Applicable    Acute hypoxemic respiratory failure [J96.01]  Yes    Nonverbal [R47.01]  Yes    Goals of care, counseling/discussion [Z71.89]  Not Applicable    Hyperkalemia [E87.5]  Yes    Anemia [D64.9]  Yes    Acute pulmonary embolism [I26.99]  Yes     Chronic    Leukocytosis [D72.829]  Yes    Urinary retention [R33.9]  Yes    Chronic diastolic heart failure [I50.32]  Yes    Essential hypertension [I10]  Yes     Chronic    CKD (chronic kidney disease) stage 3, GFR 30-59 ml/min [N18.30]  Yes     Chronic    Asthma [J45.909]  Yes     Chronic    Schizophrenia [F20.9]  Yes     Chronic    Blind [H54.7]  Yes     Chronic    Deaf, bilateral [H91.93]  Yes     Chronic      Resolved Hospital Problems   No resolved problems to display.       Hospice Qualifying Diagnoses:        Patient has a life expectancy < 6 months due to:  Primary Hospice Diagnosis: Renal failure  Comorbid Conditions Contributing to Decline: CHF, urinary retention, anemia, schizophrenia    Vital Signs: Routine per Hospice Protocol.    Code Status: DNR    Allergies:   Review of patient's allergies indicates:   Allergen Reactions    Penicillins Other (See Comments)       Diet: Pleasure feeds as tolerated    Activities: As tolerated    Goals of Care Treatment Preferences:  Code Status: DNR          What is most important right now is to focus on improvement in condition but with limits to invasive therapies.  Accordingly, we have decided that the best plan to meet the patient's goals includes continuing with treatment.      Nursing:  Per Hospice Routine.    Routine Skin for Bedridden Patients: Apply moisture barrier cream to all skin folds and   wet areas in perineal area daily and after baths and all bowel movements.    Oxygen: O2 via NC as needed for dyspnea    Medications:      Medication List        START taking these medications      HYDROmorphone 1 mg/mL Liqd  Take 0.5 mLs (0.5 mg total) by mouth every hour as needed (Pain, Shortness of Breath).     LORazepam 2 mg/mL Conc  Commonly known as: ATIVAN  Take 0.5 mLs (1 mg total) by mouth every hour as needed (Anxiety).            CHANGE how you take these medications      albuterol-ipratropium 2.5 mg-0.5 mg/3 mL nebulizer solution  Commonly known as: DUO-NEB  Take 3 mLs by nebulization every 4 (four) hours as needed for Shortness of Breath or Wheezing. Rescue  What changed:   when to take this  reasons to take this            CONTINUE taking these medications      ondansetron 4 MG Tbdl  Commonly known as: ZOFRAN-ODT  Take 1 tablet (4 mg total) by mouth every 6 (six) hours as needed (nausea/vomiting).     risperiDONE 1 MG tablet  Commonly known as: RISPERDAL  Take 1 tablet (1 mg total) by mouth nightly.            STOP taking these medications      acetaminophen 325 MG tablet  Commonly known as: TYLENOL     albuterol 90 mcg/actuation inhaler  Commonly known as: PROVENTIL/VENTOLIN HFA     ammonium lactate 12 % lotion  Commonly known as: LAC-HYDRIN     apixaban 5 mg Tab  Commonly known as: ELIQUIS     carvediloL 25 MG tablet  Commonly known as: COREG     fluticasone propionate 50 mcg/actuation nasal spray  Commonly known as: FLONASE     furosemide 20 MG tablet  Commonly known as: LASIX     lisinopriL 40 MG tablet  Commonly known as: PRINIVIL,ZESTRIL     melatonin 3 mg tablet  Commonly known as: MELATIN     NIFEdipine 60 MG (OSM) 24 hr tablet  Commonly known as: PROCARDIA-XL     polyethylene glycol 17 gram Pwpk  Commonly known as: GLYCOLAX     tamsulosin 0.4 mg Cap  Commonly known as: FLOMAX             Future Orders:  Hospice Medical Director may dictate new orders for comfortable care measures & sign death certificate.    _________________________________  D Sunny Quesada MD  05/09/2025

## 2025-05-09 NOTE — EICU
Virtual ICU Quality Rounds    Admit Date: 5/5/2025  Hospital Day: 4    ICU Day: 2d 3h    24H Vital Sign Range:  Temp:  [98.2 °F (36.8 °C)-99.3 °F (37.4 °C)]   Pulse:  []   Resp:  [14-26]   BP: (139-209)/()   SpO2:  [93 %-100 %]     VICU Surveillance Screening    Daily review of  line necessity(optional): Urinary catheter in place    [place] Indications : Critically ill in the intensive care unit requiring hourly monitoring     LDA reconciliation : Yes

## 2025-05-09 NOTE — PROGRESS NOTES
[facility] - Intensive Care ([facility]  Palliative Medicine  Progress Note    Patient Name: Frantz Sigala Jr.  MRN: [id number]  Admission Date: 5/5/2025  Hospital Length of Stay: 4 days  Code Status: DNR   Attending Provider: LALO Quesada MD  Consulting Provider: Jimmy Pham MD  Primary Care Physician: Zachary Ramos MD  Principal Problem:Acute renal failure superimposed on stage 3 chronic kidney disease    Patient information was obtained from past medical records and primary team.      Assessment/Plan:     Ophtho  Blind  - noted    ENT  Deaf, bilateral  - noted     Renal/  * Acute renal failure superimposed on stage 3 chronic kidney disease  - noted continued decline with rising BUN and CR steady at 4 without improvement (baseline around 2)  - nephrology discussed with family regarding him not being a HD candidate. Family in agreement.   - contributes to poor prognosis  - focus on comfort care now per family discussions    Palliative Care  Comfort measures only status  - noted increased work of breathing and accessory muscle usage + abdominal movement along with restlessness.   - RDOS score 3+ despite hydromorphone 0.5mg IV dose  - recommend increase to hydromorphone 1mg IV q1h PRN in hopes of improved comfort vs pain/dyspnea. Discussed with primary team   - agree with continue ativen 1mg IV q4h PRN  - can consider addition of glycopyrolate 0.2mg IV q6h PRN vs secretions    Goals of care, counseling/discussion  5/9/2025:  - chart reviewed in depth  - discussed with primary team, bedside nurse.   - patient seen at bedside along with palliative RN Nora and bedside nurse  - patient noted to be sleeping with occasional snoring, but also noted to have increased work of breathing, accessory muscle usage, abdominal movement with breathing and some restlessness. This is despite dose of hydromorphone 0.5mg IV PRN.   - please see comfort focused care section of note for symptom management discussion  -  "noted plan per primary team of inpatient hospice today. They and case management had discussions with family about this already.   [] next step of transition to inpatient hospice noted and agree. Continued symptom management.   [] his mother (next of kin) deferred medical conversation/decision making to her children (patients siblings) Adonay Eliana and Wendy, who are medical decision making leads as appointed by family/siblings.   [] DNR/DNI per family clear wishes        I will follow-up with patient. Please contact us if you have any additional questions.    Subjective:     Chief Complaint:   Chief Complaint   Patient presents with    Abnormal Lab     EMS reports pt came from [facility] for critical lab values. Low HGB 6.3 and HCT 20.6. EMS reports vomiting and fever yesterday, but no vomiting or fever today. EMS report patient is deaf and blind. Patient takes eliquis daily.       HPI:   Per HPI: "Mr. Frantz Sigala Jr. Is a 60 y.o. male, with PMH of CHF, urinary retention, on chronic anticoagulation, HTN, CKD-3, asthma, schizophrenia, blindness, deafness, who presented to [facility] ED on 5/5/25 due to low hemoglobin on outpatient labs. His family reports that he is staying at [facility] and his H&H were reported to be 6.3/20.6. EMS reported that they were informed he had vomiting and a fever on the day prior to arrival. His sibling reported that he had decreased PO intake over the past two days. He was evaluated in the ED with labs showing H&H of 7.4/23.3. He was recently admitted and treated for acute on chronic CHF, and acute anemia. During that admission he was treated with EPO injection and iron infusion as he is a [facility]. His HGB increased to 8.4 after treatments. Today he also had leukocytosis of 16k, with left shift. A metabolic panel showed potassium of 6.1 (with hemolysis; no peaked T waves on EKG), with BUN 92, Cr of 4.0, and was retaining urine. He was unable to " "provide a urine sample initially as he is retaining urine. A bladder scan showed 800 cc urine in his bladder, and mackay cath was placed. A cath UA was obtained and showed no UTI. He was negative for Influenza A/B, and Covid-19. He was treated with 1L of IV NS over 2 hours in the ED. He was admitted to inpatient status."    Palliative medicine consulted for assistance with [facility]    Hospital Course:  No notes on file      Medications:  Continuous Infusions:  Scheduled Meds:   famotidine (PF)  20 mg Intravenous Daily    ferric gluconate (Ferrlecit) IVPB  125 mg Intravenous Every Other Day    lubiprostone  24 mcg Oral Daily with breakfast    mupirocin   Nasal BID    risperiDONE  1 mg Oral Nightly     PRN Meds:  Current Facility-Administered Medications:     acetaminophen, 650 mg, Oral, Q6H PRN    albuterol-ipratropium, 3 mL, Nebulization, Q4H PRN    HYDROmorphone, 1 mg, Intravenous, Q1H PRN    lorazepam, 1 mg, Intravenous, Q4H PRN    ondansetron, 4 mg, Intravenous, Q6H PRN    Objective:     Vital Signs (Most Recent):  Temp: 98.8 °F (37.1 °C) (05/09/25 0706)  Pulse: 90 (05/09/25 0800)  Resp: 20 (05/09/25 0955)  BP: (!) 140/70 (05/09/25 0800)  SpO2: 96 % (05/09/25 0800) Vital Signs (24h Range):  Temp:  [98.2 °F (36.8 °C)-99.3 °F (37.4 °C)] 98.8 °F (37.1 °C)  Pulse:  [] 90  Resp:  [14-26] 20  SpO2:  [93 %-100 %] 96 %  BP: (133-209)/() 140/70     Weight: 90.7 kg (200 lb)  Body mass index is 29.53 kg/m².       Physical Exam  Vitals and nursing note reviewed.   Constitutional:       General: He is not in acute distress.     Comments: Sleeping. Eyes closed   Eyes:      Comments: Eyes closed   Pulmonary:      Comments: Breathing room air. Noted increased work of breathing/accessory muscle usage at times.  Neurological:      Mental Status: He is alert.      Comments: Sleeping. 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 Unable to participate meaningfully in conversation. Occasional restlessness noted.             Review " "of Symptoms        Living Arrangements:  Lives with family    Psychosocial/Cultural:   See Palliative Psychosocial Note: Yes  - lives at home with his mother and sister Eliana   - has 8 other siblings who are well involved in his care  - increasingly bedbound over the past month   - family is able to interpret his needs at times based on his physical cues   **Primary  to Follow**  Palliative Care  Consult: No        Advance Care Planning  Advance Directives:   Do Not Resuscitate Status: Yes    Medical Power of : His mother Ms. Diamond Sigala is next of kin medical decision maker and has deferred to her 3 children Eliana, Adonay and Wendy to be the lead medical decision makers of the siblings.      Decision Making:  Patient unable to communicate due to disease severity/cognitive impairment and Family answered questions  Goals of Care: What is most important right now is to focus on improvement in condition but with limits to invasive therapies. Accordingly, we have decided that the best plan to meet the patient's goals includes continuing with treatment.         Significant Labs: reviewed  CBC:   Recent Labs   Lab 05/08/25  0337   WBC 9.36   HGB 5.8*   HCT 18.1*   MCV 79*        BMP:  No results for input(s): "GLU", "NA", "K", "CL", "CO2", "BUN", "CREATININE", "CALCIUM", "MG" in the last 24 hours.  LFT:  Lab Results   Component Value Date    AST 20 05/07/2025    ALKPHOS 58 05/07/2025    BILITOT 0.2 05/07/2025     Albumin:   Albumin   Date Value Ref Range Status   05/08/2025 1.9 (L) 3.5 - 5.2 g/dL Final     Comment:     Pediatric tubes, pleasePediatric tubes, please   12/06/2022 3.5 3.5 - 5.2 g/dL Final     Protein:   Protein Total   Date Value Ref Range Status   05/07/2025 6.8 6.0 - 8.4 gm/dL Final     Total Protein   Date Value Ref Range Status   12/06/2022 8.9 (H) 6.0 - 8.4 g/dL Final     Lactic acid:   Lab Results   Component Value Date    LACTATE 1.1 04/27/2025    LACTATE 0.9 " 04/14/2025       Significant Imaging: reviewed    35 minutes face to face time in discussion of symptom assessment, emotional support, coordination of care, exploring burden/ benefit of various approaches of treatment and discharge planning.  This also includes non-face to face time preparing to see the patient (eg, review of tests/imaging), obtaining and/or reviewing separately obtained history, documenting clinical information in the electronic or other health record, independently interpreting results and communicating results to the patient/family/caregiver, or care coordinator.       Jimmy Pham MD  Palliative Medicine  Religion  Intensive [facility] [place] ([place])

## 2025-05-09 NOTE — ASSESSMENT & PLAN NOTE
- noted increased work of breathing and accessory muscle usage + abdominal movement along with restlessness.   - RDOS score 3+ despite hydromorphone 0.5mg IV dose  - recommend increase to hydromorphone 1mg IV q1h PRN in hopes of improved comfort vs pain/dyspnea. Discussed with primary team   - agree with continue ativen 1mg IV q4h PRN  - can consider addition of glycopyrolate 0.2mg IV q6h PRN vs secretions

## 2025-05-09 NOTE — ASSESSMENT & PLAN NOTE
- noted continued decline with rising BUN and CR steady at 4 without improvement (baseline around 2)  - nephrology discussed with family regarding him not being a HD candidate. Family in agreement.   - contributes to poor prognosis  - focus on comfort care now per family discussions

## 2025-05-09 NOTE — EICU
Intervention Initiated From:  COR / JOSEU    [place] intervened regarding:  Rounding (Video assessment)    VICU Night Rounds Checklist  24H Vital Sign Range:  Temp:  [98.2 °F (36.8 °C)-98.8 °F (37.1 °C)]   Pulse:  []   Resp:  [17-22]   BP: (117-191)/(55-94)   SpO2:  [93 %-100 %]     Video rounds and LDA reconciliation

## 2025-05-09 NOTE — PLAN OF CARE
JOSESITO spoke with Yamel with [facility] [phone], she stated that report can be called to Yin at [phone], pt will go to room 12. JOSESITO updated bedside nurse with report information. Josesito was informed that transport can be arranged as soon as pt can be ready, josesito requested ETA from bedside nursing. JOESSITO contacted pt's sister Wendy [phone] to inform her that the pt will dc later today. Wendy agreed with dc plan stating she already signed consents for [facility]. JOSESITO informed family that transport will likely be set for 12pm. Rounds completed on pt. All questions addressed. Bedside nurse to discuss d/c medications. Discussed importance to attend all f/u appts and take medications as prescribed. Verbalized understanding. Case Management to sign off.     JEREMIAS Whitfield  [phone]    Future Appointments   Date Time Provider Department Center   6/5/2025 10:00 AM Sunny Kwon PA-C [facility]   6/12/2025  3:20 PM Adolfo Woods MD [id number]        05/09/25 1021   Final Note   Assessment Type Final Discharge Note   Anticipated Discharge Disposition [facility]   What phone number can be called within the next 1-3 days to see how you are doing after discharge? [phone]   Post-Acute Status   Post-Acute Authorization Hospice   Hospice Status Set-up Complete/Auth obtained   Discharge Delays (!) [facility] Arranged Transportation

## 2025-05-09 NOTE — SUBJECTIVE & OBJECTIVE
Medications:  Continuous Infusions:  Scheduled Meds:   famotidine (PF)  20 mg Intravenous Daily    ferric gluconate (Ferrlecit) IVPB  125 mg Intravenous Every Other Day    lubiprostone  24 mcg Oral Daily with breakfast    mupirocin   Nasal BID    risperiDONE  1 mg Oral Nightly     PRN Meds:  Current Facility-Administered Medications:     acetaminophen, 650 mg, Oral, Q6H PRN    albuterol-ipratropium, 3 mL, Nebulization, Q4H PRN    HYDROmorphone, 1 mg, Intravenous, Q1H PRN    lorazepam, 1 mg, Intravenous, Q4H PRN    ondansetron, 4 mg, Intravenous, Q6H PRN    Objective:     Vital Signs (Most Recent):  Temp: 98.8 °F (37.1 °C) (05/09/25 0706)  Pulse: 90 (05/09/25 0800)  Resp: 20 (05/09/25 0955)  BP: (!) 140/70 (05/09/25 0800)  SpO2: 96 % (05/09/25 0800) Vital Signs (24h Range):  Temp:  [98.2 °F (36.8 °C)-99.3 °F (37.4 °C)] 98.8 °F (37.1 °C)  Pulse:  [] 90  Resp:  [14-26] 20  SpO2:  [93 %-100 %] 96 %  BP: (133-209)/() 140/70     Weight: 90.7 kg (200 lb)  Body mass index is 29.53 kg/m².       Physical Exam  Vitals and nursing note reviewed.   Constitutional:       General: He is not in acute distress.     Comments: Sleeping. Eyes closed   Eyes:      Comments: Eyes closed   Pulmonary:      Comments: Breathing room air. Noted increased work of breathing/accessory muscle usage at times.  Neurological:      Mental Status: He is alert.      Comments: Sleeping. 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 Unable to participate meaningfully in conversation. Occasional restlessness noted.             Review of Symptoms        Living Arrangements:  Lives with family    Psychosocial/Cultural:   See Palliative Psychosocial Note: Yes  - lives at home with his mother and sister Eliana   - has 8 other siblings who are well involved in his care  - increasingly bedbound over the past month   - family is able to interpret his needs at times based on his physical cues   **Primary  to Follow**  Palliative Care  " Consult: No        Advance Care Planning   Advance Directives:   Do Not Resuscitate Status: Yes    Medical Power of : His mother Ms. Diamond Sigala is next of kin medical decision maker and has deferred to her 3 children Eliana, Adonay and Wendy to be the lead medical decision makers of the siblings.      Decision Making:  Patient unable to communicate due to disease severity/cognitive impairment and Family answered questions  Goals of Care: What is most important right now is to focus on improvement in condition but with limits to invasive therapies. Accordingly, we have decided that the best plan to meet the patient's goals includes continuing with treatment.         Significant Labs: reviewed  CBC:   Recent Labs   Lab 05/08/25  0337   WBC 9.36   HGB 5.8*   HCT 18.1*   MCV 79*        BMP:  No results for input(s): "GLU", "NA", "K", "CL", "CO2", "BUN", "CREATININE", "CALCIUM", "MG" in the last 24 hours.  LFT:  Lab Results   Component Value Date    AST 20 05/07/2025    ALKPHOS 58 05/07/2025    BILITOT 0.2 05/07/2025     Albumin:   Albumin   Date Value Ref Range Status   05/08/2025 1.9 (L) 3.5 - 5.2 g/dL Final     Comment:     Pediatric tubes, pleasePediatric tubes, please   12/06/2022 3.5 3.5 - 5.2 g/dL Final     Protein:   Protein Total   Date Value Ref Range Status   05/07/2025 6.8 6.0 - 8.4 gm/dL Final     Total Protein   Date Value Ref Range Status   12/06/2022 8.9 (H) 6.0 - 8.4 g/dL Final     Lactic acid:   Lab Results   Component Value Date    LACTATE 1.1 04/27/2025    LACTATE 0.9 04/14/2025       Significant Imaging: reviewed  "

## 2025-05-09 NOTE — ASSESSMENT & PLAN NOTE
5/9/2025:  - chart reviewed in depth  - discussed with primary team, bedside nurse.   - patient seen at bedside along with palliative RN Nora and bedside nurse  - patient noted to be sleeping with occasional snoring, but also noted to have increased work of breathing, accessory muscle usage, abdominal movement with breathing and some restlessness. This is despite dose of hydromorphone 0.5mg IV PRN.   - please see comfort focused care section of note for symptom management discussion  - noted plan per primary team of inpatient hospice today. They and case management had discussions with family about this already.   [] next step of transition to inpatient hospice noted and agree. Continued symptom management.   [] his mother (next of kin) deferred medical conversation/decision making to her children (patients siblings) Eliana Urias and Wendy, who are medical decision making leads as appointed by family/siblings.   [] DNR/DNI per family clear wishes

## 2025-05-21 RX ORDER — APIXABAN 5 MG/1
TABLET, FILM COATED ORAL
Qty: 64 TABLET | Refills: 0 | Status: SHIPPED | OUTPATIENT
Start: 2025-05-21

## 2025-05-23 ENCOUNTER — OUTPATIENT CASE MANAGEMENT (OUTPATIENT)
Dept: ADMINISTRATIVE | Facility: OTHER | Age: 60
End: 2025-05-23
Payer: MEDICARE

## 2025-05-23 NOTE — PROGRESS NOTES
Outpatient Care Management  Plan of Care Follow Up Visit    Patient: Frantz Sigala Jr.  MRN: [id number]  Date of Service: 2025  Completed by: Marojrie Redman RN  Referral Date: 04/15/2025    Reason for Visit   Patient presents with    Case Closure       Brief Summary: Case closure, patient .  Dr. Ramos and Chart Correction notified of this.

## 2025-07-30 NOTE — ASSESSMENT & PLAN NOTE
- Chronic, with persistent elevation.  - Continue lisinopril 40mg PO daily and increase carvedilol to 12.5 mg PO BID today  - Stopped furosemide 40mg PO daily, HCTZ 25mg PO daily due to low Na levels  - Continue labetalol 10mg IV q4hr PRN.   Kirstie STRAUSS, LIZBETH  & Jennifer W, ultrasound